# Patient Record
Sex: FEMALE | Race: BLACK OR AFRICAN AMERICAN | NOT HISPANIC OR LATINO | Employment: FULL TIME | URBAN - METROPOLITAN AREA
[De-identification: names, ages, dates, MRNs, and addresses within clinical notes are randomized per-mention and may not be internally consistent; named-entity substitution may affect disease eponyms.]

---

## 2017-04-06 ENCOUNTER — OFFICE VISIT (OUTPATIENT)
Dept: OBSTETRICS AND GYNECOLOGY | Facility: CLINIC | Age: 32
End: 2017-04-06
Payer: COMMERCIAL

## 2017-04-06 ENCOUNTER — TELEPHONE (OUTPATIENT)
Dept: OBSTETRICS AND GYNECOLOGY | Facility: CLINIC | Age: 32
End: 2017-04-06

## 2017-04-06 ENCOUNTER — HOSPITAL ENCOUNTER (OUTPATIENT)
Dept: RADIOLOGY | Facility: OTHER | Age: 32
Discharge: HOME | End: 2017-04-06
Attending: NURSE PRACTITIONER
Payer: COMMERCIAL

## 2017-04-06 VITALS
SYSTOLIC BLOOD PRESSURE: 114 MMHG | WEIGHT: 169.56 LBS | BODY MASS INDEX: 32.01 KG/M2 | HEIGHT: 61 IN | DIASTOLIC BLOOD PRESSURE: 82 MMHG

## 2017-04-06 DIAGNOSIS — R10.2 PELVIC PAIN IN FEMALE: Primary | ICD-10-CM

## 2017-04-06 DIAGNOSIS — R10.2 PELVIC PAIN IN FEMALE: ICD-10-CM

## 2017-04-06 LAB
CANDIDA RRNA VAG QL PROBE: NEGATIVE
G VAGINALIS RRNA GENITAL QL PROBE: NEGATIVE
T VAGINALIS RRNA GENITAL QL PROBE: NEGATIVE

## 2017-04-06 PROCEDURE — 87591 N.GONORRHOEAE DNA AMP PROB: CPT

## 2017-04-06 PROCEDURE — 76856 US EXAM PELVIC COMPLETE: CPT | Mod: 26,,, | Performed by: RADIOLOGY

## 2017-04-06 PROCEDURE — 76856 US EXAM PELVIC COMPLETE: CPT | Mod: TC

## 2017-04-06 PROCEDURE — 1160F RVW MEDS BY RX/DR IN RCRD: CPT | Mod: S$GLB,,, | Performed by: NURSE PRACTITIONER

## 2017-04-06 PROCEDURE — 76830 TRANSVAGINAL US NON-OB: CPT | Mod: 26,,, | Performed by: RADIOLOGY

## 2017-04-06 PROCEDURE — 87480 CANDIDA DNA DIR PROBE: CPT

## 2017-04-06 PROCEDURE — 99999 PR PBB SHADOW E&M-EST. PATIENT-LVL III: CPT | Mod: PBBFAC,,, | Performed by: NURSE PRACTITIONER

## 2017-04-06 PROCEDURE — 99213 OFFICE O/P EST LOW 20 MIN: CPT | Mod: S$GLB,,, | Performed by: NURSE PRACTITIONER

## 2017-04-06 NOTE — TELEPHONE ENCOUNTER
Returned pt call regarding experiencing right sided pelvic pain. No answer.  Left VM message to return call to clinic.

## 2017-04-06 NOTE — TELEPHONE ENCOUNTER
Returned pt call regarding experiencing pelvic pain.  Offer pt an appt in the urgent care clinic.  Informed pt of appt date and time.  Pt verbalized understanding.

## 2017-04-06 NOTE — PROGRESS NOTES
CC: Pelvic pain    Pt is 34 y.o. Here in urgent GYN care for evaluation of pelvic pain. The pain is described as cramping and stabbing, and is 5/10 in intensity. Pain was worse last night and could not go to sleep. Pain is located in the RLQ area and occasionally radiates down both legs. Onset was sudden occurring 2 days ago. Symptoms have been gradually worsening since. Aggravating factors: activity and a full bladder. Alleviating factors: sitting up and heating pad. Advil gave moderate relief. Associated symptoms: none. The patient denies constipation, fever, headache, nausea, sweats and vomiting. Risk factors for pelvic/abdominal pain include myomectomy. Pt stopped OCPs in December 2016-pt got . Pt is about to start her cycle. LMP 3/10/17. Pt reports h/o ovarian cysts and has had one rupture before.     Pt sees Dr. Mcmullen for her OB/GYN care.     ROS:  GENERAL: Feeling well overall. Denies fever or chills.   SKIN: Denies rash or lesions.   HEAD: Denies head injury or headache.   NODES: Denies enlarged lymph nodes.   CHEST: Denies chest pain or shortness of breath.   CARDIOVASCULAR: Denies palpitations or left sided chest pain.   ABDOMEN: No abdominal pain, constipation, diarrhea, nausea, vomiting or rectal bleeding.   URINARY: No dysuria, hematuria, or burning on urination.  REPRODUCTIVE: See HPI.   BREASTS: Denies pain, lumps, or nipple discharge.   HEMATOLOGIC: No easy bruisability or excessive bleeding.   MUSCULOSKELETAL: Denies joint pain or swelling.   NEUROLOGIC: Denies syncope or weakness.   PSYCHIATRIC: Denies depression, anxiety or mood swings.    PE:   APPEARANCE: Well nourished, well developed, White female in no acute distress.  VULVA: No lesions. Normal external female genitalia.  URETHRAL MEATUS: Normal size and location, no lesions, no prolapse.  URETHRA: No masses, tenderness, or prolapse.  VAGINA: Moist. No lesions or lacerations noted. No abnormal discharge present. No odor present.    CERVIX: No lesions or discharge. No cervical motion tenderness.   UTERUS: Normal size, regular shape, mobile, non-tender.  ADNEXA: No tenderness. No fullness or masses palpated in the adnexal regions.   ANUS PERINEUM: Normal.      Diagnosis:  1. Pelvic pain in female      Plan   GCCT and affirm pending  Pelvic u/s ordered  Discussed possible ovarian cyst or mittelschmertz pain  Follow-up pending results

## 2017-04-06 NOTE — TELEPHONE ENCOUNTER
----- Message from Hemalatha Chan sent at 4/6/2017 11:48 AM CDT -----  Contact: self  Pt returning a missed call, pt can be reached at [phone].

## 2017-04-06 NOTE — TELEPHONE ENCOUNTER
----- Message from Yasemin Oropeza sent at 4/6/2017  9:43 AM CDT -----  Contact: pt  x_  1st Request  _  2nd Request  _  3rd Request      Who:pt    Why: pain since last Tuesday in right ovary (pelvic area)    What Number to Call Back: [phone]    When to Expect a call back: (Before the end of the day)   -- if call after 3:00 call back will be tomorrow.

## 2017-04-07 ENCOUNTER — TELEPHONE (OUTPATIENT)
Dept: OBSTETRICS AND GYNECOLOGY | Facility: CLINIC | Age: 32
End: 2017-04-07

## 2017-04-07 LAB
C TRACH DNA SPEC QL NAA+PROBE: NOT DETECTED
N GONORRHOEA DNA SPEC QL NAA+PROBE: NOT DETECTED

## 2017-04-07 RX ORDER — ALBUTEROL SULFATE 90 UG/1
2 AEROSOL, METERED RESPIRATORY (INHALATION) EVERY 6 HOURS PRN
Qty: 18 G | Refills: 3 | Status: SHIPPED | OUTPATIENT
Start: 2017-04-07 | End: 2019-02-05 | Stop reason: SDUPTHER

## 2017-04-07 NOTE — TELEPHONE ENCOUNTER
----- Message from Hemalatha Chan sent at 4/7/2017 10:01 AM CDT -----  Contact: self  Pt needing a call back regarding her appt to Urgent Care on yesterday, she can be reached at [phone].

## 2017-04-07 NOTE — TELEPHONE ENCOUNTER
Returned pt call regarding her urgent care appt.  Pt stated that she seen her vaginosis culture came back negative but she did not receive her U/S results.  Informed pt that [employer] emailed her the results through [employer].  Read results to pt and inform her that the U/S showed 3 large fibroids.  Scheduled consult with Dr Mcmullen.  Informed pt to of appt date and time.  Pt verbalized understanding.  Letter sent out.

## 2017-04-12 ENCOUNTER — TELEPHONE (OUTPATIENT)
Dept: INTERNAL MEDICINE | Facility: CLINIC | Age: 32
End: 2017-04-12

## 2017-04-12 ENCOUNTER — OFFICE VISIT (OUTPATIENT)
Dept: INTERNAL MEDICINE | Facility: CLINIC | Age: 32
End: 2017-04-12
Payer: COMMERCIAL

## 2017-04-12 VITALS
HEIGHT: 62 IN | RESPIRATION RATE: 16 BRPM | BODY MASS INDEX: 31.68 KG/M2 | TEMPERATURE: 98 F | HEART RATE: 76 BPM | SYSTOLIC BLOOD PRESSURE: 102 MMHG | DIASTOLIC BLOOD PRESSURE: 62 MMHG | WEIGHT: 172.19 LBS

## 2017-04-12 DIAGNOSIS — K46.9 HERNIA: Primary | ICD-10-CM

## 2017-04-12 DIAGNOSIS — Z00.00 ROUTINE GENERAL MEDICAL EXAMINATION AT A HEALTH CARE FACILITY: Primary | ICD-10-CM

## 2017-04-12 PROCEDURE — 99999 PR PBB SHADOW E&M-EST. PATIENT-LVL III: CPT | Mod: PBBFAC,,, | Performed by: INTERNAL MEDICINE

## 2017-04-12 PROCEDURE — 1160F RVW MEDS BY RX/DR IN RCRD: CPT | Mod: S$GLB,,, | Performed by: INTERNAL MEDICINE

## 2017-04-12 PROCEDURE — 99213 OFFICE O/P EST LOW 20 MIN: CPT | Mod: S$GLB,,, | Performed by: INTERNAL MEDICINE

## 2017-04-12 NOTE — MR AVS SNAPSHOT
[facility]   [facility]  [place] [address]  Phone: [phone]  Fax: [phone]                  Melita Shell   2017 2:20 PM   Office Visit    Description:  Female : 1985   Provider:  Claudia Barajas MD   Department:  [facility]           Reason for Visit     Follow-up           Diagnoses this Visit        Comments    Hernia    -  Primary            To Do List           Future Appointments        Provider Department Dept Phone    2017 4:15 PM Milagros Mcmullen MD [facility] [address] [phone]    2017 2:15 PM Milagros Mcmullen MD [facility] [address] [phone]      Goals (5 Years of Data)     None      Follow-Up and Disposition     Return in about 3 months (around 2017).      [facility]     [facility] -  Assistance  Unless otherwise directed by your provider, please contact [facility], our nurse care line that is available for  assistance.     Registered nurses in the [facility] Center provide: appointment scheduling, clinical advisement, health education, and other advisory services.  Call: [phone] (toll free)               Medications           Message regarding Medications     Verify the changes and/or additions to your medication regime listed below are the same as discussed with your clinician today.  If any of these changes or additions are incorrect, please notify your healthcare provider.             Verify that the below list of medications is an accurate representation of the medications you are currently taking.  If none reported, the list may be blank. If incorrect, please contact your healthcare provider. Carry this list with you in case of emergency.           Current Medications     albuterol 90 mcg/actuation inhaler Inhale 2 puffs into the lungs every 6 (six) hours as needed for Wheezing.    CYANOCOBALAMIN, VITAMIN B-12,  "(LIQUID B 12 ORAL) Take by mouth.    DOCOSAHEXANOIC ACID/EPA (FISH OIL ORAL) Take by mouth.    FOLIC ACID ORAL Take by mouth.    mometasone (ASMANEX TWISTHALER) 220 mcg (60 doses) AePB Inhale 2 puffs into the lungs 2 (two) times daily. Controller    MULTIVITS,CA,MINERALS/IRON/FA (ONE-A-DAY WOMENS FORMULA ORAL) Take by mouth.    potassium 99 mg Tab Take by mouth.           Clinical Reference Information           Your Vitals Were     BP Pulse Temp Resp Height Weight    102/62 (BP Location: Left arm, Patient Position: Sitting, BP Method: Manual) 76 98 °F (36.7 °C) (Oral) 16 5' 2" (1.575 m) 78.1 kg (172 lb 2.9 oz)    Last Period BMI             04/06/2017 31.49 kg/m2         Blood Pressure          Most Recent Value    BP  102/62      Allergies as of 4/12/2017     Pcn [Penicillins]      Immunizations Administered on Date of Encounter - 4/12/2017     None      Instructions      What Is a Hernia?    A hernia is when an organ or tissue pushes through a weak area in the belly (abdominal) wall. This weak area may be present at birth. Or it may be caused by abdominal strain over time. If not treated, a hernia can get worse with time and physical stress.  When a bulge forms  When there is a weak area in the abdominal wall, an organ or tissue can push outward. This often causes a bulge that you can see under your skin. The bulge may get bigger when you stand up. It may go away when you lie down. You may also feel some pressure or mild pain when lifting, coughing, urinating, or doing other activities.  Types of hernias  The type of hernia you have depends on its location. Most hernias form in the groin at or near the internal ring. This is the entrance to a canal between the abdomen and groin. Hernias can also occur in the abdomen, thigh, or genitals.  · An incisional hernia occurs at the site of a previous surgical incision.  · An umbilical hernia occurs at the navel.  · An indirect inguinal hernia occurs in the groin at the " internal ring.  · A direct inguinal hernia occurs in the groin near the internal ring.  · A femoral hernia occurs just below the groin.  · An epigastric hernia occurs in the upper abdomen at the midline.  Diagnosis  In most cases, your healthcare provider can diagnose a hernia by doing a physical exam.  In some cases it might not be clear why you have a swelling in the belly wall. Then your provider may order an imaging test such as an ultrasound. This can help with the diagnosis.  Surgery  A hernia will not heal on its own. Surgery is needed to fix the weak spot in the abdominal wall. If not treated, a hernia can get larger. It can also cause serious health problems. The good news is that hernia surgery can be done quickly and safely. In some cases, you can go home the same day as your surgery.   When to call your provider  Call your healthcare provider right away if the swelling around your hernia becomes larger, firmer, or more painful. These may be signs that your intestines are stuck in the abdominal wall. This is an emergency. The hernia must be repaired right away to avoid serious problems.  Date Last Reviewed: 7/1/2016 © 2000-2016 [employer]. [address] [place] [address]. All rights reserved. This information is not intended as a substitute for professional medical care. Always follow your healthcare professional's instructions.             Language Assistance Services     ATTENTION: Language assistance services are available, free of charge. Please call [phone].      ATENCIÓN: Si habla español, tiene a sutherland disposición servicios gratuitos de asistencia lingüística. Llame al [phone].     [facility] Ý: N?u b?n nói Ti?ng Vi?t, có các d?ch v? h? tr? ngôn ng? mi?n phí dành cho b?n. G?i s? [phone].         [facility] complies with applicable Federal civil rights laws and does not discriminate on the basis of race, color, national origin, age, disability,  or sex.

## 2017-04-12 NOTE — PATIENT INSTRUCTIONS
What Is a Hernia?    A hernia is when an organ or tissue pushes through a weak area in the belly (abdominal) wall. This weak area may be present at birth. Or it may be caused by abdominal strain over time. If not treated, a hernia can get worse with time and physical stress.  When a bulge forms  When there is a weak area in the abdominal wall, an organ or tissue can push outward. This often causes a bulge that you can see under your skin. The bulge may get bigger when you stand up. It may go away when you lie down. You may also feel some pressure or mild pain when lifting, coughing, urinating, or doing other activities.  Types of hernias  The type of hernia you have depends on its location. Most hernias form in the groin at or near the internal ring. This is the entrance to a canal between the abdomen and groin. Hernias can also occur in the abdomen, thigh, or genitals.  · An incisional hernia occurs at the site of a previous surgical incision.  · An umbilical hernia occurs at the navel.  · An indirect inguinal hernia occurs in the groin at the internal ring.  · A direct inguinal hernia occurs in the groin near the internal ring.  · A femoral hernia occurs just below the groin.  · An epigastric hernia occurs in the upper abdomen at the midline.  Diagnosis  In most cases, your healthcare provider can diagnose a hernia by doing a physical exam.  In some cases it might not be clear why you have a swelling in the belly wall. Then your provider may order an imaging test such as an ultrasound. This can help with the diagnosis.  Surgery  A hernia will not heal on its own. Surgery is needed to fix the weak spot in the abdominal wall. If not treated, a hernia can get larger. It can also cause serious health problems. The good news is that hernia surgery can be done quickly and safely. In some cases, you can go home the same day as your surgery.   When to call your provider  Call your healthcare provider right away if the  swelling around your hernia becomes larger, firmer, or more painful. These may be signs that your intestines are stuck in the abdominal wall. This is an emergency. The hernia must be repaired right away to avoid serious problems.  Date Last Reviewed: 7/1/2016  © 9768-8646 [employer]. [address] [place] [address]. All rights reserved. This information is not intended as a substitute for professional medical care. Always follow your healthcare professional's instructions.
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
## 2017-05-05 ENCOUNTER — OFFICE VISIT (OUTPATIENT)
Dept: OBSTETRICS AND GYNECOLOGY | Facility: CLINIC | Age: 32
End: 2017-05-05
Attending: OBSTETRICS & GYNECOLOGY
Payer: COMMERCIAL

## 2017-05-05 VITALS
BODY MASS INDEX: 31.85 KG/M2 | SYSTOLIC BLOOD PRESSURE: 124 MMHG | DIASTOLIC BLOOD PRESSURE: 76 MMHG | WEIGHT: 174.19 LBS

## 2017-05-05 DIAGNOSIS — D25.1 FIBROIDS, INTRAMURAL: Primary | ICD-10-CM

## 2017-05-05 PROCEDURE — 99999 PR PBB SHADOW E&M-EST. PATIENT-LVL II: CPT | Mod: PBBFAC,,, | Performed by: OBSTETRICS & GYNECOLOGY

## 2017-05-05 PROCEDURE — 99213 OFFICE O/P EST LOW 20 MIN: CPT | Mod: S$GLB,,, | Performed by: OBSTETRICS & GYNECOLOGY

## 2017-05-05 PROCEDURE — 1160F RVW MEDS BY RX/DR IN RCRD: CPT | Mod: S$GLB,,, | Performed by: OBSTETRICS & GYNECOLOGY

## 2017-05-05 NOTE — PROGRESS NOTES
Chief Complaint: Pelvic pain    Melita Shell is a 32 y.o. female  presents for a follow-up of ultrasound.  She was experiencing pelvic pain similar to what she felt before myomectomy.  She reports bleeding is stable and not too heavy.  She is currently interested in conceiving and has been trying since December.      Ultrasound shows:    Comparison: Limited Pelvic ultrasound 13 and MRI pelvis 11    Findings: Transabdominal and transvaginal pelvic ultrasound performed.      The uterus is enlarged, lobulated and heterogeneous measuring 13.2 cm in length and 10 x 11.6 cm in transverse dimensions.  The endometrium was not well visualized due to shadowing and therefore not evaluated. There are several heterogeneous hypoechoic masses noted throughout the uterus, with the 3 largest appearing myometrial and measuring 6.5 x 7.5 x 7.2 cm anterior left upper segment, 5 x 6.2 x 6.1 cm at the right fundus, and 4.5 x 5.2 x 4.6 cm at the posterior and right fundus.  Multiple large fibroids were previously noted on prior MRI pelvis in .   The right ovary measures 6 x 3 x 4.8 cm with intact arterial and venous flow, containing several normal size and also dominant simple appearing follicles.  The left ovary was not definitively seen. No left adnexal mass identified. No significant amount of free fluid identified.    /76  Wt 79 kg (174 lb 2.6 oz)  LMP 2017  BMI 31.85 kg/m2    ROS:  GENERAL: No fever, chills, fatigability or weight loss.  VULVAR: No pain, no lesions and no itching.  VAGINAL: No relaxation, no itching, no discharge, no abnormal bleeding and no lesions.  ABDOMEN: No abdominal pain. Denies nausea. Denies vomiting. No diarrhea. No constipation  BREAST: Denies pain. No lumps. No discharge.  URINARY: No incontinence, no nocturia, no frequency and no dysuria.  CARDIOVASCULAR: No chest pain. No shortness of breath. No leg cramps.  NEUROLOGICAL: No headaches. No vision  changes.    Physical exam:      PELVIC: Normal external genitalia without lesions.  Normal hair distribution.  Adequate perineal body, normal urethral meatus.  Vagina moist and well rugated without lesions or discharge.  Cervix pink, without lesions, discharge or tenderness.  No significant cystocele or rectocele.  Bimanual exam shows uterus to be approximately 12 week size, irregular, mobile and nontender.  Adnexa without masses or tenderness.      1. Fibroids, intramural         Discussed ultrasound findings.  As patient is essentially asymptomatic, I do not recommend removal at this time.  Discussed possible complications of fibroids during pregnancy.  Patient agrees with management.  If she has not conceived by December, will further discuss management.    RTC in 6 months

## 2017-05-28 ENCOUNTER — PATIENT MESSAGE (OUTPATIENT)
Dept: OBSTETRICS AND GYNECOLOGY | Facility: CLINIC | Age: 32
End: 2017-05-28

## 2017-06-12 ENCOUNTER — OFFICE VISIT (OUTPATIENT)
Dept: INTERNAL MEDICINE | Facility: CLINIC | Age: 32
End: 2017-06-12
Payer: COMMERCIAL

## 2017-06-12 ENCOUNTER — LAB VISIT (OUTPATIENT)
Dept: LAB | Facility: HOSPITAL | Age: 32
End: 2017-06-12
Attending: INTERNAL MEDICINE
Payer: COMMERCIAL

## 2017-06-12 VITALS
HEART RATE: 76 BPM | HEIGHT: 62 IN | BODY MASS INDEX: 33.07 KG/M2 | SYSTOLIC BLOOD PRESSURE: 112 MMHG | DIASTOLIC BLOOD PRESSURE: 80 MMHG | TEMPERATURE: 98 F | WEIGHT: 179.69 LBS

## 2017-06-12 DIAGNOSIS — Z00.00 ROUTINE GENERAL MEDICAL EXAMINATION AT A HEALTH CARE FACILITY: ICD-10-CM

## 2017-06-12 DIAGNOSIS — Z00.00 ROUTINE MEDICAL EXAM: Primary | ICD-10-CM

## 2017-06-12 LAB
25(OH)D3+25(OH)D2 SERPL-MCNC: 9 NG/ML
ALBUMIN SERPL BCP-MCNC: 3.3 G/DL
ALP SERPL-CCNC: 59 U/L
ALT SERPL W/O P-5'-P-CCNC: 29 U/L
ANION GAP SERPL CALC-SCNC: 6 MMOL/L
AST SERPL-CCNC: 23 U/L
BASOPHILS # BLD AUTO: 0.01 K/UL
BASOPHILS NFR BLD: 0.2 %
BILIRUB SERPL-MCNC: 0.5 MG/DL
BUN SERPL-MCNC: 12 MG/DL
CALCIUM SERPL-MCNC: 8.8 MG/DL
CHLORIDE SERPL-SCNC: 104 MMOL/L
CHOLEST/HDLC SERPL: 3.1 {RATIO}
CO2 SERPL-SCNC: 26 MMOL/L
CREAT SERPL-MCNC: 0.8 MG/DL
DIFFERENTIAL METHOD: ABNORMAL
EOSINOPHIL # BLD AUTO: 0.1 K/UL
EOSINOPHIL NFR BLD: 1.8 %
ERYTHROCYTE [DISTWIDTH] IN BLOOD BY AUTOMATED COUNT: 13.3 %
EST. GFR  (AFRICAN AMERICAN): >60 ML/MIN/1.73 M^2
EST. GFR  (NON AFRICAN AMERICAN): >60 ML/MIN/1.73 M^2
GLUCOSE SERPL-MCNC: 90 MG/DL
HCT VFR BLD AUTO: 36.5 %
HDL/CHOLESTEROL RATIO: 31.8 %
HDLC SERPL-MCNC: 132 MG/DL
HDLC SERPL-MCNC: 42 MG/DL
HGB BLD-MCNC: 11.9 G/DL
LDLC SERPL CALC-MCNC: 79.4 MG/DL
LYMPHOCYTES # BLD AUTO: 1.8 K/UL
LYMPHOCYTES NFR BLD: 29 %
MCH RBC QN AUTO: 28.3 PG
MCHC RBC AUTO-ENTMCNC: 32.6 %
MCV RBC AUTO: 87 FL
MONOCYTES # BLD AUTO: 0.4 K/UL
MONOCYTES NFR BLD: 7.3 %
NEUTROPHILS # BLD AUTO: 3.7 K/UL
NEUTROPHILS NFR BLD: 61.2 %
NONHDLC SERPL-MCNC: 90 MG/DL
PLATELET # BLD AUTO: 355 K/UL
PMV BLD AUTO: 10.4 FL
POTASSIUM SERPL-SCNC: 4.4 MMOL/L
PROT SERPL-MCNC: 7.4 G/DL
RBC # BLD AUTO: 4.2 M/UL
SODIUM SERPL-SCNC: 136 MMOL/L
TRIGL SERPL-MCNC: 53 MG/DL
TSH SERPL DL<=0.005 MIU/L-ACNC: 2.47 UIU/ML
WBC # BLD AUTO: 6.03 K/UL

## 2017-06-12 PROCEDURE — 36415 COLL VENOUS BLD VENIPUNCTURE: CPT | Mod: PO

## 2017-06-12 PROCEDURE — 84443 ASSAY THYROID STIM HORMONE: CPT

## 2017-06-12 PROCEDURE — 80061 LIPID PANEL: CPT

## 2017-06-12 PROCEDURE — 99999 PR PBB SHADOW E&M-EST. PATIENT-LVL III: CPT | Mod: PBBFAC,,, | Performed by: INTERNAL MEDICINE

## 2017-06-12 PROCEDURE — 85025 COMPLETE CBC W/AUTO DIFF WBC: CPT

## 2017-06-12 PROCEDURE — 99395 PREV VISIT EST AGE 18-39: CPT | Mod: S$GLB,,, | Performed by: INTERNAL MEDICINE

## 2017-06-12 PROCEDURE — 80053 COMPREHEN METABOLIC PANEL: CPT

## 2017-06-12 PROCEDURE — 82306 VITAMIN D 25 HYDROXY: CPT
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
## 2017-06-13 ENCOUNTER — TELEPHONE (OUTPATIENT)
Dept: INTERNAL MEDICINE | Facility: CLINIC | Age: 32
End: 2017-06-13

## 2017-06-13 ENCOUNTER — PATIENT MESSAGE (OUTPATIENT)
Dept: INTERNAL MEDICINE | Facility: CLINIC | Age: 32
End: 2017-06-13

## 2017-06-13 DIAGNOSIS — D64.9 ANEMIA, UNSPECIFIED TYPE: Primary | ICD-10-CM

## 2017-06-13 RX ORDER — ERGOCALCIFEROL 1.25 MG/1
50000 CAPSULE ORAL
Qty: 4 CAPSULE | Refills: 3 | Status: SHIPPED | OUTPATIENT
Start: 2017-06-13 | End: 2019-02-05 | Stop reason: SDUPTHER

## 2017-06-13 NOTE — TELEPHONE ENCOUNTER
Labs are significant for mild anemia and low vitamin D.  Please schedule iron studies.  Prescription for vitamin D has been sent to her pharmacy electronically.

## 2017-06-13 NOTE — TELEPHONE ENCOUNTER
Sent rivka bhattig with  comments and lab appt booked for Friday.  i said to call phone staff if need different/date or time or location.

## 2017-06-16 ENCOUNTER — LAB VISIT (OUTPATIENT)
Dept: LAB | Facility: HOSPITAL | Age: 32
End: 2017-06-16
Attending: INTERNAL MEDICINE
Payer: COMMERCIAL

## 2017-06-16 DIAGNOSIS — D64.9 ANEMIA, UNSPECIFIED TYPE: ICD-10-CM

## 2017-06-16 LAB
FERRITIN SERPL-MCNC: 54 NG/ML
IRON SERPL-MCNC: 65 UG/DL
SATURATED IRON: 18 %
TOTAL IRON BINDING CAPACITY: 371 UG/DL
TRANSFERRIN SERPL-MCNC: 251 MG/DL

## 2017-06-16 PROCEDURE — 36415 COLL VENOUS BLD VENIPUNCTURE: CPT | Mod: PO

## 2017-06-16 PROCEDURE — 82728 ASSAY OF FERRITIN: CPT

## 2017-06-16 PROCEDURE — 83540 ASSAY OF IRON: CPT

## 2017-07-07 ENCOUNTER — OFFICE VISIT (OUTPATIENT)
Dept: OBSTETRICS AND GYNECOLOGY | Facility: CLINIC | Age: 32
End: 2017-07-07
Attending: OBSTETRICS & GYNECOLOGY
Payer: COMMERCIAL

## 2017-07-07 VITALS
SYSTOLIC BLOOD PRESSURE: 110 MMHG | HEIGHT: 62 IN | DIASTOLIC BLOOD PRESSURE: 66 MMHG | WEIGHT: 178.38 LBS | BODY MASS INDEX: 32.82 KG/M2

## 2017-07-07 DIAGNOSIS — Z01.419 VISIT FOR GYNECOLOGIC EXAMINATION: Primary | ICD-10-CM

## 2017-07-07 PROCEDURE — 99999 PR PBB SHADOW E&M-EST. PATIENT-LVL II: CPT | Mod: PBBFAC,,, | Performed by: OBSTETRICS & GYNECOLOGY

## 2017-07-07 PROCEDURE — 99395 PREV VISIT EST AGE 18-39: CPT | Mod: S$GLB,,, | Performed by: OBSTETRICS & GYNECOLOGY
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
## 2017-11-30 ENCOUNTER — TELEPHONE (OUTPATIENT)
Dept: OBSTETRICS AND GYNECOLOGY | Facility: CLINIC | Age: 32
End: 2017-11-30

## 2017-11-30 NOTE — TELEPHONE ENCOUNTER
Contacted the pt in attempt to r/s her appointment on 11/30. No answer, left VM message for the pt to call the clinic back.

## 2017-12-01 ENCOUNTER — TELEPHONE (OUTPATIENT)
Dept: OBSTETRICS AND GYNECOLOGY | Facility: CLINIC | Age: 32
End: 2017-12-01

## 2017-12-14 ENCOUNTER — TELEPHONE (OUTPATIENT)
Dept: OBSTETRICS AND GYNECOLOGY | Facility: CLINIC | Age: 32
End: 2017-12-14

## 2017-12-14 NOTE — TELEPHONE ENCOUNTER
----- Message from Trisha Liz sent at 12/14/2017  9:35 AM CST -----  Contact: self  x_  1st Request  _  2nd Request  _  3rd Request    Who: pt    Why: pt states she need to speak with a nurse in regards to an appt that was cancelled but never rescheduled.. Pt would like to be seen before January.. Please advise...    What Number to Call Back: [phone]    When to Expect a call back: (Before the end of the day)   -- if call after 3:00 call back will be tomorrow.

## 2017-12-14 NOTE — TELEPHONE ENCOUNTER
Returned the pt's call to the clinic. Pt informed me that she needed to be r/s from the appointment that was canceled when Dr. Mcmullen was out sick. Pt offered an appointment on 1/4/18 at 10:30 for her fibroids ff/u. Pt agreed to the appointment and given the appointment location, date, and time. Pt verbalized understanding. Letter sent out.

## 2018-01-02 ENCOUNTER — TELEPHONE (OUTPATIENT)
Dept: INTERNAL MEDICINE | Facility: CLINIC | Age: 33
End: 2018-01-02

## 2018-01-02 NOTE — TELEPHONE ENCOUNTER
----- Message from Aries Knapp sent at 1/2/2018 11:48 AM CST -----  Contact: self [phone]  Patient would like to come in for immunization shots for graduate school    Please advise

## 2018-01-04 ENCOUNTER — OFFICE VISIT (OUTPATIENT)
Dept: OBSTETRICS AND GYNECOLOGY | Facility: CLINIC | Age: 33
End: 2018-01-04
Attending: OBSTETRICS & GYNECOLOGY
Payer: COMMERCIAL

## 2018-01-04 VITALS
DIASTOLIC BLOOD PRESSURE: 78 MMHG | SYSTOLIC BLOOD PRESSURE: 104 MMHG | HEIGHT: 62 IN | WEIGHT: 174.81 LBS | BODY MASS INDEX: 32.17 KG/M2

## 2018-01-04 DIAGNOSIS — N92.6 IRREGULAR MENSES: Primary | ICD-10-CM

## 2018-01-04 PROCEDURE — 99999 PR PBB SHADOW E&M-EST. PATIENT-LVL II: CPT | Mod: PBBFAC,,, | Performed by: OBSTETRICS & GYNECOLOGY

## 2018-01-04 PROCEDURE — 99213 OFFICE O/P EST LOW 20 MIN: CPT | Mod: S$GLB,,, | Performed by: OBSTETRICS & GYNECOLOGY

## 2018-01-04 NOTE — PROGRESS NOTES
"Chief complaint: Fibroids    Melita Shell is a 32 y.o. female  presents for follow-up of fibroids.      She and her  have been trying to conceive for over a year.  She has a history of fibroids and reports some pelvic pain.  She reports bleeding is moderate.  LMP 17.  She has not been performing OPK's.  Her  does not have any children.      Last ultrasound shows:    Findings: Transabdominal and transvaginal pelvic ultrasound performed.  The uterus is enlarged, lobulated and heterogeneous measuring 13.2 cm in length and 10 x 11.6 cm in transverse dimensions.  The endometrium was not well visualized due to shadowing and therefore not evaluated. There are several heterogeneous hypoechoic masses noted throughout the uterus, with the 3 largest appearing myometrial and measuring 6.5 x 7.5 x 7.2 cm anterior left upper segment, 5 x 6.2 x 6.1 cm at the right fundus, and 4.5 x 5.2 x 4.6 cm at the posterior and right fundus.  Multiple large fibroids were previously noted on prior MRI pelvis in .   The right ovary measures 6 x 3 x 4.8 cm with intact arterial and venous flow, containing several normal size and also dominant simple appearing follicles.  The left ovary was not definitively seen. No left adnexal mass identified. No significant amount of free fluid identified.    /78 (BP Location: Left arm, Patient Position: Sitting, BP Method: Small (Manual))   Ht 5' 2" (1.575 m)   Wt 79.3 kg (174 lb 13.2 oz)   LMP 2017 (Exact Date)   BMI 31.98 kg/m²     ROS:  GENERAL: No fever, chills, fatigability or weight loss.  VULVAR: No pain, no lesions and no itching.  VAGINAL: No relaxation, no itching, no discharge, no abnormal bleeding and no lesions.  ABDOMEN: Reports abdominal pain associated with menses. Denies nausea. Denies vomiting. No diarrhea. No constipation  BREAST: Denies pain. No lumps. No discharge.  URINARY: No incontinence, no nocturia, no frequency and no " dysuria.  CARDIOVASCULAR: No chest pain. No shortness of breath. No leg cramps.  NEUROLOGICAL: No headaches. No vision changes.    Physical exam:    Deferred    Discussed the diagnosis of infertility and evaluation.  Information for semen analysis given.  Patient to call on CD#1.  FSH, LH, TSH, prolactin, estradiol to be drawn on CD # 3, progesterone on CD #21.  HSG on CD #5-9.  Recommended OPK's to help for timing of intercourse.     Discussed fibroids and that she would most likely need to have them removed for improved pregnancy outcomes but may not be contributing to infertility.  Will begin evaluation for infertility.  If all WNL, would recommend a myomectomy.    Will email/call with results.

## 2018-01-08 ENCOUNTER — TELEPHONE (OUTPATIENT)
Dept: INTERNAL MEDICINE | Facility: CLINIC | Age: 33
End: 2018-01-08

## 2018-01-08 NOTE — TELEPHONE ENCOUNTER
Called and spoke with the patient and she states she wants a sooner appt as she is in pain UC scheduled and also patient states she has a form for school. Termed the call

## 2018-01-08 NOTE — TELEPHONE ENCOUNTER
----- Message from Lorrie Milton sent at 1/3/2018 10:56 AM CST -----  Contact: Pt [phone]  Patient would like to get medical advice.  Symptoms (please be specific):  umbilical hernia   How long has patient had these symptoms: 1/2/18    Pharmacy name and phone #:  [facility] [id number], [place] - [address] AT [facility] [phone] (Phone)  [phone] (Fax)  Any drug allergies:    Comments:

## 2018-01-10 ENCOUNTER — LAB VISIT (OUTPATIENT)
Dept: LAB | Facility: HOSPITAL | Age: 33
End: 2018-01-10
Attending: INTERNAL MEDICINE
Payer: COMMERCIAL

## 2018-01-10 ENCOUNTER — OFFICE VISIT (OUTPATIENT)
Dept: INTERNAL MEDICINE | Facility: CLINIC | Age: 33
End: 2018-01-10
Payer: COMMERCIAL

## 2018-01-10 ENCOUNTER — TELEPHONE (OUTPATIENT)
Dept: INTERNAL MEDICINE | Facility: CLINIC | Age: 33
End: 2018-01-10

## 2018-01-10 ENCOUNTER — PATIENT MESSAGE (OUTPATIENT)
Dept: INTERNAL MEDICINE | Facility: CLINIC | Age: 33
End: 2018-01-10

## 2018-01-10 VITALS
DIASTOLIC BLOOD PRESSURE: 80 MMHG | WEIGHT: 174.81 LBS | TEMPERATURE: 99 F | SYSTOLIC BLOOD PRESSURE: 104 MMHG | HEART RATE: 84 BPM | HEIGHT: 62 IN | BODY MASS INDEX: 32.17 KG/M2

## 2018-01-10 DIAGNOSIS — R10.9 ABDOMINAL PAIN, UNSPECIFIED ABDOMINAL LOCATION: Primary | ICD-10-CM

## 2018-01-10 DIAGNOSIS — K45.8 OTHER SPECIFIED ABDOMINAL HERNIA WITHOUT OBSTRUCTION OR GANGRENE: ICD-10-CM

## 2018-01-10 DIAGNOSIS — Z01.84 IMMUNITY STATUS TESTING: ICD-10-CM

## 2018-01-10 DIAGNOSIS — Z23 NEED FOR DIPHTHERIA-TETANUS-PERTUSSIS (TDAP) VACCINE, ADULT/ADOLESCENT: ICD-10-CM

## 2018-01-10 PROCEDURE — 86762 RUBELLA ANTIBODY: CPT

## 2018-01-10 PROCEDURE — 90715 TDAP VACCINE 7 YRS/> IM: CPT | Mod: S$GLB,,, | Performed by: INTERNAL MEDICINE

## 2018-01-10 PROCEDURE — 36415 COLL VENOUS BLD VENIPUNCTURE: CPT | Mod: PO

## 2018-01-10 PROCEDURE — 86765 RUBEOLA ANTIBODY: CPT

## 2018-01-10 PROCEDURE — 99999 PR PBB SHADOW E&M-EST. PATIENT-LVL III: CPT | Mod: PBBFAC,,, | Performed by: INTERNAL MEDICINE

## 2018-01-10 PROCEDURE — 86735 MUMPS ANTIBODY: CPT

## 2018-01-10 PROCEDURE — 99213 OFFICE O/P EST LOW 20 MIN: CPT | Mod: 25,S$GLB,, | Performed by: INTERNAL MEDICINE

## 2018-01-10 PROCEDURE — 90471 IMMUNIZATION ADMIN: CPT | Mod: S$GLB,,, | Performed by: INTERNAL MEDICINE
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
## 2018-01-11 LAB
MUMPS IGG INTERPRETATION: POSITIVE
MUMPS IGG SCREEN: 2.58 ISR
RUBV IGG SER-ACNC: 21.1 IU/ML
RUBV IGG SER-IMP: REACTIVE

## 2018-01-15 ENCOUNTER — PATIENT MESSAGE (OUTPATIENT)
Dept: OBSTETRICS AND GYNECOLOGY | Facility: CLINIC | Age: 33
End: 2018-01-15

## 2018-01-16 ENCOUNTER — TELEPHONE (OUTPATIENT)
Dept: OBSTETRICS AND GYNECOLOGY | Facility: CLINIC | Age: 33
End: 2018-01-16

## 2018-01-16 ENCOUNTER — TELEPHONE (OUTPATIENT)
Dept: INTERNAL MEDICINE | Facility: CLINIC | Age: 33
End: 2018-01-16
Payer: COMMERCIAL

## 2018-01-16 DIAGNOSIS — Z23 NEED FOR MEASLES-MUMPS-RUBELLA (MMR) VACCINE: Primary | ICD-10-CM

## 2018-01-16 PROCEDURE — 81025 URINE PREGNANCY TEST: CPT | Mod: S$GLB,,, | Performed by: INTERNAL MEDICINE

## 2018-01-16 NOTE — TELEPHONE ENCOUNTER
Returned the pt's call to the clinic. Pt scheduled for labs on 1/17 since her cycle started on Monday 15, 2018. Pt agreed to schedule her lab draw at [facility] for 11:45AM. Pt informed of the appointment location, date, and time. Pt verbalized understanding. Letter reminder sent out.

## 2018-01-16 NOTE — TELEPHONE ENCOUNTER
----- Message from Whit Slater sent at 1/16/2018  8:22 AM CST -----  Contact: pt  x_ 1st Request  _ 2nd Request  _ 3rd Request    Who: pt    Why: was instructed to call once she start her 1st day of her cycle. Today it started and the patient is now needing instructions    What Number to Call Back: [phone]    When to Expect a call back: (Before the end of the day)  -- if call after 3:00 call back will be tomorrow.

## 2018-01-17 ENCOUNTER — PATIENT MESSAGE (OUTPATIENT)
Dept: INTERNAL MEDICINE | Facility: CLINIC | Age: 33
End: 2018-01-17

## 2018-01-17 LAB
RUBEOLA IGG ANTIBODY: 1.07 ISR
RUBEOLA INTERPRETATION: ABNORMAL

## 2018-01-17 NOTE — TELEPHONE ENCOUNTER
Please inform patient that measles titer is equivocal.  Need to schedule an MMR with UPT prior to getting the immunization.

## 2018-01-18 ENCOUNTER — LAB VISIT (OUTPATIENT)
Dept: LAB | Facility: OTHER | Age: 33
End: 2018-01-18
Attending: OBSTETRICS & GYNECOLOGY
Payer: COMMERCIAL

## 2018-01-18 DIAGNOSIS — N92.6 IRREGULAR MENSES: ICD-10-CM

## 2018-01-18 LAB — TSH SERPL DL<=0.005 MIU/L-ACNC: 2.28 UIU/ML

## 2018-01-18 PROCEDURE — 84146 ASSAY OF PROLACTIN: CPT

## 2018-01-18 PROCEDURE — 36415 COLL VENOUS BLD VENIPUNCTURE: CPT

## 2018-01-18 PROCEDURE — 84443 ASSAY THYROID STIM HORMONE: CPT

## 2018-01-18 PROCEDURE — 83002 ASSAY OF GONADOTROPIN (LH): CPT

## 2018-01-18 PROCEDURE — 82670 ASSAY OF TOTAL ESTRADIOL: CPT

## 2018-01-18 PROCEDURE — 83001 ASSAY OF GONADOTROPIN (FSH): CPT

## 2018-01-19 LAB
ESTRADIOL SERPL-MCNC: 34 PG/ML
FSH SERPL-ACNC: 8.1 MIU/ML
LH SERPL-ACNC: 3.4 MIU/ML
PROLACTIN SERPL IA-MCNC: 17.4 NG/ML

## 2018-01-21 ENCOUNTER — PATIENT MESSAGE (OUTPATIENT)
Dept: OBSTETRICS AND GYNECOLOGY | Facility: CLINIC | Age: 33
End: 2018-01-21

## 2018-01-23 ENCOUNTER — TELEPHONE (OUTPATIENT)
Dept: INTERNAL MEDICINE | Facility: CLINIC | Age: 33
End: 2018-01-23

## 2018-01-23 ENCOUNTER — OFFICE VISIT (OUTPATIENT)
Dept: SURGERY | Facility: CLINIC | Age: 33
End: 2018-01-23
Payer: COMMERCIAL

## 2018-01-23 ENCOUNTER — CLINICAL SUPPORT (OUTPATIENT)
Dept: INTERNAL MEDICINE | Facility: CLINIC | Age: 33
End: 2018-01-23
Payer: COMMERCIAL

## 2018-01-23 VITALS
TEMPERATURE: 99 F | DIASTOLIC BLOOD PRESSURE: 70 MMHG | HEART RATE: 61 BPM | SYSTOLIC BLOOD PRESSURE: 104 MMHG | HEIGHT: 62 IN | WEIGHT: 172.81 LBS | BODY MASS INDEX: 31.8 KG/M2

## 2018-01-23 DIAGNOSIS — K42.9 UMBILICAL HERNIA WITHOUT OBSTRUCTION AND WITHOUT GANGRENE: Primary | ICD-10-CM

## 2018-01-23 LAB
B-HCG UR QL: NEGATIVE
CTP QC/QA: YES

## 2018-01-23 PROCEDURE — 99204 OFFICE O/P NEW MOD 45 MIN: CPT | Mod: S$GLB,,, | Performed by: SURGERY

## 2018-01-23 PROCEDURE — 99999 PR PBB SHADOW E&M-EST. PATIENT-LVL III: CPT | Mod: PBBFAC,,, | Performed by: SURGERY

## 2018-01-23 PROCEDURE — 99999 PR PBB SHADOW E&M-EST. PATIENT-LVL I: CPT | Mod: PBBFAC,,,

## 2018-01-23 PROCEDURE — 90471 IMMUNIZATION ADMIN: CPT | Mod: S$GLB,,, | Performed by: FAMILY MEDICINE

## 2018-01-23 PROCEDURE — 90707 MMR VACCINE SC: CPT | Mod: S$GLB,,, | Performed by: FAMILY MEDICINE
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
## 2018-01-23 NOTE — LETTER
January 23, 2018      Claudia Barajas MD  [address]  [place] [address]           [facility]  [address]  [address] [facility] [address]  Phone: [phone]          Patient: Melita Shell   MR Number: [id number]   YOB: 1985   Date of Visit: 1/23/2018       Dear Dr. Claudia Barajas:    Thank you for referring Melita Shell to me for evaluation. Attached you will find relevant portions of my assessment and plan of care.    If you have questions, please do not hesitate to call me. I look forward to following Melita Shell along with you.    Sincerely,    Kim Valiente MD    Enclosure  CC:  No Recipients    If you would like to receive this communication electronically, please contact [email or web address] or [phone] to request more information on [employer] Link access.    For providers and/or their staff who would like to refer a patient to [facility], please contact us through our one-stop-shop provider referral line, Hardeep Alfaro, at [phone].    If you feel you have received this communication in error or would no longer like to receive these types of communications, please e-mail [email or web address]

## 2018-01-23 NOTE — PROGRESS NOTES
Pt's pregnancy test was negative.  She received an MMR vaccine to the L arm subQ as ordered by Dr. Barajas.  Tolerated well with no complaints of pain or discomfort. VIS information provided and she is agreeable to wait 15 minutes following the vaccine.

## 2018-02-15 ENCOUNTER — PATIENT MESSAGE (OUTPATIENT)
Dept: OBSTETRICS AND GYNECOLOGY | Facility: CLINIC | Age: 33
End: 2018-02-15

## 2018-02-21 ENCOUNTER — PATIENT MESSAGE (OUTPATIENT)
Dept: OBSTETRICS AND GYNECOLOGY | Facility: CLINIC | Age: 33
End: 2018-02-21

## 2018-02-21 ENCOUNTER — TELEPHONE (OUTPATIENT)
Dept: OBSTETRICS AND GYNECOLOGY | Facility: CLINIC | Age: 33
End: 2018-02-21

## 2018-02-21 NOTE — TELEPHONE ENCOUNTER
I spoke with patient regarding the semen analysis results and recommendations.  I have also emailed the recommendations through [facility].

## 2018-02-27 ENCOUNTER — PATIENT MESSAGE (OUTPATIENT)
Dept: OBSTETRICS AND GYNECOLOGY | Facility: CLINIC | Age: 33
End: 2018-02-27

## 2018-02-27 DIAGNOSIS — N94.6 DYSMENORRHEA: Primary | ICD-10-CM

## 2018-02-28 RX ORDER — NAPROXEN SODIUM 275 MG/1
275 TABLET ORAL EVERY 8 HOURS PRN
Qty: 30 TABLET | Refills: 0 | Status: SHIPPED | OUTPATIENT
Start: 2018-02-28 | End: 2018-05-14

## 2018-03-01 ENCOUNTER — PATIENT MESSAGE (OUTPATIENT)
Dept: OBSTETRICS AND GYNECOLOGY | Facility: CLINIC | Age: 33
End: 2018-03-01

## 2018-03-04 ENCOUNTER — HOSPITAL ENCOUNTER (EMERGENCY)
Facility: HOSPITAL | Age: 33
Discharge: HOME | End: 2018-03-04
Attending: EMERGENCY MEDICINE
Payer: COMMERCIAL

## 2018-03-04 VITALS
WEIGHT: 168 LBS | OXYGEN SATURATION: 98 % | BODY MASS INDEX: 30.91 KG/M2 | HEART RATE: 79 BPM | SYSTOLIC BLOOD PRESSURE: 121 MMHG | TEMPERATURE: 99 F | HEIGHT: 62 IN | DIASTOLIC BLOOD PRESSURE: 70 MMHG | RESPIRATION RATE: 16 BRPM

## 2018-03-04 DIAGNOSIS — N76.0 BV (BACTERIAL VAGINOSIS): Primary | ICD-10-CM

## 2018-03-04 DIAGNOSIS — B96.89 BV (BACTERIAL VAGINOSIS): Primary | ICD-10-CM

## 2018-03-04 LAB
BACTERIA #/AREA URNS AUTO: ABNORMAL /HPF
BACTERIA GENITAL QL WET PREP: ABNORMAL
BILIRUB UR QL STRIP: NEGATIVE
CLARITY UR REFRACT.AUTO: ABNORMAL
CLUE CELLS VAG QL WET PREP: ABNORMAL
COLOR UR AUTO: YELLOW
FILAMENT FUNGI VAG WET PREP-#/AREA: ABNORMAL
GLUCOSE UR QL STRIP: NEGATIVE
HGB UR QL STRIP: NEGATIVE
HYALINE CASTS UR QL AUTO: 4 /LPF
KETONES UR QL STRIP: NEGATIVE
LEUKOCYTE ESTERASE UR QL STRIP: ABNORMAL
MICROSCOPIC COMMENT: ABNORMAL
NITRITE UR QL STRIP: NEGATIVE
PH UR STRIP: 6 [PH] (ref 5–8)
PROT UR QL STRIP: NEGATIVE
RBC #/AREA URNS AUTO: 4 /HPF (ref 0–4)
SP GR UR STRIP: 1.01 (ref 1–1.03)
SPECIMEN SOURCE: ABNORMAL
SQUAMOUS #/AREA URNS AUTO: 6 /HPF
T VAGINALIS GENITAL QL WET PREP: ABNORMAL
URN SPEC COLLECT METH UR: ABNORMAL
UROBILINOGEN UR STRIP-ACNC: NEGATIVE EU/DL
WBC #/AREA URNS AUTO: 7 /HPF (ref 0–5)
WBC #/AREA VAG WET PREP: ABNORMAL
YEAST GENITAL QL WET PREP: ABNORMAL

## 2018-03-04 PROCEDURE — 99284 EMERGENCY DEPT VISIT MOD MDM: CPT | Mod: ,,, | Performed by: EMERGENCY MEDICINE

## 2018-03-04 PROCEDURE — 99283 EMERGENCY DEPT VISIT LOW MDM: CPT | Mod: 25

## 2018-03-04 PROCEDURE — 81001 URINALYSIS AUTO W/SCOPE: CPT

## 2018-03-04 PROCEDURE — 63600175 PHARM REV CODE 636 W HCPCS: Performed by: EMERGENCY MEDICINE

## 2018-03-04 PROCEDURE — 96372 THER/PROPH/DIAG INJ SC/IM: CPT

## 2018-03-04 PROCEDURE — 25000003 PHARM REV CODE 250: Performed by: EMERGENCY MEDICINE

## 2018-03-04 PROCEDURE — 87210 SMEAR WET MOUNT SALINE/INK: CPT

## 2018-03-04 RX ORDER — METRONIDAZOLE 500 MG/1
500 TABLET ORAL EVERY 12 HOURS
Qty: 20 TABLET | Refills: 0 | Status: SHIPPED | OUTPATIENT
Start: 2018-03-04 | End: 2018-03-14

## 2018-03-04 RX ORDER — AZITHROMYCIN 250 MG/1
1000 TABLET, FILM COATED ORAL
Status: COMPLETED | OUTPATIENT
Start: 2018-03-04 | End: 2018-03-04

## 2018-03-04 RX ORDER — CEFTRIAXONE 1 G/1
1 INJECTION, POWDER, FOR SOLUTION INTRAMUSCULAR; INTRAVENOUS
Status: COMPLETED | OUTPATIENT
Start: 2018-03-04 | End: 2018-03-04

## 2018-03-04 RX ORDER — DOXYCYCLINE 100 MG/1
100 CAPSULE ORAL 2 TIMES DAILY
Qty: 20 CAPSULE | Refills: 0 | Status: SHIPPED | OUTPATIENT
Start: 2018-03-04 | End: 2018-03-14

## 2018-03-04 RX ORDER — ONDANSETRON 8 MG/1
8 TABLET, ORALLY DISINTEGRATING ORAL
Status: COMPLETED | OUTPATIENT
Start: 2018-03-04 | End: 2018-03-04

## 2018-03-04 RX ADMIN — CEFTRIAXONE SODIUM 1 G: 1 INJECTION, POWDER, FOR SOLUTION INTRAMUSCULAR; INTRAVENOUS at 04:03

## 2018-03-04 RX ADMIN — ONDANSETRON 8 MG: 8 TABLET, ORALLY DISINTEGRATING ORAL at 04:03

## 2018-03-04 RX ADMIN — AZITHROMYCIN 1000 MG: 250 TABLET, FILM COATED ORAL at 04:03
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
## 2018-03-09 ENCOUNTER — PATIENT MESSAGE (OUTPATIENT)
Dept: SURGERY | Facility: CLINIC | Age: 33
End: 2018-03-09

## 2018-03-11 DIAGNOSIS — D25.1 INTRAMURAL AND SUBSEROUS LEIOMYOMA OF UTERUS: Primary | ICD-10-CM

## 2018-03-11 DIAGNOSIS — D25.1 FIBROIDS, INTRAMURAL: ICD-10-CM

## 2018-03-11 DIAGNOSIS — D25.2 INTRAMURAL AND SUBSEROUS LEIOMYOMA OF UTERUS: Primary | ICD-10-CM

## 2018-03-11 RX ORDER — LIDOCAINE HYDROCHLORIDE 10 MG/ML
1 INJECTION, SOLUTION EPIDURAL; INFILTRATION; INTRACAUDAL; PERINEURAL ONCE
Status: DISCONTINUED | OUTPATIENT
Start: 2018-03-11 | End: 2018-07-19

## 2018-03-11 RX ORDER — SODIUM CHLORIDE 9 MG/ML
INJECTION, SOLUTION INTRAVENOUS CONTINUOUS
Status: CANCELLED | OUTPATIENT
Start: 2018-03-11

## 2018-03-19 ENCOUNTER — PATIENT MESSAGE (OUTPATIENT)
Dept: SURGERY | Facility: CLINIC | Age: 33
End: 2018-03-19

## 2018-04-11 ENCOUNTER — PATIENT MESSAGE (OUTPATIENT)
Dept: OBSTETRICS AND GYNECOLOGY | Facility: CLINIC | Age: 33
End: 2018-04-11

## 2018-04-23 ENCOUNTER — PATIENT MESSAGE (OUTPATIENT)
Dept: SURGERY | Facility: OTHER | Age: 33
End: 2018-04-23

## 2018-05-02 ENCOUNTER — PATIENT MESSAGE (OUTPATIENT)
Dept: OBSTETRICS AND GYNECOLOGY | Facility: CLINIC | Age: 33
End: 2018-05-02

## 2018-05-06 ENCOUNTER — PATIENT MESSAGE (OUTPATIENT)
Dept: OBSTETRICS AND GYNECOLOGY | Facility: CLINIC | Age: 33
End: 2018-05-06

## 2018-05-14 ENCOUNTER — PATIENT MESSAGE (OUTPATIENT)
Dept: OBSTETRICS AND GYNECOLOGY | Facility: CLINIC | Age: 33
End: 2018-05-14

## 2018-05-14 ENCOUNTER — OFFICE VISIT (OUTPATIENT)
Dept: OBSTETRICS AND GYNECOLOGY | Facility: CLINIC | Age: 33
End: 2018-05-14
Attending: OBSTETRICS & GYNECOLOGY
Payer: COMMERCIAL

## 2018-05-14 ENCOUNTER — ANESTHESIA EVENT (OUTPATIENT)
Dept: SURGERY | Facility: OTHER | Age: 33
DRG: 742 | End: 2018-05-14
Payer: COMMERCIAL

## 2018-05-14 VITALS
WEIGHT: 163.38 LBS | DIASTOLIC BLOOD PRESSURE: 60 MMHG | BODY MASS INDEX: 30.07 KG/M2 | SYSTOLIC BLOOD PRESSURE: 110 MMHG | HEIGHT: 62 IN

## 2018-05-14 DIAGNOSIS — D25.2 INTRAMURAL, SUBMUCOUS, AND SUBSEROUS LEIOMYOMA OF UTERUS: ICD-10-CM

## 2018-05-14 DIAGNOSIS — Z01.818 PREOPERATIVE EXAM FOR GYNECOLOGIC SURGERY: Primary | ICD-10-CM

## 2018-05-14 DIAGNOSIS — D25.1 INTRAMURAL, SUBMUCOUS, AND SUBSEROUS LEIOMYOMA OF UTERUS: ICD-10-CM

## 2018-05-14 DIAGNOSIS — D25.0 INTRAMURAL, SUBMUCOUS, AND SUBSEROUS LEIOMYOMA OF UTERUS: ICD-10-CM

## 2018-05-14 PROCEDURE — 99499 UNLISTED E&M SERVICE: CPT | Mod: S$GLB,,, | Performed by: OBSTETRICS & GYNECOLOGY

## 2018-05-14 PROCEDURE — 99999 PR PBB SHADOW E&M-EST. PATIENT-LVL II: CPT | Mod: PBBFAC,,, | Performed by: OBSTETRICS & GYNECOLOGY
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
## 2018-05-17 ENCOUNTER — PATIENT MESSAGE (OUTPATIENT)
Dept: SURGERY | Facility: OTHER | Age: 33
End: 2018-05-17

## 2018-05-17 ENCOUNTER — HOSPITAL ENCOUNTER (OUTPATIENT)
Dept: PREADMISSION TESTING | Facility: OTHER | Age: 33
Discharge: HOME | End: 2018-05-17
Attending: OBSTETRICS & GYNECOLOGY
Payer: COMMERCIAL

## 2018-05-17 VITALS
HEART RATE: 77 BPM | TEMPERATURE: 99 F | DIASTOLIC BLOOD PRESSURE: 62 MMHG | RESPIRATION RATE: 16 BRPM | WEIGHT: 159 LBS | OXYGEN SATURATION: 99 % | HEIGHT: 62 IN | SYSTOLIC BLOOD PRESSURE: 107 MMHG | BODY MASS INDEX: 29.26 KG/M2

## 2018-05-17 LAB
ABO + RH BLD: NORMAL
BASOPHILS # BLD AUTO: 0.02 K/UL
BASOPHILS NFR BLD: 0.3 %
BLD GP AB SCN CELLS X3 SERPL QL: NORMAL
DIFFERENTIAL METHOD: ABNORMAL
EOSINOPHIL # BLD AUTO: 0.1 K/UL
EOSINOPHIL NFR BLD: 2.2 %
ERYTHROCYTE [DISTWIDTH] IN BLOOD BY AUTOMATED COUNT: 14 %
HCT VFR BLD AUTO: 36.3 %
HGB BLD-MCNC: 11.9 G/DL
LYMPHOCYTES # BLD AUTO: 1.7 K/UL
LYMPHOCYTES NFR BLD: 29.4 %
MCH RBC QN AUTO: 28.5 PG
MCHC RBC AUTO-ENTMCNC: 32.8 G/DL
MCV RBC AUTO: 87 FL
MONOCYTES # BLD AUTO: 0.4 K/UL
MONOCYTES NFR BLD: 6.7 %
NEUTROPHILS # BLD AUTO: 3.5 K/UL
NEUTROPHILS NFR BLD: 61.2 %
PLATELET # BLD AUTO: 337 K/UL
PMV BLD AUTO: 9.6 FL
RBC # BLD AUTO: 4.18 M/UL
RH BLD: NORMAL
WBC # BLD AUTO: 5.78 K/UL

## 2018-05-17 PROCEDURE — 36415 COLL VENOUS BLD VENIPUNCTURE: CPT

## 2018-05-17 PROCEDURE — 86901 BLOOD TYPING SEROLOGIC RH(D): CPT | Mod: 91

## 2018-05-17 PROCEDURE — 85025 COMPLETE CBC W/AUTO DIFF WBC: CPT

## 2018-05-17 PROCEDURE — 86901 BLOOD TYPING SEROLOGIC RH(D): CPT

## 2018-05-17 RX ORDER — SODIUM CHLORIDE, SODIUM LACTATE, POTASSIUM CHLORIDE, CALCIUM CHLORIDE 600; 310; 30; 20 MG/100ML; MG/100ML; MG/100ML; MG/100ML
INJECTION, SOLUTION INTRAVENOUS CONTINUOUS
Status: CANCELLED | OUTPATIENT
Start: 2018-05-17

## 2018-05-17 RX ORDER — ALBUTEROL SULFATE 0.83 MG/ML
2.5 SOLUTION RESPIRATORY (INHALATION)
Status: CANCELLED | OUTPATIENT
Start: 2018-05-17 | End: 2018-05-17

## 2018-05-17 RX ORDER — MINERAL OIL
180 ENEMA (ML) RECTAL DAILY
COMMUNITY
End: 2018-07-19

## 2018-05-17 NOTE — ANESTHESIA PREPROCEDURE EVALUATION
05/17/2018  Melita Shell is a 33 y.o., female.    Anesthesia Evaluation    I have reviewed the Patient Summary Reports.    I have reviewed the Nursing Notes.   I have reviewed the Medications.     Review of Systems  Anesthesia Hx:  No problems with previous Anesthesia  Denies Family Hx of Anesthesia complications.   Denies Personal Hx of Anesthesia complications.   Social:  Non-Smoker    Hematology/Oncology:  Hematology Normal   Oncology Normal     EENT/Dental:EENT/Dental Normal   Cardiovascular:  Cardiovascular Normal Exercise tolerance: good     Pulmonary:   Asthma mild No ER visits   Renal/:  Renal/ Normal     Musculoskeletal:  Musculoskeletal Normal    Neurological:  Neurology Normal    Endocrine:  Endocrine Normal    Dermatological:  Skin Normal    Psych:  Psychiatric Normal           Physical Exam  General:  Well nourished    Airway/Jaw/Neck:  Airway Findings: Mouth Opening: Normal Tongue: Normal  General Airway Assessment: Adult  Mallampati: I  TM Distance: Normal, at least 6 cm  Jaw/Neck Findings:  Neck ROM: Normal ROM      Dental:  Dental Findings: upper partial dentures             Anesthesia Plan  Type of Anesthesia, risks & benefits discussed:  Anesthesia Type:  general  Patient's Preference:   Intra-op Monitoring Plan: standard ASA monitors  Intra-op Monitoring Plan Comments:   Post Op Pain Control Plan: multimodal analgesia  Post Op Pain Control Plan Comments:   Induction:   IV  Beta Blocker:         Informed Consent: Patient understands risks and agrees with Anesthesia plan.  Questions answered. Anesthesia consent signed with patient.  ASA Score: 1     Day of Surgery Review of History & Physical:    H&P update referred to the surgeon.         Ready For Surgery From Anesthesia Perspective.

## 2018-05-17 NOTE — DISCHARGE INSTRUCTIONS
PRE-ADMIT TESTING -  [phone]    [address]  [place] [facility]          Your surgery has been scheduled at [facility]. We are pleased to have the opportunity to serve you. For Further Information please call [phone].    On the day of surgery please report to the Information Desk on the 1st floor.    · CONTACT YOUR PHYSICIAN'S OFFICE THE DAY PRIOR TO YOUR SURGERY TO OBTAIN YOUR ARRIVAL TIME.     · The evening before surgery do not eat anything after 9 p.m. ( this includes hard candy, chewing gum and mints).  You may only have GATORADE, POWERADE AND WATER  from 9 p.m. until you leave your home.   DO NOT DRINK ANY LIQUIDS ON THE WAY TO THE HOSPITAL.      SPECIAL MEDICATION INSTRUCTIONS: TAKE medications checked off by the Anesthesiologist on your Medication List.    Angiogram Patients: Take medications as instructed by your physician, including aspirin.     Surgery Patients:    If you take ASPIRIN - Your PHYSICIAN/SURGEON will need to inform you IF/OR when you need to stop taking aspirin prior to your surgery.     Do Not take any medications containing IBUPROFEN.  Do Not Wear any make-up or dark nail polish   (especially eye make-up) to surgery. If you come to surgery with makeup on you will be required to remove the makeup or nail polish.    Do not shave your surgical area at least 5 days prior to your surgery. The surgical prep will be performed at the hospital according to Infection Control regulations.    Leave all valuables at home.   Do Not wear any jewelry or watches, including any metal in body piercings.  Contact Lens must be removed before surgery. Either do not wear the contact lens or bring a case and solution for storage.  Please bring a container for eyeglasses or dentures as required.  Bring any paperwork your physician has provided, such as consent forms,  history and physicals, doctor's orders, etc.   Bring comfortable clothes that are loose fitting to wear upon  discharge. Take into consideration the type of surgery being performed.  Maintain your diet as advised per your physician the day prior to surgery.      Adequate rest the night before surgery is advised.   Park in the Parking lot behind the hospital or in the [place] [facility] across the street from the parking lot. Parking is complimentary.  If you will be discharged the same day as your procedure, please arrange for a responsible adult to drive you home or to accompany you if traveling by taxi.   YOU WILL NOT BE PERMITTED TO DRIVE OR TO LEAVE THE HOSPITAL ALONE AFTER SURGERY.   It is strongly recommended that you arrange for someone to remain with you for the first 24 hrs following your surgery.       Thank you for your cooperation.  The Staff of [facility].        Bathing Instructions                                                                 Please shower the evening before and morning of your procedure with    ANTIBACTERIAL SOAP. ( DIAL, etc )  Concentrate on the surgical area   for at least 3 minutes and rinse completely. Dry off as usual.   Do not use any deodorant, powder, body lotions, perfume, after shave or    cologne.

## 2018-05-23 ENCOUNTER — TELEPHONE (OUTPATIENT)
Dept: SURGERY | Facility: CLINIC | Age: 33
End: 2018-05-23

## 2018-05-23 NOTE — TELEPHONE ENCOUNTER
05/23/18                             1032  Contacted patient regarding upcoming appointment. Patient informed that Dr. Valiente will consent the morning of surgery. Patient verbalized understanding.

## 2018-05-23 NOTE — TELEPHONE ENCOUNTER
05/22/2018           828  Pt called in regarding her appt this afternoon. She has a meeting at work for 1330, and will not be able to make it. Inquired about potentially coming in on Monday. Informed pt that we could schedule an appt for Monday, however, due to schedule changes, I could not guarantee that her appt would be permanent. Also informed pt that I would speak w/ Dr. Valiente to see how she wanted to handle getting the pt consented for surgery and someone would contact her. Pt verbalized understanding.

## 2018-05-23 NOTE — TELEPHONE ENCOUNTER
05/23/2018          1506  Contacted pt to inform her that per Dr. Valiente, she will not be able to consent her the morning of surgery. Inquired about her coming in on 05/28/2018 @ 5815. Pt confirmed that she would be able to make it, and verbalized understanding.

## 2018-05-28 ENCOUNTER — OFFICE VISIT (OUTPATIENT)
Dept: SURGERY | Facility: CLINIC | Age: 33
DRG: 742 | End: 2018-05-28
Payer: COMMERCIAL

## 2018-05-28 ENCOUNTER — TELEPHONE (OUTPATIENT)
Dept: SURGERY | Facility: CLINIC | Age: 33
End: 2018-05-28

## 2018-05-28 VITALS
HEART RATE: 80 BPM | HEIGHT: 62 IN | DIASTOLIC BLOOD PRESSURE: 73 MMHG | SYSTOLIC BLOOD PRESSURE: 108 MMHG | WEIGHT: 161.69 LBS | TEMPERATURE: 98 F | BODY MASS INDEX: 29.75 KG/M2

## 2018-05-28 DIAGNOSIS — K42.9 UMBILICAL HERNIA WITHOUT OBSTRUCTION AND WITHOUT GANGRENE: Primary | ICD-10-CM

## 2018-05-28 PROCEDURE — 99999 PR PBB SHADOW E&M-EST. PATIENT-LVL III: CPT | Mod: PBBFAC,,, | Performed by: SURGERY

## 2018-05-28 PROCEDURE — 3008F BODY MASS INDEX DOCD: CPT | Mod: CPTII,S$GLB,, | Performed by: SURGERY

## 2018-05-28 PROCEDURE — 99214 OFFICE O/P EST MOD 30 MIN: CPT | Mod: S$GLB,,, | Performed by: SURGERY
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
## 2018-05-28 NOTE — TELEPHONE ENCOUNTER
----- Message from Toma Oliveira sent at 5/28/2018  8:09 AM CDT -----  Contact: [phone] or  [phone]/ Adelaide with [facility]    Adelaide its requesting to speak with the nurse in regarding pt's procedure tomorrow . Please advise       05/28/18                           0831  Contacted Adelaide in P[employer]mit regarding patient. Adelaide was requesting patient's consent, and was informed that patient would be coming in this afternoon to be consented at 1:40pm. Consent will be scanned into media. Adelaide verbalized understanding.

## 2018-05-29 ENCOUNTER — TELEPHONE (OUTPATIENT)
Dept: OBSTETRICS AND GYNECOLOGY | Facility: CLINIC | Age: 33
End: 2018-05-29

## 2018-05-29 ENCOUNTER — HOSPITAL ENCOUNTER (INPATIENT)
Facility: OTHER | Age: 33
LOS: 1 days | Discharge: HOME | DRG: 742 | End: 2018-05-31
Attending: OBSTETRICS & GYNECOLOGY | Admitting: OBSTETRICS & GYNECOLOGY
Payer: COMMERCIAL

## 2018-05-29 ENCOUNTER — ANESTHESIA (OUTPATIENT)
Dept: SURGERY | Facility: OTHER | Age: 33
DRG: 742 | End: 2018-05-29
Payer: COMMERCIAL

## 2018-05-29 DIAGNOSIS — D25.2 INTRAMURAL AND SUBSEROUS LEIOMYOMA OF UTERUS: ICD-10-CM

## 2018-05-29 DIAGNOSIS — D25.1 FIBROIDS, INTRAMURAL: ICD-10-CM

## 2018-05-29 DIAGNOSIS — Z98.890 S/P MYOMECTOMY: Primary | ICD-10-CM

## 2018-05-29 DIAGNOSIS — D25.1 INTRAMURAL AND SUBSEROUS LEIOMYOMA OF UTERUS: ICD-10-CM

## 2018-05-29 PROBLEM — D62 ACUTE BLOOD LOSS ANEMIA: Status: ACTIVE | Noted: 2018-05-29

## 2018-05-29 LAB
B-HCG UR QL: NEGATIVE
BASOPHILS # BLD AUTO: 0 K/UL
BASOPHILS # BLD AUTO: 0.01 K/UL
BASOPHILS NFR BLD: 0 %
BASOPHILS NFR BLD: 0.1 %
BLD PROD TYP BPU: NORMAL
BLOOD UNIT EXPIRATION DATE: NORMAL
BLOOD UNIT TYPE CODE: 1700
BLOOD UNIT TYPE: NORMAL
CODING SYSTEM: NORMAL
CTP QC/QA: YES
DIFFERENTIAL METHOD: ABNORMAL
DIFFERENTIAL METHOD: ABNORMAL
DISPENSE STATUS: NORMAL
EOSINOPHIL # BLD AUTO: 0 K/UL
EOSINOPHIL # BLD AUTO: 0 K/UL
EOSINOPHIL NFR BLD: 0 %
EOSINOPHIL NFR BLD: 0.2 %
ERYTHROCYTE [DISTWIDTH] IN BLOOD BY AUTOMATED COUNT: 13.9 %
ERYTHROCYTE [DISTWIDTH] IN BLOOD BY AUTOMATED COUNT: 14 %
HCT VFR BLD AUTO: 22.8 %
HCT VFR BLD AUTO: 24.1 %
HGB BLD-MCNC: 7.6 G/DL
HGB BLD-MCNC: 8.1 G/DL
LYMPHOCYTES # BLD AUTO: 0.7 K/UL
LYMPHOCYTES # BLD AUTO: 0.9 K/UL
LYMPHOCYTES NFR BLD: 6.8 %
LYMPHOCYTES NFR BLD: 7.3 %
MCH RBC QN AUTO: 28.4 PG
MCH RBC QN AUTO: 29.2 PG
MCHC RBC AUTO-ENTMCNC: 33.3 G/DL
MCHC RBC AUTO-ENTMCNC: 33.6 G/DL
MCV RBC AUTO: 85 FL
MCV RBC AUTO: 87 FL
MONOCYTES # BLD AUTO: 0.2 K/UL
MONOCYTES # BLD AUTO: 0.5 K/UL
MONOCYTES NFR BLD: 1.5 %
MONOCYTES NFR BLD: 5 %
NEUTROPHILS # BLD AUTO: 10.9 K/UL
NEUTROPHILS # BLD AUTO: 9.1 K/UL
NEUTROPHILS NFR BLD: 88 %
NEUTROPHILS NFR BLD: 90.7 %
PLATELET # BLD AUTO: 231 K/UL
PLATELET # BLD AUTO: 235 K/UL
PMV BLD AUTO: 9.2 FL
PMV BLD AUTO: 9.3 FL
POCT GLUCOSE: 94 MG/DL (ref 70–110)
RBC # BLD AUTO: 2.68 M/UL
RBC # BLD AUTO: 2.77 M/UL
TRANS ERYTHROCYTES VOL PATIENT: NORMAL ML
WBC # BLD AUTO: 10.33 K/UL
WBC # BLD AUTO: 12.05 K/UL

## 2018-05-29 PROCEDURE — 81025 URINE PREGNANCY TEST: CPT | Performed by: ANESTHESIOLOGY

## 2018-05-29 PROCEDURE — 82962 GLUCOSE BLOOD TEST: CPT | Performed by: OBSTETRICS & GYNECOLOGY

## 2018-05-29 PROCEDURE — 36415 COLL VENOUS BLD VENIPUNCTURE: CPT

## 2018-05-29 PROCEDURE — P9045 ALBUMIN (HUMAN), 5%, 250 ML: HCPCS | Mod: JG | Performed by: NURSE ANESTHETIST, CERTIFIED REGISTERED

## 2018-05-29 PROCEDURE — 88305 TISSUE EXAM BY PATHOLOGIST: CPT | Mod: 26,,, | Performed by: PATHOLOGY

## 2018-05-29 PROCEDURE — 58350 REOPEN FALLOPIAN TUBE: CPT | Mod: 59,,, | Performed by: OBSTETRICS & GYNECOLOGY

## 2018-05-29 PROCEDURE — 88305 TISSUE EXAM BY PATHOLOGIST: CPT | Performed by: PATHOLOGY

## 2018-05-29 PROCEDURE — 94640 AIRWAY INHALATION TREATMENT: CPT

## 2018-05-29 PROCEDURE — S0020 INJECTION, BUPIVICAINE HYDRO: HCPCS | Performed by: OBSTETRICS & GYNECOLOGY

## 2018-05-29 PROCEDURE — 37000008 HC ANESTHESIA 1ST 15 MINUTES: Performed by: OBSTETRICS & GYNECOLOGY

## 2018-05-29 PROCEDURE — 25000003 PHARM REV CODE 250: Performed by: NURSE ANESTHETIST, CERTIFIED REGISTERED

## 2018-05-29 PROCEDURE — 63600175 PHARM REV CODE 636 W HCPCS: Performed by: ANESTHESIOLOGY

## 2018-05-29 PROCEDURE — 71000033 HC RECOVERY, INTIAL HOUR: Performed by: OBSTETRICS & GYNECOLOGY

## 2018-05-29 PROCEDURE — 0WQF0ZZ REPAIR ABDOMINAL WALL, OPEN APPROACH: ICD-10-PCS | Performed by: SURGERY

## 2018-05-29 PROCEDURE — 71000039 HC RECOVERY, EACH ADD'L HOUR: Performed by: OBSTETRICS & GYNECOLOGY

## 2018-05-29 PROCEDURE — P9021 RED BLOOD CELLS UNIT: HCPCS

## 2018-05-29 PROCEDURE — 36000706: Performed by: OBSTETRICS & GYNECOLOGY

## 2018-05-29 PROCEDURE — 63600175 PHARM REV CODE 636 W HCPCS: Performed by: OBSTETRICS & GYNECOLOGY

## 2018-05-29 PROCEDURE — 63600175 PHARM REV CODE 636 W HCPCS: Performed by: NURSE ANESTHETIST, CERTIFIED REGISTERED

## 2018-05-29 PROCEDURE — 94799 UNLISTED PULMONARY SVC/PX: CPT

## 2018-05-29 PROCEDURE — 25000003 PHARM REV CODE 250: Performed by: OBSTETRICS & GYNECOLOGY

## 2018-05-29 PROCEDURE — 25000242 PHARM REV CODE 250 ALT 637 W/ HCPCS: Performed by: ANESTHESIOLOGY

## 2018-05-29 PROCEDURE — 0UB90ZZ EXCISION OF UTERUS, OPEN APPROACH: ICD-10-PCS | Performed by: OBSTETRICS & GYNECOLOGY

## 2018-05-29 PROCEDURE — 58146 MYOMECTOMY ABDOM COMPLEX: CPT | Mod: ,,, | Performed by: OBSTETRICS & GYNECOLOGY

## 2018-05-29 PROCEDURE — 27201423 OPTIME MED/SURG SUP & DEVICES STERILE SUPPLY: Performed by: OBSTETRICS & GYNECOLOGY

## 2018-05-29 PROCEDURE — 94761 N-INVAS EAR/PLS OXIMETRY MLT: CPT

## 2018-05-29 PROCEDURE — 85025 COMPLETE CBC W/AUTO DIFF WBC: CPT

## 2018-05-29 PROCEDURE — 99900035 HC TECH TIME PER 15 MIN (STAT)

## 2018-05-29 PROCEDURE — 86920 COMPATIBILITY TEST SPIN: CPT

## 2018-05-29 PROCEDURE — 3E0P7KZ INTRODUCTION OF OTHER DIAGNOSTIC SUBSTANCE INTO FEMALE REPRODUCTIVE, VIA NATURAL OR ARTIFICIAL OPENING: ICD-10-PCS | Performed by: OBSTETRICS & GYNECOLOGY

## 2018-05-29 PROCEDURE — C1765 ADHESION BARRIER: HCPCS | Performed by: OBSTETRICS & GYNECOLOGY

## 2018-05-29 PROCEDURE — 25000003 PHARM REV CODE 250: Performed by: ANESTHESIOLOGY

## 2018-05-29 PROCEDURE — 37000009 HC ANESTHESIA EA ADD 15 MINS: Performed by: OBSTETRICS & GYNECOLOGY

## 2018-05-29 PROCEDURE — 36000707: Performed by: OBSTETRICS & GYNECOLOGY

## 2018-05-29 PROCEDURE — 63600175 PHARM REV CODE 636 W HCPCS: Mod: JG | Performed by: OBSTETRICS & GYNECOLOGY

## 2018-05-29 DEVICE — BARRIER SEPRAFILM ADHESION: Type: IMPLANTABLE DEVICE | Site: ABDOMEN | Status: FUNCTIONAL

## 2018-05-29 RX ORDER — CEFAZOLIN SODIUM 1 G/3ML
2 INJECTION, POWDER, FOR SOLUTION INTRAMUSCULAR; INTRAVENOUS
Status: COMPLETED | OUTPATIENT
Start: 2018-05-29 | End: 2018-05-29

## 2018-05-29 RX ORDER — AMOXICILLIN 250 MG
1 CAPSULE ORAL 2 TIMES DAILY
Status: DISCONTINUED | OUTPATIENT
Start: 2018-05-29 | End: 2018-05-31 | Stop reason: HOSPADM

## 2018-05-29 RX ORDER — OXYCODONE AND ACETAMINOPHEN 10; 325 MG/1; MG/1
1 TABLET ORAL EVERY 4 HOURS PRN
Status: DISCONTINUED | OUTPATIENT
Start: 2018-05-29 | End: 2018-05-31 | Stop reason: HOSPADM

## 2018-05-29 RX ORDER — ALBUTEROL SULFATE 0.83 MG/ML
2.5 SOLUTION RESPIRATORY (INHALATION)
Status: COMPLETED | OUTPATIENT
Start: 2018-05-29 | End: 2018-05-29

## 2018-05-29 RX ORDER — MUPIROCIN 20 MG/G
1 OINTMENT TOPICAL 2 TIMES DAILY
Status: DISCONTINUED | OUTPATIENT
Start: 2018-05-29 | End: 2018-05-31 | Stop reason: HOSPADM

## 2018-05-29 RX ORDER — DEXAMETHASONE SODIUM PHOSPHATE 4 MG/ML
INJECTION, SOLUTION INTRA-ARTICULAR; INTRALESIONAL; INTRAMUSCULAR; INTRAVENOUS; SOFT TISSUE
Status: DISCONTINUED | OUTPATIENT
Start: 2018-05-29 | End: 2018-05-29

## 2018-05-29 RX ORDER — LIDOCAINE HCL/PF 100 MG/5ML
SYRINGE (ML) INTRAVENOUS
Status: DISCONTINUED | OUTPATIENT
Start: 2018-05-29 | End: 2018-05-29

## 2018-05-29 RX ORDER — ONDANSETRON HYDROCHLORIDE 2 MG/ML
INJECTION, SOLUTION INTRAMUSCULAR; INTRAVENOUS
Status: DISCONTINUED | OUTPATIENT
Start: 2018-05-29 | End: 2018-05-29

## 2018-05-29 RX ORDER — OXYCODONE HYDROCHLORIDE 5 MG/1
5 TABLET ORAL
Status: DISCONTINUED | OUTPATIENT
Start: 2018-05-29 | End: 2018-05-29 | Stop reason: HOSPADM

## 2018-05-29 RX ORDER — CETIRIZINE HYDROCHLORIDE 10 MG/1
10 TABLET ORAL DAILY
Status: DISCONTINUED | OUTPATIENT
Start: 2018-05-29 | End: 2018-05-31 | Stop reason: HOSPADM

## 2018-05-29 RX ORDER — HYDROMORPHONE HYDROCHLORIDE 1 MG/ML
1 INJECTION, SOLUTION INTRAMUSCULAR; INTRAVENOUS; SUBCUTANEOUS EVERY 4 HOURS PRN
Status: DISCONTINUED | OUTPATIENT
Start: 2018-05-29 | End: 2018-05-31 | Stop reason: HOSPADM

## 2018-05-29 RX ORDER — OXYCODONE AND ACETAMINOPHEN 5; 325 MG/1; MG/1
1 TABLET ORAL EVERY 4 HOURS PRN
Status: DISCONTINUED | OUTPATIENT
Start: 2018-05-29 | End: 2018-05-31 | Stop reason: HOSPADM

## 2018-05-29 RX ORDER — HYDROCODONE BITARTRATE AND ACETAMINOPHEN 500; 5 MG/1; MG/1
TABLET ORAL
Status: DISCONTINUED | OUTPATIENT
Start: 2018-05-29 | End: 2018-05-29

## 2018-05-29 RX ORDER — NEOSTIGMINE METHYLSULFATE 1 MG/ML
INJECTION, SOLUTION INTRAVENOUS
Status: DISCONTINUED | OUTPATIENT
Start: 2018-05-29 | End: 2018-05-29

## 2018-05-29 RX ORDER — ROCURONIUM BROMIDE 10 MG/ML
INJECTION, SOLUTION INTRAVENOUS
Status: DISCONTINUED | OUTPATIENT
Start: 2018-05-29 | End: 2018-05-29

## 2018-05-29 RX ORDER — BUPIVACAINE HYDROCHLORIDE 5 MG/ML
INJECTION, SOLUTION EPIDURAL; INTRACAUDAL
Status: DISCONTINUED | OUTPATIENT
Start: 2018-05-29 | End: 2018-05-29 | Stop reason: HOSPADM

## 2018-05-29 RX ORDER — ALBUMIN HUMAN 50 G/1000ML
SOLUTION INTRAVENOUS CONTINUOUS PRN
Status: DISCONTINUED | OUTPATIENT
Start: 2018-05-29 | End: 2018-05-29

## 2018-05-29 RX ORDER — SODIUM CHLORIDE 9 MG/ML
INJECTION, SOLUTION INTRAVENOUS CONTINUOUS
Status: DISCONTINUED | OUTPATIENT
Start: 2018-05-29 | End: 2018-05-29

## 2018-05-29 RX ORDER — ALBUTEROL SULFATE 90 UG/1
2 AEROSOL, METERED RESPIRATORY (INHALATION) EVERY 6 HOURS PRN
Status: DISCONTINUED | OUTPATIENT
Start: 2018-05-29 | End: 2018-05-31 | Stop reason: HOSPADM

## 2018-05-29 RX ORDER — VASOPRESSIN 20 [USP'U]/ML
INJECTION, SOLUTION INTRAMUSCULAR; SUBCUTANEOUS
Status: DISCONTINUED | OUTPATIENT
Start: 2018-05-29 | End: 2018-05-29 | Stop reason: HOSPADM

## 2018-05-29 RX ORDER — GLYCOPYRROLATE 0.2 MG/ML
INJECTION INTRAMUSCULAR; INTRAVENOUS
Status: DISCONTINUED | OUTPATIENT
Start: 2018-05-29 | End: 2018-05-29

## 2018-05-29 RX ORDER — KETOROLAC TROMETHAMINE 30 MG/ML
30 INJECTION, SOLUTION INTRAMUSCULAR; INTRAVENOUS EVERY 6 HOURS
Status: COMPLETED | OUTPATIENT
Start: 2018-05-29 | End: 2018-05-30

## 2018-05-29 RX ORDER — SODIUM CHLORIDE, SODIUM LACTATE, POTASSIUM CHLORIDE, CALCIUM CHLORIDE 600; 310; 30; 20 MG/100ML; MG/100ML; MG/100ML; MG/100ML
INJECTION, SOLUTION INTRAVENOUS CONTINUOUS
Status: DISCONTINUED | OUTPATIENT
Start: 2018-05-29 | End: 2018-05-30

## 2018-05-29 RX ORDER — SODIUM CHLORIDE, SODIUM LACTATE, POTASSIUM CHLORIDE, CALCIUM CHLORIDE 600; 310; 30; 20 MG/100ML; MG/100ML; MG/100ML; MG/100ML
INJECTION, SOLUTION INTRAVENOUS CONTINUOUS
Status: DISCONTINUED | OUTPATIENT
Start: 2018-05-29 | End: 2018-05-29

## 2018-05-29 RX ORDER — MIDAZOLAM HYDROCHLORIDE 1 MG/ML
INJECTION INTRAMUSCULAR; INTRAVENOUS
Status: DISCONTINUED | OUTPATIENT
Start: 2018-05-29 | End: 2018-05-29

## 2018-05-29 RX ORDER — FENTANYL CITRATE 50 UG/ML
25 INJECTION, SOLUTION INTRAMUSCULAR; INTRAVENOUS EVERY 5 MIN PRN
Status: COMPLETED | OUTPATIENT
Start: 2018-05-29 | End: 2018-05-29

## 2018-05-29 RX ORDER — ONDANSETRON 8 MG/1
8 TABLET, ORALLY DISINTEGRATING ORAL EVERY 8 HOURS PRN
Status: DISCONTINUED | OUTPATIENT
Start: 2018-05-29 | End: 2018-05-31 | Stop reason: HOSPADM

## 2018-05-29 RX ORDER — MEPERIDINE HYDROCHLORIDE 50 MG/ML
12.5 INJECTION INTRAMUSCULAR; INTRAVENOUS; SUBCUTANEOUS ONCE AS NEEDED
Status: DISCONTINUED | OUTPATIENT
Start: 2018-05-29 | End: 2018-05-29 | Stop reason: HOSPADM

## 2018-05-29 RX ORDER — FENTANYL CITRATE 50 UG/ML
INJECTION, SOLUTION INTRAMUSCULAR; INTRAVENOUS
Status: DISCONTINUED | OUTPATIENT
Start: 2018-05-29 | End: 2018-05-29

## 2018-05-29 RX ORDER — ACETAMINOPHEN 10 MG/ML
INJECTION, SOLUTION INTRAVENOUS
Status: DISCONTINUED | OUTPATIENT
Start: 2018-05-29 | End: 2018-05-29

## 2018-05-29 RX ORDER — SODIUM CHLORIDE 0.9 % (FLUSH) 0.9 %
3 SYRINGE (ML) INJECTION
Status: DISCONTINUED | OUTPATIENT
Start: 2018-05-29 | End: 2018-05-29

## 2018-05-29 RX ORDER — METHYLENE BLUE 10 MG/ML
INJECTION INTRAVENOUS
Status: DISCONTINUED | OUTPATIENT
Start: 2018-05-29 | End: 2018-05-29 | Stop reason: HOSPADM

## 2018-05-29 RX ORDER — KETOROLAC TROMETHAMINE 30 MG/ML
30 INJECTION, SOLUTION INTRAMUSCULAR; INTRAVENOUS EVERY 6 HOURS
Status: DISCONTINUED | OUTPATIENT
Start: 2018-05-29 | End: 2018-05-29

## 2018-05-29 RX ORDER — HYDROMORPHONE HYDROCHLORIDE 2 MG/ML
0.4 INJECTION, SOLUTION INTRAMUSCULAR; INTRAVENOUS; SUBCUTANEOUS EVERY 5 MIN PRN
Status: DISCONTINUED | OUTPATIENT
Start: 2018-05-29 | End: 2018-05-29 | Stop reason: HOSPADM

## 2018-05-29 RX ORDER — DIPHENHYDRAMINE HCL 25 MG
25 CAPSULE ORAL EVERY 4 HOURS PRN
Status: DISCONTINUED | OUTPATIENT
Start: 2018-05-29 | End: 2018-05-31 | Stop reason: HOSPADM

## 2018-05-29 RX ORDER — ONDANSETRON 2 MG/ML
4 INJECTION INTRAMUSCULAR; INTRAVENOUS DAILY PRN
Status: DISCONTINUED | OUTPATIENT
Start: 2018-05-29 | End: 2018-05-29 | Stop reason: HOSPADM

## 2018-05-29 RX ORDER — PROPOFOL 10 MG/ML
VIAL (ML) INTRAVENOUS
Status: DISCONTINUED | OUTPATIENT
Start: 2018-05-29 | End: 2018-05-29

## 2018-05-29 RX ORDER — KETOROLAC TROMETHAMINE 30 MG/ML
INJECTION, SOLUTION INTRAMUSCULAR; INTRAVENOUS
Status: DISCONTINUED | OUTPATIENT
Start: 2018-05-29 | End: 2018-05-29

## 2018-05-29 RX ORDER — IBUPROFEN 600 MG/1
600 TABLET ORAL EVERY 6 HOURS
Status: DISCONTINUED | OUTPATIENT
Start: 2018-05-30 | End: 2018-05-31 | Stop reason: HOSPADM

## 2018-05-29 RX ADMIN — CEFAZOLIN 2 G: 330 INJECTION, POWDER, FOR SOLUTION INTRAMUSCULAR; INTRAVENOUS at 07:05

## 2018-05-29 RX ADMIN — ROCURONIUM BROMIDE 20 MG: 10 INJECTION INTRAVENOUS at 08:05

## 2018-05-29 RX ADMIN — STANDARDIZED SENNA CONCENTRATE AND DOCUSATE SODIUM 1 TABLET: 8.6; 5 TABLET, FILM COATED ORAL at 09:05

## 2018-05-29 RX ADMIN — FENTANYL CITRATE 50 MCG: 50 INJECTION, SOLUTION INTRAMUSCULAR; INTRAVENOUS at 10:05

## 2018-05-29 RX ADMIN — CEFAZOLIN 2 G: 330 INJECTION, POWDER, FOR SOLUTION INTRAMUSCULAR; INTRAVENOUS at 09:05

## 2018-05-29 RX ADMIN — ALBUTEROL SULFATE 2.5 MG: 2.5 SOLUTION RESPIRATORY (INHALATION) at 05:05

## 2018-05-29 RX ADMIN — OXYCODONE HYDROCHLORIDE AND ACETAMINOPHEN 1 TABLET: 10; 325 TABLET ORAL at 03:05

## 2018-05-29 RX ADMIN — CETIRIZINE HYDROCHLORIDE 10 MG: 10 TABLET, FILM COATED ORAL at 04:05

## 2018-05-29 RX ADMIN — FENTANYL CITRATE 25 MCG: 50 INJECTION, SOLUTION INTRAMUSCULAR; INTRAVENOUS at 11:05

## 2018-05-29 RX ADMIN — GLYCOPYRROLATE 0.6 MG: 0.2 INJECTION, SOLUTION INTRAMUSCULAR; INTRAVENOUS at 11:05

## 2018-05-29 RX ADMIN — SODIUM CHLORIDE, SODIUM LACTATE, POTASSIUM CHLORIDE, AND CALCIUM CHLORIDE: .6; .31; .03; .02 INJECTION, SOLUTION INTRAVENOUS at 11:05

## 2018-05-29 RX ADMIN — ROCURONIUM BROMIDE 50 MG: 10 INJECTION INTRAVENOUS at 07:05

## 2018-05-29 RX ADMIN — KETOROLAC TROMETHAMINE 30 MG: 30 INJECTION, SOLUTION INTRAMUSCULAR at 05:05

## 2018-05-29 RX ADMIN — SODIUM CHLORIDE, SODIUM LACTATE, POTASSIUM CHLORIDE, AND CALCIUM CHLORIDE: 600; 310; 30; 20 INJECTION, SOLUTION INTRAVENOUS at 08:05

## 2018-05-29 RX ADMIN — NEOSTIGMINE METHYLSULFATE 5 MG: 1 INJECTION INTRAVENOUS at 11:05

## 2018-05-29 RX ADMIN — ONDANSETRON 4 MG: 2 INJECTION, SOLUTION INTRAMUSCULAR; INTRAVENOUS at 07:05

## 2018-05-29 RX ADMIN — ALBUMIN (HUMAN): 2.5 SOLUTION INTRAVENOUS at 07:05

## 2018-05-29 RX ADMIN — OXYCODONE HYDROCHLORIDE 5 MG: 5 TABLET ORAL at 12:05

## 2018-05-29 RX ADMIN — FENTANYL CITRATE 50 MCG: 50 INJECTION, SOLUTION INTRAMUSCULAR; INTRAVENOUS at 07:05

## 2018-05-29 RX ADMIN — ALBUMIN (HUMAN): 2.5 SOLUTION INTRAVENOUS at 08:05

## 2018-05-29 RX ADMIN — OXYCODONE HYDROCHLORIDE AND ACETAMINOPHEN 1 TABLET: 10; 325 TABLET ORAL at 09:05

## 2018-05-29 RX ADMIN — KETOROLAC TROMETHAMINE 30 MG: 30 INJECTION, SOLUTION INTRAMUSCULAR; INTRAVENOUS at 10:05

## 2018-05-29 RX ADMIN — DEXAMETHASONE SODIUM PHOSPHATE 8 MG: 4 INJECTION, SOLUTION INTRAMUSCULAR; INTRAVENOUS at 07:05

## 2018-05-29 RX ADMIN — FENTANYL CITRATE 25 MCG: 50 INJECTION, SOLUTION INTRAMUSCULAR; INTRAVENOUS at 12:05

## 2018-05-29 RX ADMIN — ALBUMIN (HUMAN): 2.5 SOLUTION INTRAVENOUS at 09:05

## 2018-05-29 RX ADMIN — PROPOFOL 150 MG: 10 INJECTION, EMULSION INTRAVENOUS at 07:05

## 2018-05-29 RX ADMIN — HYDROMORPHONE HYDROCHLORIDE 0.4 MG: 2 INJECTION INTRAMUSCULAR; INTRAVENOUS; SUBCUTANEOUS at 12:05

## 2018-05-29 RX ADMIN — MUPIROCIN 1 G: 20 OINTMENT TOPICAL at 09:05

## 2018-05-29 RX ADMIN — ACETAMINOPHEN 1000 MG: 10 INJECTION, SOLUTION INTRAVENOUS at 07:05

## 2018-05-29 RX ADMIN — SODIUM CHLORIDE, SODIUM LACTATE, POTASSIUM CHLORIDE, AND CALCIUM CHLORIDE: 600; 310; 30; 20 INJECTION, SOLUTION INTRAVENOUS at 06:05

## 2018-05-29 RX ADMIN — SODIUM CHLORIDE, SODIUM LACTATE, POTASSIUM CHLORIDE, AND CALCIUM CHLORIDE: .6; .31; .03; .02 INJECTION, SOLUTION INTRAVENOUS at 03:05

## 2018-05-29 RX ADMIN — HYDROMORPHONE HYDROCHLORIDE 1 MG: 1 INJECTION, SOLUTION INTRAMUSCULAR; INTRAVENOUS; SUBCUTANEOUS at 04:05

## 2018-05-29 RX ADMIN — ONDANSETRON 8 MG: 8 TABLET, ORALLY DISINTEGRATING ORAL at 03:05

## 2018-05-29 RX ADMIN — LIDOCAINE HYDROCHLORIDE 100 MG: 20 INJECTION, SOLUTION INTRAVENOUS at 07:05

## 2018-05-29 RX ADMIN — MIDAZOLAM HYDROCHLORIDE 2 MG: 1 INJECTION, SOLUTION INTRAMUSCULAR; INTRAVENOUS at 06:05

## 2018-05-29 RX ADMIN — CARBOXYMETHYLCELLULOSE SODIUM 2 DROP: 2.5 SOLUTION/ DROPS OPHTHALMIC at 07:05

## 2018-05-29 RX ADMIN — FENTANYL CITRATE 100 MCG: 50 INJECTION, SOLUTION INTRAMUSCULAR; INTRAVENOUS at 07:05

## 2018-05-29 RX ADMIN — KETOROLAC TROMETHAMINE 30 MG: 30 INJECTION, SOLUTION INTRAMUSCULAR at 11:05

## 2018-05-29 RX ADMIN — ROCURONIUM BROMIDE 10 MG: 10 INJECTION INTRAVENOUS at 09:05

## 2018-05-29 NOTE — DISCHARGE INSTRUCTIONS
Ok to remove dressing on 5/31, leave underlying strips in place until follow up. If they fall off it is okay. Ok to shower 5/31, use mild soap on incision, pat incision dry. No soaking bath or swimming until after follow up. Take your pain medication as needed. Take over the counter stool softener while taking pain medication to prevent constipation. If no bowel movement in 2 days take miralax twice a day. Ok for light activity (light housework, walking, stair climbing) no strenuous exercise until after follow up. No lifting greater than 20 pounds or 1 gallon of milk until after follow up. Please call my office if fever > 101.5, pain uncontrolled with oral medications, persistent nausea/vomiting/or diarrhea, redness or drainage from the incisions, or any other worrisome concerns.

## 2018-05-29 NOTE — PLAN OF CARE
Problem: Patient Care Overview  Goal: Plan of Care Review  Outcome: Ongoing (interventions implemented as appropriate)  Patient on room air saturations 98% with clear BS,prn DPI not needed.Post-op incentive spirometry instructed with good effort.

## 2018-05-29 NOTE — HPI
Suleman is a 33 y.o. female  presents for scheduled open myomectomy, chromopertubation. She has a history of fibroids and reports some pelvic pain.  She reports bleeding is moderate.

## 2018-05-29 NOTE — SUBJECTIVE & OBJECTIVE
Interval History: Patient is doing immediately post-op/ Pain is moderately controlled, just received pain meds. Tolerating CLD diet without N/V. Romeo in place. Has note yet ambulated. .Denies fever, chills, CP, SOB.      Scheduled Meds:   cetirizine  10 mg Oral Daily    [START ON 5/30/2018] ibuprofen  600 mg Oral Q6H    ketorolac  30 mg Intravenous Q6H    mupirocin  1 g Nasal BID    senna-docusate 8.6-50 mg  1 tablet Oral BID     Continuous Infusions:   lactated ringers       PRN Meds:albuterol, diphenhydrAMINE, HYDROmorphone, ondansetron, oxyCODONE-acetaminophen, oxyCODONE-acetaminophen, promethazine (PHENERGAN) IVPB    Review of patient's allergies indicates:   Allergen Reactions    Pcn [penicillins] Nausea Only       Objective:     Vital Signs (Most Recent):  Temp: 96.5 °F (35.8 °C) (05/29/18 1423)  Pulse: 72 (05/29/18 1423)  Resp: 18 (05/29/18 1255)  BP: 121/64 (05/29/18 1423)  SpO2: 98 % (05/29/18 1423) Vital Signs (24h Range):  Temp:  [96.5 °F (35.8 °C)-98.3 °F (36.8 °C)] 96.5 °F (35.8 °C)  Pulse:  [61-87] 72  Resp:  [16-18] 18  SpO2:  [94 %-100 %] 98 %  BP: (102-136)/(58-88) 121/64     Weight: 72.1 kg (159 lb)  Body mass index is 29.08 kg/m².  Patient's last menstrual period was 05/28/2018 (exact date).    I&O (Last 24H):    Intake/Output Summary (Last 24 hours) at 05/29/18 1506  Last data filed at 05/29/18 1254   Gross per 24 hour   Intake             3500 ml   Output             2440 ml   Net             1060 ml       Physical Exam:   Constitutional: She is oriented to person, place, and time. She appears well-developed and well-nourished.    HENT:   Head: Normocephalic and atraumatic.     Neck: Normal range of motion.    Cardiovascular: Normal rate, regular rhythm and normal heart sounds.     Pulmonary/Chest: Effort normal and breath sounds normal.        Abdominal: Soft. Bowel sounds are normal. She exhibits abdominal incision (pfannenstiel, c/d/i, bandage in place; umbilical incision c/d/i  bandage in place). She exhibits no distension. There is no tenderness.             Musculoskeletal: Normal range of motion and moves all extremeties.       Neurological: She is alert and oriented to person, place, and time.    Skin: Skin is warm and dry.    Psychiatric: She has a normal mood and affect.       Laboratory:    Recent Labs  Lab 05/29/18  0920   WBC 12.05   HGB 7.6*   HCT 22.8*   MCV 85

## 2018-05-29 NOTE — PROGRESS NOTES
[facility]  Obstetrics & Gynecology  Progress Note    Patient Name: Melita Shell  MRN: [id number]  Admission Date: 2018  Primary Care Provider: Claudia Barajas MD  Principal Problem: S/P myomectomy    Subjective:     HPI:  Suleman is a 33 y.o. female  presents for scheduled open myomectomy, chromopertubation. She has a history of fibroids and reports some pelvic pain.  She reports bleeding is moderate.     Interval History: Patient is doing immediately post-op/ Pain is moderately controlled, just received pain meds. Tolerating CLD diet without N/V. Romeo in place. Has note yet ambulated. .Denies fever, chills, CP, SOB.      Scheduled Meds:   cetirizine  10 mg Oral Daily    [START ON 2018] ibuprofen  600 mg Oral Q6H    ketorolac  30 mg Intravenous Q6H    mupirocin  1 g Nasal BID    senna-docusate 8.6-50 mg  1 tablet Oral BID     Continuous Infusions:   lactated ringers       PRN Meds:albuterol, diphenhydrAMINE, HYDROmorphone, ondansetron, oxyCODONE-acetaminophen, oxyCODONE-acetaminophen, promethazine (PHENERGAN) IVPB    Review of patient's allergies indicates:   Allergen Reactions    Pcn [penicillins] Nausea Only       Objective:     Vital Signs (Most Recent):  Temp: 96.5 °F (35.8 °C) (18 1423)  Pulse: 72 (18 1423)  Resp: 18 (18 1255)  BP: 121/64 (18 1423)  SpO2: 98 % (18 1423) Vital Signs (24h Range):  Temp:  [96.5 °F (35.8 °C)-98.3 °F (36.8 °C)] 96.5 °F (35.8 °C)  Pulse:  [61-87] 72  Resp:  [16-18] 18  SpO2:  [94 %-100 %] 98 %  BP: (102-136)/(58-88) 121/64     Weight: 72.1 kg (159 lb)  Body mass index is 29.08 kg/m².  Patient's last menstrual period was 2018 (exact date).    I&O (Last 24H):    Intake/Output Summary (Last 24 hours) at 18 1506  Last data filed at 18 1254   Gross per 24 hour   Intake             3500 ml   Output             2440 ml   Net             1060 ml       Physical Exam:   Constitutional: She is  oriented to person, place, and time. She appears well-developed and well-nourished.    HENT:   Head: Normocephalic and atraumatic.     Neck: Normal range of motion.    Cardiovascular: Normal rate, regular rhythm and normal heart sounds.     Pulmonary/Chest: Effort normal and breath sounds normal.        Abdominal: Soft. Bowel sounds are normal. She exhibits abdominal incision (pfannenstiel, c/d/i, bandage in place; umbilical incision c/d/i bandage in place). She exhibits no distension. There is no tenderness.             Musculoskeletal: Normal range of motion and moves all extremeties.       Neurological: She is alert and oriented to person, place, and time.    Skin: Skin is warm and dry.    Psychiatric: She has a normal mood and affect.       Laboratory:    Recent Labs  Lab 05/29/18  0920   WBC 12.05   HGB 7.6*   HCT 22.8*   MCV 85             Assessment/Plan:     * S/P abdominal myomectomy/umbilical hernia repair/chromopertubation 5/29/2018    POD#0  - Continue routine post-op advances  - Pain: Continue IV toradol/po percocet/dilaudid, pain well controlled  - Romeo in place; will remove POD#1  - Encourage ambulation  - Encourage IS  - Continue IVF until tolerating more po  - Clear liquid diet, will advance this PM as tolerated  - Antiemetics prn nausea/vomiting.  - Simethicone prn for gas pain  - DVT ppx: AMIE/SCD        Acute blood loss anemia    - EBL 1500cc intraop  - 1u pRBC given intraop  - 11/36 (pre-op) > 7.6/22.8 (intraop) > 1u pRBC > CBC pending this PM  - VSS, Good UOP            Nel Ibrahim MD  Obstetrics & Gynecology  [facility]

## 2018-05-29 NOTE — ANESTHESIA POSTPROCEDURE EVALUATION
"Anesthesia Post Evaluation    Patient: Melita Shell    Procedure(s) Performed: Procedure(s) (LRB):  MYOMECTOMY OPEN (N/A)  REPAIR-HERNIA-UMBILICAL (5 YRS +) (N/A)  CHROMOTUBATION, OVIDUCT (N/A)    Final Anesthesia Type: general  Patient location during evaluation: PACU  Patient participation: Yes- Able to Participate  Level of consciousness: awake and alert  Post-procedure vital signs: reviewed and stable  Pain management: adequate  Airway patency: patent  PONV status at discharge: No PONV  Anesthetic complications: no      Cardiovascular status: blood pressure returned to baseline  Respiratory status: unassisted and spontaneous ventilation  Hydration status: euvolemic  Follow-up not needed.        Visit Vitals  /64 (BP Location: Right arm, Patient Position: Lying)   Pulse 72   Temp 35.8 °C (96.5 °F) (Oral)   Resp 18   Ht 5' 2" (1.575 m)   Wt 72.1 kg (159 lb)   LMP 05/28/2018 (Exact Date)   SpO2 98%   BMI 29.08 kg/m²       Pain/Holli Score: Pain Assessment Performed: Yes (5/29/2018 11:24 AM)  Presence of Pain: complains of pain/discomfort (5/29/2018 11:41 AM)  Pain Rating Prior to Med Admin: 5 (5/29/2018 12:54 PM)  Pain Rating Post Med Admin: 4 (5/29/2018 12:35 PM)  Holli Score: 10 (5/29/2018 12:55 PM)      "

## 2018-05-29 NOTE — OP NOTE
DATE OF PROCEDURE: 05/29/2018     PREOPERATIVE DIAGNOSIS:   Umbilical hernia.     PREOPERATIVE DIAGNOSIS:  Umbilical hernia.     PROCEDURE: Repair of umbilical hernia      SURGEON: Kim Valiente M.D.     ANESTHESIA: General endotracheal.     PREP: Chlorhexidine.     ESTIMATED BLOOD LOSS: Minimal.     SPECIMEN: None.     INDICATION: The patient is a 33 year-old female who presents to the clinic with   symptomatic  umbilical hernia. She was getting a simultaneous myomectomy with Dr. Contreras. The risks of the surgery was discussed with the patient including bleeding, infection, pain, scarring, wound  complications, injury or local structures, recurrence and potential need for   further surgery. The patient demonstrated understanding of these risks and a   consent form was obtained.     PROCEDURE IN DETAIL: The patient was identified in the Preoperative Unit and   taken back to the Operating Room, laid supine on the operating room table. IV   antibiotics were administered prior to the administration of anesthesia.   Anesthesia was induced without complication. The patient was then prepped and   draped in a standard sterile fashion. A timeout procedure was performed in   accordance with hospital protocol. The skin was anesthetized with local   anesthesia. A 3-cm incision was made in the lexis-umbilical location with a   15-blade scalpel. Tissue was dissected until the hernia sac was identified. It  was circumferentially dissected down to the level of the fascia and cleared.   The hernia was then reduced and the fascial edges were cleared. The final   fascial defect measured 1 cm.  The midline was reapproximated with 0 Ethibond suture in an figure of 8 fashion x 4.  The umbilicus was then tacked   down with a 4-0 Vicryl suture in an interrupted fashion. The dermis was   reapproximated using 3-0 Vicryl suture in an interrupted fashion. The skin was   reapproximated using 5-0 Monocryl suture in a running fashion. Dry  sterile   dressings were applied. The patient was awakened from anesthesia without   complications and returned to the Postoperative Recovery Unit in stable   condition. At the end of the case, sponge, instrument and needle counts were   correct on 2 occasions and I was present and scrubbed throughout the entirety of  the case.     COMPLICATIONS: None.    Condition: Stable

## 2018-05-29 NOTE — BRIEF OP NOTE
[facility]  Brief Operative Note    SUMMARY     Surgery Date: 5/29/2018     Surgeon(s) and Role:  Panel 1:     * Milagros Mcmullen MD - Primary     * Nel Ibrahim MD - Resident - Assisting    Panel 2:     * Kim Valiente MD - Primary    Pre-op Diagnosis:  Intramural and subserous leiomyoma of uterus [D25.1, D25.2]    Post-op Diagnosis:  Post-Op Diagnosis Codes:     * Intramural and subserous leiomyoma of uterus [D25.1, D25.2]    Procedure(s) (LRB):  MYOMECTOMY OPEN (N/A)  REPAIR-HERNIA-UMBILICAL (5 YRS +) (N/A)  CHROMOTUBATION, OVIDUCT (N/A)    Anesthesia: General    Description of Procedure:   1. Abdominal myomectomy through pfannenstiel skin incision  2. 17 large fibroids removed  3. 1u pRBC given intraop - EBL 1500mL  4. Chromopertubation performed, bilateral tubal blockage  5. Large 5cm fibroid on left ovary, unable to be removed     Estimated Blood Loss: 1500 mL         Specimens:   Specimen (12h ago through future)    Start     Ordered    05/29/18 1053  Specimen to Pathology - Surgery  Once     Comments:  1. Uterine fibroids      05/29/18 1052        Nel Ibrahim M.D.  PGY-4 OB/GYN

## 2018-05-29 NOTE — HOSPITAL COURSE
05/29/2018 - s/p Abdominal myomectomy/chromopertubation/umbilical hernia repair per [facility], complicated by EBL 1500cc, s/p 1u pRBC intraoperatively. Doing well immediately post-op. Post H/H 8/24 05/30/2018 - POD#1. Doing well post-op, mild nausea and some pain. Plan for 1 more night in hospital.  05/31/2018 - POD#2. No acute events, tolerating regular diet, pain controlled. Discharge home today.

## 2018-05-29 NOTE — OP NOTE
DATE: (date: 05/29/2018)    OPERATIVE REPORT    PREOPERATIVE DIAGNOSIS  1. Fibroids  2. Menorrhagia  3. Chronic blood loss anemia  4. History of myomectomy    POSTOPERATIVE DIAGNOSIS  1. Fibroids  2. Menorrhagia  3. Chronic blood loss anemia  4. History of myomectomy    PROCEDURE:  1. Abdominal myomectomy via Pfannenstiel skin incision  2. Chromopertubation  3. Umbilical hernia repair (performed by Dr. Valiente)    SURGEON: Dr. Milagros Mcmullen    ASSISTANT: Dr. Nel Seymour    ANESTHESIA: General    COMPLICATIONS: None    EBL: 1500 cc    IV FLUIDS: 1800 cc LR, 1000 cc Albumin, 1 Unit PRBC's    URINE OUTPUT: 800 cc    FINDINGS: Enlarged uterus with multiple fibroids (subserosal, intramural) extending to the liver and stomach.  Large subserosal fibroid adhesed to the left ovary and tube.  Tubal dye perfusion attempted but dye extravasated through uterine incisions.  Adhesions of the right tube and ovary to the right portion of the uterus.  Patient is not a candidate for a vaginal delivery    PROCEDURE: Patient was taking to the operating room where general anesthesia was administered and found to be adequate.  She was prepped and draped in the dorsal lithotomy position.  A sterile speculum was placed in the vagina.  The anterior lip of the cervix was grasped with a single tooth tenaculum.  The uterus was sounded to approximately 10cm.  A JAMIL was placed inside the endometrial cavity.  All other instruments were removed from the vagina.  A conte catheter was inserted into the bladder.    Gloves were changed.  A Pfannenstiel skin incision was made with the scalpel and carried down to the underlying layer of fascia with the scalpel.  The incision was extended laterally with Cha scissors.  The superior aspect of the incision was grasped with the [facility] clamps, tented up and the underlying muscles were dissected off bluntly.  Attention was turned to the inferior aspect of the incision.  The underlying muscles were  dissected off bluntly.  The rectus muscles were  in the midline.  The peritoneum was entered in digitally.  The incision was extended with finger fracture.  All laparotomy sponges were removed from the surgical field.     Uterus was palpated and found to be very irregular and enlarged extending to the liver and stomach superiorly.  The uterus was unable to be exteriorized due to the large size.  All fibroids were removed in the following fashion: 20 Units of Pitressin diluted in 100cc of Normal saline was injected into the serosa overlying the fibroid.  An incision was made with a Bovie.  This incision was carried down to the fibroid with the Bovie.  The fibroid was grasped with a Leyhey clamp.  Using the Harmonic Focus, the fibroid was enucleated from the underlying myometrium.  Hemostasis was maintained utilizing the focus.  Once the fibroid was completely enucleated, it was handed to the waiting surgical nurse.  The deepest layer of myometrium was reapproximated with 2-0 PDS in a running, locked fashion.  The next layer of myometrium was reapproximated with 2-0 Vicryl in a running, locked fashion.  The serosa was reapproximated with 3-0 Monocryl in a baseball stitch fashion.  After several large fibroids had been removed, the uterus was able to be exteriorized.  A red rubber catheter was placed around the lower uterine segment to achieve better hemostasis.  Additional fibroids were removed as described above.  A left subserosal fibroid was noted and found to be densely adherent to the ovary.  It was difficult to distinguish the fibroid from the ovary.  Large vessels were noted as well as the IP which were involved in the conglomeration.  Decision was made to leave this fibroid in-situ. There was an additional anterior fibroid near the bladder.  Decision was made to leave this fibroid in-situ due to risk of injury to the bladder.      The uterus was copiously irrigated.  Excellent hemostasis was noted.   Chromopertubation was performed.  However, there was extravasation of dye anteriorly where fibroids had been removed.  The pelvis was again irrigated and suctioned.  The JAMIL was removed.  A sheet of seprafilm was placed inside the abdominal cavity.  The peritoneum was reapproximated with 2-0 Vicryl in a running fashion.  The muscle was reapproximated with 2-0 Vicryl.  The fascia was reapproximated with 1-0 PDS in a running fashion.  The skin was closed with 4-0 Monocryl in a subcuticular fashion.  Sponge, lap, and needle counts were correct x 2.  The conte was draining clear urine.    Dr. Valiente was present to perform the umbilical hernia repair portion of the procedure.  That will be dictated under a separate note.       Milagros Mcmullen MD, FACOG  Obstetrics and Gynecology

## 2018-05-29 NOTE — INTERVAL H&P NOTE
The patient has been examined and the H&P has been reviewed:    I concur with the findings and no changes have occurred since H&P was written.    Anesthesia/Surgery risks, benefits and alternative options discussed and understood by patient/family.          Active Hospital Problems    Diagnosis  POA    Fibroids, intramural [D25.1]  Yes      Resolved Hospital Problems    Diagnosis Date Resolved POA   No resolved problems to display.     Nel Ibrahim M.D.  PGY-4 OB/GYN

## 2018-05-29 NOTE — TELEPHONE ENCOUNTER
----- Message from Roberta Campa MA sent at 5/14/2018 10:06 AM CDT -----  Post-op appointment 6 weeks from 5/29

## 2018-05-29 NOTE — TELEPHONE ENCOUNTER
Contacted the pt to schedule post op appointment. Spoke with the pt's  James. James agreed to an appointment on 7/19 at 10:15AM for the pt. James verbalized understanding. Letter sent out.

## 2018-05-29 NOTE — TRANSFER OF CARE
"Anesthesia Transfer of Care Note    Patient: Melita Shell    Procedure(s) Performed: Procedure(s) (LRB):  MYOMECTOMY OPEN (N/A)  REPAIR-HERNIA-UMBILICAL (5 YRS +) (N/A)  CHROMOTUBATION, OVIDUCT (N/A)    Patient location: PACU    Anesthesia Type: general    Transport from OR: Transported from OR on 2-3 L/min O2 by NC with adequate spontaneous ventilation    Post pain: adequate analgesia    Post assessment: no apparent anesthetic complications    Post vital signs: stable    Level of consciousness: awake and alert    Nausea/Vomiting: no nausea/vomiting    Complications: none    Transfer of care protocol was followed      Last vitals:   Visit Vitals  /88   Pulse 75   Temp 36.7 °C (98 °F)   Resp 18   Ht 5' 2" (1.575 m)   Wt 72.1 kg (159 lb)   LMP 05/28/2018 (Exact Date)   SpO2 100%   BMI 29.08 kg/m²     "

## 2018-05-29 NOTE — ASSESSMENT & PLAN NOTE
POD#0  - Continue routine post-op advances  - Pain: Continue IV toradol/po percocet/dilaudid, pain well controlled  - Romeo in place; will remove POD#1  - Encourage ambulation  - Encourage IS  - Continue IVF until tolerating more po  - Clear liquid diet, will advance this PM as tolerated  - Antiemetics prn nausea/vomiting.  - Simethicone prn for gas pain  - DVT ppx: AMIE/SCD

## 2018-05-29 NOTE — ASSESSMENT & PLAN NOTE
- EBL 1500cc intraop  - 1u pRBC given intraop  - 11/36 (pre-op) > 7.6/22.8 (intraop) > 1u pRBC > CBC pending this PM  - VSS, Good UOP

## 2018-05-30 PROBLEM — D62 ACUTE BLOOD LOSS ANEMIA: Status: ACTIVE | Noted: 2018-05-30

## 2018-05-30 PROCEDURE — 25000003 PHARM REV CODE 250: Performed by: OBSTETRICS & GYNECOLOGY

## 2018-05-30 PROCEDURE — 63600175 PHARM REV CODE 636 W HCPCS: Performed by: OBSTETRICS & GYNECOLOGY

## 2018-05-30 PROCEDURE — 99024 POSTOP FOLLOW-UP VISIT: CPT | Mod: ,,, | Performed by: OBSTETRICS & GYNECOLOGY

## 2018-05-30 PROCEDURE — 25000003 PHARM REV CODE 250: Performed by: PHYSICIAN ASSISTANT

## 2018-05-30 PROCEDURE — 11000001 HC ACUTE MED/SURG PRIVATE ROOM

## 2018-05-30 PROCEDURE — 94799 UNLISTED PULMONARY SVC/PX: CPT

## 2018-05-30 RX ORDER — IBUPROFEN 600 MG/1
600 TABLET ORAL EVERY 6 HOURS
Qty: 30 TABLET | Refills: 0 | Status: SHIPPED | OUTPATIENT
Start: 2018-05-30 | End: 2018-06-11 | Stop reason: SDUPTHER

## 2018-05-30 RX ORDER — OXYCODONE AND ACETAMINOPHEN 5; 325 MG/1; MG/1
1 TABLET ORAL EVERY 4 HOURS PRN
Qty: 20 TABLET | Refills: 0 | Status: SHIPPED | OUTPATIENT
Start: 2018-05-30 | End: 2018-07-19

## 2018-05-30 RX ORDER — SIMETHICONE 80 MG
1 TABLET,CHEWABLE ORAL 3 TIMES DAILY PRN
Status: DISCONTINUED | OUTPATIENT
Start: 2018-05-30 | End: 2018-05-31 | Stop reason: HOSPADM

## 2018-05-30 RX ADMIN — STANDARDIZED SENNA CONCENTRATE AND DOCUSATE SODIUM 1 TABLET: 8.6; 5 TABLET, FILM COATED ORAL at 08:05

## 2018-05-30 RX ADMIN — HYDROMORPHONE HYDROCHLORIDE 1 MG: 1 INJECTION, SOLUTION INTRAMUSCULAR; INTRAVENOUS; SUBCUTANEOUS at 02:05

## 2018-05-30 RX ADMIN — HYDROMORPHONE HYDROCHLORIDE 1 MG: 1 INJECTION, SOLUTION INTRAMUSCULAR; INTRAVENOUS; SUBCUTANEOUS at 08:05

## 2018-05-30 RX ADMIN — IBUPROFEN 600 MG: 600 TABLET ORAL at 05:05

## 2018-05-30 RX ADMIN — MUPIROCIN 1 G: 20 OINTMENT TOPICAL at 08:05

## 2018-05-30 RX ADMIN — KETOROLAC TROMETHAMINE 30 MG: 30 INJECTION, SOLUTION INTRAMUSCULAR at 05:05

## 2018-05-30 RX ADMIN — SODIUM CHLORIDE, SODIUM LACTATE, POTASSIUM CHLORIDE, AND CALCIUM CHLORIDE: .6; .31; .03; .02 INJECTION, SOLUTION INTRAVENOUS at 11:05

## 2018-05-30 RX ADMIN — IBUPROFEN 600 MG: 600 TABLET ORAL at 12:05

## 2018-05-30 RX ADMIN — OXYCODONE HYDROCHLORIDE AND ACETAMINOPHEN 1 TABLET: 10; 325 TABLET ORAL at 05:05

## 2018-05-30 RX ADMIN — KETOROLAC TROMETHAMINE 30 MG: 30 INJECTION, SOLUTION INTRAMUSCULAR at 11:05

## 2018-05-30 RX ADMIN — CETIRIZINE HYDROCHLORIDE 10 MG: 10 TABLET, FILM COATED ORAL at 08:05

## 2018-05-30 RX ADMIN — STANDARDIZED SENNA CONCENTRATE AND DOCUSATE SODIUM 1 TABLET: 8.6; 5 TABLET, FILM COATED ORAL at 09:05

## 2018-05-30 RX ADMIN — SIMETHICONE CHEW TAB 80 MG 80 MG: 80 TABLET ORAL at 08:05

## 2018-05-30 RX ADMIN — OXYCODONE HYDROCHLORIDE AND ACETAMINOPHEN 1 TABLET: 10; 325 TABLET ORAL at 03:05

## 2018-05-30 NOTE — PLAN OF CARE
PCP: Claudia Barajas in [place], confirmed in [facility]    Pharmacy:  [facility] [address], confirmed in [facility]    Pt lives independently at home with .    Family will provide ride home at time of discharge.      No DME in use at home.      Pt denies substance abuse or mental health history.      No anticipated DC needs from CM perspective.       05/30/18 1043   Discharge Assessment   Assessment Type Discharge Planning Assessment   Confirmed/corrected address and phone number on facesheet? Yes   Assessment information obtained from? Patient   Communicated expected length of stay with patient/caregiver yes   Prior to hospitilization cognitive status: Alert/Oriented   Prior to hospitalization functional status: Independent   Current cognitive status: Alert/Oriented   Current Functional Status: Independent   Lives With spouse   Able to Return to Prior Arrangements yes   Is patient able to care for self after discharge? Yes   Who are your caregiver(s) and their phone number(s)? Spouse:  James [phone]   Patient's perception of discharge disposition home or selfcare   Readmission Within The Last 30 Days no previous admission in last 30 days   Patient currently being followed by outpatient case management? No   Patient currently receives any other outside agency services? No   Equipment Currently Used at Home none   Do you have any problems affording any of your prescribed medications? No   Is the patient taking medications as prescribed? yes   Does the patient have transportation home? Yes   Transportation Available family or friend will provide   Does the patient receive services at the [facility]? No   Discharge Plan A Home   Patient/Family In Agreement With Plan yes

## 2018-05-30 NOTE — PROGRESS NOTES
[facility]  Obstetrics & Gynecology  Progress Note    Patient Name: Melita Shell  MRN: [id number]  Admission Date: 2018  Primary Care Provider: Claudia Barajas MD  Principal Problem: S/P myomectomy    Subjective:     HPI:  Suleman is a 33 y.o. female  presents for scheduled open myomectomy, chromopertubation. She has a history of fibroids and reports some pelvic pain.  She reports bleeding is moderate.     Interval History: Patient is doing well this morning. No acute events overnight. Pain is well controlled with IV/po pain meds. Tolerating liquid diet without N/V. Romeo just removed this AM, has not yet voided. Ambulating without difficulty. Minimal vaginal bleeding. Denies fever, chills, CP, SOB.      Scheduled Meds:   cetirizine  10 mg Oral Daily    ibuprofen  600 mg Oral Q6H    ketorolac  30 mg Intravenous Q6H    mupirocin  1 g Nasal BID    senna-docusate 8.6-50 mg  1 tablet Oral BID     Continuous Infusions:   lactated ringers 125 mL/hr at 18 2311     PRN Meds:albuterol, diphenhydrAMINE, HYDROmorphone, ondansetron, oxyCODONE-acetaminophen, oxyCODONE-acetaminophen, promethazine (PHENERGAN) IVPB    Review of patient's allergies indicates:   Allergen Reactions    Pcn [penicillins] Nausea Only       Objective:     Vital Signs (Most Recent):  Temp: 99.2 °F (37.3 °C) (18 0446)  Pulse: 85 (18 0446)  Resp: 18 (18 2328)  BP: (!) 104/50 (18 0446)  SpO2: 99 % (18 0446) Vital Signs (24h Range):  Temp:  [96.5 °F (35.8 °C)-99.2 °F (37.3 °C)] 99.2 °F (37.3 °C)  Pulse:  [61-88] 85  Resp:  [16-20] 18  SpO2:  [94 %-100 %] 99 %  BP: ()/(50-66) 104/50     Weight: 73 kg (160 lb 15 oz)  Body mass index is 29.44 kg/m².  Patient's last menstrual period was 2018 (exact date).    I&O (Last 24H):    Intake/Output Summary (Last 24 hours) at 18 0648  Last data filed at 18 0641   Gross per 24 hour   Intake             5000 ml   Output              3690 ml   Net             1310 ml       Physical Exam:   Constitutional: She is oriented to person, place, and time. She appears well-developed and well-nourished.    HENT:   Head: Normocephalic and atraumatic.     Neck: Normal range of motion.    Cardiovascular: Normal rate, regular rhythm and normal heart sounds.     Pulmonary/Chest: Effort normal and breath sounds normal.        Abdominal: Soft. Bowel sounds are normal. She exhibits abdominal incision (pfannenstiel, c/d/i, bandage in place; umbilical incision c/d/i bandage in place). She exhibits no distension. There is no tenderness.             Musculoskeletal: Normal range of motion and moves all extremeties.       Neurological: She is alert and oriented to person, place, and time.    Skin: Skin is warm and dry.    Psychiatric: She has a normal mood and affect.       Laboratory:    Recent Labs  Lab 05/29/18  0920 05/29/18  1610   WBC 12.05 10.33   HGB 7.6* 8.1*   HCT 22.8* 24.1*   MCV 85 87    231          Assessment/Plan:     * S/P abdominal myomectomy/umbilical hernia repair/chromopertubation 5/29/2018    POD#1  - Continue routine post-op advances  - Pain: Continue IV [id number] (then po ibuprofen)/po percocet/dilaudid, pain well controlled  - Romeo removed this AM, PVT   - Encourage ambulation  - Encourage IS  - Continue IVF until tolerating more po  - Regular diet  - Antiemetics prn nausea/vomiting.  - Simethicone prn for gas pain  - DVT ppx: AMIE/SCD        Acute blood loss anemia    - EBL 1500cc intraop  - 1u pRBC given intraop  - 11/36 (pre-op) > 7.6/22.8 (intraop) > 1u pRBC > 8.1/24.1  - VSS, Good UOP        Umbilical hernia without obstruction and without gangrene    - repaired per general surgery            Nel Ibrahim MD  Obstetrics & Gynecology  [facility]

## 2018-05-30 NOTE — ASSESSMENT & PLAN NOTE
- EBL 1500cc intraop  - 1u pRBC given intraop  - 11/36 (pre-op) > 7.6/22.8 (intraop) > 1u pRBC > 8.1/24.1  - VSS, Good UOP

## 2018-05-30 NOTE — PLAN OF CARE
Problem: Patient Care Overview  Goal: Plan of Care Review  Outcome: Ongoing (interventions implemented as appropriate)  Patient in no apparent distress. Sat's  99 % on room air. IS done. PRN MDI not needed . Will continue to monitor.

## 2018-05-30 NOTE — MEDICAL/APP STUDENT
[facility]  Obstetrics & Gynecology  Progress Note    Patient Name: Melita Shell  MRN: [id number]  Admission Date: 2018  Primary Care Provider: Claudia Barajas MD  Principal Problem: S/P myomectomy   [id number]    Subjective:     33 y.o. female  is at post-operative day (POD) #1 status post open myomectomy, chromopertubation, and umbilical hernia repair, indicated by a history of intramural and subserous leiomyoma (fibroids) and umbilical hernia. She also had an opened myomectomy in  and history of R ovarian cyst  and asthma.     The patient is doing well. No major issues/adverse events overnights.  Pain was controlled with PO vs IV meds   Tolerating regular diet   Voiding vs Has not voided as conte catheter in place  Stooling (BM)  Has Ambulated to bedside chair w/o difficulty     (-) Headache  (-) Chest pain  (-) SOB  (-) Nausea/Vomiting  (-) AMS    Scheduled Meds:   cetirizine  10 mg Oral Daily    ibuprofen  600 mg Oral Q6H    ketorolac  30 mg Intravenous Q6H    mupirocin  1 g Nasal BID    senna-docusate 8.6-50 mg  1 tablet Oral BID     Continuous Infusions:   lactated ringers 125 mL/hr at 18 2311     PRN Meds:albuterol, diphenhydrAMINE, HYDROmorphone, ondansetron, oxyCODONE-acetaminophen, oxyCODONE-acetaminophen, promethazine (PHENERGAN) IVPB    Review of patient's allergies indicates:   Allergen Reactions    Pcn [penicillins] Nausea Only       Objective:     Vital Signs (Most Recent):  Temp: 99.2 °F (37.3 °C) (18 0446)  Pulse: 85 (18 0446)  Resp: 18 (188)  BP: (!) 104/50 (18 0446)  SpO2: 99 % (18 0446) Vital Signs (24h Range):  Temp:  [96.5 °F (35.8 °C)-99.2 °F (37.3 °C)] 99.2 °F (37.3 °C)  Pulse:  [61-88] 85  Resp:  [16-20] 18  SpO2:  [94 %-100 %] 99 %  BP: ()/(50-66) 104/50     Weight: 73 kg (160 lb 15 oz)  Body mass index is 29.44 kg/m².  Patient's last menstrual period was 2018 (exact date).    I&O (Last  24H):    Intake/Output Summary (Last 24 hours) at 05/30/18 0546  Last data filed at 05/30/18 0500   Gross per 24 hour   Intake             3500 ml   Output             3690 ml   Net             -190 ml     Urine = 2190  Oliguria/Poor Urine Output = <30mL/h or < (0.5 x weight kg)/h      Physical Exam  Physical Exam  Constitutional:                She is oriented to person, place, and time.                She appeared well-developed and well-nourished.                No distress.  HEENT               Head: Normocephalic and atraumatic. Neck: supple, normal range of motion.               Eyes               Ear               Nose               Throat  CVS:                Radial pulses are regular, strong, and symmetrical               Normal heart sounds. No murmurs  PULMONARY/CHEST:                Normal efforts and breath sounds. No stridor. No wheezes. No crackles.                No respiratory distress.  ABDOMEN:                Abdominal incisions (pfannenstiel) are clean, dry, intact, bandage in place               Soft.               No distension.               No tenderness vs appropriately tenderness               No rebound. No guarding               Normal bowel sounds  GENITOURINARY               Romeo catheter in place draining clean yellow urine               Packing removed. Minimal blood vs completely red but no clot.               No active bleeding.               No discharge               No tenderness vs appropriately tender  EXTREMITIES (UE and LE)               No cyanosis               No edema               No tenderness               Normal sensation               Normal movements               LE: Sequential Compression Device (SCD) in place               LE: Posterior tibial / Dorsalis pedis pulses are strong and symmetrical  SKIN: Warm and dry  PSY:                Normal mood and affect               Normal behavior    Laboratory:  5/29 4-8pm  H&H  8.1/24.1  RBC: 2.77  WBC: 10.33    Pathology specimen: Inprogress    Assessment/Plan:     Active Diagnoses:    Diagnosis Date Noted POA    PRINCIPAL PROBLEM:  S/P abdominal myomectomy/umbilical hernia repair/chromopertubation 2018 [Z98.890] 2018 Not Applicable    Fibroids, intramural [D25.1] 2018 Yes    Acute blood loss anemia [D62] 2018 Unknown    Umbilical hernia without obstruction and without gangrene [K42.9] 10/08/2013 Yes      Problems Resolved During this Admission:    Diagnosis Date Noted Date Resolved POA     33 y.o. female , at (POD) #1, is doing well.     POD#0  - Continue routine post-op advances  - Pain: Continue IV toradol/po percocet/dilaudid, pain well controlled  - Romeo in place; will remove POD#1  - Encourage ambulation  - Encourage IS  - Continue IVF until tolerating more po  - Clear liquid diet, will advance this PM as tolerated  - Antiemetics prn nausea/vomiting.  - Simethicone prn for gas pain  - DVT ppx: AMIE/SCD     Acute blood loss anemia  - EBL 1500cc intraop  - 1u pRBC given intraop  - /36 (pre-op) > 7.6/22.8 (intraop) > 1u pRBC > CBC pending this PM     Continue home med  -albuterol  -mometasone  -fexofenadine  -pre-philipp Vit, Vit D, folate, B12     Discharge  As patient meets post-op milestone, anticipate d/c home today/POD pending void trial         ***    Bean Bush  Obstetrics & Gynecology  [facility]

## 2018-05-30 NOTE — DISCHARGE SUMMARY
[facility]  Obstetrics & Gynecology  Discharge Summary    Patient Name: Meilta Shell  MRN: [id number]  Admission Date: 2018  Hospital Length of Stay: 0 days  Discharge Date and Time:  2018 12:45 PM  Attending Physician: Milagros Mcmullen MD   Discharging Provider: Nel Ibrahim MD  Primary Care Provider: Claudia Barajas MD    HPI:  Suleman is a 33 y.o. female  presents for scheduled open myomectomy, chromopertubation. She has a history of fibroids and reports some pelvic pain.  She reports bleeding is moderate.     Hospital Course:  2018 - s/p Abdominal myomectomy/chromopertubation/umbilical hernia repair [facility], complicated by EBL 1500cc, s/p 1u pRBC intraoperatively. Doing well immediately post-op. Post H/H 2018 - POD#1. Meeting all post-op milestones. Ready for DC home.    Procedure(s) (LRB):  MYOMECTOMY OPEN (N/A)  REPAIR-HERNIA-UMBILICAL (5 YRS +) (N/A)  CHROMOTUBATION, OVIDUCT (N/A)         Significant Diagnostic Studies: Labs:   CBC   Recent Labs  Lab 18  0920 18  1610   WBC 12.05 10.33   HGB 7.6* 8.1*   HCT 22.8* 24.1*    231       Pending Diagnostic Studies:     None        Final Active Diagnoses:    Diagnosis Date Noted POA    PRINCIPAL PROBLEM:  S/P abdominal myomectomy/umbilical hernia repair/chromopertubation 2018 [Z98.890] 2018 Not Applicable    Fibroids, intramural [D25.1] 2018 Yes    Acute blood loss anemia [D62] 2018 Unknown    Umbilical hernia without obstruction and without gangrene [K42.9] 10/08/2013 Yes      Problems Resolved During this Admission:    Diagnosis Date Noted Date Resolved POA        Discharged Condition: good    Disposition: Home or Self Care    Follow Up:  Follow-up Information     Milagros Mcmullen MD In 4 weeks.    Specialties:  Obstetrics, Obstetrics and Gynecology  Why:  Post-op Check  Contact information:  [address]  [address]  [phone]                 Patient Instructions:     Diet Adult Regular     Activity as tolerated     Notify your health care provider if you experience any of the following:  temperature >100.4     Notify your health care provider if you experience any of the following:  persistent nausea and vomiting or diarrhea     Notify your health care provider if you experience any of the following:  redness, tenderness, or signs of infection (pain, swelling, redness, odor or green/yellow discharge around incision site)     Notify your health care provider if you experience any of the following:  persistent dizziness, light-headedness, or visual disturbances     Notify your health care provider if you experience any of the following:  increased confusion or weakness     Remove dressing in 72 hours       Medications:  Reconciled Home Medications:      Medication List      START taking these medications    ibuprofen 600 MG tablet  Commonly known as:  ADVIL,MOTRIN  Take 1 tablet (600 mg total) by mouth every 6 (six) hours.     oxyCODONE-acetaminophen 5-325 mg per tablet  Commonly known as:  PERCOCET  Take 1 tablet by mouth every 4 (four) hours as needed.        CONTINUE taking these medications    albuterol 90 mcg/actuation inhaler  Inhale 2 puffs into the lungs every 6 (six) hours as needed for Wheezing.     ergocalciferol 50,000 unit Cap  Commonly known as:  ERGOCALCIFEROL  Take 1 capsule (50,000 Units total) by mouth every 7 days.     fexofenadine 180 MG tablet  Commonly known as:  ALLEGRA  Take 180 mg by mouth once daily.     LIQUID B 12 ORAL  Take by mouth.     mometasone 220 mcg (60 doses) Aepb  Commonly known as:  ASMANEX TWISTHALER  Inhale 2 puffs into the lungs 2 (two) times daily. Controller     prenat.vits,rachel,min-iron-folic Tab  Take by mouth.     UNABLE TO FIND  medication name:Fertalaid supplement            Nel Ibrahim MD  Obstetrics & Gynecology  [facility]

## 2018-05-30 NOTE — PLAN OF CARE
Patient resting in no apparent distress. Romeo removed per order. Pt due to void. No passing of  flatus reported. No bowel movement reported. IVF infusing. Abdominal dressings c/d/i. Pt ambulated in room with assistance x 1. Family at bedside. Pt instructed to use call light for assistance. Will continue to monitor.

## 2018-05-30 NOTE — SUBJECTIVE & OBJECTIVE
Interval History: Patient is doing well this morning. No acute events overnight. Pain is well controlled with IV/po pain meds. Tolerating liquid diet without N/V. Romeo just removed this AM, has not yet voided. Ambulating without difficulty. Minimal vaginal bleeding. Denies fever, chills, CP, SOB.      Scheduled Meds:   cetirizine  10 mg Oral Daily    ibuprofen  600 mg Oral Q6H    ketorolac  30 mg Intravenous Q6H    mupirocin  1 g Nasal BID    senna-docusate 8.6-50 mg  1 tablet Oral BID     Continuous Infusions:   lactated ringers 125 mL/hr at 05/29/18 2311     PRN Meds:albuterol, diphenhydrAMINE, HYDROmorphone, ondansetron, oxyCODONE-acetaminophen, oxyCODONE-acetaminophen, promethazine (PHENERGAN) IVPB    Review of patient's allergies indicates:   Allergen Reactions    Pcn [penicillins] Nausea Only       Objective:     Vital Signs (Most Recent):  Temp: 99.2 °F (37.3 °C) (05/30/18 0446)  Pulse: 85 (05/30/18 0446)  Resp: 18 (05/29/18 2328)  BP: (!) 104/50 (05/30/18 0446)  SpO2: 99 % (05/30/18 0446) Vital Signs (24h Range):  Temp:  [96.5 °F (35.8 °C)-99.2 °F (37.3 °C)] 99.2 °F (37.3 °C)  Pulse:  [61-88] 85  Resp:  [16-20] 18  SpO2:  [94 %-100 %] 99 %  BP: ()/(50-66) 104/50     Weight: 73 kg (160 lb 15 oz)  Body mass index is 29.44 kg/m².  Patient's last menstrual period was 05/28/2018 (exact date).    I&O (Last 24H):    Intake/Output Summary (Last 24 hours) at 05/30/18 0648  Last data filed at 05/30/18 0641   Gross per 24 hour   Intake             5000 ml   Output             3690 ml   Net             1310 ml       Physical Exam:   Constitutional: She is oriented to person, place, and time. She appears well-developed and well-nourished.    HENT:   Head: Normocephalic and atraumatic.     Neck: Normal range of motion.    Cardiovascular: Normal rate, regular rhythm and normal heart sounds.     Pulmonary/Chest: Effort normal and breath sounds normal.        Abdominal: Soft. Bowel sounds are normal. She  exhibits abdominal incision (pfannenstiel, c/d/i, bandage in place; umbilical incision c/d/i bandage in place). She exhibits no distension. There is no tenderness.             Musculoskeletal: Normal range of motion and moves all extremeties.       Neurological: She is alert and oriented to person, place, and time.    Skin: Skin is warm and dry.    Psychiatric: She has a normal mood and affect.       Laboratory:    Recent Labs  Lab 05/29/18  0920 05/29/18  1610   WBC 12.05 10.33   HGB 7.6* 8.1*   HCT 22.8* 24.1*   MCV 85 87    231

## 2018-05-30 NOTE — ASSESSMENT & PLAN NOTE
POD#1  - Continue routine post-op advances  - Pain: Continue IV [id number] (then po ibuprofen)/po percocet/dilaudid, pain well controlled  - Romeo removed this AM, PVT   - Encourage ambulation  - Encourage IS  - Continue IVF until tolerating more po  - Regular diet  - Antiemetics prn nausea/vomiting.  - Simethicone prn for gas pain  - DVT ppx: AMIE/SCD

## 2018-05-31 ENCOUNTER — PATIENT MESSAGE (OUTPATIENT)
Dept: OBSTETRICS AND GYNECOLOGY | Facility: CLINIC | Age: 33
End: 2018-05-31

## 2018-05-31 VITALS
TEMPERATURE: 99 F | BODY MASS INDEX: 29.62 KG/M2 | HEART RATE: 96 BPM | OXYGEN SATURATION: 98 % | SYSTOLIC BLOOD PRESSURE: 105 MMHG | WEIGHT: 160.94 LBS | RESPIRATION RATE: 18 BRPM | DIASTOLIC BLOOD PRESSURE: 59 MMHG | HEIGHT: 62 IN

## 2018-05-31 PROCEDURE — 99900035 HC TECH TIME PER 15 MIN (STAT)

## 2018-05-31 PROCEDURE — 99024 POSTOP FOLLOW-UP VISIT: CPT | Mod: ,,, | Performed by: OBSTETRICS & GYNECOLOGY

## 2018-05-31 PROCEDURE — 63600175 PHARM REV CODE 636 W HCPCS: Performed by: OBSTETRICS & GYNECOLOGY

## 2018-05-31 PROCEDURE — 94799 UNLISTED PULMONARY SVC/PX: CPT

## 2018-05-31 PROCEDURE — 94761 N-INVAS EAR/PLS OXIMETRY MLT: CPT

## 2018-05-31 PROCEDURE — 25000003 PHARM REV CODE 250: Performed by: OBSTETRICS & GYNECOLOGY

## 2018-05-31 PROCEDURE — 25000003 PHARM REV CODE 250: Performed by: PHYSICIAN ASSISTANT

## 2018-05-31 RX ADMIN — CETIRIZINE HYDROCHLORIDE 10 MG: 10 TABLET, FILM COATED ORAL at 09:05

## 2018-05-31 RX ADMIN — STANDARDIZED SENNA CONCENTRATE AND DOCUSATE SODIUM 1 TABLET: 8.6; 5 TABLET, FILM COATED ORAL at 09:05

## 2018-05-31 RX ADMIN — IBUPROFEN 600 MG: 600 TABLET ORAL at 01:05

## 2018-05-31 RX ADMIN — MUPIROCIN 1 G: 20 OINTMENT TOPICAL at 09:05

## 2018-05-31 RX ADMIN — OXYCODONE HYDROCHLORIDE AND ACETAMINOPHEN 1 TABLET: 5; 325 TABLET ORAL at 01:05

## 2018-05-31 RX ADMIN — HYDROMORPHONE HYDROCHLORIDE 1 MG: 1 INJECTION, SOLUTION INTRAMUSCULAR; INTRAVENOUS; SUBCUTANEOUS at 12:05

## 2018-05-31 RX ADMIN — IBUPROFEN 600 MG: 600 TABLET ORAL at 12:05

## 2018-05-31 RX ADMIN — OXYCODONE HYDROCHLORIDE AND ACETAMINOPHEN 1 TABLET: 5; 325 TABLET ORAL at 09:05

## 2018-05-31 RX ADMIN — OXYCODONE HYDROCHLORIDE AND ACETAMINOPHEN 1 TABLET: 5; 325 TABLET ORAL at 05:05

## 2018-05-31 RX ADMIN — IBUPROFEN 600 MG: 600 TABLET ORAL at 05:05

## 2018-05-31 RX ADMIN — SIMETHICONE CHEW TAB 80 MG 80 MG: 80 TABLET ORAL at 05:05

## 2018-05-31 NOTE — NURSING
Dressings to abdomen removed.  Steri strips in place up umbilical incision; transverse lower abdominal incision well approximated with no steri strips.  Verified with Ob/gyn resident that pt was ok to discharge without dressing or steristrips to transverse dressing.  Pt provided with abdominal pads to cover incision for comfort if needed.  Discharge instructions reviewed with patient and family at bedside.  Peripheral IV access removed before discharge.

## 2018-05-31 NOTE — DISCHARGE SUMMARY
[facility]  Obstetrics & Gynecology  Discharge Summary    Patient Name: Melita Shell  MRN: [id number]  Admission Date: 2018  Hospital Length of Stay: 1 days  Discharge Date and Time: No discharge date for patient encounter.  Attending Physician: Milagros Mcmullen MD   Discharging Provider: Nel Ibrahim MD  Primary Care Provider: Claudia Barajas MD    HPI:  Suleman is a 33 y.o. female  presents for scheduled open myomectomy, chromopertubation. She has a history of fibroids and reports some pelvic pain.  She reports bleeding is moderate.     Hospital Course:  2018 - s/p Abdominal myomectomy/chromopertubation/umbilical hernia repair [facility], complicated by EBL 1500cc, s/p 1u pRBC intraoperatively. Doing well immediately post-op. Post H/H 2018 - POD#1. Doing well post-op, mild nausea and some pain. Plan for 1 more night in hospital.  2018 - POD#2. No acute events, tolerating regular diet, pain controlled. Discharge home today.    Procedure(s) (LRB):  MYOMECTOMY OPEN (N/A)  REPAIR-HERNIA-UMBILICAL (5 YRS +) (N/A)  CHROMOTUBATION, OVIDUCT (N/A)         Significant Diagnostic Studies: Labs:   CBC   Recent Labs  Lab 18  1610   WBC 10.33   HGB 8.1*   HCT 24.1*          Pending Diagnostic Studies:     None        Final Active Diagnoses:    Diagnosis Date Noted POA    PRINCIPAL PROBLEM:  S/P abdominal myomectomy/umbilical hernia repair/chromopertubation 2018 [Z98.890] 2018 Not Applicable    Acute blood loss anemia [D62] 2018 Yes    Fibroids, intramural [D25.1] 2018 Yes    Umbilical hernia without obstruction and without gangrene [K42.9] 10/08/2013 Yes      Problems Resolved During this Admission:    Diagnosis Date Noted Date Resolved POA        Discharged Condition: good    Disposition: Home or Self Care    Follow Up:  Follow-up Information     Milagros Mcmullen MD In 4 weeks.    Specialties:   Obstetrics, Obstetrics and Gynecology  Why:  Post-op Check  Contact information:  [address] [address]  [phone]                 Patient Instructions:     Diet Adult Regular     Activity as tolerated     Notify your health care provider if you experience any of the following:  temperature >100.4     Notify your health care provider if you experience any of the following:  persistent nausea and vomiting or diarrhea     Notify your health care provider if you experience any of the following:  redness, tenderness, or signs of infection (pain, swelling, redness, odor or green/yellow discharge around incision site)     Notify your health care provider if you experience any of the following:  persistent dizziness, light-headedness, or visual disturbances     Notify your health care provider if you experience any of the following:  increased confusion or weakness     Remove dressing in 72 hours       Medications:  Reconciled Home Medications:      Medication List      START taking these medications    ibuprofen 600 MG tablet  Commonly known as:  ADVIL,MOTRIN  Take 1 tablet (600 mg total) by mouth every 6 (six) hours.     oxyCODONE-acetaminophen 5-325 mg per tablet  Commonly known as:  PERCOCET  Take 1 tablet by mouth every 4 (four) hours as needed.        CONTINUE taking these medications    albuterol 90 mcg/actuation inhaler  Inhale 2 puffs into the lungs every 6 (six) hours as needed for Wheezing.     ergocalciferol 50,000 unit Cap  Commonly known as:  ERGOCALCIFEROL  Take 1 capsule (50,000 Units total) by mouth every 7 days.     fexofenadine 180 MG tablet  Commonly known as:  ALLEGRA  Take 180 mg by mouth once daily.     LIQUID B 12 ORAL  Take by mouth.     mometasone 220 mcg (60 doses) Aepb  Commonly known as:  ASMANEX TWISTHALER  Inhale 2 puffs into the lungs 2 (two) times daily. Controller     prenat.vits,rachel,min-iron-folic Tab  Take by mouth.     UNABLE TO FIND  medication  name:Fertalaid supplement            Nel Ibrahim MD  Obstetrics & Gynecology  [facility]

## 2018-05-31 NOTE — PROGRESS NOTES
[facility]  Obstetrics & Gynecology  Progress Note    Patient Name: Melita Shell  MRN: [id number]  Admission Date: 2018  Primary Care Provider: Claudia Barajas MD  Principal Problem: S/P myomectomy    Subjective:     HPI:  Suleman is a 33 y.o. female  presents for scheduled open myomectomy, chromopertubation. She has a history of fibroids and reports some pelvic pain.  She reports bleeding is moderate.     Interval History: Patient is doing well this morning. No acute events overnight. C/o right sided chest pain last night after using IS and when taking deep breaths, states this has since resolved. Pain is well controlled with po pain meds, dilaudid IV x 2 overnight. Tolerating regular diet without N/V. Urinating and ambulating without difficulty. Minimal vaginal bleeding. Denies fever, chills, CP, SOB.      Scheduled Meds:   cetirizine  10 mg Oral Daily    ibuprofen  600 mg Oral Q6H    mupirocin  1 g Nasal BID    senna-docusate 8.6-50 mg  1 tablet Oral BID     Continuous Infusions:    PRN Meds:albuterol, diphenhydrAMINE, HYDROmorphone, ondansetron, oxyCODONE-acetaminophen, oxyCODONE-acetaminophen, promethazine (PHENERGAN) IVPB, simethicone    Review of patient's allergies indicates:   Allergen Reactions    Pcn [penicillins] Nausea Only       Objective:     Vital Signs (Most Recent):  Temp: 98.7 °F (37.1 °C) (18 0420)  Pulse: (!) 116 (18 0420)  Resp: 16 (18 2331)  BP: (!) 109/57 (18 0420)  SpO2: 97 % (18 0420) Vital Signs (24h Range):  Temp:  [98.7 °F (37.1 °C)-100 °F (37.8 °C)] 98.7 °F (37.1 °C)  Pulse:  [] 116  Resp:  [16-18] 16  SpO2:  [96 %-100 %] 97 %  BP: (108-114)/(53-59) 109/57     Weight: 73 kg (160 lb 15 oz)  Body mass index is 29.44 kg/m².  Patient's last menstrual period was 2018 (exact date).    I&O (Last 24H):    Intake/Output Summary (Last 24 hours) at 18 0742  Last data filed at 18 0100   Gross per 24  hour   Intake                0 ml   Output             2900 ml   Net            -2900 ml       Physical Exam:   Constitutional: She is oriented to person, place, and time. She appears well-developed and well-nourished.    HENT:   Head: Normocephalic and atraumatic.     Neck: Normal range of motion.    Cardiovascular: Normal rate, regular rhythm and normal heart sounds.     Pulmonary/Chest: Effort normal and breath sounds normal.        Abdominal: Soft. Bowel sounds are normal. She exhibits abdominal incision (pfannenstiel, c/d/i, bandage in place; umbilical incision c/d/i bandage in place). She exhibits no distension. There is no tenderness.             Musculoskeletal: Normal range of motion and moves all extremeties.       Neurological: She is alert and oriented to person, place, and time.    Skin: Skin is warm and dry.    Psychiatric: She has a normal mood and affect.       Laboratory:    Recent Labs  Lab 05/29/18  0920 05/29/18  1610   WBC 12.05 10.33   HGB 7.6* 8.1*   HCT 22.8* 24.1*   MCV 85 87    231          Assessment/Plan:     * S/P abdominal myomectomy/umbilical hernia repair/chromopertubation 5/29/2018    POD#2  - Continue routine post-op advances  - Pain: s/p IV [id number], continue po/po percocet/dilaudid, pain well controlled  - Urinating without difficulty  - Encourage ambulation  - Encourage IS  - Regular diet  - Antiemetics prn nausea/vomiting.  - Simethicone prn for gas pain  - DVT ppx: AMIE/SCD        Acute blood loss anemia    - EBL 1500cc intraop  - 1u pRBC given intraop  - 11/36 (pre-op) > 7.6/22.8 (intraop) > 1u pRBC > 8.1/24.1  - VSS, Good UOP        Umbilical hernia without obstruction and without gangrene    - repaired per general surgery            Nel Ibrahim MD  Obstetrics & Gynecology  [facility]

## 2018-05-31 NOTE — MEDICAL/APP STUDENT
[facility]  Obstetrics & Gynecology  Progress Note    Patient Name: Melita Shell  MRN: [id number]  Admission Date: 2018  Primary Care Provider: Claudia Barajas MD  Principal Problem: S/P myomectomy    Subjective:     33 y.o. female  is at post-operative day (POD) #2 status post open myomectomy, chromopertubation, and umbilical hernia repair, indicated by a history of intramural and subserous leiomyoma (fibroids) and umbilical hernia. She also had an opened myomectomy in  and history of R ovarian cyst  and asthma.     The patient is doing well. No major issues/adverse events overnights.  Pain was controlled with PO vs IV meds   Tolerating regular diet   Voiding vs Has not voided as conte catheter in place  Stooling (BM)  Has Ambulated to bedside chair w/o difficulty     (-) Headache  (-) Chest pain  (-) SOB  (-) Nausea/Vomiting  (-) AMS    Scheduled Meds:   cetirizine  10 mg Oral Daily    ibuprofen  600 mg Oral Q6H    mupirocin  1 g Nasal BID    senna-docusate 8.6-50 mg  1 tablet Oral BID     Continuous Infusions:  PRN Meds:albuterol, diphenhydrAMINE, HYDROmorphone, ondansetron, oxyCODONE-acetaminophen, oxyCODONE-acetaminophen, promethazine (PHENERGAN) IVPB, simethicone    Review of patient's allergies indicates:   Allergen Reactions    Pcn [penicillins] Nausea Only       Objective:     Vital Signs (Most Recent):  Temp: 98.7 °F (37.1 °C) (18 0420)  Pulse: (!) 116 (18 0420)  Resp: 16 (18 2331)  BP: (!) 109/57 (18 0420)  SpO2: 97 % (18 0420) Vital Signs (24h Range):  Temp:  [98.7 °F (37.1 °C)-100 °F (37.8 °C)] 98.7 °F (37.1 °C)  Pulse:  [] 116  Resp:  [16-18] 16  SpO2:  [96 %-100 %] 97 %  BP: ()/(50-59) 109/57     Weight: 73 kg (160 lb 15 oz)  Body mass index is 29.44 kg/m².  Patient's last menstrual period was 2018 (exact date).    I&O (Last 24H):    Intake/Output Summary (Last 24 hours) at 18 0507  Last data  filed at 18 0100   Gross per 24 hour   Intake             1500 ml   Output             2900 ml   Net            -1400 ml     Urine = 2190  Oliguria/Poor Urine Output = <30mL/h or < (0.5 x weight kg)/h       Physical Exam  Constitutional:                She is oriented to person, place, and time.                She appeared well-developed and well-nourished.                No distress.  HEENT               Head: Normocephalic and atraumatic. Neck: supple, normal range of motion.               Eyes               Ear               Nose               Throat  CVS:                Radial pulses are regular, strong, and symmetrical               Normal heart sounds. No murmurs  PULMONARY/CHEST:                Normal efforts and breath sounds. No stridor. No wheezes. No crackles.                No respiratory distress.  ABDOMEN:                Abdominal incisions (pfannenstiel) are clean, dry, intact, bandage in place               Soft.               No distension.               No tenderness vs appropriately tenderness               No rebound. No guarding               Normal bowel sounds  GENITOURINARY               Romeo catheter in place draining clean yellow urine               Packing removed. Minimal blood vs completely red but no clot.               No active bleeding.               No discharge               No tenderness vs appropriately tender  EXTREMITIES (UE and LE)               No cyanosis               No edema               No tenderness               Normal sensation               Normal movements               LE: Sequential Compression Device (SCD) in place               LE: Posterior tibial / Dorsalis pedis pulses are strong and symmetrical  SKIN: Warm and dry  PSY:                Normal mood and affect               Normal behavior    Laboratory:  HH:   WBC: 10.33  Pt: 23.1        Assessment/Plan:     33 y.o. female , at (POD) #1, is doing  well.     POD#0  - Continue routine post-op advances  - Pain: Continue IV toradol/po percocet/dilaudid, pain well controlled  - Romeo in place; will remove POD#1  - Encourage ambulation  - Encourage IS  - Continue IVF until tolerating more po  - Clear liquid diet, will advance this PM as tolerated  - Antiemetics prn nausea/vomiting.  - Simethicone prn for gas pain  - DVT ppx: AMIE/SCD     Acute blood loss anemia  - EBL 1500cc intraop  - 1u pRBC given intraop  -  (pre-op) > 7.6/22.8 (intraop) > 1u pRBC > CBC pending this PM     Continue home med  -albuterol  -mometasone  -fexofenadine  -pre-philipp Vit, Vit D, folate, B12     Discharge  As patient meets post-op milestone, anticipate d/c home today/POD pending void trial           ***    Bean Castillo Eleazar  Obstetrics & Gynecology  [facility]

## 2018-05-31 NOTE — ASSESSMENT & PLAN NOTE
POD#2  - Continue routine post-op advances  - Pain: s/p IV [id number], continue po/po percocet/dilaudid, pain well controlled  - Urinating without difficulty  - Encourage ambulation  - Encourage IS  - Regular diet  - Antiemetics prn nausea/vomiting.  - Simethicone prn for gas pain  - DVT ppx: AMIE/SCD

## 2018-05-31 NOTE — PLAN OF CARE
Pt medically cleared for discharge.    Family to provide ride home.    No further needs from CM perspective.       05/31/18 1430   Final Note   Assessment Type Final Discharge Note   Discharge Disposition Home   What phone number can be called within the next 1-3 days to see how you are doing after discharge? [phone]   Hospital Follow Up  Appt(s) scheduled? Yes   Discharge plans and expectations educations in teach back method with documentation complete? Yes   Right Care Referral Info   Post Acute Recommendation No Care

## 2018-05-31 NOTE — NURSING
"Notified and spoke with Dr. JOSE Ji about patient c/o upper Right chest pain that is worse when she deep breathes and performs on her IS  Pt also passed large quarter size blood clot    Spoke with MD, patient, and family  verbalized "this is to be expected, even a week or two out"    Gas x was prev given  Pt not passing gas and no bowel movement noted  Bowel sounds absent    Instructed to ambulate and increase PO fluid intake     No further orders at this time, however a team member will be by to check on her some time tonight  Cont to monitor   "

## 2018-05-31 NOTE — SUBJECTIVE & OBJECTIVE
Interval History: Patient is doing well this morning. No acute events overnight. C/o right sided chest pain last night after using IS and when taking deep breaths, states this has since resolved. Pain is well controlled with po pain meds, dilaudid IV x 2 overnight. Tolerating regular diet without N/V. Urinating and ambulating without difficulty. Minimal vaginal bleeding. Denies fever, chills, CP, SOB.      Scheduled Meds:   cetirizine  10 mg Oral Daily    ibuprofen  600 mg Oral Q6H    mupirocin  1 g Nasal BID    senna-docusate 8.6-50 mg  1 tablet Oral BID     Continuous Infusions:    PRN Meds:albuterol, diphenhydrAMINE, HYDROmorphone, ondansetron, oxyCODONE-acetaminophen, oxyCODONE-acetaminophen, promethazine (PHENERGAN) IVPB, simethicone    Review of patient's allergies indicates:   Allergen Reactions    Pcn [penicillins] Nausea Only       Objective:     Vital Signs (Most Recent):  Temp: 98.7 °F (37.1 °C) (05/31/18 0420)  Pulse: (!) 116 (05/31/18 0420)  Resp: 16 (05/30/18 2331)  BP: (!) 109/57 (05/31/18 0420)  SpO2: 97 % (05/31/18 0420) Vital Signs (24h Range):  Temp:  [98.7 °F (37.1 °C)-100 °F (37.8 °C)] 98.7 °F (37.1 °C)  Pulse:  [] 116  Resp:  [16-18] 16  SpO2:  [96 %-100 %] 97 %  BP: (108-114)/(53-59) 109/57     Weight: 73 kg (160 lb 15 oz)  Body mass index is 29.44 kg/m².  Patient's last menstrual period was 05/28/2018 (exact date).    I&O (Last 24H):    Intake/Output Summary (Last 24 hours) at 05/31/18 0742  Last data filed at 05/31/18 0100   Gross per 24 hour   Intake                0 ml   Output             2900 ml   Net            -2900 ml       Physical Exam:   Constitutional: She is oriented to person, place, and time. She appears well-developed and well-nourished.    HENT:   Head: Normocephalic and atraumatic.     Neck: Normal range of motion.    Cardiovascular: Normal rate, regular rhythm and normal heart sounds.     Pulmonary/Chest: Effort normal and breath sounds normal.        Abdominal:  Soft. Bowel sounds are normal. She exhibits abdominal incision (pfannenstiel, c/d/i, bandage in place; umbilical incision c/d/i bandage in place). She exhibits no distension. There is no tenderness.             Musculoskeletal: Normal range of motion and moves all extremeties.       Neurological: She is alert and oriented to person, place, and time.    Skin: Skin is warm and dry.    Psychiatric: She has a normal mood and affect.       Laboratory:    Recent Labs  Lab 05/29/18  0920 05/29/18  1610   WBC 12.05 10.33   HGB 7.6* 8.1*   HCT 22.8* 24.1*   MCV 85 87    231

## 2018-05-31 NOTE — PLAN OF CARE
Problem: Patient Care Overview  Goal: Plan of Care Review  Outcome: Ongoing (interventions implemented as appropriate)  Patient remains on room air saturations 99%,prn DPI not needed at this time.IS done with good effort will continue to monitor.

## 2018-06-01 ENCOUNTER — TELEPHONE (OUTPATIENT)
Dept: OBSTETRICS AND GYNECOLOGY | Facility: CLINIC | Age: 33
End: 2018-06-01

## 2018-06-01 NOTE — TELEPHONE ENCOUNTER
----- Message from Whit Slater sent at 6/1/2018  3:46 PM CDT -----  Contact: pt            Name of Who is Calling: pt      What is the request in detail: pt states she is needing to speak with staff to schedule a appointment for 06/06/18 to have her stiches removed.      Can the clinic reply by [facility]: no      What [facility]: [phone]

## 2018-06-01 NOTE — TELEPHONE ENCOUNTER
Contacted the pt to schedule her ollow up. Pt agreed to an appointment on 6/6 at 11AM. Pt verbalized understanding.  Letter sent out.

## 2018-06-02 ENCOUNTER — NURSE TRIAGE (OUTPATIENT)
Dept: ADMINISTRATIVE | Facility: CLINIC | Age: 33
End: 2018-06-02

## 2018-06-02 ENCOUNTER — HOSPITAL ENCOUNTER (OUTPATIENT)
Facility: OTHER | Age: 33
Discharge: HOME | End: 2018-06-03
Attending: EMERGENCY MEDICINE | Admitting: OBSTETRICS & GYNECOLOGY
Payer: COMMERCIAL

## 2018-06-02 DIAGNOSIS — D64.9 SYMPTOMATIC ANEMIA: Primary | ICD-10-CM

## 2018-06-02 PROBLEM — R11.0 NAUSEA: Status: ACTIVE | Noted: 2018-06-02

## 2018-06-02 LAB
ABO + RH BLD: NORMAL
ALBUMIN SERPL BCP-MCNC: 3 G/DL
ALP SERPL-CCNC: 120 U/L
ALT SERPL W/O P-5'-P-CCNC: 34 U/L
ANION GAP SERPL CALC-SCNC: 11 MMOL/L
ANISOCYTOSIS BLD QL SMEAR: SLIGHT
AST SERPL-CCNC: 40 U/L
B-HCG UR QL: NEGATIVE
BASOPHILS # BLD AUTO: ABNORMAL K/UL
BASOPHILS NFR BLD: 0 %
BILIRUB SERPL-MCNC: 1.3 MG/DL
BILIRUB UR QL STRIP: NEGATIVE
BLD GP AB SCN CELLS X3 SERPL QL: NORMAL
BUN SERPL-MCNC: 5 MG/DL
CALCIUM SERPL-MCNC: 9.1 MG/DL
CHLORIDE SERPL-SCNC: 106 MMOL/L
CLARITY UR: CLEAR
CO2 SERPL-SCNC: 22 MMOL/L
COLOR UR: YELLOW
CREAT SERPL-MCNC: 0.7 MG/DL
CTP QC/QA: YES
DIFFERENTIAL METHOD: ABNORMAL
EOSINOPHIL # BLD AUTO: ABNORMAL K/UL
EOSINOPHIL NFR BLD: 2 %
ERYTHROCYTE [DISTWIDTH] IN BLOOD BY AUTOMATED COUNT: 14.2 %
EST. GFR  (AFRICAN AMERICAN): >60 ML/MIN/1.73 M^2
EST. GFR  (NON AFRICAN AMERICAN): >60 ML/MIN/1.73 M^2
GLUCOSE SERPL-MCNC: 98 MG/DL
GLUCOSE UR QL STRIP: NEGATIVE
HCT VFR BLD AUTO: 21.6 %
HGB BLD-MCNC: 7 G/DL
HGB UR QL STRIP: ABNORMAL
HYPOCHROMIA BLD QL SMEAR: ABNORMAL
KETONES UR QL STRIP: NEGATIVE
LACTATE SERPL-SCNC: 1.2 MMOL/L
LEUKOCYTE ESTERASE UR QL STRIP: NEGATIVE
LYMPHOCYTES # BLD AUTO: ABNORMAL K/UL
LYMPHOCYTES NFR BLD: 14 %
MAGNESIUM SERPL-MCNC: 2.1 MG/DL
MCH RBC QN AUTO: 28.3 PG
MCHC RBC AUTO-ENTMCNC: 32.4 G/DL
MCV RBC AUTO: 87 FL
MICROSCOPIC COMMENT: ABNORMAL
MONOCYTES # BLD AUTO: ABNORMAL K/UL
MONOCYTES NFR BLD: 3 %
NEUTROPHILS NFR BLD: 81 %
NITRITE UR QL STRIP: NEGATIVE
PH UR STRIP: 8 [PH] (ref 5–8)
PHOSPHATE SERPL-MCNC: 2.1 MG/DL
PLATELET # BLD AUTO: 360 K/UL
PLATELET BLD QL SMEAR: ABNORMAL
PMV BLD AUTO: 9.1 FL
POLYCHROMASIA BLD QL SMEAR: ABNORMAL
POTASSIUM SERPL-SCNC: 4 MMOL/L
PROT SERPL-MCNC: 6.8 G/DL
PROT UR QL STRIP: NEGATIVE
RBC # BLD AUTO: 2.47 M/UL
RBC #/AREA URNS HPF: 5 /HPF (ref 0–4)
SODIUM SERPL-SCNC: 139 MMOL/L
SP GR UR STRIP: <=1.005 (ref 1–1.03)
SQUAMOUS #/AREA URNS HPF: 4 /HPF
URN SPEC COLLECT METH UR: ABNORMAL
UROBILINOGEN UR STRIP-ACNC: NEGATIVE EU/DL
WBC # BLD AUTO: 6.12 K/UL

## 2018-06-02 PROCEDURE — 85027 COMPLETE CBC AUTOMATED: CPT

## 2018-06-02 PROCEDURE — 83605 ASSAY OF LACTIC ACID: CPT

## 2018-06-02 PROCEDURE — 96361 HYDRATE IV INFUSION ADD-ON: CPT

## 2018-06-02 PROCEDURE — 25000003 PHARM REV CODE 250: Performed by: PHYSICIAN ASSISTANT

## 2018-06-02 PROCEDURE — 99219 PR INITIAL OBSERVATION CARE,LEVL II: CPT | Mod: ,,, | Performed by: OBSTETRICS & GYNECOLOGY

## 2018-06-02 PROCEDURE — 81025 URINE PREGNANCY TEST: CPT | Performed by: EMERGENCY MEDICINE

## 2018-06-02 PROCEDURE — G0378 HOSPITAL OBSERVATION PER HR: HCPCS

## 2018-06-02 PROCEDURE — 99285 EMERGENCY DEPT VISIT HI MDM: CPT | Mod: 25

## 2018-06-02 PROCEDURE — 96374 THER/PROPH/DIAG INJ IV PUSH: CPT

## 2018-06-02 PROCEDURE — 84100 ASSAY OF PHOSPHORUS: CPT

## 2018-06-02 PROCEDURE — 83735 ASSAY OF MAGNESIUM: CPT

## 2018-06-02 PROCEDURE — 85007 BL SMEAR W/DIFF WBC COUNT: CPT

## 2018-06-02 PROCEDURE — 81000 URINALYSIS NONAUTO W/SCOPE: CPT

## 2018-06-02 PROCEDURE — 86901 BLOOD TYPING SEROLOGIC RH(D): CPT

## 2018-06-02 PROCEDURE — 80053 COMPREHEN METABOLIC PANEL: CPT

## 2018-06-02 PROCEDURE — 86920 COMPATIBILITY TEST SPIN: CPT

## 2018-06-02 RX ORDER — PANTOPRAZOLE SODIUM 40 MG/10ML
40 INJECTION, POWDER, LYOPHILIZED, FOR SOLUTION INTRAVENOUS 2 TIMES DAILY
Status: DISCONTINUED | OUTPATIENT
Start: 2018-06-03 | End: 2018-06-02 | Stop reason: RX

## 2018-06-02 RX ORDER — SODIUM CHLORIDE 0.9 % (FLUSH) 0.9 %
3 SYRINGE (ML) INJECTION
Status: DISCONTINUED | OUTPATIENT
Start: 2018-06-03 | End: 2018-06-03 | Stop reason: HOSPADM

## 2018-06-02 RX ORDER — AMOXICILLIN 250 MG
1 CAPSULE ORAL 2 TIMES DAILY
Status: DISCONTINUED | OUTPATIENT
Start: 2018-06-03 | End: 2018-06-03 | Stop reason: HOSPADM

## 2018-06-02 RX ORDER — HYDROCODONE BITARTRATE AND ACETAMINOPHEN 500; 5 MG/1; MG/1
TABLET ORAL
Status: DISCONTINUED | OUTPATIENT
Start: 2018-06-03 | End: 2018-06-03 | Stop reason: HOSPADM

## 2018-06-02 RX ORDER — DIPHENHYDRAMINE HCL 25 MG
25 CAPSULE ORAL EVERY 6 HOURS PRN
Status: DISCONTINUED | OUTPATIENT
Start: 2018-06-03 | End: 2018-06-03 | Stop reason: HOSPADM

## 2018-06-02 RX ORDER — DIPHENHYDRAMINE HCL 25 MG
25 CAPSULE ORAL EVERY 6 HOURS PRN
Status: DISCONTINUED | OUTPATIENT
Start: 2018-06-03 | End: 2018-06-02

## 2018-06-02 RX ORDER — ACETAMINOPHEN 325 MG/1
650 TABLET ORAL ONCE
Status: COMPLETED | OUTPATIENT
Start: 2018-06-03 | End: 2018-06-03

## 2018-06-02 RX ORDER — SIMETHICONE 80 MG
1 TABLET,CHEWABLE ORAL 3 TIMES DAILY PRN
Status: DISCONTINUED | OUTPATIENT
Start: 2018-06-03 | End: 2018-06-03 | Stop reason: HOSPADM

## 2018-06-02 RX ORDER — ONDANSETRON 8 MG/1
8 TABLET, ORALLY DISINTEGRATING ORAL EVERY 8 HOURS PRN
Status: DISCONTINUED | OUTPATIENT
Start: 2018-06-03 | End: 2018-06-03 | Stop reason: HOSPADM

## 2018-06-02 RX ORDER — HYDROCODONE BITARTRATE AND ACETAMINOPHEN 10; 325 MG/1; MG/1
1 TABLET ORAL EVERY 6 HOURS PRN
Status: DISCONTINUED | OUTPATIENT
Start: 2018-06-03 | End: 2018-06-03 | Stop reason: HOSPADM

## 2018-06-02 RX ORDER — IBUPROFEN 600 MG/1
600 TABLET ORAL EVERY 6 HOURS PRN
Status: DISCONTINUED | OUTPATIENT
Start: 2018-06-03 | End: 2018-06-03 | Stop reason: HOSPADM

## 2018-06-02 RX ORDER — SODIUM CHLORIDE 9 MG/ML
1000 INJECTION, SOLUTION INTRAVENOUS
Status: COMPLETED | OUTPATIENT
Start: 2018-06-02 | End: 2018-06-02

## 2018-06-02 RX ORDER — SODIUM CHLORIDE AND POTASSIUM CHLORIDE 150; 900 MG/100ML; MG/100ML
INJECTION, SOLUTION INTRAVENOUS CONTINUOUS
Status: DISCONTINUED | OUTPATIENT
Start: 2018-06-03 | End: 2018-06-03 | Stop reason: HOSPADM

## 2018-06-02 RX ORDER — HYDROCODONE BITARTRATE AND ACETAMINOPHEN 5; 325 MG/1; MG/1
1 TABLET ORAL EVERY 6 HOURS PRN
Status: DISCONTINUED | OUTPATIENT
Start: 2018-06-03 | End: 2018-06-03 | Stop reason: HOSPADM

## 2018-06-02 RX ADMIN — SODIUM CHLORIDE 1000 ML: 0.9 INJECTION, SOLUTION INTRAVENOUS at 09:06

## 2018-06-02 NOTE — TELEPHONE ENCOUNTER
"    Reason for Disposition   Caller has URGENT question and triager unable to answer question    Answer Assessment - Initial Assessment Questions  1. SYMPTOM: "What's the main symptom you're concerned about?" (e.g., pain, fever, vomiting)      [place], vomiting.   No BM-  Used a fleet.  Last night  2. ONSET: "When did ________  start?"   today   3. SURGERY: "What surgery was performed?"      myometectomy and hernia repair  4. DATE of SURGERY: "When was surgery performed?"   tues may 29th  5. ANESTHESIA: " What type of anesthesia did you have?" (e.g., general, spinal, epidural, local)      genernal  6. PAIN: "Is there any pain?" If so, ask: "How bad is it?"  (Scale 1-10; or mild, moderate, severe)     Not a lot  7. FEVER: "Do you have a fever?" If so, ask: "What is your temperature, how was it measured, and when did it start?"    no  8. VOMITING: "Is there any vomiting?" If yes, ask: "How many times?"  once  9. BLEEDING: "Is there any bleeding?" If so, ask: "How much?" and "Where?"  No bleeding  10. OTHER SYMPTOMS: "Do you have any other symptoms?" (e.g., drainage from wound, painful urination, constipation)  Constipation-  Taking miralax    Protocols used: ST POST-OP SYMPTOMS AND [id number]     wants to now if anything to else for pain that will not cause constipation.     something nausea-  Still feeling a little nauseated not.  Weak from vomiting and nausea.  =  Has appt with Dr. Mcmullen on wed.  weaker today-  Holding on to wall to walk.    called on call provider. DR. Ellis.  She will call pt directly.    Called pt back and she was feeling worse.  States she was weak, cold  and feeling dizzy.  Advised she should go to the Ed for evaluation.  Mom with her and took temp- normal.  Mom states she looks weak and was crying because she felt weak.  Again encouraged to go to  ED for evaluation.  Advised oncall  would call, but she did not have to wait and should go to Ed if continue to feel bad.   "

## 2018-06-03 VITALS
DIASTOLIC BLOOD PRESSURE: 56 MMHG | WEIGHT: 162 LBS | BODY MASS INDEX: 29.81 KG/M2 | RESPIRATION RATE: 18 BRPM | TEMPERATURE: 98 F | OXYGEN SATURATION: 99 % | SYSTOLIC BLOOD PRESSURE: 98 MMHG | HEIGHT: 62 IN | HEART RATE: 71 BPM

## 2018-06-03 PROBLEM — R11.0 NAUSEA: Status: RESOLVED | Noted: 2018-06-03 | Resolved: 2018-06-03

## 2018-06-03 PROBLEM — D64.9 SYMPTOMATIC ANEMIA: Status: RESOLVED | Noted: 2018-06-02 | Resolved: 2018-06-03

## 2018-06-03 PROBLEM — R11.0 NAUSEA: Status: RESOLVED | Noted: 2018-06-02 | Resolved: 2018-06-03

## 2018-06-03 LAB
BASOPHILS # BLD AUTO: 0.01 K/UL
BASOPHILS # BLD AUTO: 0.01 K/UL
BASOPHILS NFR BLD: 0.2 %
BASOPHILS NFR BLD: 0.2 %
BLD PROD TYP BPU: NORMAL
BLOOD UNIT EXPIRATION DATE: NORMAL
BLOOD UNIT TYPE CODE: 1700
BLOOD UNIT TYPE: NORMAL
CODING SYSTEM: NORMAL
DIFFERENTIAL METHOD: ABNORMAL
DIFFERENTIAL METHOD: ABNORMAL
DISPENSE STATUS: NORMAL
EOSINOPHIL # BLD AUTO: 0.2 K/UL
EOSINOPHIL # BLD AUTO: 0.2 K/UL
EOSINOPHIL NFR BLD: 2.5 %
EOSINOPHIL NFR BLD: 2.5 %
ERYTHROCYTE [DISTWIDTH] IN BLOOD BY AUTOMATED COUNT: 14.3 %
ERYTHROCYTE [DISTWIDTH] IN BLOOD BY AUTOMATED COUNT: 14.8 %
HCT VFR BLD AUTO: 22 %
HCT VFR BLD AUTO: 23.5 %
HCT VFR BLD AUTO: 24.8 %
HGB BLD-MCNC: 7.4 G/DL
HGB BLD-MCNC: 7.9 G/DL
HGB BLD-MCNC: 8.3 G/DL
LYMPHOCYTES # BLD AUTO: 1 K/UL
LYMPHOCYTES # BLD AUTO: 1.3 K/UL
LYMPHOCYTES NFR BLD: 17 %
LYMPHOCYTES NFR BLD: 20.7 %
MCH RBC QN AUTO: 28.9 PG
MCH RBC QN AUTO: 29 PG
MCHC RBC AUTO-ENTMCNC: 33.6 G/DL
MCHC RBC AUTO-ENTMCNC: 33.6 G/DL
MCV RBC AUTO: 86 FL
MCV RBC AUTO: 86 FL
MONOCYTES # BLD AUTO: 0.4 K/UL
MONOCYTES # BLD AUTO: 0.4 K/UL
MONOCYTES NFR BLD: 6.2 %
MONOCYTES NFR BLD: 6.7 %
NEUTROPHILS # BLD AUTO: 4.4 K/UL
NEUTROPHILS # BLD AUTO: 4.4 K/UL
NEUTROPHILS NFR BLD: 69.6 %
NEUTROPHILS NFR BLD: 73.3 %
PLATELET # BLD AUTO: 303 K/UL
PLATELET # BLD AUTO: 310 K/UL
PMV BLD AUTO: 9.2 FL
PMV BLD AUTO: 9.3 FL
RBC # BLD AUTO: 2.55 M/UL
RBC # BLD AUTO: 2.73 M/UL
TRANS ERYTHROCYTES VOL PATIENT: NORMAL ML
WBC # BLD AUTO: 5.94 K/UL
WBC # BLD AUTO: 6.38 K/UL

## 2018-06-03 PROCEDURE — 85018 HEMOGLOBIN: CPT

## 2018-06-03 PROCEDURE — 63600175 PHARM REV CODE 636 W HCPCS: Performed by: OBSTETRICS & GYNECOLOGY

## 2018-06-03 PROCEDURE — 36415 COLL VENOUS BLD VENIPUNCTURE: CPT

## 2018-06-03 PROCEDURE — 25000003 PHARM REV CODE 250: Performed by: OBSTETRICS & GYNECOLOGY

## 2018-06-03 PROCEDURE — G0378 HOSPITAL OBSERVATION PER HR: HCPCS

## 2018-06-03 PROCEDURE — C9113 INJ PANTOPRAZOLE SODIUM, VIA: HCPCS | Performed by: OBSTETRICS & GYNECOLOGY

## 2018-06-03 PROCEDURE — 36430 TRANSFUSION BLD/BLD COMPNT: CPT

## 2018-06-03 PROCEDURE — P9021 RED BLOOD CELLS UNIT: HCPCS

## 2018-06-03 PROCEDURE — 99217 PR OBSERVATION CARE DISCHARGE: CPT | Mod: ,,, | Performed by: OBSTETRICS & GYNECOLOGY

## 2018-06-03 PROCEDURE — 85014 HEMATOCRIT: CPT

## 2018-06-03 PROCEDURE — 85025 COMPLETE CBC W/AUTO DIFF WBC: CPT

## 2018-06-03 RX ORDER — IRON POLYSACCHARIDE COMPLEX 150 MG
150 CAPSULE ORAL DAILY
Refills: 0 | COMMUNITY
Start: 2018-06-03 | End: 2019-02-05

## 2018-06-03 RX ORDER — IRON POLYSACCHARIDE COMPLEX 150 MG
150 CAPSULE ORAL DAILY
Refills: 0 | COMMUNITY
Start: 2018-06-03 | End: 2019-07-29

## 2018-06-03 RX ADMIN — HYDROCODONE BITARTRATE AND ACETAMINOPHEN 1 TABLET: 5; 325 TABLET ORAL at 09:06

## 2018-06-03 RX ADMIN — ONDANSETRON 8 MG: 8 TABLET, ORALLY DISINTEGRATING ORAL at 09:06

## 2018-06-03 RX ADMIN — SIMETHICONE CHEW TAB 80 MG 80 MG: 80 TABLET ORAL at 09:06

## 2018-06-03 RX ADMIN — IBUPROFEN 600 MG: 600 TABLET ORAL at 09:06

## 2018-06-03 RX ADMIN — SODIUM CHLORIDE AND POTASSIUM CHLORIDE: .9; .15 SOLUTION INTRAVENOUS at 01:06

## 2018-06-03 RX ADMIN — STANDARDIZED SENNA CONCENTRATE AND DOCUSATE SODIUM 1 TABLET: 8.6; 5 TABLET, FILM COATED ORAL at 12:06

## 2018-06-03 RX ADMIN — PANTOPRAZOLE SODIUM 40 MG: 40 INJECTION, POWDER, FOR SOLUTION INTRAVENOUS at 12:06

## 2018-06-03 RX ADMIN — POTASSIUM PHOSPHATE, MONOBASIC AND POTASSIUM PHOSPHATE, DIBASIC 15 MMOL: 224; 236 INJECTION, SOLUTION, CONCENTRATE INTRAVENOUS at 05:06

## 2018-06-03 RX ADMIN — ACETAMINOPHEN 650 MG: 325 TABLET ORAL at 12:06

## 2018-06-03 RX ADMIN — SIMETHICONE CHEW TAB 80 MG 80 MG: 80 TABLET ORAL at 06:06

## 2018-06-03 RX ADMIN — HYDROCODONE BITARTRATE AND ACETAMINOPHEN 1 TABLET: 10; 325 TABLET ORAL at 06:06

## 2018-06-03 RX ADMIN — STANDARDIZED SENNA CONCENTRATE AND DOCUSATE SODIUM 1 TABLET: 8.6; 5 TABLET, FILM COATED ORAL at 09:06

## 2018-06-03 RX ADMIN — HYDROCODONE BITARTRATE AND ACETAMINOPHEN 1 TABLET: 5; 325 TABLET ORAL at 02:06

## 2018-06-03 RX ADMIN — PANTOPRAZOLE SODIUM 40 MG: 40 INJECTION, POWDER, FOR SOLUTION INTRAVENOUS at 09:06

## 2018-06-03 NOTE — NURSING
Verbal order from Dr. Briggs to repeat H & H at 1515; call with result, then likely ok to d/c.    H/H = 8.3/24.8.  Paged GYN.     1600 Spoke to GYN resident; ok to d/c pt.  Waiting for discharge orders.    Discharge orders received at 1550.  IV removed.    Discharge instructions reviewed with pt by Kecia FRANCIS.      7828 Pt ready for transport.
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
## 2018-06-03 NOTE — NURSING
Pt mood labile, resting with eyes closed, then complains of pain 8/10 when awakened, alternates between tearful outbursts, lashing out at mother, and sitting up in bed, chatting happily. Voiding adequately and spontaneously, tolerating clear liquids well, advanced to regular diet, encouraged a light lunch and PO fluids, discussed pain management with mother and pt, encouraged ibuprofen, and to use opiod pain medication sparingly to avoid nausea and constipation, encouraged ambulation, pt ambulated to bathroom and in room.    Bed in low, locked position, call light within reach, able to make needs known; no needs at this time.

## 2018-06-03 NOTE — ED TRIAGE NOTES
Pt presents to the ED with c/o fatigue since being discharged Thursday following a myomectomy and hernia repair on Tuesday. Pt states she has been feeling fatigued with nausea and vomiting. Denies trouble with urination but states she has been having trouble with bowel movements. Pt's  administered a fleet enema with little to no relief. Pt has also been taking miralax. Pt has been taking percocet and ibuprofen around the clock since discharge. Pain is rated 4/10.

## 2018-06-03 NOTE — SUBJECTIVE & OBJECTIVE
Scheduled Meds:  Continuous Infusions:   [START ON 6/3/2018] 0/9% NACL & POTASSIUM CHLORIDE 20 MEQ/L       PRN Meds:[START ON 6/3/2018] sodium chloride, [START ON 6/3/2018] ondansetron, [START ON 6/3/2018] promethazine (PHENERGAN) IVPB, [START ON 6/3/2018] sodium chloride 0.9%    Review of patient's allergies indicates:   Allergen Reactions    Pcn [penicillins] Nausea Only       Objective:     Vital Signs (Most Recent):  Temp: 99.4 °F (37.4 °C) (06/02/18 2116)  Pulse: 76 (06/02/18 2333)  Resp: 20 (06/02/18 2333)  BP: 129/63 (06/02/18 2326)  SpO2: 100 % (06/02/18 2333) Vital Signs (24h Range):  Temp:  [99.4 °F (37.4 °C)-99.6 °F (37.6 °C)] 99.4 °F (37.4 °C)  Pulse:  [74-84] 76  Resp:  [15-20] 20  SpO2:  [100 %] 100 %  BP: (116-129)/(62-73) 129/63     Weight: 73.5 kg (162 lb)  Body mass index is 29.63 kg/m².  Patient's last menstrual period was 05/28/2018 (exact date).    I&O (Last 24H):  No intake or output data in the 24 hours ending 06/02/18 2340    Physical Exam:   Constitutional: She is oriented to person, place, and time. She appears well-developed and well-nourished.       Cardiovascular: Normal rate.     Pulmonary/Chest: Effort normal. No respiratory distress.        Abdominal: Soft. Bowel sounds are normal. She exhibits abdominal incision (clean dry and intact. no erythema of incisions. ). She exhibits no distension. There is tenderness (appropriate psoto p). There is no rebound and no guarding.                 Neurological: She is alert and oriented to person, place, and time.     Psychiatric: She has a normal mood and affect. Her behavior is normal. Judgment and thought content normal.       Laboratory:  CBC:   Recent Labs  Lab 06/02/18 2112   WBC 6.12   RBC 2.47*   HGB 7.0*   HCT 21.6*   *   MCV 87   MCH 28.3   MCHC 32.4     CMP:   Recent Labs  Lab 06/02/18  2112   GLU 98   CALCIUM 9.1   ALBUMIN 3.0*   PROT 6.8      K 4.0   CO2 22*      BUN 5*   CREATININE 0.7   ALKPHOS 120   ALT  34   AST 40   BILITOT 1.3*       Diagnostic Results:  none   Bladder non-tender and non-distended. Urine clear yellow.

## 2018-06-03 NOTE — PLAN OF CARE
Discharge planner met with patient at the bedside, accompanied by several family members.  Patient is a 30-day re-admission, last dc on 5/30/18 with no dc needs. Patient denies having any dc needs at time. Denies needing assistance with medications.  Patient's spouse to provide transportation at the time of discharge. Patient confirmed demographics is correct in epic.  Case management to continue to follow.    6/3/18 1301   Discharge Assessment   Assessment Type Discharge Planning Assessment   Confirmed/corrected address and phone number on face sheet? Yes   Assessment information obtained from? Patient    Communicated expected length of stay with patient/caregiver Yes, Observation level of care   Type of Healthcare Directive Received None    If Healthcare Directive is received, is it scanned into Epic? no (comment)   Prior to hospitalization cognitive status: Alert/Oriented   Current cognitive status: Alert/Oriented    Current Functional Status: Independent    Arrived From home or self-care   Lives With Spouse    Able to Return to Prior Arrangements Yes    Is patient able to care for self after discharge? Yes    Discharge Plan A Home with self care   Discharge Plan B Home with family    Patient/Family In Agreement With Plan Yes

## 2018-06-03 NOTE — NURSING
Pt alert, oriented, admitted to unit and oriented to unit, one unit of blood transfused no signs or symptoms of reaction noted, pt complaining of generalized abdominal pain, medicated once with prn oxycodone per request, pt denies nausea at this time, reports fatigue and Sob alleviated following blood administration, pt voiding in bedside commode and bathroom, abdominal incision clean dry intact family at bedside.

## 2018-06-03 NOTE — ED NOTES
Completed hourly rounding on pt. Pt lying in bed, AAOx4, GCS 15, calm, cooperative, responding appropriately to questions, respirations even and unlabored with equal bilateral chest expansion, NAD noted. Addressed pt needs. Pt given extra warm blanket for comfort. Pt disconnected from monitor and ambulated to bathroom with standby assist accompanied by family member.

## 2018-06-03 NOTE — DISCHARGE INSTRUCTIONS
Iron Supplements  Most people think of iron as a metal that is used to make pots, frying pans, and soup kettles. This same metal (or mineral) also plays a vital role in the body. Iron helps the blood cells carry oxygen. When you dont get enough iron, you may feel tired and lack energy. Over time, without enough iron, your body makes fewer red blood cells. This can cause a condition called iron-deficiency anemia. Since your body doesnt make iron, you must get it from foods or supplements.     Food sources of iron  Iron is found in a few types of foods. Good sources include:  · Red meat, poultry, fish, eggs  · Dried fruits (especially raisins, prunes, figs)  · Legumes such as dried beans and lentils  · Breads and cereals with iron added  · Blackstrap molasses  · Spinach  · Foods cooked in cast-iron pans. This is especially true of acidic foods, such as tomatoes and veronica.   Why use a supplement?  Women often need additional iron because they lose menstrual blood during their period. But even grown men and boys may need a supplement at some point. You may need an iron supplement if any of the following is true for you:  · I rarely eat foods that are high in iron (such as red meat, poultry, dried beans, and foods with iron added).  · I am a vegetarian and I rarely eat legumes (dried beans, peas, lentils).  · I am a woman who has heavy menstrual periods.  · I am pregnant or breastfeeding.  · I have been diagnosed with iron-deficiency anemia.  If you take iron  Here are some tips to help you get the most from an iron supplement:  · Take it with vitamin C for better absorption.  · Dont take an iron supplement with milk. The calcium in milk limits iron absorption.  · Iron supplements can cause constipation. To prevent this, drink plenty of water, eat high-fiber foods, and exercise often. Also try an iron supplement with an added stool softener.  · Eat a healthy diet to get all the nutrients your body  needs.  Recommended iron intake  The amount of iron each person needs is different, and varies by age. Women who are pregnant or breastfeeding need extra iron. But taking more than the suggested amount is not always healthy. Your health care provider can help you choose the right amount of iron for you.   Date Last Reviewed: 6/2/2015  © 2974-7636 [employer]. [address] [place] [address], [place] [address]. All rights reserved. This information is not intended as a substitute for professional medical care. Always follow your healthcare professional's instructions.

## 2018-06-03 NOTE — HOSPITAL COURSE
06/02/2018 - Admitted for observation. Post operative symptomatic Anemia. Hgb 7 Hct 21. VSS. Plan for transfusion 1uPRBC and reassess CBC and symptoms.   No signs of infection on admission. Afebrile. No leukocytosis. Lactic Acid WNL. Exam benign.   Patient also w/ one episode of nausea; however was isolated incident. Abdomen is soft and w/ +BS. Plan for NPO overnight, pending no nausea in AM will advance diet.

## 2018-06-03 NOTE — DISCHARGE SUMMARY
[facility]  Obstetrics & Gynecology  Discharge Summary    Patient Name: Melita Shell  MRN: [id number]  Admission Date: 6/2/2018  Hospital Length of Stay: 0 days  Discharge Date and Time: 06/03/2018  Attending Physician: Jerry Arango MD   Discharging Provider: Gordo Ramirez MD  Primary Care Provider: Claudia Barajas MD    HPI:  Melita Shell is a 33 y.o. female who is now POD#5 s/p Open Myomectomy and umbilical hernia repair. Myomectomy complicated by EBL of 1500ml. Transfused 1u PRBC intraop. Patient was in house until POD#2 when she was able to tolerate PO, pain was controlled, Hemoglobin was 8 (from 11 pre op). She reports since going home has had progressive fatigue. Experiences lightheadedness and SOB with walking and mother reports she has been eating lots of ice. She has been tolerating PO until tonight when she had one isolated episode of emesis after feeling like her food was stuck. Denies on going nausea. Denies fever or chills. She is passing gas per rectum. She had not had a BM until yesterday when she performed a fleets enema and was able to pass some stool.     Hospital Course:  06/02/2018 - Admitted for observation. Post operative symptomatic Anemia. Hgb 7 Hct 21. VSS. Plan for transfusion 1uPRBC and reassess CBC and symptoms.   No signs of infection on admission. Afebrile. No leukocytosis. Lactic Acid WNL. Exam benign.   Patient also w/ one episode of nausea; however was isolated incident. Abdomen is soft and w/ +BS. Plan for NPO overnight, pending no nausea in AM will advance diet.     * No surgery found *     Consults         Status Ordering Provider     Inpatient consult to Obstetrics / Gynecology  Once     Provider:  Jerry Arango MD    Completed RENA CRANDALL          Significant Diagnostic Studies: Labs: All labs within the past 24 hours have been reviewed       Recent Labs  Lab 06/02/18  2112 06/03/18  0522 06/03/18  0918 06/03/18  1510   WBC  6.12 6.38 5.94  --    HGB 7.0* 7.4* 7.9* 8.3*   HCT 21.6* 22.0* 23.5* 24.8*   MCV 87 86 86  --    * 303 310  --         H/h 7/21 -> 8.3/25    Pending Diagnostic Studies:     None        Final Active Diagnoses:    Diagnosis Date Noted POA      Problems Resolved During this Admission:    Diagnosis Date Noted Date Resolved POA    PRINCIPAL PROBLEM:  Symptomatic anemia [D64.9] 06/02/2018 06/03/2018 Yes    Nausea [R11.0] 06/02/2018 06/03/2018 Unknown        Discharged Condition: good    Disposition:     Follow Up:  Follow-up Information     Milagros Mcmullen MD In 3 weeks.    Specialties:  Obstetrics, Obstetrics and Gynecology  Why:  Post Op Visit  Contact information:  [address] [address]  [phone]                 Patient Instructions:   No discharge procedures on file.  Medications:  Reconciled Home Medications:      Medication List      START taking these medications    iron polysaccharides 150 mg iron Cap  Commonly known as:  NIFEREX  Take 1 capsule (150 mg total) by mouth once daily.        CONTINUE taking these medications    albuterol 90 mcg/actuation inhaler  Inhale 2 puffs into the lungs every 6 (six) hours as needed for Wheezing.     ergocalciferol 50,000 unit Cap  Commonly known as:  ERGOCALCIFEROL  Take 1 capsule (50,000 Units total) by mouth every 7 days.     fexofenadine 180 MG tablet  Commonly known as:  ALLEGRA  Take 180 mg by mouth once daily.     ibuprofen 600 MG tablet  Commonly known as:  ADVIL,MOTRIN  Take 1 tablet (600 mg total) by mouth every 6 (six) hours.     LIQUID B 12 ORAL  Take by mouth.     mometasone 220 mcg (60 doses) Aepb  Commonly known as:  ASMANEX TWISTHALER  Inhale 2 puffs into the lungs 2 (two) times daily. Controller     oxyCODONE-acetaminophen 5-325 mg per tablet  Commonly known as:  PERCOCET  Take 1 tablet by mouth every 4 (four) hours as needed.     prenat.vits,rachel,min-iron-folic Tab  Take by mouth.     UNABLE TO FIND  medication  name:Francie Ramirez MD  Obstetrics & Gynecology  [facility]    Doing well, no questions,no problems.Hct 24.8,HGB 8.3  I have reviewed the resident's note, evaluated the patient and agree with the diagnosis and management plan

## 2018-06-03 NOTE — ASSESSMENT & PLAN NOTE
-- Admit to observation  -- Transfuse 1 u PRBC  -- Recheck CBC in AM   -- No suspicion for ongoing intraabdominal bleeding -- abdomen is soft, vitals are stable. hgb is 7 (has been 7-8 since surgery).   -- Monitor closely

## 2018-06-03 NOTE — ASSESSMENT & PLAN NOTE
" -- Isolated incident this PM w/ one episode of vomiting after GERD and feeling that dinner "got stuck" , no on going n/v. Abdomen soft. BS+. Electrolytes WNL. No suspicion for ileus or SBO at this time. No imaging indicated.  -- Plan for protonix BID   -- NPO w/ IVFs overnight   -- If no nausea in AM, advance diet as tolerated          "

## 2018-06-03 NOTE — ASSESSMENT & PLAN NOTE
"-- Isolated incident this PM w/ one episode of vomiting after GERD and feeling that dinner "got stuck" , no on going n/v. Abdomen soft. BS+. Electrolytes WNL.   -- Plan for protonix BID   -- NPO w/ IVFs overnight   -- If no nausea in AM, advance diet as tolerated   "

## 2018-06-03 NOTE — H&P
[facility]  Obstetrics & Gynecology  History & Physical    Patient Name: Melita Shell  MRN: [id number]  Admission Date: 6/2/2018  Primary Care Provider: Claudia Barajas MD    Subjective:     Chief Complaint/Reason for Admission: Symptomatic Anemia     History of Present Illness:  Melita Shell is a 33 y.o. female who is now POD#5 s/p Open Myomectomy and umbilical hernia repair. Myomectomy complicated by EBL of 1500ml. Transfused 1u PRBC intraop. Patient was in house until POD#2 when she was able to tolerate PO, pain was controlled, Hemoglobin was 8 (from 11 pre op). She reports since going home has had progressive fatigue. Experiences lightheadedness and SOB with walking and mother reports she has been eating lots of ice. She has been tolerating PO until tonight when she had one isolated episode of emesis after feeling like her food was stuck. Denies on going nausea. Denies fever or chills. She is passing gas per rectum. She had not had a BM until yesterday when she performed a fleets enema and was able to pass some stool.         Scheduled Meds:  Continuous Infusions:   [START ON 6/3/2018] 0/9% NACL & POTASSIUM CHLORIDE 20 MEQ/L       PRN Meds:[START ON 6/3/2018] sodium chloride, [START ON 6/3/2018] ondansetron, [START ON 6/3/2018] promethazine (PHENERGAN) IVPB, [START ON 6/3/2018] sodium chloride 0.9%    Review of patient's allergies indicates:   Allergen Reactions    Pcn [penicillins] Nausea Only       Objective:     Vital Signs (Most Recent):  Temp: 99.4 °F (37.4 °C) (06/02/18 2116)  Pulse: 76 (06/02/18 2333)  Resp: 20 (06/02/18 2333)  BP: 129/63 (06/02/18 2326)  SpO2: 100 % (06/02/18 2333) Vital Signs (24h Range):  Temp:  [99.4 °F (37.4 °C)-99.6 °F (37.6 °C)] 99.4 °F (37.4 °C)  Pulse:  [74-84] 76  Resp:  [15-20] 20  SpO2:  [100 %] 100 %  BP: (116-129)/(62-73) 129/63     Weight: 73.5 kg (162 lb)  Body mass index is 29.63 kg/m².  Patient's last menstrual period was 05/28/2018  "(exact date).    I&O (Last 24H):  No intake or output data in the 24 hours ending 06/02/18 2340    Physical Exam:   Constitutional: She is oriented to person, place, and time. She appears well-developed and well-nourished.       Cardiovascular: Normal rate.     Pulmonary/Chest: Effort normal. No respiratory distress.        Abdominal: Soft. Bowel sounds are normal. She exhibits abdominal incision (clean dry and intact. no erythema of incisions. ). She exhibits no distension. There is tenderness (appropriate psoto p). There is no rebound and no guarding.                 Neurological: She is alert and oriented to person, place, and time.     Psychiatric: She has a normal mood and affect. Her behavior is normal. Judgment and thought content normal.       Laboratory:  CBC:   Recent Labs  Lab 06/02/18 2112   WBC 6.12   RBC 2.47*   HGB 7.0*   HCT 21.6*   *   MCV 87   MCH 28.3   MCHC 32.4     CMP:   Recent Labs  Lab 06/02/18 2112   GLU 98   CALCIUM 9.1   ALBUMIN 3.0*   PROT 6.8      K 4.0   CO2 22*      BUN 5*   CREATININE 0.7   ALKPHOS 120   ALT 34   AST 40   BILITOT 1.3*       Diagnostic Results:  none    Assessment/Plan:     Nausea    -- Isolated incident this PM w/ one episode of vomiting after GERD and feeling that dinner "got stuck" , no on going n/v. Abdomen soft. BS+. Electrolytes WNL. No suspicion for ileus or SBO at this time. No imaging indicated.  -- Plan for protonix BID   -- NPO w/ IVFs overnight   -- If no nausea in AM, advance diet as tolerated         Symptomatic anemia    -- Admit to observation  -- Transfuse 1 u PRBC  -- Recheck CBC in AM   -- No suspicion for ongoing intraabdominal bleeding -- abdomen is soft, vitals are stable. hgb is 7 (has been 7-8 since surgery).   -- Monitor closely            Jazmine Escobedo MD  Obstetrics & Gynecology  [facility]  "

## 2018-06-03 NOTE — HPI
Melita Shell is a 33 y.o. female who is now POD#5 s/p Open Myomectomy and umbilical hernia repair. Myomectomy complicated by EBL of 1500ml. Transfused 1u PRBC intraop. Patient was in house until POD#2 when she was able to tolerate PO, pain was controlled, Hemoglobin was 8 (from 11 pre op). She reports since going home has had progressive fatigue. Experiences lightheadedness and SOB with walking and mother reports she has been eating lots of ice. She has been tolerating PO until tonight when she had one isolated episode of emesis after feeling like her food was stuck. Denies on going nausea. Denies fever or chills. She is passing gas per rectum. She had not had a BM until yesterday when she performed a fleets enema and was able to pass some stool.

## 2018-06-04 ENCOUNTER — TELEPHONE (OUTPATIENT)
Dept: OBSTETRICS AND GYNECOLOGY | Facility: CLINIC | Age: 33
End: 2018-06-04

## 2018-06-04 NOTE — TELEPHONE ENCOUNTER
Hello, this is Roberta calling from the OBGYN clinic at [facility]. Following up from your visit to the ER. (No answer, left VM message for the pt regarding this information and to call the clinic back.)

## 2018-06-06 ENCOUNTER — OFFICE VISIT (OUTPATIENT)
Dept: OBSTETRICS AND GYNECOLOGY | Facility: CLINIC | Age: 33
End: 2018-06-06
Attending: OBSTETRICS & GYNECOLOGY
Payer: COMMERCIAL

## 2018-06-06 VITALS
DIASTOLIC BLOOD PRESSURE: 64 MMHG | WEIGHT: 154.56 LBS | SYSTOLIC BLOOD PRESSURE: 110 MMHG | HEIGHT: 62 IN | BODY MASS INDEX: 28.44 KG/M2

## 2018-06-06 DIAGNOSIS — Z09 POSTOP CHECK: Primary | ICD-10-CM

## 2018-06-06 PROCEDURE — 99024 POSTOP FOLLOW-UP VISIT: CPT | Mod: S$GLB,,, | Performed by: OBSTETRICS & GYNECOLOGY

## 2018-06-06 PROCEDURE — 99999 PR PBB SHADOW E&M-EST. PATIENT-LVL II: CPT | Mod: PBBFAC,,, | Performed by: OBSTETRICS & GYNECOLOGY

## 2018-06-06 NOTE — PROGRESS NOTES
"CC: Postoperative visit to remove bandage    Melita Shell is a 33 y.o. female  presents for a postoperative visit s/p abdominal myomectomy on Tuesday, May 30.  Her postoperative course was complicated by a readmission for blood transfusion secondary to symptomatic anemia.  She is doing well postoperative.  The bandage was removed while she was getting the transfusion.    /64 (BP Location: Left arm, Patient Position: Sitting, BP Method: Small (Manual))   Ht 5' 2" (1.575 m)   Wt 70.1 kg (154 lb 8.7 oz)   LMP 2018 (Exact Date)   BMI 28.27 kg/m²     ROS:  GENERAL: No fever, chills, fatigability or weight loss.  VULVAR: No pain, no lesions and no itching.  VAGINAL: No relaxation, no itching, no discharge, no abnormal bleeding and no lesions.  ABDOMEN: No abdominal pain. Denies nausea. Denies vomiting. No diarrhea. No constipation  BREAST: Denies pain. No lumps. No discharge.  URINARY: No incontinence, no nocturia, no frequency and no dysuria.  CARDIOVASCULAR: No chest pain. No shortness of breath. No leg cramps.  NEUROLOGICAL: No headaches. No vision changes.    Physical Exam    INCISION: Clean, dry, intact.  Well healed.      1. Postop check         Continue routine postop care.  RTC as scheduled.    "

## 2018-06-10 ENCOUNTER — PATIENT MESSAGE (OUTPATIENT)
Dept: OBSTETRICS AND GYNECOLOGY | Facility: CLINIC | Age: 33
End: 2018-06-10

## 2018-06-11 ENCOUNTER — TELEPHONE (OUTPATIENT)
Dept: OBSTETRICS AND GYNECOLOGY | Facility: CLINIC | Age: 33
End: 2018-06-11

## 2018-06-11 ENCOUNTER — OFFICE VISIT (OUTPATIENT)
Dept: SURGERY | Facility: CLINIC | Age: 33
End: 2018-06-11
Payer: COMMERCIAL

## 2018-06-11 VITALS
WEIGHT: 154.31 LBS | SYSTOLIC BLOOD PRESSURE: 92 MMHG | DIASTOLIC BLOOD PRESSURE: 59 MMHG | HEIGHT: 62 IN | TEMPERATURE: 98 F | HEART RATE: 65 BPM | BODY MASS INDEX: 28.39 KG/M2

## 2018-06-11 DIAGNOSIS — N94.6 DYSMENORRHEA: Primary | ICD-10-CM

## 2018-06-11 DIAGNOSIS — K42.9 UMBILICAL HERNIA WITHOUT OBSTRUCTION AND WITHOUT GANGRENE: Primary | ICD-10-CM

## 2018-06-11 PROCEDURE — 99024 POSTOP FOLLOW-UP VISIT: CPT | Mod: S$GLB,,, | Performed by: STUDENT IN AN ORGANIZED HEALTH CARE EDUCATION/TRAINING PROGRAM

## 2018-06-11 PROCEDURE — 99999 PR PBB SHADOW E&M-EST. PATIENT-LVL III: CPT | Mod: PBBFAC,,, | Performed by: STUDENT IN AN ORGANIZED HEALTH CARE EDUCATION/TRAINING PROGRAM

## 2018-06-11 RX ORDER — IBUPROFEN 600 MG/1
600 TABLET ORAL EVERY 6 HOURS
Qty: 30 TABLET | Refills: 2 | Status: SHIPPED | OUTPATIENT
Start: 2018-06-11 | End: 2018-06-13 | Stop reason: SDUPTHER

## 2018-06-11 NOTE — PROGRESS NOTES
32 yo female 2 wks s/p umbilical hernia repair during simultaneous myomectomy.     Doing well. Tolerating diet and (+) bowel function. Scheduled for follow- up with OB 6/16/188.    Vitals:    06/11/18 0808   BP: (!) 92/59   Pulse: 65   Temp: 98 °F (36.7 °C)     NAD  Non labored breathing  Umbilical incision healing well, no erythema, drainage, or tenderness, scar tissue ridge    - RTC as needed    Katie Arora MD  General Surgery, PGY-II  Pager: [phone]

## 2018-06-11 NOTE — TELEPHONE ENCOUNTER
Pt requesting a refill of her Ibuprofen 600mg. Pt stated that she is completely out. Can you refill for the her?

## 2018-06-13 DIAGNOSIS — N94.6 DYSMENORRHEA: ICD-10-CM

## 2018-06-13 RX ORDER — IBUPROFEN 600 MG/1
600 TABLET ORAL EVERY 6 HOURS
Qty: 30 TABLET | Refills: 0 | Status: CANCELLED | OUTPATIENT
Start: 2018-06-13

## 2018-06-13 RX ORDER — IBUPROFEN 600 MG/1
600 TABLET ORAL EVERY 6 HOURS
Qty: 30 TABLET | Refills: 2 | Status: SHIPPED | OUTPATIENT
Start: 2018-06-13 | End: 2018-07-19

## 2018-06-28 ENCOUNTER — PATIENT MESSAGE (OUTPATIENT)
Dept: OBSTETRICS AND GYNECOLOGY | Facility: CLINIC | Age: 33
End: 2018-06-28

## 2018-07-10 ENCOUNTER — PATIENT MESSAGE (OUTPATIENT)
Dept: OBSTETRICS AND GYNECOLOGY | Facility: CLINIC | Age: 33
End: 2018-07-10

## 2018-07-10 ENCOUNTER — PATIENT MESSAGE (OUTPATIENT)
Dept: SURGERY | Facility: CLINIC | Age: 33
End: 2018-07-10

## 2018-07-19 ENCOUNTER — PATIENT MESSAGE (OUTPATIENT)
Dept: OBSTETRICS AND GYNECOLOGY | Facility: CLINIC | Age: 33
End: 2018-07-19

## 2018-07-19 ENCOUNTER — OFFICE VISIT (OUTPATIENT)
Dept: OBSTETRICS AND GYNECOLOGY | Facility: CLINIC | Age: 33
End: 2018-07-19
Attending: OBSTETRICS & GYNECOLOGY
Payer: COMMERCIAL

## 2018-07-19 VITALS
BODY MASS INDEX: 29.45 KG/M2 | WEIGHT: 160.06 LBS | HEIGHT: 62 IN | SYSTOLIC BLOOD PRESSURE: 110 MMHG | DIASTOLIC BLOOD PRESSURE: 70 MMHG

## 2018-07-19 DIAGNOSIS — Z09 POSTOP CHECK: Primary | ICD-10-CM

## 2018-07-19 PROCEDURE — 99999 PR PBB SHADOW E&M-EST. PATIENT-LVL II: CPT | Mod: PBBFAC,,, | Performed by: OBSTETRICS & GYNECOLOGY

## 2018-07-19 PROCEDURE — 99024 POSTOP FOLLOW-UP VISIT: CPT | Mod: S$GLB,,, | Performed by: OBSTETRICS & GYNECOLOGY

## 2018-07-19 NOTE — PROGRESS NOTES
"CC: Postoperative visit to remove bandage     Melita Shell is a 33 y.o. female  presents for a postoperative visit s/p abdominal myomectomy on Tuesday, May 30.  Her postoperative course was complicated by a readmission for blood transfusion secondary to symptomatic anemia.  She is doing well postoperative.  The bandage was removed while she was getting the transfusion.     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    /70   Ht 5' 2" (1.575 m)   Wt 72.6 kg (160 lb 0.9 oz)   LMP 2018 (Exact Date)   BMI 29.27 kg/m²     ROS:  GENERAL: No fever, chills, fatigability or weight loss.  VULVAR: No pain, no lesions and no itching.  VAGINAL: No relaxation, no itching, no discharge, no abnormal bleeding and no lesions.  ABDOMEN: No abdominal pain. Denies nausea. Denies vomiting. No diarrhea. No constipation  BREAST: Denies pain. No lumps. No discharge.  URINARY: No incontinence, no nocturia, no frequency and no dysuria.  CARDIOVASCULAR: No chest pain. No shortness of breath. No leg cramps.  NEUROLOGICAL: No headaches. No vision changes.    Physical Exam    INCISION: Clean, dry, intact.  Well healed.    PELVIC: Normal external genitalia without lesions.  Normal hair distribution.  Adequate perineal body, normal urethral meatus.  Vagina moist and well rugated without lesions or discharge.  Cervix pink, without lesions, discharge or tenderness.  No significant cystocele or rectocele.  Bimanual exam shows uterus to be normal size, regular, mobile and nontender.  Adnexa without masses or tenderness.      1. Postop check         Patient can return to normal activities.  Return to clinic in 1 year for well woman exam.    Need to delay conception for 4-6 months  "

## 2018-10-11 ENCOUNTER — PATIENT MESSAGE (OUTPATIENT)
Dept: OBSTETRICS AND GYNECOLOGY | Facility: CLINIC | Age: 33
End: 2018-10-11

## 2018-11-05 ENCOUNTER — PATIENT MESSAGE (OUTPATIENT)
Dept: INTERNAL MEDICINE | Facility: CLINIC | Age: 33
End: 2018-11-05

## 2019-02-02 ENCOUNTER — OFFICE VISIT (OUTPATIENT)
Dept: URGENT CARE | Facility: CLINIC | Age: 34
End: 2019-02-02
Payer: COMMERCIAL

## 2019-02-02 VITALS
RESPIRATION RATE: 16 BRPM | BODY MASS INDEX: 30.36 KG/M2 | TEMPERATURE: 101 F | OXYGEN SATURATION: 98 % | HEART RATE: 98 BPM | DIASTOLIC BLOOD PRESSURE: 74 MMHG | SYSTOLIC BLOOD PRESSURE: 131 MMHG | HEIGHT: 62 IN | WEIGHT: 165 LBS

## 2019-02-02 DIAGNOSIS — R50.9 FEVER, UNSPECIFIED FEVER CAUSE: ICD-10-CM

## 2019-02-02 DIAGNOSIS — J10.1 INFLUENZA A: Primary | ICD-10-CM

## 2019-02-02 LAB
CTP QC/QA: YES
FLUAV AG NPH QL: POSITIVE
FLUBV AG NPH QL: NEGATIVE

## 2019-02-02 PROCEDURE — 87804 INFLUENZA ASSAY W/OPTIC: CPT | Mod: QW,S$GLB,, | Performed by: NURSE PRACTITIONER

## 2019-02-02 PROCEDURE — 3008F PR BODY MASS INDEX (BMI) DOCUMENTED: ICD-10-PCS | Mod: CPTII,S$GLB,, | Performed by: NURSE PRACTITIONER

## 2019-02-02 PROCEDURE — 99214 OFFICE O/P EST MOD 30 MIN: CPT | Mod: S$GLB,,, | Performed by: NURSE PRACTITIONER

## 2019-02-02 PROCEDURE — 99214 PR OFFICE/OUTPT VISIT, EST, LEVL IV, 30-39 MIN: ICD-10-PCS | Mod: S$GLB,,, | Performed by: NURSE PRACTITIONER

## 2019-02-02 PROCEDURE — 87804 POCT INFLUENZA A/B: ICD-10-PCS | Mod: 59,QW,S$GLB, | Performed by: NURSE PRACTITIONER

## 2019-02-02 PROCEDURE — 3008F BODY MASS INDEX DOCD: CPT | Mod: CPTII,S$GLB,, | Performed by: NURSE PRACTITIONER

## 2019-02-02 RX ORDER — ACETAMINOPHEN 500 MG
1000 TABLET ORAL
Status: COMPLETED | OUTPATIENT
Start: 2019-02-02 | End: 2019-02-02

## 2019-02-02 RX ORDER — PROMETHAZINE HYDROCHLORIDE AND DEXTROMETHORPHAN HYDROBROMIDE 6.25; 15 MG/5ML; MG/5ML
5 SYRUP ORAL NIGHTLY PRN
Qty: 180 ML | Refills: 0 | Status: SHIPPED | OUTPATIENT
Start: 2019-02-02 | End: 2019-02-12

## 2019-02-02 RX ORDER — OSELTAMIVIR PHOSPHATE 75 MG/1
75 CAPSULE ORAL 2 TIMES DAILY
Qty: 10 CAPSULE | Refills: 0 | Status: SHIPPED | OUTPATIENT
Start: 2019-02-02 | End: 2019-02-07

## 2019-02-02 RX ADMIN — Medication 1000 MG: at 11:02

## 2019-02-02 NOTE — PATIENT INSTRUCTIONS
Motrin or tylenol for fevers/body aches. He received Tylenol in clinic today.   Cough syrup may make you drowsy.  Rest and increase fluid intake.    If you were prescribed a narcotic medication, do not drive or operate heavy equipment or machinery while taking these medications.    You must understand that you've received an Urgent Care treatment only and that you may be released before all your medical problems are known or treated. You, the patient, will arrange for follow up care as instructed.  If your condition worsens we recommend that you receive another evaluation at the emergency room immediately or contact your primary medical clinics after hours call service to discuss your concerns.  Please return here or go to the Emergency Department for any concerns or worsening of condition.      Influenza (Adult)    Influenza is also called the flu. It is a viral illness that affects the air passages of your lungs. It is different from the common cold. The flu can easily be passed from one to person to another. It may be spread through the air by coughing and sneezing. Or it can be spread by touching the sick person and then touching your own eyes, nose, or mouth.  The flu starts 1 to 3 days after you are exposed to the flu virus. It may last for 1 to 2 weeks but many people feel tired or fatigued for many weeks afterward. You usually dont need to take antibiotics unless you have a complication. This might be an ear or sinus infection or pneumonia.  Symptoms of the flu may be mild or severe. They can include extreme tiredness (wanting to stay in bed all day), chills, fevers, muscle aches, soreness with eye movement, headache, and a dry, hacking cough.  Home care  Follow these guidelines when caring for yourself at home:  · Avoid being around cigarette smoke, whether yours or other peoples.  · Acetaminophen or ibuprofen will help ease your fever, muscle aches, and headache. Dont give aspirin to anyone younger than  18 who has the flu. Aspirin can harm the liver.  · Nausea and loss of appetite are common with the flu. Eat light meals. Drink 6 to 8 glasses of liquids every day. Good choices are water, sport drinks, soft drinks without caffeine, juices, tea, and soup. Extra fluids will also help loosen secretions in your nose and lungs.  · Over-the-counter cold medicines will not make the flu go away faster. But the medicines may help with coughing, sore throat, and congestion in your nose and sinuses. Dont use a decongestant if you have high blood pressure.  · Stay home until your fever has been gone for at least 24 hours without using medicine to reduce fever.  Follow-up care  Follow up with your healthcare provider, or as advised, if you are not getting better over the next week.  If you are age 65 or older, talk with your provider about getting a pneumococcal vaccine every 5 years. You should also get this vaccine if you have chronic asthma or COPD. All adults should get a flu vaccine every fall. Ask your provider about this.  When to seek medical advice  Call your healthcare provider right away if any of these occur:  · Cough with lots of colored mucus (sputum) or blood in your mucus  · Chest pain, shortness of breath, wheezing, or trouble breathing  · Severe headache, or face, neck, or ear pain  · New rash with fever  · Fever of 100.4°F (38°C) or higher, or as directed by your healthcare provider  · Confusion, behavior change, or seizure  · Severe weakness or dizziness  · You get a new fever or cough after getting better for a few days  Date Last Reviewed: 1/1/2017  © 4097-4423 [employer]. [address] [place] [address], [place] [address]. All rights reserved. This information is not intended as a substitute for professional medical care. Always follow your healthcare professional's instructions.

## 2019-02-02 NOTE — PROGRESS NOTES
"Subjective:       Patient ID: Melita Shell is a 33 y.o. female.    Vitals:  height is 5' 2" (1.575 m) and weight is 74.8 kg (165 lb). Her temperature is 101 °F (38.3 °C) (abnormal). Her blood pressure is 131/74 and her pulse is 98. Her respiration is 16 and oxygen saturation is 98%.     Chief Complaint: URI      She is accompanied by her spouse.  Symptoms started yesterday.   Has not taken anything for fever.      URI    This is a new problem. The current episode started yesterday. The problem has been gradually worsening. Maximum temperature: pt states she felt feverish. Associated symptoms include congestion, coughing, a sore throat and vomiting. Pertinent negatives include no ear pain, nausea, rash, sinus pain or wheezing. She has tried acetaminophen and decongestant for the symptoms. The treatment provided no relief.       Constitution: Positive for chills. Negative for sweating, fatigue and fever.   HENT: Positive for congestion and sore throat. Negative for ear pain, sinus pain, sinus pressure and voice change.    Neck: Negative for painful lymph nodes.   Eyes: Negative for eye redness.   Respiratory: Positive for cough. Negative for chest tightness, sputum production, bloody sputum, COPD, shortness of breath, stridor, wheezing and asthma.    Gastrointestinal: Positive for vomiting. Negative for nausea.   Musculoskeletal: Positive for muscle ache.   Skin: Negative for rash.   Allergic/Immunologic: Negative for seasonal allergies and asthma.   Hematologic/Lymphatic: Negative for swollen lymph nodes.       Objective:      Physical Exam   Constitutional: She is oriented to person, place, and time. She appears well-developed and well-nourished. She is cooperative.  Non-toxic appearance. She does not appear ill. No distress.   HENT:   Head: Normocephalic and atraumatic.   Right Ear: Hearing, tympanic membrane, external ear and ear canal normal.   Left Ear: Hearing, tympanic membrane, external ear and ear " canal normal.   Nose: Nose normal. No mucosal edema, rhinorrhea or nasal deformity. No epistaxis. Right sinus exhibits no maxillary sinus tenderness and no frontal sinus tenderness. Left sinus exhibits no maxillary sinus tenderness and no frontal sinus tenderness.   Mouth/Throat: Uvula is midline, oropharynx is clear and moist and mucous membranes are normal. No trismus in the jaw. Normal dentition. No uvula swelling. No posterior oropharyngeal erythema.   Eyes: Conjunctivae and lids are normal. No scleral icterus.   Sclera clear bilat   Neck: Trachea normal, full passive range of motion without pain and phonation normal. Neck supple.   Cardiovascular: Normal rate, regular rhythm, normal heart sounds, intact distal pulses and normal pulses.   Pulmonary/Chest: Effort normal and breath sounds normal. No respiratory distress.   Abdominal: Soft. Normal appearance and bowel sounds are normal. She exhibits no distension. There is no tenderness.   Musculoskeletal: Normal range of motion. She exhibits no edema or deformity.   Neurological: She is alert and oriented to person, place, and time. She exhibits normal muscle tone. Coordination normal.   Skin: Skin is warm, dry and intact. She is not diaphoretic. No pallor.   Psychiatric: She has a normal mood and affect. Her speech is normal and behavior is normal. Judgment and thought content normal. Cognition and memory are normal.   Nursing note and vitals reviewed.      Results for orders placed or performed in visit on 02/02/19   POCT Influenza A/B   Result Value Ref Range    Rapid Influenza A Ag Positive (A) Negative    Rapid Influenza B Ag Negative Negative     Acceptable Yes      Assessment:       1. Influenza A    2. Fever, unspecified fever cause        Plan:         Influenza A  -     oseltamivir (TAMIFLU) 75 MG capsule; Take 1 capsule (75 mg total) by mouth 2 (two) times daily. for 5 days  Dispense: 10 capsule; Refill: 0  -      promethazine-dextromethorphan (PROMETHAZINE-DM) 6.25-15 mg/5 mL Syrp; Take 5 mLs by mouth nightly as needed.  Dispense: 180 mL; Refill: 0    Fever, unspecified fever cause  -     POCT Influenza A/B  -     acetaminophen tablet 1,000 mg      Patient Instructions   Motrin or tylenol for fevers/body aches. He received Tylenol in clinic today.   Cough syrup may make you drowsy.  Rest and increase fluid intake.    If you were prescribed a narcotic medication, do not drive or operate heavy equipment or machinery while taking these medications.    You must understand that you've received an Urgent Care treatment only and that you may be released before all your medical problems are known or treated. You, the patient, will arrange for follow up care as instructed.  If your condition worsens we recommend that you receive another evaluation at the emergency room immediately or contact your primary medical clinics after hours call service to discuss your concerns.  Please return here or go to the Emergency Department for any concerns or worsening of condition.      Influenza (Adult)    Influenza is also called the flu. It is a viral illness that affects the air passages of your lungs. It is different from the common cold. The flu can easily be passed from one to person to another. It may be spread through the air by coughing and sneezing. Or it can be spread by touching the sick person and then touching your own eyes, nose, or mouth.  The flu starts 1 to 3 days after you are exposed to the flu virus. It may last for 1 to 2 weeks but many people feel tired or fatigued for many weeks afterward. You usually dont need to take antibiotics unless you have a complication. This might be an ear or sinus infection or pneumonia.  Symptoms of the flu may be mild or severe. They can include extreme tiredness (wanting to stay in bed all day), chills, fevers, muscle aches, soreness with eye movement, headache, and a dry, hacking cough.  Home  care  Follow these guidelines when caring for yourself at home:  · Avoid being around cigarette smoke, whether yours or other peoples.  · Acetaminophen or ibuprofen will help ease your fever, muscle aches, and headache. Dont give aspirin to anyone younger than 18 who has the flu. Aspirin can harm the liver.  · Nausea and loss of appetite are common with the flu. Eat light meals. Drink 6 to 8 glasses of liquids every day. Good choices are water, sport drinks, soft drinks without caffeine, juices, tea, and soup. Extra fluids will also help loosen secretions in your nose and lungs.  · Over-the-counter cold medicines will not make the flu go away faster. But the medicines may help with coughing, sore throat, and congestion in your nose and sinuses. Dont use a decongestant if you have high blood pressure.  · Stay home until your fever has been gone for at least 24 hours without using medicine to reduce fever.  Follow-up care  Follow up with your healthcare provider, or as advised, if you are not getting better over the next week.  If you are age 65 or older, talk with your provider about getting a pneumococcal vaccine every 5 years. You should also get this vaccine if you have chronic asthma or COPD. All adults should get a flu vaccine every fall. Ask your provider about this.  When to seek medical advice  Call your healthcare provider right away if any of these occur:  · Cough with lots of colored mucus (sputum) or blood in your mucus  · Chest pain, shortness of breath, wheezing, or trouble breathing  · Severe headache, or face, neck, or ear pain  · New rash with fever  · Fever of 100.4°F (38°C) or higher, or as directed by your healthcare provider  · Confusion, behavior change, or seizure  · Severe weakness or dizziness  · You get a new fever or cough after getting better for a few days  Date Last Reviewed: 1/1/2017  © 5421-1357 [employer]. [address] [place] [address], [place] [address]. All rights  reserved. This information is not intended as a substitute for professional medical care. Always follow your healthcare professional's instructions.

## 2019-02-05 ENCOUNTER — OFFICE VISIT (OUTPATIENT)
Dept: INTERNAL MEDICINE | Facility: CLINIC | Age: 34
End: 2019-02-05
Payer: COMMERCIAL

## 2019-02-05 VITALS
RESPIRATION RATE: 14 BRPM | TEMPERATURE: 99 F | WEIGHT: 167.31 LBS | HEIGHT: 62 IN | BODY MASS INDEX: 30.79 KG/M2 | HEART RATE: 60 BPM | SYSTOLIC BLOOD PRESSURE: 100 MMHG | DIASTOLIC BLOOD PRESSURE: 62 MMHG

## 2019-02-05 DIAGNOSIS — Z00.00 ROUTINE MEDICAL EXAM: Primary | ICD-10-CM

## 2019-02-05 PROCEDURE — 99999 PR PBB SHADOW E&M-EST. PATIENT-LVL III: CPT | Mod: PBBFAC,,, | Performed by: INTERNAL MEDICINE

## 2019-02-05 PROCEDURE — 99395 PR PREVENTIVE VISIT,EST,18-39: ICD-10-PCS | Mod: S$GLB,,, | Performed by: INTERNAL MEDICINE

## 2019-02-05 PROCEDURE — 99999 PR PBB SHADOW E&M-EST. PATIENT-LVL III: ICD-10-PCS | Mod: PBBFAC,,, | Performed by: INTERNAL MEDICINE

## 2019-02-05 PROCEDURE — 99395 PREV VISIT EST AGE 18-39: CPT | Mod: S$GLB,,, | Performed by: INTERNAL MEDICINE

## 2019-02-05 RX ORDER — ERGOCALCIFEROL 1.25 MG/1
50000 CAPSULE ORAL
Qty: 4 CAPSULE | Refills: 3 | Status: SHIPPED | OUTPATIENT
Start: 2019-02-05 | End: 2019-03-29

## 2019-02-05 RX ORDER — ALBUTEROL SULFATE 90 UG/1
2 AEROSOL, METERED RESPIRATORY (INHALATION) EVERY 6 HOURS PRN
Qty: 18 G | Refills: 3 | Status: SHIPPED | OUTPATIENT
Start: 2019-02-05 | End: 2021-04-26

## 2019-02-05 NOTE — LETTER
February 5, 2019      [facility]                                                                                                                                                       [address]  [place] [address]  Phone: [phone]  Fax: [phone]       Patient: Melita Shell   YOB: 1985  Date of Visit: 02/05/2019    To Whom It May Concern:    Joshua Shell  was at [facility] on 02/05/2019. She may return to work/school on 02/06/2019 with no restrictions. If you have any questions or concerns, or if I can be of further assistance, please do not hesitate to contact me.    Sincerely,    Claudia Barajas MD
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
## 2019-03-08 ENCOUNTER — LAB VISIT (OUTPATIENT)
Dept: LAB | Facility: HOSPITAL | Age: 34
End: 2019-03-08
Attending: INTERNAL MEDICINE
Payer: COMMERCIAL

## 2019-03-08 DIAGNOSIS — Z00.00 ROUTINE MEDICAL EXAM: ICD-10-CM

## 2019-03-08 LAB
25(OH)D3+25(OH)D2 SERPL-MCNC: 20 NG/ML
ALBUMIN SERPL BCP-MCNC: 3.4 G/DL
ALP SERPL-CCNC: 54 U/L
ALT SERPL W/O P-5'-P-CCNC: 20 U/L
ANION GAP SERPL CALC-SCNC: 6 MMOL/L
AST SERPL-CCNC: 19 U/L
BASOPHILS # BLD AUTO: 0.02 K/UL
BASOPHILS NFR BLD: 0.3 %
BILIRUB SERPL-MCNC: 0.7 MG/DL
BUN SERPL-MCNC: 10 MG/DL
CALCIUM SERPL-MCNC: 9.3 MG/DL
CHLORIDE SERPL-SCNC: 102 MMOL/L
CHOLEST SERPL-MCNC: 154 MG/DL
CHOLEST/HDLC SERPL: 3.6 {RATIO}
CO2 SERPL-SCNC: 28 MMOL/L
CREAT SERPL-MCNC: 0.8 MG/DL
DIFFERENTIAL METHOD: ABNORMAL
EOSINOPHIL # BLD AUTO: 0.1 K/UL
EOSINOPHIL NFR BLD: 1.9 %
ERYTHROCYTE [DISTWIDTH] IN BLOOD BY AUTOMATED COUNT: 12.4 %
EST. GFR  (AFRICAN AMERICAN): >60 ML/MIN/1.73 M^2
EST. GFR  (NON AFRICAN AMERICAN): >60 ML/MIN/1.73 M^2
ESTIMATED AVG GLUCOSE: 114 MG/DL
GLUCOSE SERPL-MCNC: 92 MG/DL
HBA1C MFR BLD HPLC: 5.6 %
HCT VFR BLD AUTO: 37.9 %
HDLC SERPL-MCNC: 43 MG/DL
HDLC SERPL: 27.9 %
HGB BLD-MCNC: 12.5 G/DL
IMM GRANULOCYTES # BLD AUTO: 0.02 K/UL
IMM GRANULOCYTES NFR BLD AUTO: 0.3 %
LDLC SERPL CALC-MCNC: 92.2 MG/DL
LYMPHOCYTES # BLD AUTO: 1.7 K/UL
LYMPHOCYTES NFR BLD: 26.8 %
MCH RBC QN AUTO: 29.7 PG
MCHC RBC AUTO-ENTMCNC: 33 G/DL
MCV RBC AUTO: 90 FL
MONOCYTES # BLD AUTO: 0.6 K/UL
MONOCYTES NFR BLD: 8.7 %
NEUTROPHILS # BLD AUTO: 3.9 K/UL
NEUTROPHILS NFR BLD: 62 %
NONHDLC SERPL-MCNC: 111 MG/DL
NRBC BLD-RTO: 0 /100 WBC
PLATELET # BLD AUTO: 359 K/UL
PMV BLD AUTO: 10.6 FL
POTASSIUM SERPL-SCNC: 4.4 MMOL/L
PROT SERPL-MCNC: 7.3 G/DL
RBC # BLD AUTO: 4.21 M/UL
SODIUM SERPL-SCNC: 136 MMOL/L
TRIGL SERPL-MCNC: 94 MG/DL
TSH SERPL DL<=0.005 MIU/L-ACNC: 3.23 UIU/ML
WBC # BLD AUTO: 6.35 K/UL

## 2019-03-08 PROCEDURE — 83036 HEMOGLOBIN GLYCOSYLATED A1C: CPT

## 2019-03-08 PROCEDURE — 85025 COMPLETE CBC W/AUTO DIFF WBC: CPT

## 2019-03-08 PROCEDURE — 36415 COLL VENOUS BLD VENIPUNCTURE: CPT | Mod: PO

## 2019-03-08 PROCEDURE — 84443 ASSAY THYROID STIM HORMONE: CPT

## 2019-03-08 PROCEDURE — 80061 LIPID PANEL: CPT

## 2019-03-08 PROCEDURE — 80053 COMPREHEN METABOLIC PANEL: CPT

## 2019-03-08 PROCEDURE — 82306 VITAMIN D 25 HYDROXY: CPT

## 2019-03-25 ENCOUNTER — PATIENT MESSAGE (OUTPATIENT)
Dept: SURGERY | Facility: CLINIC | Age: 34
End: 2019-03-25

## 2019-03-29 ENCOUNTER — OFFICE VISIT (OUTPATIENT)
Dept: SURGERY | Facility: CLINIC | Age: 34
End: 2019-03-29
Payer: COMMERCIAL

## 2019-03-29 VITALS
HEART RATE: 63 BPM | SYSTOLIC BLOOD PRESSURE: 124 MMHG | HEIGHT: 62 IN | WEIGHT: 173.94 LBS | TEMPERATURE: 99 F | DIASTOLIC BLOOD PRESSURE: 73 MMHG | BODY MASS INDEX: 32.01 KG/M2

## 2019-03-29 DIAGNOSIS — Z98.890 S/P MYOMECTOMY: Primary | ICD-10-CM

## 2019-03-29 PROCEDURE — 3008F PR BODY MASS INDEX (BMI) DOCUMENTED: ICD-10-PCS | Mod: CPTII,S$GLB,, | Performed by: STUDENT IN AN ORGANIZED HEALTH CARE EDUCATION/TRAINING PROGRAM

## 2019-03-29 PROCEDURE — 99213 PR OFFICE/OUTPT VISIT, EST, LEVL III, 20-29 MIN: ICD-10-PCS | Mod: S$GLB,,, | Performed by: STUDENT IN AN ORGANIZED HEALTH CARE EDUCATION/TRAINING PROGRAM

## 2019-03-29 PROCEDURE — 99999 PR PBB SHADOW E&M-EST. PATIENT-LVL III: CPT | Mod: PBBFAC,,, | Performed by: STUDENT IN AN ORGANIZED HEALTH CARE EDUCATION/TRAINING PROGRAM

## 2019-03-29 PROCEDURE — 3008F BODY MASS INDEX DOCD: CPT | Mod: CPTII,S$GLB,, | Performed by: STUDENT IN AN ORGANIZED HEALTH CARE EDUCATION/TRAINING PROGRAM

## 2019-03-29 PROCEDURE — 99213 OFFICE O/P EST LOW 20 MIN: CPT | Mod: S$GLB,,, | Performed by: STUDENT IN AN ORGANIZED HEALTH CARE EDUCATION/TRAINING PROGRAM

## 2019-03-29 PROCEDURE — 99999 PR PBB SHADOW E&M-EST. PATIENT-LVL III: ICD-10-PCS | Mod: PBBFAC,,, | Performed by: STUDENT IN AN ORGANIZED HEALTH CARE EDUCATION/TRAINING PROGRAM
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
## 2019-03-29 NOTE — LETTER
March 29, 2019      Kim Valiente MD  [address]  [facility] [address]           [facility]  [address]  [address] [facility] [address]  Phone: [phone]          Patient: Melita Shell   MR Number: [id number]   YOB: 1985   Date of Visit: 3/29/2019       Dear Dr. Kim Valiente:    Thank you for referring Melita Shell to me for evaluation. Attached you will find relevant portions of my assessment and plan of care.    If you have questions, please do not hesitate to call me. I look forward to following Melita Shell along with you.    Sincerely,    Ward Puente MD    Enclosure  CC:  No Recipients    If you would like to receive this communication electronically, please contact [email or web address] or [phone] to request more information on [employer] access.    For providers and/or their staff who would like to refer a patient to [facility], please contact us through our one-stop-shop provider referral line, [facility] , at [phone].    If you feel you have received this communication in error or would no longer like to receive these types of communications, please e-mail [email or web address]

## 2019-07-02 ENCOUNTER — HOSPITAL ENCOUNTER (EMERGENCY)
Facility: OTHER | Age: 34
Discharge: HOME | End: 2019-07-02
Attending: EMERGENCY MEDICINE
Payer: COMMERCIAL

## 2019-07-02 VITALS
OXYGEN SATURATION: 100 % | HEIGHT: 62 IN | WEIGHT: 174 LBS | TEMPERATURE: 98 F | BODY MASS INDEX: 32.02 KG/M2 | SYSTOLIC BLOOD PRESSURE: 120 MMHG | DIASTOLIC BLOOD PRESSURE: 73 MMHG | HEART RATE: 64 BPM | RESPIRATION RATE: 18 BRPM

## 2019-07-02 DIAGNOSIS — N94.6 DYSMENORRHEA: ICD-10-CM

## 2019-07-02 DIAGNOSIS — D25.9 UTERINE LEIOMYOMA, UNSPECIFIED LOCATION: Primary | ICD-10-CM

## 2019-07-02 DIAGNOSIS — R10.32 LEFT LOWER QUADRANT PAIN: ICD-10-CM

## 2019-07-02 LAB
ALBUMIN SERPL BCP-MCNC: 3.7 G/DL (ref 3.5–5.2)
ALP SERPL-CCNC: 55 U/L (ref 55–135)
ALT SERPL W/O P-5'-P-CCNC: 30 U/L (ref 10–44)
ANION GAP SERPL CALC-SCNC: 5 MMOL/L (ref 8–16)
AST SERPL-CCNC: 40 U/L (ref 10–40)
B-HCG UR QL: NEGATIVE
BACTERIA #/AREA URNS HPF: ABNORMAL /HPF
BASOPHILS # BLD AUTO: 0.01 K/UL (ref 0–0.2)
BASOPHILS NFR BLD: 0.3 % (ref 0–1.9)
BILIRUB SERPL-MCNC: 0.8 MG/DL (ref 0.1–1)
BILIRUB UR QL STRIP: NEGATIVE
BUN SERPL-MCNC: 10 MG/DL (ref 6–20)
CALCIUM SERPL-MCNC: 9 MG/DL (ref 8.7–10.5)
CHLORIDE SERPL-SCNC: 107 MMOL/L (ref 95–110)
CLARITY UR: ABNORMAL
CO2 SERPL-SCNC: 25 MMOL/L (ref 23–29)
COLOR UR: YELLOW
CREAT SERPL-MCNC: 0.8 MG/DL (ref 0.5–1.4)
CTP QC/QA: YES
DIFFERENTIAL METHOD: ABNORMAL
EOSINOPHIL # BLD AUTO: 0.1 K/UL (ref 0–0.5)
EOSINOPHIL NFR BLD: 2.4 % (ref 0–8)
ERYTHROCYTE [DISTWIDTH] IN BLOOD BY AUTOMATED COUNT: 12.8 % (ref 11.5–14.5)
EST. GFR  (AFRICAN AMERICAN): >60 ML/MIN/1.73 M^2
EST. GFR  (NON AFRICAN AMERICAN): >60 ML/MIN/1.73 M^2
GLUCOSE SERPL-MCNC: 91 MG/DL (ref 70–110)
GLUCOSE UR QL STRIP: NEGATIVE
HCT VFR BLD AUTO: 35.5 % (ref 37–48.5)
HGB BLD-MCNC: 12 G/DL (ref 12–16)
HGB UR QL STRIP: ABNORMAL
KETONES UR QL STRIP: NEGATIVE
LEUKOCYTE ESTERASE UR QL STRIP: ABNORMAL
LIPASE SERPL-CCNC: 32 U/L (ref 4–60)
LYMPHOCYTES # BLD AUTO: 1 K/UL (ref 1–4.8)
LYMPHOCYTES NFR BLD: 24.9 % (ref 18–48)
MCH RBC QN AUTO: 29.2 PG (ref 27–31)
MCHC RBC AUTO-ENTMCNC: 33.8 G/DL (ref 32–36)
MCV RBC AUTO: 86 FL (ref 82–98)
MICROSCOPIC COMMENT: ABNORMAL
MONOCYTES # BLD AUTO: 0.3 K/UL (ref 0.3–1)
MONOCYTES NFR BLD: 8.1 % (ref 4–15)
NEUTROPHILS # BLD AUTO: 2.5 K/UL (ref 1.8–7.7)
NEUTROPHILS NFR BLD: 64.3 % (ref 38–73)
NITRITE UR QL STRIP: NEGATIVE
PH UR STRIP: 7 [PH] (ref 5–8)
PLATELET # BLD AUTO: 357 K/UL (ref 150–350)
PMV BLD AUTO: 10.2 FL (ref 9.2–12.9)
POTASSIUM SERPL-SCNC: 4.7 MMOL/L (ref 3.5–5.1)
PROT SERPL-MCNC: 8.1 G/DL (ref 6–8.4)
PROT UR QL STRIP: NEGATIVE
RBC # BLD AUTO: 4.11 M/UL (ref 4–5.4)
RBC #/AREA URNS HPF: 25 /HPF (ref 0–4)
SODIUM SERPL-SCNC: 137 MMOL/L (ref 136–145)
SP GR UR STRIP: 1.01 (ref 1–1.03)
SQUAMOUS #/AREA URNS HPF: 7 /HPF
URN SPEC COLLECT METH UR: ABNORMAL
UROBILINOGEN UR STRIP-ACNC: NEGATIVE EU/DL
WBC # BLD AUTO: 3.82 K/UL (ref 3.9–12.7)
WBC #/AREA URNS HPF: 2 /HPF (ref 0–5)

## 2019-07-02 PROCEDURE — 96374 THER/PROPH/DIAG INJ IV PUSH: CPT | Mod: 59

## 2019-07-02 PROCEDURE — 25500020 PHARM REV CODE 255: Performed by: EMERGENCY MEDICINE

## 2019-07-02 PROCEDURE — 85025 COMPLETE CBC W/AUTO DIFF WBC: CPT

## 2019-07-02 PROCEDURE — 81000 URINALYSIS NONAUTO W/SCOPE: CPT

## 2019-07-02 PROCEDURE — 96361 HYDRATE IV INFUSION ADD-ON: CPT

## 2019-07-02 PROCEDURE — 63600175 PHARM REV CODE 636 W HCPCS: Performed by: PHYSICIAN ASSISTANT

## 2019-07-02 PROCEDURE — 81025 URINE PREGNANCY TEST: CPT | Performed by: EMERGENCY MEDICINE

## 2019-07-02 PROCEDURE — 83690 ASSAY OF LIPASE: CPT

## 2019-07-02 PROCEDURE — 25000003 PHARM REV CODE 250: Performed by: PHYSICIAN ASSISTANT

## 2019-07-02 PROCEDURE — 99284 EMERGENCY DEPT VISIT MOD MDM: CPT | Mod: 25

## 2019-07-02 PROCEDURE — 96375 TX/PRO/DX INJ NEW DRUG ADDON: CPT

## 2019-07-02 PROCEDURE — 80053 COMPREHEN METABOLIC PANEL: CPT

## 2019-07-02 RX ORDER — ONDANSETRON 2 MG/ML
8 INJECTION INTRAMUSCULAR; INTRAVENOUS
Status: COMPLETED | OUTPATIENT
Start: 2019-07-02 | End: 2019-07-02

## 2019-07-02 RX ORDER — HYDROCODONE BITARTRATE AND ACETAMINOPHEN 5; 325 MG/1; MG/1
1 TABLET ORAL EVERY 6 HOURS PRN
Qty: 12 TABLET | Refills: 0 | Status: SHIPPED | OUTPATIENT
Start: 2019-07-02 | End: 2019-07-12

## 2019-07-02 RX ORDER — KETOROLAC TROMETHAMINE 30 MG/ML
15 INJECTION, SOLUTION INTRAMUSCULAR; INTRAVENOUS
Status: COMPLETED | OUTPATIENT
Start: 2019-07-02 | End: 2019-07-02

## 2019-07-02 RX ORDER — MORPHINE SULFATE 10 MG/ML
4 INJECTION INTRAMUSCULAR; INTRAVENOUS; SUBCUTANEOUS
Status: COMPLETED | OUTPATIENT
Start: 2019-07-02 | End: 2019-07-02

## 2019-07-02 RX ORDER — NAPROXEN 500 MG/1
500 TABLET ORAL 2 TIMES DAILY PRN
Qty: 20 TABLET | Refills: 0 | Status: SHIPPED | OUTPATIENT
Start: 2019-07-02 | End: 2019-08-28

## 2019-07-02 RX ADMIN — SODIUM CHLORIDE 1000 ML: 0.9 INJECTION, SOLUTION INTRAVENOUS at 11:07

## 2019-07-02 RX ADMIN — IOHEXOL 75 ML: 350 INJECTION, SOLUTION INTRAVENOUS at 12:07

## 2019-07-02 RX ADMIN — KETOROLAC TROMETHAMINE 15 MG: 30 INJECTION, SOLUTION INTRAMUSCULAR; INTRAVENOUS at 11:07

## 2019-07-02 RX ADMIN — MORPHINE SULFATE 4 MG: 10 INJECTION INTRAVENOUS at 12:07

## 2019-07-02 RX ADMIN — ONDANSETRON 8 MG: 2 INJECTION INTRAMUSCULAR; INTRAVENOUS at 11:07

## 2019-07-02 NOTE — ED TRIAGE NOTES
Reports abd pain starting last night. Tender to palpation to LLQ. Reports some nausea. Denies emesis. Last BM yesterday. PMH of hernia repair and myomectomy. AAO X 3, answers questions appropriately, appears in no acute distress. Placed on continuous pulse ox, BP cycling q 30.
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
## 2019-07-03 ENCOUNTER — PATIENT MESSAGE (OUTPATIENT)
Dept: OBSTETRICS AND GYNECOLOGY | Facility: CLINIC | Age: 34
End: 2019-07-03

## 2019-07-29 ENCOUNTER — OFFICE VISIT (OUTPATIENT)
Dept: OBSTETRICS AND GYNECOLOGY | Facility: CLINIC | Age: 34
End: 2019-07-29
Attending: OBSTETRICS & GYNECOLOGY
Payer: COMMERCIAL

## 2019-07-29 VITALS
WEIGHT: 179.44 LBS | HEIGHT: 62 IN | DIASTOLIC BLOOD PRESSURE: 74 MMHG | BODY MASS INDEX: 33.02 KG/M2 | SYSTOLIC BLOOD PRESSURE: 108 MMHG

## 2019-07-29 DIAGNOSIS — D25.1 INTRAMURAL, SUBMUCOUS, AND SUBSEROUS LEIOMYOMA OF UTERUS: Primary | ICD-10-CM

## 2019-07-29 DIAGNOSIS — D25.0 INTRAMURAL, SUBMUCOUS, AND SUBSEROUS LEIOMYOMA OF UTERUS: Primary | ICD-10-CM

## 2019-07-29 DIAGNOSIS — D25.2 INTRAMURAL, SUBMUCOUS, AND SUBSEROUS LEIOMYOMA OF UTERUS: Primary | ICD-10-CM

## 2019-07-29 PROBLEM — D62 ACUTE BLOOD LOSS ANEMIA: Status: RESOLVED | Noted: 2018-05-30 | Resolved: 2019-07-29

## 2019-07-29 PROCEDURE — 99213 PR OFFICE/OUTPT VISIT, EST, LEVL III, 20-29 MIN: ICD-10-PCS | Mod: S$GLB,,, | Performed by: OBSTETRICS & GYNECOLOGY

## 2019-07-29 PROCEDURE — 99999 PR PBB SHADOW E&M-EST. PATIENT-LVL III: ICD-10-PCS | Mod: PBBFAC,,, | Performed by: OBSTETRICS & GYNECOLOGY

## 2019-07-29 PROCEDURE — 3008F BODY MASS INDEX DOCD: CPT | Mod: CPTII,S$GLB,, | Performed by: OBSTETRICS & GYNECOLOGY

## 2019-07-29 PROCEDURE — 3008F PR BODY MASS INDEX (BMI) DOCUMENTED: ICD-10-PCS | Mod: CPTII,S$GLB,, | Performed by: OBSTETRICS & GYNECOLOGY

## 2019-07-29 PROCEDURE — 99213 OFFICE O/P EST LOW 20 MIN: CPT | Mod: S$GLB,,, | Performed by: OBSTETRICS & GYNECOLOGY

## 2019-07-29 PROCEDURE — 99999 PR PBB SHADOW E&M-EST. PATIENT-LVL III: CPT | Mod: PBBFAC,,, | Performed by: OBSTETRICS & GYNECOLOGY
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
## 2019-08-05 ENCOUNTER — PATIENT MESSAGE (OUTPATIENT)
Dept: OBSTETRICS AND GYNECOLOGY | Facility: CLINIC | Age: 34
End: 2019-08-05

## 2019-08-05 ENCOUNTER — TELEPHONE (OUTPATIENT)
Dept: OBSTETRICS AND GYNECOLOGY | Facility: CLINIC | Age: 34
End: 2019-08-05

## 2019-08-05 DIAGNOSIS — Z32.00 PREGNANCY EXAMINATION OR TEST, PREGNANCY UNCONFIRMED: Primary | ICD-10-CM

## 2019-08-05 NOTE — TELEPHONE ENCOUNTER
Pt wants to  Know her next step  She took 2 pregnancy test and she is pregnant and wants to be seen by Dr Mcmullen for her pregnancy. Please advise pt.

## 2019-08-06 ENCOUNTER — PATIENT MESSAGE (OUTPATIENT)
Dept: OBSTETRICS AND GYNECOLOGY | Facility: CLINIC | Age: 34
End: 2019-08-06

## 2019-08-07 ENCOUNTER — TELEPHONE (OUTPATIENT)
Dept: OBSTETRICS AND GYNECOLOGY | Facility: CLINIC | Age: 34
End: 2019-08-07

## 2019-08-07 DIAGNOSIS — Z34.90 PREGNANCY, UNSPECIFIED GESTATIONAL AGE: Primary | ICD-10-CM

## 2019-08-07 NOTE — TELEPHONE ENCOUNTER
Yes, I will see her.  If she does not know her LMP, please schedule hcg and ultrasound.  Orders placed

## 2019-08-11 ENCOUNTER — PATIENT MESSAGE (OUTPATIENT)
Dept: OBSTETRICS AND GYNECOLOGY | Facility: CLINIC | Age: 34
End: 2019-08-11

## 2019-08-12 ENCOUNTER — PATIENT MESSAGE (OUTPATIENT)
Dept: OBSTETRICS AND GYNECOLOGY | Facility: CLINIC | Age: 34
End: 2019-08-12

## 2019-08-13 ENCOUNTER — OFFICE VISIT (OUTPATIENT)
Dept: OBSTETRICS AND GYNECOLOGY | Facility: CLINIC | Age: 34
End: 2019-08-13
Payer: COMMERCIAL

## 2019-08-13 ENCOUNTER — TELEPHONE (OUTPATIENT)
Dept: OBSTETRICS AND GYNECOLOGY | Facility: CLINIC | Age: 34
End: 2019-08-13

## 2019-08-13 ENCOUNTER — HOSPITAL ENCOUNTER (OUTPATIENT)
Dept: RADIOLOGY | Facility: OTHER | Age: 34
Discharge: HOME | End: 2019-08-13
Attending: NURSE PRACTITIONER
Payer: COMMERCIAL

## 2019-08-13 VITALS
BODY MASS INDEX: 31.85 KG/M2 | HEIGHT: 62 IN | WEIGHT: 173.06 LBS | SYSTOLIC BLOOD PRESSURE: 108 MMHG | DIASTOLIC BLOOD PRESSURE: 64 MMHG

## 2019-08-13 DIAGNOSIS — O46.90 VAGINAL BLEEDING IN PREGNANCY: Primary | ICD-10-CM

## 2019-08-13 DIAGNOSIS — O46.90 VAGINAL BLEEDING IN PREGNANCY: ICD-10-CM

## 2019-08-13 DIAGNOSIS — O20.9 BLEEDING IN EARLY PREGNANCY: Primary | ICD-10-CM

## 2019-08-13 LAB
B-HCG UR QL: POSITIVE
CTP QC/QA: YES

## 2019-08-13 PROCEDURE — 81025 POCT URINE PREGNANCY: ICD-10-PCS | Mod: S$GLB,,, | Performed by: NURSE PRACTITIONER

## 2019-08-13 PROCEDURE — 99999 PR PBB SHADOW E&M-EST. PATIENT-LVL III: ICD-10-PCS | Mod: PBBFAC,,, | Performed by: NURSE PRACTITIONER

## 2019-08-13 PROCEDURE — 76817 US OB LESS THAN 14 WKS WITH TRANSVAGINAL (XPD): ICD-10-PCS | Mod: 26,,, | Performed by: RADIOLOGY

## 2019-08-13 PROCEDURE — 76801 OB US < 14 WKS SINGLE FETUS: CPT | Mod: TC

## 2019-08-13 PROCEDURE — 99999 PR PBB SHADOW E&M-EST. PATIENT-LVL III: CPT | Mod: PBBFAC,,, | Performed by: NURSE PRACTITIONER

## 2019-08-13 PROCEDURE — 3008F PR BODY MASS INDEX (BMI) DOCUMENTED: ICD-10-PCS | Mod: CPTII,S$GLB,, | Performed by: NURSE PRACTITIONER

## 2019-08-13 PROCEDURE — 99213 OFFICE O/P EST LOW 20 MIN: CPT | Mod: S$GLB,,, | Performed by: NURSE PRACTITIONER

## 2019-08-13 PROCEDURE — 99213 PR OFFICE/OUTPT VISIT, EST, LEVL III, 20-29 MIN: ICD-10-PCS | Mod: S$GLB,,, | Performed by: NURSE PRACTITIONER

## 2019-08-13 PROCEDURE — 76801 US OB LESS THAN 14 WKS WITH TRANSVAGINAL (XPD): ICD-10-PCS | Mod: 26,,, | Performed by: RADIOLOGY

## 2019-08-13 PROCEDURE — 81025 URINE PREGNANCY TEST: CPT | Mod: S$GLB,,, | Performed by: NURSE PRACTITIONER

## 2019-08-13 PROCEDURE — 76801 OB US < 14 WKS SINGLE FETUS: CPT | Mod: 26,,, | Performed by: RADIOLOGY

## 2019-08-13 PROCEDURE — 3008F BODY MASS INDEX DOCD: CPT | Mod: CPTII,S$GLB,, | Performed by: NURSE PRACTITIONER

## 2019-08-13 PROCEDURE — 76817 TRANSVAGINAL US OBSTETRIC: CPT | Mod: 26,,, | Performed by: RADIOLOGY

## 2019-08-13 RX ORDER — FOLIC ACID 20 MG
CAPSULE ORAL
COMMUNITY
Start: 2019-08-08 | End: 2021-04-26

## 2019-08-13 NOTE — PROGRESS NOTES
CC: Vaginal Bleeding in Early Pregnancy    HPI: Pt is a 34 y.o.  female who presents for complaining of vaginal bleeding and cramping that began 4 days ago. Positive UPT at home, LMP 6/29/19, 6w3d EGA. She initially noted vaginal spotting on 8/9/19 that has progressed to heavy bleeding with clots/tissue and associated cramping.  Repots she is still experiencing some cramping and moderate vaginal bleeding today.      ROS:  GENERAL: Feeling well overall. Denies fever or chills.   SKIN: Denies rash or lesions.   HEAD: Denies head injury or headache.   NODES: Denies enlarged lymph nodes.   CHEST: Denies chest pain or shortness of breath.   CARDIOVASCULAR: Denies palpitations or left sided chest pain.   ABDOMEN: No abdominal pain, constipation, diarrhea, nausea, vomiting or rectal bleeding.   URINARY: No dysuria, hematuria, or burning on urination.  REPRODUCTIVE: See HPI.   BREASTS: Denies pain, lumps, or nipple discharge.   HEMATOLOGIC: No easy bruisability or excessive bleeding.   MUSCULOSKELETAL: Denies joint pain or swelling.   NEUROLOGIC: Denies syncope or weakness.   PSYCHIATRIC: Denies depression, anxiety or mood swings.    PE:   APPEARANCE: Well nourished, well developed, Black or  female in no acute distress.  VULVA: No lesions. Normal external female genitalia.  URETHRAL MEATUS: Normal size and location, no lesions, no prolapse.  URETHRA: No masses, tenderness, or prolapse.  VAGINA: Moist. No lesions or lacerations noted. No abnormal discharge present. No odor present. Moderate amount of dark, red blood.  CERVIX: No lesions or discharge. No cervical motion tenderness. Cervical os is closed.  UTERUS: Normal size, regular shape, mobile, non-tender.  ADNEXA: No tenderness. No fullness or masses palpated in the adnexal regions.   ANUS PERINEUM: Normal.      Diagnosis:  1. Bleeding in Early Pregnancy       Plan:     Orders Placed This Encounter    hCG, quantitative      STAT OB US and HCG today,  will await results for follow up. Instructed to maintain pelvic rest.  Discussed to call and RTC if  Has a Fever above 100.4°F or chills, bright red vaginal bleeding that soaks more than 1 pad per hour, or a foul smelling discharge        ADELAIDE Witt

## 2019-08-13 NOTE — TELEPHONE ENCOUNTER
Contact was made with patient, lab work and US results were reviewed. Informed of repeat HCG order placed. Given ER precautions and instructed to maintain pelvic rest, verbalized understanding.

## 2019-08-15 ENCOUNTER — PATIENT MESSAGE (OUTPATIENT)
Dept: OBSTETRICS AND GYNECOLOGY | Facility: CLINIC | Age: 34
End: 2019-08-15

## 2019-08-15 ENCOUNTER — LAB VISIT (OUTPATIENT)
Dept: LAB | Facility: HOSPITAL | Age: 34
End: 2019-08-15
Attending: NURSE PRACTITIONER
Payer: COMMERCIAL

## 2019-08-15 ENCOUNTER — TELEPHONE (OUTPATIENT)
Dept: OBSTETRICS AND GYNECOLOGY | Facility: CLINIC | Age: 34
End: 2019-08-15

## 2019-08-15 DIAGNOSIS — O20.9 BLEEDING IN EARLY PREGNANCY: ICD-10-CM

## 2019-08-15 DIAGNOSIS — O20.9 BLEEDING IN EARLY PREGNANCY: Primary | ICD-10-CM

## 2019-08-15 LAB — HCG INTACT+B SERPL-ACNC: 1745 MIU/ML

## 2019-08-15 PROCEDURE — 36415 COLL VENOUS BLD VENIPUNCTURE: CPT

## 2019-08-15 PROCEDURE — 84702 CHORIONIC GONADOTROPIN TEST: CPT

## 2019-08-15 NOTE — TELEPHONE ENCOUNTER
Patient was contacted and notified of decreasing HCG level.Pelvic rest, ED precautions reviewed. Work note sent. Will repeat HCG level in 2-3 days.

## 2019-08-19 ENCOUNTER — CLINICAL SUPPORT (OUTPATIENT)
Dept: OBSTETRICS AND GYNECOLOGY | Facility: CLINIC | Age: 34
End: 2019-08-19
Payer: COMMERCIAL

## 2019-08-19 ENCOUNTER — TELEPHONE (OUTPATIENT)
Dept: OBSTETRICS AND GYNECOLOGY | Facility: CLINIC | Age: 34
End: 2019-08-19

## 2019-08-19 ENCOUNTER — PATIENT MESSAGE (OUTPATIENT)
Dept: OBSTETRICS AND GYNECOLOGY | Facility: CLINIC | Age: 34
End: 2019-08-19

## 2019-08-19 ENCOUNTER — LAB VISIT (OUTPATIENT)
Dept: LAB | Facility: OTHER | Age: 34
End: 2019-08-19
Attending: NURSE PRACTITIONER
Payer: COMMERCIAL

## 2019-08-19 DIAGNOSIS — Z67.91 RH NEGATIVE STATE IN ANTEPARTUM PERIOD: Primary | ICD-10-CM

## 2019-08-19 DIAGNOSIS — Z67.91 RH NEGATIVE STATUS DURING PREGNANCY IN FIRST TRIMESTER: Primary | ICD-10-CM

## 2019-08-19 DIAGNOSIS — O26.891 RH NEGATIVE STATUS DURING PREGNANCY IN FIRST TRIMESTER: Primary | ICD-10-CM

## 2019-08-19 DIAGNOSIS — O20.9 VAGINAL BLEEDING AFFECTING EARLY PREGNANCY: Primary | ICD-10-CM

## 2019-08-19 DIAGNOSIS — O26.899 RH NEGATIVE STATE IN ANTEPARTUM PERIOD: Primary | ICD-10-CM

## 2019-08-19 DIAGNOSIS — O20.9 BLEEDING IN EARLY PREGNANCY: ICD-10-CM

## 2019-08-19 LAB — HCG INTACT+B SERPL-ACNC: 1996 MIU/ML

## 2019-08-19 PROCEDURE — 96372 RHO (D) IMMUNE GLOBULIN: ICD-10-PCS | Mod: S$GLB,,, | Performed by: NURSE PRACTITIONER

## 2019-08-19 PROCEDURE — 96372 THER/PROPH/DIAG INJ SC/IM: CPT | Mod: S$GLB,,, | Performed by: NURSE PRACTITIONER

## 2019-08-19 PROCEDURE — 99999 PR PBB SHADOW E&M-EST. PATIENT-LVL I: ICD-10-PCS | Mod: PBBFAC,,,

## 2019-08-19 PROCEDURE — 99999 PR PBB SHADOW E&M-EST. PATIENT-LVL I: CPT | Mod: PBBFAC,,,

## 2019-08-19 PROCEDURE — 84702 CHORIONIC GONADOTROPIN TEST: CPT

## 2019-08-19 PROCEDURE — 36415 COLL VENOUS BLD VENIPUNCTURE: CPT

## 2019-08-19 NOTE — TELEPHONE ENCOUNTER
Patient contacted. Rh negative status and need for Rhogam injection with miscarriage explained. Patient reports all questions answered. Awaiting result of HCG testing today.

## 2019-08-19 NOTE — TELEPHONE ENCOUNTER
Patient contacted regarding HCG results. Levels have increased mildly, Dr. Mcmullen notified of the result. Repeat HCG ordered to be performed on Wednesday 8/21/19. Pelvic rest and ED precautions reviewed.

## 2019-08-21 ENCOUNTER — LAB VISIT (OUTPATIENT)
Dept: LAB | Facility: OTHER | Age: 34
End: 2019-08-21
Payer: COMMERCIAL

## 2019-08-21 ENCOUNTER — TELEPHONE (OUTPATIENT)
Dept: OBSTETRICS AND GYNECOLOGY | Facility: CLINIC | Age: 34
End: 2019-08-21

## 2019-08-21 DIAGNOSIS — O20.9 VAGINAL BLEEDING AFFECTING EARLY PREGNANCY: ICD-10-CM

## 2019-08-21 DIAGNOSIS — O20.9 VAGINAL BLEEDING AFFECTING EARLY PREGNANCY: Primary | ICD-10-CM

## 2019-08-21 LAB — HCG INTACT+B SERPL-ACNC: 1671 MIU/ML

## 2019-08-21 PROCEDURE — 84702 CHORIONIC GONADOTROPIN TEST: CPT

## 2019-08-21 PROCEDURE — 36415 COLL VENOUS BLD VENIPUNCTURE: CPT

## 2019-08-23 ENCOUNTER — LAB VISIT (OUTPATIENT)
Dept: LAB | Facility: OTHER | Age: 34
End: 2019-08-23
Payer: COMMERCIAL

## 2019-08-23 DIAGNOSIS — O20.9 VAGINAL BLEEDING AFFECTING EARLY PREGNANCY: ICD-10-CM

## 2019-08-23 LAB — HCG INTACT+B SERPL-ACNC: 1452 MIU/ML

## 2019-08-23 PROCEDURE — 84702 CHORIONIC GONADOTROPIN TEST: CPT

## 2019-08-23 PROCEDURE — 36415 COLL VENOUS BLD VENIPUNCTURE: CPT

## 2019-08-26 ENCOUNTER — LAB VISIT (OUTPATIENT)
Dept: LAB | Facility: OTHER | Age: 34
End: 2019-08-26
Payer: COMMERCIAL

## 2019-08-26 DIAGNOSIS — O20.9 VAGINAL BLEEDING AFFECTING EARLY PREGNANCY: ICD-10-CM

## 2019-08-26 DIAGNOSIS — O20.9 VAGINAL BLEEDING AFFECTING EARLY PREGNANCY: Primary | ICD-10-CM

## 2019-08-26 LAB — HCG INTACT+B SERPL-ACNC: 1156 MIU/ML

## 2019-08-26 PROCEDURE — 84702 CHORIONIC GONADOTROPIN TEST: CPT

## 2019-08-26 PROCEDURE — 36415 COLL VENOUS BLD VENIPUNCTURE: CPT

## 2019-08-27 ENCOUNTER — TELEPHONE (OUTPATIENT)
Dept: OBSTETRICS AND GYNECOLOGY | Facility: CLINIC | Age: 34
End: 2019-08-27

## 2019-08-27 NOTE — TELEPHONE ENCOUNTER
Contacted the patient to schedule f/u on test results with Dr. Mcmullen. Patient agreed to an appointment on 8/28 at 9:15AM. Patient verbalized understanding.

## 2019-08-27 NOTE — TELEPHONE ENCOUNTER
----- Message from Milagros Mcmullen MD sent at 8/27/2019 10:41 AM CDT -----  Regarding: RE: Beta Levels  Roberta,  Please have patient see me tomorrow to discuss treatment options and how to proceed.    Dr. Mcmullen  ----- Message -----  From: Abby Kennedy NP  Sent: 8/26/2019   3:51 PM  To: Milagros Mcmullen MD  Subject: Beta Levels                                      Dr. Mcmullen,    Ms. Shell's repeat bHCG levels display decline but no significant drop in levels. I would like you input as how to proceed from this point further. The patient reports she is asymptomatic at this time. She has follow up schedules with Nano Dunbar NP on Thursday.    Abby Kennedy NP

## 2019-08-28 ENCOUNTER — OFFICE VISIT (OUTPATIENT)
Dept: OBSTETRICS AND GYNECOLOGY | Facility: CLINIC | Age: 34
End: 2019-08-28
Attending: OBSTETRICS & GYNECOLOGY
Payer: COMMERCIAL

## 2019-08-28 VITALS
WEIGHT: 174.19 LBS | SYSTOLIC BLOOD PRESSURE: 114 MMHG | DIASTOLIC BLOOD PRESSURE: 80 MMHG | BODY MASS INDEX: 32.05 KG/M2 | HEIGHT: 62 IN

## 2019-08-28 DIAGNOSIS — O03.9 SPONTANEOUS ABORTION: Primary | ICD-10-CM

## 2019-08-28 PROCEDURE — 3008F PR BODY MASS INDEX (BMI) DOCUMENTED: ICD-10-PCS | Mod: CPTII,S$GLB,, | Performed by: OBSTETRICS & GYNECOLOGY

## 2019-08-28 PROCEDURE — 99999 PR PBB SHADOW E&M-EST. PATIENT-LVL III: CPT | Mod: PBBFAC,,, | Performed by: OBSTETRICS & GYNECOLOGY

## 2019-08-28 PROCEDURE — 3008F BODY MASS INDEX DOCD: CPT | Mod: CPTII,S$GLB,, | Performed by: OBSTETRICS & GYNECOLOGY

## 2019-08-28 PROCEDURE — 99213 OFFICE O/P EST LOW 20 MIN: CPT | Mod: S$GLB,,, | Performed by: OBSTETRICS & GYNECOLOGY

## 2019-08-28 PROCEDURE — 99213 PR OFFICE/OUTPT VISIT, EST, LEVL III, 20-29 MIN: ICD-10-PCS | Mod: S$GLB,,, | Performed by: OBSTETRICS & GYNECOLOGY

## 2019-08-28 PROCEDURE — 99999 PR PBB SHADOW E&M-EST. PATIENT-LVL III: ICD-10-PCS | Mod: PBBFAC,,, | Performed by: OBSTETRICS & GYNECOLOGY

## 2019-08-28 NOTE — PROGRESS NOTES
"Chief Complaint: Abnormal pregnancy    Melita Shell is a 34 y.o. female  presents to discuss hcg levels.  Patient had a positive UPT at home.  She experienced bleeding and clots.  At that time, initial hcg was 2303.  Ultrasound failed to show an intrauterine pregnancy, only a gestational sac, no fetal pole.  Levels have trended as such.    8/13 - 2303  8/15 - 1745  8/19 - 1996  8/21 - 1671  8/23 - 1452  8/26 - 1156    Last ultrasound was  and showed:    FINDINGS:  The uterus measures 13.8 x 9.3 x 8.9 cm and contains multiple fibroids, largest partially exophytic near the fundus measuring 9.2 x 9.3 x 11.3 cm.  Small, anechoic intrauterine structure measuring 1.2 x 0.5 x 0.5 cm.  No definite fetal pole or yolk sac identified.  Bilateral ovaries are not clearly visualized.  Mild amount of free fluid, nonspecific and possibly physiologic.      Impression       Anechoic intrauterine structure without fetal pole identified.  This meets criteria for pregnancy of unknown location.  Normal early intrauterine pregnancy (less than 5 weeks) may not be visualized sonographically.  Recommend continued correlation with serial beta HCG.  If an ectopic is a clinical consideration, it cannot be excluded by this examination.    Bilateral ovaries not well visualized.    Intrauterine fibroids.      /80 (BP Location: Right arm, Patient Position: Sitting, BP Method: Small (Manual))   Ht 5' 2" (1.575 m)   Wt 79 kg (174 lb 2.6 oz)   LMP 2019   BMI 31.85 kg/m²     ROS:  GENERAL: No fever, chills, fatigability or weight loss.  VULVAR: No pain, no lesions and no itching.  VAGINAL: No relaxation, no itching, no discharge, no abnormal bleeding and no lesions.  ABDOMEN: No abdominal pain. Denies nausea. Denies vomiting. No diarrhea. No constipation  BREAST: Denies pain. No lumps. No discharge.  URINARY: No incontinence, no nocturia, no frequency and no dysuria.  CARDIOVASCULAR: No chest pain. No shortness of " breath. No leg cramps.  NEUROLOGICAL: No headaches. No vision changes.    Physical exam:    Deferred    1. Spontaneous   hCG, quantitative       Discussed falling hcg levels are indicative of a miscarriage.  Discussed treatment options - expectant, medical, surgical.  R/B of each discussed.  She desires expectant management.  Will repeat hcg weekly until hormone level is less than 5.  Recommended contraception during that time.      RTC PRN

## 2019-09-02 PROBLEM — D25.2 INTRAMURAL, SUBMUCOUS, AND SUBSEROUS LEIOMYOMA OF UTERUS: Status: RESOLVED | Noted: 2018-05-14 | Resolved: 2019-09-02

## 2019-09-02 PROBLEM — Z98.890 S/P MYOMECTOMY: Status: RESOLVED | Noted: 2018-05-29 | Resolved: 2019-09-02

## 2019-09-02 PROBLEM — D25.1 INTRAMURAL, SUBMUCOUS, AND SUBSEROUS LEIOMYOMA OF UTERUS: Status: RESOLVED | Noted: 2018-05-14 | Resolved: 2019-09-02

## 2019-09-02 PROBLEM — D25.0 INTRAMURAL, SUBMUCOUS, AND SUBSEROUS LEIOMYOMA OF UTERUS: Status: RESOLVED | Noted: 2018-05-14 | Resolved: 2019-09-02

## 2019-09-03 ENCOUNTER — LAB VISIT (OUTPATIENT)
Dept: LAB | Facility: OTHER | Age: 34
End: 2019-09-03
Attending: OBSTETRICS & GYNECOLOGY
Payer: COMMERCIAL

## 2019-09-03 DIAGNOSIS — O03.9 SPONTANEOUS ABORTION: ICD-10-CM

## 2019-09-03 LAB — HCG INTACT+B SERPL-ACNC: 492 MIU/ML

## 2019-09-03 PROCEDURE — 84702 CHORIONIC GONADOTROPIN TEST: CPT

## 2019-09-03 PROCEDURE — 36415 COLL VENOUS BLD VENIPUNCTURE: CPT

## 2019-09-10 ENCOUNTER — PATIENT MESSAGE (OUTPATIENT)
Dept: OBSTETRICS AND GYNECOLOGY | Facility: CLINIC | Age: 34
End: 2019-09-10

## 2019-09-10 ENCOUNTER — LAB VISIT (OUTPATIENT)
Dept: LAB | Facility: OTHER | Age: 34
End: 2019-09-10
Attending: OBSTETRICS & GYNECOLOGY
Payer: COMMERCIAL

## 2019-09-10 DIAGNOSIS — Z32.00 PREGNANCY EXAMINATION OR TEST, PREGNANCY UNCONFIRMED: ICD-10-CM

## 2019-09-10 LAB — HCG INTACT+B SERPL-ACNC: 213 MIU/ML

## 2019-09-10 PROCEDURE — 84702 CHORIONIC GONADOTROPIN TEST: CPT

## 2019-09-10 PROCEDURE — 36415 COLL VENOUS BLD VENIPUNCTURE: CPT

## 2019-09-11 ENCOUNTER — PATIENT MESSAGE (OUTPATIENT)
Dept: OBSTETRICS AND GYNECOLOGY | Facility: CLINIC | Age: 34
End: 2019-09-11

## 2019-09-24 ENCOUNTER — LAB VISIT (OUTPATIENT)
Dept: LAB | Facility: OTHER | Age: 34
End: 2019-09-24
Attending: OBSTETRICS & GYNECOLOGY
Payer: COMMERCIAL

## 2019-09-24 DIAGNOSIS — O03.9 SPONTANEOUS ABORTION: ICD-10-CM

## 2019-09-24 LAB — HCG INTACT+B SERPL-ACNC: 9.4 MIU/ML

## 2019-09-24 PROCEDURE — 84702 CHORIONIC GONADOTROPIN TEST: CPT

## 2019-09-24 PROCEDURE — 36415 COLL VENOUS BLD VENIPUNCTURE: CPT

## 2019-09-26 ENCOUNTER — PATIENT MESSAGE (OUTPATIENT)
Dept: OBSTETRICS AND GYNECOLOGY | Facility: CLINIC | Age: 34
End: 2019-09-26

## 2019-09-27 ENCOUNTER — TELEPHONE (OUTPATIENT)
Dept: OBSTETRICS AND GYNECOLOGY | Facility: CLINIC | Age: 34
End: 2019-09-27

## 2019-09-27 NOTE — TELEPHONE ENCOUNTER
----- Message from Savannah Hall sent at 9/27/2019 11:18 AM CDT -----  Contact: Patient   Patient called requesting a call back. The patient can be reached at  [phone].

## 2019-09-27 NOTE — TELEPHONE ENCOUNTER
Returned call to the patient. Patient agreed to lab appointment on 10/8 at 7:30AM. Patient verbalized understanding.

## 2019-09-27 NOTE — TELEPHONE ENCOUNTER
Contacted the patient to schedule repeat hcg level in two weeks. No answer, left voicemail message for the patient to call the clinic back.

## 2019-10-08 ENCOUNTER — CLINICAL SUPPORT (OUTPATIENT)
Dept: INTERNAL MEDICINE | Facility: CLINIC | Age: 34
End: 2019-10-08
Payer: COMMERCIAL

## 2019-10-08 ENCOUNTER — LAB VISIT (OUTPATIENT)
Dept: LAB | Facility: OTHER | Age: 34
End: 2019-10-08
Attending: OBSTETRICS & GYNECOLOGY
Payer: COMMERCIAL

## 2019-10-08 DIAGNOSIS — O03.9 SPONTANEOUS ABORTION: ICD-10-CM

## 2019-10-08 LAB — HCG INTACT+B SERPL-ACNC: <1.2 MIU/ML

## 2019-10-08 PROCEDURE — 90686 IIV4 VACC NO PRSV 0.5 ML IM: CPT | Mod: S$GLB,,, | Performed by: INTERNAL MEDICINE

## 2019-10-08 PROCEDURE — 90471 FLU VACCINE (QUAD) GREATER THAN OR EQUAL TO 3YO PRESERVATIVE FREE IM: ICD-10-PCS | Mod: S$GLB,,, | Performed by: INTERNAL MEDICINE

## 2019-10-08 PROCEDURE — 90686 FLU VACCINE (QUAD) GREATER THAN OR EQUAL TO 3YO PRESERVATIVE FREE IM: ICD-10-PCS | Mod: S$GLB,,, | Performed by: INTERNAL MEDICINE

## 2019-10-08 PROCEDURE — 90471 IMMUNIZATION ADMIN: CPT | Mod: S$GLB,,, | Performed by: INTERNAL MEDICINE

## 2019-10-08 PROCEDURE — 84702 CHORIONIC GONADOTROPIN TEST: CPT

## 2019-10-08 PROCEDURE — 36415 COLL VENOUS BLD VENIPUNCTURE: CPT

## 2020-03-05 ENCOUNTER — OFFICE VISIT (OUTPATIENT)
Dept: OBSTETRICS AND GYNECOLOGY | Facility: CLINIC | Age: 35
End: 2020-03-05
Payer: COMMERCIAL

## 2020-03-05 VITALS
DIASTOLIC BLOOD PRESSURE: 70 MMHG | SYSTOLIC BLOOD PRESSURE: 120 MMHG | BODY MASS INDEX: 33.83 KG/M2 | WEIGHT: 184.94 LBS

## 2020-03-05 DIAGNOSIS — Z11.51 SCREENING FOR HPV (HUMAN PAPILLOMAVIRUS): ICD-10-CM

## 2020-03-05 DIAGNOSIS — Z12.4 ENCOUNTER FOR PAPANICOLAOU SMEAR FOR CERVICAL CANCER SCREENING: ICD-10-CM

## 2020-03-05 DIAGNOSIS — Z01.419 WOMEN'S ANNUAL ROUTINE GYNECOLOGICAL EXAMINATION: Primary | ICD-10-CM

## 2020-03-05 PROCEDURE — 99395 PREV VISIT EST AGE 18-39: CPT | Mod: S$GLB,,, | Performed by: NURSE PRACTITIONER

## 2020-03-05 PROCEDURE — 99999 PR PBB SHADOW E&M-EST. PATIENT-LVL III: ICD-10-PCS | Mod: PBBFAC,,, | Performed by: NURSE PRACTITIONER

## 2020-03-05 PROCEDURE — 99999 PR PBB SHADOW E&M-EST. PATIENT-LVL III: CPT | Mod: PBBFAC,,, | Performed by: NURSE PRACTITIONER

## 2020-03-05 PROCEDURE — 88142 CYTOPATH C/V THIN LAYER: CPT

## 2020-03-05 PROCEDURE — 87624 HPV HI-RISK TYP POOLED RSLT: CPT

## 2020-03-05 PROCEDURE — 99395 PR PREVENTIVE VISIT,EST,18-39: ICD-10-PCS | Mod: S$GLB,,, | Performed by: NURSE PRACTITIONER

## 2020-03-13 LAB
HPV HR 12 DNA SPEC QL NAA+PROBE: NEGATIVE
HPV16 AG SPEC QL: NEGATIVE
HPV18 DNA SPEC QL NAA+PROBE: NEGATIVE

## 2020-03-29 ENCOUNTER — HOSPITAL ENCOUNTER (EMERGENCY)
Facility: HOSPITAL | Age: 35
Discharge: HOME | End: 2020-03-29
Attending: EMERGENCY MEDICINE
Payer: COMMERCIAL

## 2020-03-29 VITALS
OXYGEN SATURATION: 98 % | TEMPERATURE: 100 F | HEART RATE: 97 BPM | RESPIRATION RATE: 16 BRPM | HEIGHT: 62 IN | BODY MASS INDEX: 33.13 KG/M2 | DIASTOLIC BLOOD PRESSURE: 58 MMHG | SYSTOLIC BLOOD PRESSURE: 122 MMHG | WEIGHT: 180 LBS

## 2020-03-29 DIAGNOSIS — R10.2 PELVIC PAIN: Primary | ICD-10-CM

## 2020-03-29 DIAGNOSIS — B34.9 VIRAL SYNDROME: ICD-10-CM

## 2020-03-29 LAB
ALBUMIN SERPL BCP-MCNC: 3.4 G/DL (ref 3.5–5.2)
ALP SERPL-CCNC: 66 U/L (ref 55–135)
ALT SERPL W/O P-5'-P-CCNC: 21 U/L (ref 10–44)
ANION GAP SERPL CALC-SCNC: 9 MMOL/L (ref 8–16)
AST SERPL-CCNC: 18 U/L (ref 10–40)
B-HCG UR QL: NEGATIVE
BASOPHILS # BLD AUTO: 0.02 K/UL (ref 0–0.2)
BASOPHILS NFR BLD: 0.3 % (ref 0–1.9)
BILIRUB SERPL-MCNC: 0.6 MG/DL (ref 0.1–1)
BILIRUB UR QL STRIP: NEGATIVE
BUN SERPL-MCNC: 9 MG/DL (ref 6–20)
CALCIUM SERPL-MCNC: 9.3 MG/DL (ref 8.7–10.5)
CHLORIDE SERPL-SCNC: 102 MMOL/L (ref 95–110)
CLARITY UR: CLEAR
CO2 SERPL-SCNC: 24 MMOL/L (ref 23–29)
COLOR UR: YELLOW
CREAT SERPL-MCNC: 0.9 MG/DL (ref 0.5–1.4)
CTP QC/QA: YES
DIFFERENTIAL METHOD: ABNORMAL
EOSINOPHIL # BLD AUTO: 0.1 K/UL (ref 0–0.5)
EOSINOPHIL NFR BLD: 0.7 % (ref 0–8)
ERYTHROCYTE [DISTWIDTH] IN BLOOD BY AUTOMATED COUNT: 11.9 % (ref 11.5–14.5)
EST. GFR  (AFRICAN AMERICAN): >60 ML/MIN/1.73 M^2
EST. GFR  (NON AFRICAN AMERICAN): >60 ML/MIN/1.73 M^2
GLUCOSE SERPL-MCNC: 85 MG/DL (ref 70–110)
GLUCOSE UR QL STRIP: NEGATIVE
HCT VFR BLD AUTO: 37 % (ref 37–48.5)
HGB BLD-MCNC: 12.2 G/DL (ref 12–16)
HGB UR QL STRIP: ABNORMAL
IMM GRANULOCYTES # BLD AUTO: 0.02 K/UL (ref 0–0.04)
IMM GRANULOCYTES NFR BLD AUTO: 0.3 % (ref 0–0.5)
INFLUENZA A, MOLECULAR: NEGATIVE
INFLUENZA B, MOLECULAR: NEGATIVE
KETONES UR QL STRIP: NEGATIVE
LEUKOCYTE ESTERASE UR QL STRIP: NEGATIVE
LYMPHOCYTES # BLD AUTO: 1 K/UL (ref 1–4.8)
LYMPHOCYTES NFR BLD: 13.6 % (ref 18–48)
MCH RBC QN AUTO: 29.6 PG (ref 27–31)
MCHC RBC AUTO-ENTMCNC: 33 G/DL (ref 32–36)
MCV RBC AUTO: 90 FL (ref 82–98)
MONOCYTES # BLD AUTO: 0.5 K/UL (ref 0.3–1)
MONOCYTES NFR BLD: 7.3 % (ref 4–15)
NEUTROPHILS # BLD AUTO: 5.6 K/UL (ref 1.8–7.7)
NEUTROPHILS NFR BLD: 77.8 % (ref 38–73)
NITRITE UR QL STRIP: NEGATIVE
NRBC BLD-RTO: 0 /100 WBC
PH UR STRIP: 8 [PH] (ref 5–8)
PLATELET # BLD AUTO: 373 K/UL (ref 150–350)
PMV BLD AUTO: 10 FL (ref 9.2–12.9)
POTASSIUM SERPL-SCNC: 3.9 MMOL/L (ref 3.5–5.1)
PROT SERPL-MCNC: 8.2 G/DL (ref 6–8.4)
PROT UR QL STRIP: NEGATIVE
RBC # BLD AUTO: 4.12 M/UL (ref 4–5.4)
SODIUM SERPL-SCNC: 135 MMOL/L (ref 136–145)
SP GR UR STRIP: 1.01 (ref 1–1.03)
SPECIMEN SOURCE: NORMAL
URN SPEC COLLECT METH UR: ABNORMAL
UROBILINOGEN UR STRIP-ACNC: NEGATIVE EU/DL
WBC # BLD AUTO: 7.14 K/UL (ref 3.9–12.7)

## 2020-03-29 PROCEDURE — 87591 N.GONORRHOEAE DNA AMP PROB: CPT

## 2020-03-29 PROCEDURE — 36415 COLL VENOUS BLD VENIPUNCTURE: CPT

## 2020-03-29 PROCEDURE — 85025 COMPLETE CBC W/AUTO DIFF WBC: CPT

## 2020-03-29 PROCEDURE — 81025 URINE PREGNANCY TEST: CPT | Performed by: EMERGENCY MEDICINE

## 2020-03-29 PROCEDURE — 87502 INFLUENZA DNA AMP PROBE: CPT

## 2020-03-29 PROCEDURE — 96374 THER/PROPH/DIAG INJ IV PUSH: CPT

## 2020-03-29 PROCEDURE — 81003 URINALYSIS AUTO W/O SCOPE: CPT

## 2020-03-29 PROCEDURE — 63600175 PHARM REV CODE 636 W HCPCS: Performed by: EMERGENCY MEDICINE

## 2020-03-29 PROCEDURE — 99284 EMERGENCY DEPT VISIT MOD MDM: CPT | Mod: 25

## 2020-03-29 PROCEDURE — 80053 COMPREHEN METABOLIC PANEL: CPT

## 2020-03-29 RX ORDER — METOCLOPRAMIDE HYDROCHLORIDE 5 MG/ML
10 INJECTION INTRAMUSCULAR; INTRAVENOUS
Status: COMPLETED | OUTPATIENT
Start: 2020-03-29 | End: 2020-03-29

## 2020-03-29 RX ADMIN — METOCLOPRAMIDE 10 MG: 5 INJECTION, SOLUTION INTRAMUSCULAR; INTRAVENOUS at 03:03
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
## 2020-03-29 NOTE — ED NOTES
Upon discharge, patient is AAOx4, no cardiac or respiratory complications. Follow up care and  Medications have been reviewed with patient and has been instructed to return to the ER if needed. Patient verbalized understanding and ambulated to the lobby without difficulty. SHARON RETANA.

## 2020-03-30 ENCOUNTER — PES CALL (OUTPATIENT)
Dept: ADMINISTRATIVE | Facility: CLINIC | Age: 35
End: 2020-03-30

## 2020-03-30 LAB
C TRACH DNA SPEC QL NAA+PROBE: NOT DETECTED
N GONORRHOEA DNA SPEC QL NAA+PROBE: NOT DETECTED

## 2020-03-31 LAB
FINAL PATHOLOGIC DIAGNOSIS: NORMAL
Lab: NORMAL

## 2020-04-01 ENCOUNTER — CLINICAL SUPPORT (OUTPATIENT)
Dept: INTERNAL MEDICINE | Facility: CLINIC | Age: 35
End: 2020-04-01
Payer: COMMERCIAL

## 2020-04-01 ENCOUNTER — OFFICE VISIT (OUTPATIENT)
Dept: INTERNAL MEDICINE | Facility: CLINIC | Age: 35
End: 2020-04-01
Payer: COMMERCIAL

## 2020-04-01 ENCOUNTER — PATIENT MESSAGE (OUTPATIENT)
Dept: INTERNAL MEDICINE | Facility: CLINIC | Age: 35
End: 2020-04-01

## 2020-04-01 DIAGNOSIS — R50.9 FEVER, UNSPECIFIED FEVER CAUSE: Primary | ICD-10-CM

## 2020-04-01 DIAGNOSIS — R50.9 FEVER, UNSPECIFIED FEVER CAUSE: ICD-10-CM

## 2020-04-01 PROCEDURE — U0002 COVID-19 LAB TEST NON-CDC: HCPCS

## 2020-04-01 PROCEDURE — 99213 PR OFFICE/OUTPT VISIT, EST, LEVL III, 20-29 MIN: ICD-10-PCS | Mod: 95,,, | Performed by: INTERNAL MEDICINE

## 2020-04-01 PROCEDURE — 99999 PR PBB SHADOW E&M-EST. PATIENT-LVL I: CPT | Mod: PBBFAC,,,

## 2020-04-01 PROCEDURE — 99999 PR PBB SHADOW E&M-EST. PATIENT-LVL I: ICD-10-PCS | Mod: PBBFAC,,,

## 2020-04-01 PROCEDURE — 99213 OFFICE O/P EST LOW 20 MIN: CPT | Mod: 95,,, | Performed by: INTERNAL MEDICINE
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
## 2020-04-02 LAB — SARS-COV-2 RNA RESP QL NAA+PROBE: NOT DETECTED

## 2020-05-11 ENCOUNTER — TELEPHONE (OUTPATIENT)
Dept: INTERNAL MEDICINE | Facility: CLINIC | Age: 35
End: 2020-05-11

## 2020-05-11 ENCOUNTER — PATIENT MESSAGE (OUTPATIENT)
Dept: INTERNAL MEDICINE | Facility: CLINIC | Age: 35
End: 2020-05-11

## 2020-05-13 ENCOUNTER — OFFICE VISIT (OUTPATIENT)
Dept: URGENT CARE | Facility: CLINIC | Age: 35
End: 2020-05-13
Payer: COMMERCIAL

## 2020-05-13 VITALS
SYSTOLIC BLOOD PRESSURE: 112 MMHG | HEIGHT: 62 IN | WEIGHT: 183 LBS | HEART RATE: 83 BPM | RESPIRATION RATE: 19 BRPM | TEMPERATURE: 97 F | OXYGEN SATURATION: 100 % | DIASTOLIC BLOOD PRESSURE: 77 MMHG | BODY MASS INDEX: 33.68 KG/M2

## 2020-05-13 DIAGNOSIS — R10.31 PAIN, ABDOMINAL, RLQ: Primary | ICD-10-CM

## 2020-05-13 DIAGNOSIS — R10.30 LOWER ABDOMINAL PAIN: ICD-10-CM

## 2020-05-13 DIAGNOSIS — K43.9 VENTRAL HERNIA WITHOUT OBSTRUCTION OR GANGRENE: ICD-10-CM

## 2020-05-13 LAB
B-HCG UR QL: NEGATIVE
BILIRUB UR QL STRIP: NEGATIVE
CTP QC/QA: YES
GLUCOSE UR QL STRIP: NEGATIVE
KETONES UR QL STRIP: NEGATIVE
LEUKOCYTE ESTERASE UR QL STRIP: NEGATIVE
PH, POC UA: 8.5
POC BLOOD, URINE: NEGATIVE
POC NITRATES, URINE: NEGATIVE
PROT UR QL STRIP: NEGATIVE
SP GR UR STRIP: 1 (ref 1–1.03)
UROBILINOGEN UR STRIP-ACNC: NORMAL (ref 0.1–1.1)

## 2020-05-13 PROCEDURE — 74022 PR XRAY, ABD SERIES, COMPLETE, W/ 2+ VIEWS ABD, 1 VIEW CHEST: ICD-10-PCS | Mod: S$GLB,,, | Performed by: NURSE PRACTITIONER

## 2020-05-13 PROCEDURE — 74022 RADEX COMPL AQT ABD SERIES: CPT | Mod: S$GLB,,, | Performed by: NURSE PRACTITIONER

## 2020-05-13 PROCEDURE — 81003 URINALYSIS AUTO W/O SCOPE: CPT | Mod: QW,S$GLB,, | Performed by: NURSE PRACTITIONER

## 2020-05-13 PROCEDURE — 99214 OFFICE O/P EST MOD 30 MIN: CPT | Mod: 25,S$GLB,, | Performed by: NURSE PRACTITIONER

## 2020-05-13 PROCEDURE — 81025 POCT URINE PREGNANCY: ICD-10-PCS | Mod: S$GLB,,, | Performed by: NURSE PRACTITIONER

## 2020-05-13 PROCEDURE — 99214 PR OFFICE/OUTPT VISIT, EST, LEVL IV, 30-39 MIN: ICD-10-PCS | Mod: 25,S$GLB,, | Performed by: NURSE PRACTITIONER

## 2020-05-13 PROCEDURE — 81025 URINE PREGNANCY TEST: CPT | Mod: S$GLB,,, | Performed by: NURSE PRACTITIONER

## 2020-05-13 PROCEDURE — 81003 POCT URINALYSIS, DIPSTICK, AUTOMATED, W/O SCOPE: ICD-10-PCS | Mod: QW,S$GLB,, | Performed by: NURSE PRACTITIONER

## 2020-05-13 RX ORDER — DICLOFENAC SODIUM 50 MG/1
50 TABLET, DELAYED RELEASE ORAL 3 TIMES DAILY PRN
Qty: 30 TABLET | Refills: 0 | Status: SHIPPED | OUTPATIENT
Start: 2020-05-13 | End: 2021-01-29

## 2020-05-14 NOTE — PATIENT INSTRUCTIONS
Hernia (Adult)    A hernia can happen when there is a weakness or defect in the wall of the abdomen or groin. Intestines or nearby tissues may move from their usual location and push through the weakness in the wall. This can cause a hernia (bulge) you may see or feel.  Causes and Risk Factors   A hernia may be present at birth. Or it may be caused by the wear and tear of daily living. Certain factors can make a hernia more likely. These can include:  · Heavy lifting  · Straining, whether from lifting, movement, or constipation  · Chronic cough  · Injury to the abdominal wall  · Excess weight  · Pregnancy  · Prior surgery  · Older age  · Family history of hernia  Symptoms  Symptoms of a hernia may come on suddenly. Or they may appear slowly over time. Some common symptoms include:  · Bulge in the groin area, around the navel, or in the scrotum (the bulge may get bigger when you stand and go away when you lie down)  · Pain or pressure around the bulge  · Pain during activities such as lifting, coughing, or sneezing  · A feeling of weakness or pressure in the groin  · Pain or swelling in the scrotum  Types of hernias  There are different types of hernia. The type you have depends on its location:  · Inguinal: This type is in the groin or scrotum. It is more common in men.  · Femoral: This type is in the groin, upper thigh (where the leg bends), or labia. It is more common in women.  · Ventral: This type is in the abdominal wall.  · Umbilical: This type occurs around the navel (belly button).  · Incisional: This type occurs at the site of a previous surgery.  The condition of the hernia can help determine how urgently it needs to be treated.  · Reducible: It goes back in by itself, or it can be pushed back in.  · Irreducible: It cant be pushed back in.  · Incarcerated/Strangulated: The intestine is trapped (incarcerated). If this happens, you wont be able to push the bulge back in. If the incarcerated hernia isnt  treated, it may become strangulated. This means the area loses blood supply and the tissue may die. This requires emergency surgery! Treatment is needed right away!  In most cases, a hernia will not heal on its own. Surgery is usually needed to repair the defect in the abdominal wall or groin. Youll be told more about surgery, if needed.  If your symptoms are not severe, treatment may sometimes be delayed. In such cases, regular follow-up visits with the provider will be needed. Youll be asked to keep track of your symptoms and to watch for signs of more serious problems. You may also be given guidelines similar to the home care instructions below.  Home Care  To help keep a hernia from getting worse, you may be advised to:  · Avoid heavy lifting and straining as directed.  · Take steps to prevent constipation, such as eating more fiber and drinking more water. This may help reduce straining that can occur when having a bowel movement. Reducing straining may help keep your symptoms from getting worse.  · Maintain a healthy weight or lose excess weight. This can help reduce strain on abdominal muscles and tissues.  · Stop smoking. This can help prevent coughing that may also strain abdominal muscles and tissues.  Follow-up care  Follow up with your healthcare provider, or as directed. If imaging tests were done, they will be reviewed a doctor. You will be told the results and any new findings that may affect your care.  When to seek medical advice  Call your healthcare provider right away if any of these occur:  · Hernia hardens, swells, or grows larger  · Hernia can no longer be pushed back in  · Pain moves to the lower right abdomen (just below the waistline), or spreads to the back  Call 911  Call 911 right away if any of these occur:  · Nausea and vomiting  · Severe pain, redness, or tenderness in the area near the hernia  · Pain worsens quickly and doesnt get better  · Inability to have a bowel movement or  pass gas  · Fever of 100.4°F (38°C) or higher  · Trouble breathing  · Fainting  · Rapid heart rate  · Vomiting blood  · Large amounts of blood in stool  Date Last Reviewed: 6/9/2015  © 9092-3873 [employer]. [address] [place] [address], [place] [address]. All rights reserved. This information is not intended as a substitute for professional medical care. Always follow your healthcare professional's instructions.        Uterine Fibroids    Fibroids are lumps (growths) of muscle tissue. They can form in the wall of uterus (womb). Fibroids are very common. They are almost always benign (noncancerous). It is not known what causes fibroids. But they may run in families. Also, changes in female hormones may cause them to grow over time. After menopause, fibroids may stop growing, shrink, or go away.  Fibroids may or may not cause symptoms. This depends on many factors, such as the size, number, and locations of the fibroids. If symptoms do occur, they can include:  · Heavy bleeding (in severe cases, this may lead to a problem called anemia) or painful periods  · Feeling of fullness, pressure, or pain in the lower belly (abdomen) or pelvic region  · Lower back pain  · Frequent urination  · Constipation  · Pain during sex  · Problems getting pregnant  If fibroids are suspected, an evaluation will be done to help understand the extent of the problem. This can include a health history, exam, and tests. Based on the results, treatment can then be planned in needed.  Until more details are known about your problem, you may be given guidelines similar to the home care instructions below.      Home care  · To help control pain, over-the-counter pain medicine may be advised. Take these only as directed by your provider.  · If you have heavy or painful periods, keep a log of your menstrual cycle. Show the log to your provider. The information may help him or her determine if your symptoms are due to fibroids or another  cause.  · Make certain lifestyle changes. This may include losing excess weight, being more active, or eating less red meat. These changes may help lower the risk of fibroids in some people. They may also help improve overall health.  Follow-up care  Follow up with your healthcare provider as advised. If testing was done, youll be told the results when they are ready. Treatment options for fibroids may include medicines or procedures to help shrink or keep fibroids from growing. In severe cases, surgery may be needed to remove fibroids or to remove the uterus. If you have fibroids but no symptoms, you may not need treatment at all. However, your provider may advise regular follow-up to check fibroid size and growth.  When to seek medical advice  Call your healthcare provider right away if any of these occur:  · Heavy bleeding or painful periods that continue or dont get better with treatment  · Signs of anemia such as extreme fatigue, pale skin, shortness of breath with little exertion, or rapid heartbeat  · Weakness, dizziness, or fainting  · Severe pain in the pelvic or belly region  · Swollen or enlarged belly  Date Last Reviewed: 6/22/2015  © 1798-6146 [employer]. [address] [place] [address]. All rights reserved. This information is not intended as a substitute for professional medical care. Always follow your healthcare professional's instructions.        Ovarian Cysts    Ovarian cysts are sacs filled with fluid or tissue that form on or inside the ovaries. The ovaries are two small organs located on each side of a womans uterus (womb). They are part of the female reproductive system.  Ovarian cysts are common in women, especially during childbearing years. There are different types of cysts. Most are harmless (benign) and go away on their own. They often cause no symptoms. If symptoms do occur, they can include mild pain or pressure in the lower belly (abdomen).  Cysts that are  large or break (rupture) may cause more severe pain and symptoms. In these cases, hospital care or treatment such as surgery may be needed. More extensive treatment may also be needed if a cyst causes an ovary to twist (called torsion) or if a cyst is suspected to be cancerous. Keep in mind that most cysts are not cancerous, however.  General care  · To help relieve pain, your healthcare provider may recommend using over-the-counter pain medicine. If needed, stronger pain medicine may be prescribed.  · Depending on the type of cyst you have, your healthcare provider may advise taking birth control pills. These help shrink cysts in certain cases. They may also help prevent new cysts from forming. Be sure to take these medicines as directed if they are prescribed.  · Your healthcare provider may advise you to watch your symptoms over time to see if they go away or worsen. Regular ultrasound tests may also be advised. These can help check if a cyst goes away or grows in size.  Follow-up care  Follow up with your healthcare provider, or as advised.  When to seek medical advice  Call your healthcare provider right away if any of these occur:  · Pain worsens or fails to get better with home treatment  · Fever of 100.4°F (38°C) or higher (or other fever amount directed by your healthcare provider)  · Nausea and vomiting  · Weakness, dizziness, or fainting  · Abnormal vaginal bleeding  Date Last Reviewed: 6/11/2015  © 0350-0376 [employer]. [address] [place] [address], [place] [address]. All rights reserved. This information is not intended as a substitute for professional medical care. Always follow your healthcare professional's instructions.

## 2020-05-14 NOTE — PROGRESS NOTES
"Subjective:       Patient ID: Melita Shell is a 35 y.o. female.    Vitals:  height is 5' 2" (1.575 m) and weight is 83 kg (183 lb). Her temperature is 97.4 °F (36.3 °C). Her blood pressure is 112/77 and her pulse is 83. Her respiration is 19 and oxygen saturation is 100%.     Chief Complaint: Abdominal Pain    Presents with RLQ/right suprapubic pain for a few days. Noticed pain more when her bladder is full, as she empties her bladder, she will get a sharp pain to the right suprapubic area that lasts seconds and resolve. States she has had this pain in the past, but does not know what it was from, did not have it evaluated. Hx of fibroids, unsure if she has ever had ovarian cysts  Reviewed hx, has hx ovarian cyst in 2013    Also has a mass to her abdomen x 2 weeks, very mildly tender. Denies back pain, denies lower extremity weakness/parasthesia/paralysis. Denies nausea or vomiting or deep pain in abdomen surrounding site of mass.     Abdominal Pain   The current episode started yesterday. The pain is located in the RLQ. The quality of the pain is sharp, aching and cramping. Pertinent negatives include no constipation, diarrhea, dysuria, fever, nausea or vomiting. Associated symptoms comments: Pt explains pain as when bladder is full its a aching pain after using restroom send sharp pain and then goes away . The pain is aggravated by urination and bowel movement. Relieved by: tylenol. She has tried nothing for the symptoms. There is no history of abdominal surgery.       Constitution: Negative for appetite change, chills, sweating and fever.   HENT: Negative for trouble swallowing.    Cardiovascular: Negative for chest pain.   Respiratory: Negative for shortness of breath.    Gastrointestinal: Positive for abdominal pain. Negative for abdominal trauma, abdominal bloating, history of abdominal surgery, nausea, vomiting, constipation, diarrhea, dark colored stools and heartburn.   Genitourinary: Positive for " pelvic pain. Negative for dysuria and missed menses.   Musculoskeletal: Negative for back pain.       Objective:      Physical Exam   Constitutional: She is oriented to person, place, and time. She appears well-developed and well-nourished.   HENT:   Head: Normocephalic and atraumatic.   Right Ear: External ear normal.   Left Ear: External ear normal.   Nose: Nose normal.   Mouth/Throat: Mucous membranes are normal.   Eyes: Conjunctivae and lids are normal.   Neck: Trachea normal and full passive range of motion without pain. Neck supple.   Cardiovascular: Normal rate, regular rhythm and normal heart sounds.   Pulmonary/Chest: Effort normal and breath sounds normal. No respiratory distress.   Abdominal: Soft. Normal appearance and bowel sounds are normal. She exhibits no distension, no abdominal bruit, no pulsatile midline mass and no mass. There is tenderness in the right lower quadrant and suprapubic area. A hernia is present.       Musculoskeletal: Normal range of motion. She exhibits no edema.   Neurological: She is alert and oriented to person, place, and time. She has normal strength.   Skin: Skin is warm, dry, intact, not diaphoretic and not pale.   Psychiatric: She has a normal mood and affect. Her speech is normal and behavior is normal. Judgment and thought content normal. Cognition and memory are normal.   Nursing note and vitals reviewed.        Assessment:       1. Lower abdominal pain        Plan:       During exam, noted mass superior to umbilicus, consistent with a ventral hernia. Unable to reduce. Pulsating, will obtain an xray as there is no other imaging study available in clinic to see if aorta is visible and enlarged    Xray reviewed by me, no acute process except some retained stool  Instructed to increase fluids, fiber, follow up with PCP as scheduled    Lower abdominal pain  -     POCT Urinalysis, Dipstick, Automated, W/O Scope  -     POCT urine pregnancy  -     XR ABDOMEN FLAT AND ERECT;  Future; Expected date: 05/13/2020

## 2020-05-21 ENCOUNTER — OFFICE VISIT (OUTPATIENT)
Dept: INTERNAL MEDICINE | Facility: CLINIC | Age: 35
End: 2020-05-21
Payer: COMMERCIAL

## 2020-05-21 VITALS
BODY MASS INDEX: 32.49 KG/M2 | TEMPERATURE: 98 F | SYSTOLIC BLOOD PRESSURE: 98 MMHG | DIASTOLIC BLOOD PRESSURE: 70 MMHG | RESPIRATION RATE: 18 BRPM | HEIGHT: 62 IN | WEIGHT: 176.56 LBS

## 2020-05-21 DIAGNOSIS — K43.9 VENTRAL HERNIA WITHOUT OBSTRUCTION OR GANGRENE: Primary | ICD-10-CM

## 2020-05-21 DIAGNOSIS — K59.00 CONSTIPATION, UNSPECIFIED CONSTIPATION TYPE: ICD-10-CM

## 2020-05-21 PROCEDURE — 99213 OFFICE O/P EST LOW 20 MIN: CPT | Mod: S$GLB,,, | Performed by: INTERNAL MEDICINE

## 2020-05-21 PROCEDURE — 3008F PR BODY MASS INDEX (BMI) DOCUMENTED: ICD-10-PCS | Mod: CPTII,S$GLB,, | Performed by: INTERNAL MEDICINE

## 2020-05-21 PROCEDURE — 3008F BODY MASS INDEX DOCD: CPT | Mod: CPTII,S$GLB,, | Performed by: INTERNAL MEDICINE

## 2020-05-21 PROCEDURE — 99213 PR OFFICE/OUTPT VISIT, EST, LEVL III, 20-29 MIN: ICD-10-PCS | Mod: S$GLB,,, | Performed by: INTERNAL MEDICINE

## 2020-05-21 PROCEDURE — 99999 PR PBB SHADOW E&M-EST. PATIENT-LVL III: ICD-10-PCS | Mod: PBBFAC,,, | Performed by: INTERNAL MEDICINE

## 2020-05-21 PROCEDURE — 99999 PR PBB SHADOW E&M-EST. PATIENT-LVL III: CPT | Mod: PBBFAC,,, | Performed by: INTERNAL MEDICINE

## 2020-05-21 NOTE — PATIENT INSTRUCTIONS
Constipation (Adult)  Constipation means that you have bowel movements that are less frequent than usual. Stools often become very hard and difficult to pass.  Constipation is very common. At some point in life it affects almost everyone. Since everyone's bowel habits are different, what is constipation to one person may not be to another. Your healthcare provider may do tests to diagnose constipation. It depends on what he or she finds when evaluating you.    Symptoms of constipation include:  · Abdominal pain  · Bloating  · Vomiting  · Painful bowel movements  · Itching, swelling, bleeding, or pain around the anus  Causes  Constipation can have many causes. These include:  · Diet low in fiber  · Too much dairy  · Not drinking enough liquids  · Lack of exercise or physical activity. This is especially true for older adults.  · Changes in lifestyle or daily routine, including pregnancy, aging, work, and travel  · Frequent use or misuse of laxatives  · Ignoring the urge to have a bowel movement or delaying it until later  · Medicines, such as certain prescription pain medicines, iron supplements, antacids, certain antidepressants, and calcium supplements  · Diseases like irritable bowel syndrome, bowel obstructions, stroke, diabetes, thyroid disease, Parkinson disease, hemorrhoids, and colon cancer  Complications  Potential complications of constipation can include:  · Hemorrhoids  · Rectal bleeding from hemorrhoids or anal fissures (skin tears)  · Hernias  · Dependency on laxatives  · Chronic constipation  · Fecal impaction  · Bowel obstruction or perforation  Home care  All treatment should be done after talking with your healthcare provider. This is especially true if you have another medical problems, are taking prescription medicines, or are an older adult. Treatment most often involves lifestyle changes. You may also need medicines. Your healthcare provider will tell you which will work best for you. Follow  the advice below to help avoid this problem in the future.  Lifestyle changes  These lifestyle changes can help prevent constipation:  · Diet. Eat a high-fiber diet, with fresh fruit and vegetables, and reduce dairy intake, meats, and processed foods  · Fluids. It's important to get enough fluids each day. Drink plenty of water when you eat more fiber. If you are on diet that limits the amount of fluid you can have, talk about this with your healthcare provider.  · Regular exercise. Check with your healthcare provider first.  Medications  Take any medicines as directed. Some laxatives are safe to use only every now and then. Others can be taken on a regular basis. Talk with your doctor or pharmacist if you have questions.  Prescription pain medicines can cause constipation. If you are taking this kind of medicine, ask your healthcare provider if you should also take a stool softener.  Medicines you may take to treat constipation include:  · Fiber supplements  · Stool softeners  · Laxatives  · Enemas  · Rectal suppositories  Follow-up care  Follow up with your healthcare provider if symptoms don't get better in the next few days. You may need to have more tests or see a specialist.  Call 911  Call 911 if any of these occur:  · Trouble breathing  · Stiff, rigid abdomen that is severely painful to touch  · Confusion  · Fainting or loss of consciousness  · Rapid heart rate  · Chest pain  When to seek medical advice  Call your healthcare provider right away if any of these occur:  · Fever over 100.4°F (38°C)  · Failure to resume normal bowel movements  · Pain in your abdomen or back gets worse  · Nausea or vomiting  · Swelling in your abdomen  · Blood in the stool  · Black, tarry stool  · Involuntary weight loss  · Weakness  Date Last Reviewed: 12/30/2015  © 6109-2921 "[employer]. [address] [place] [address], [place] [address]. All rights reserved. This information is not intended as a substitute for  professional medical care. Always follow your healthcare professional's instructions.
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
## 2020-09-16 ENCOUNTER — TELEPHONE (OUTPATIENT)
Dept: INTERNAL MEDICINE | Facility: CLINIC | Age: 35
End: 2020-09-16

## 2020-11-23 ENCOUNTER — PATIENT MESSAGE (OUTPATIENT)
Dept: OBSTETRICS AND GYNECOLOGY | Facility: CLINIC | Age: 35
End: 2020-11-23

## 2021-01-04 ENCOUNTER — PATIENT MESSAGE (OUTPATIENT)
Dept: OBSTETRICS AND GYNECOLOGY | Facility: CLINIC | Age: 36
End: 2021-01-04

## 2021-01-05 ENCOUNTER — TELEPHONE (OUTPATIENT)
Dept: OBSTETRICS AND GYNECOLOGY | Facility: CLINIC | Age: 36
End: 2021-01-05

## 2021-01-05 DIAGNOSIS — N91.2 AMENORRHEA: Primary | ICD-10-CM

## 2021-01-27 ENCOUNTER — PATIENT OUTREACH (OUTPATIENT)
Dept: ADMINISTRATIVE | Facility: OTHER | Age: 36
End: 2021-01-27

## 2021-01-29 ENCOUNTER — OFFICE VISIT (OUTPATIENT)
Dept: OBSTETRICS AND GYNECOLOGY | Facility: CLINIC | Age: 36
End: 2021-01-29
Payer: COMMERCIAL

## 2021-01-29 ENCOUNTER — PATIENT MESSAGE (OUTPATIENT)
Dept: OBSTETRICS AND GYNECOLOGY | Facility: CLINIC | Age: 36
End: 2021-01-29

## 2021-01-29 ENCOUNTER — LAB VISIT (OUTPATIENT)
Dept: LAB | Facility: OTHER | Age: 36
End: 2021-01-29
Payer: COMMERCIAL

## 2021-01-29 ENCOUNTER — PROCEDURE VISIT (OUTPATIENT)
Dept: OBSTETRICS AND GYNECOLOGY | Facility: CLINIC | Age: 36
End: 2021-01-29
Attending: OBSTETRICS & GYNECOLOGY
Payer: COMMERCIAL

## 2021-01-29 VITALS
BODY MASS INDEX: 31.45 KG/M2 | HEIGHT: 62 IN | WEIGHT: 170.88 LBS | DIASTOLIC BLOOD PRESSURE: 72 MMHG | SYSTOLIC BLOOD PRESSURE: 112 MMHG

## 2021-01-29 DIAGNOSIS — D25.1 FIBROIDS, INTRAMURAL: ICD-10-CM

## 2021-01-29 DIAGNOSIS — N91.2 AMENORRHEA: ICD-10-CM

## 2021-01-29 DIAGNOSIS — N91.5 OLIGOMENORRHEA, UNSPECIFIED TYPE: Primary | ICD-10-CM

## 2021-01-29 DIAGNOSIS — Z32.01 POSITIVE PREGNANCY TEST: ICD-10-CM

## 2021-01-29 DIAGNOSIS — O99.019 ANEMIA DURING PREGNANCY: Primary | ICD-10-CM

## 2021-01-29 DIAGNOSIS — N91.5 OLIGOMENORRHEA, UNSPECIFIED TYPE: ICD-10-CM

## 2021-01-29 PROBLEM — O26.899 RH NEGATIVE STATE IN ANTEPARTUM PERIOD: Status: ACTIVE | Noted: 2021-01-29

## 2021-01-29 PROBLEM — Z67.91 RH NEGATIVE STATE IN ANTEPARTUM PERIOD: Status: ACTIVE | Noted: 2021-01-29

## 2021-01-29 LAB
ABO + RH BLD: NORMAL
B-HCG UR QL: POSITIVE
BASOPHILS # BLD AUTO: 0.02 K/UL (ref 0–0.2)
BASOPHILS NFR BLD: 0.3 % (ref 0–1.9)
BLD GP AB SCN CELLS X3 SERPL QL: NORMAL
CTP QC/QA: YES
DIFFERENTIAL METHOD: ABNORMAL
EOSINOPHIL # BLD AUTO: 0.1 K/UL (ref 0–0.5)
EOSINOPHIL NFR BLD: 0.7 % (ref 0–8)
ERYTHROCYTE [DISTWIDTH] IN BLOOD BY AUTOMATED COUNT: 12.2 % (ref 11.5–14.5)
HCT VFR BLD AUTO: 33.1 % (ref 37–48.5)
HGB BLD-MCNC: 11.2 G/DL (ref 12–16)
IMM GRANULOCYTES # BLD AUTO: 0.03 K/UL (ref 0–0.04)
IMM GRANULOCYTES NFR BLD AUTO: 0.4 % (ref 0–0.5)
LYMPHOCYTES # BLD AUTO: 1.3 K/UL (ref 1–4.8)
LYMPHOCYTES NFR BLD: 18.3 % (ref 18–48)
MCH RBC QN AUTO: 29.3 PG (ref 27–31)
MCHC RBC AUTO-ENTMCNC: 33.8 G/DL (ref 32–36)
MCV RBC AUTO: 87 FL (ref 82–98)
MONOCYTES # BLD AUTO: 0.5 K/UL (ref 0.3–1)
MONOCYTES NFR BLD: 7.1 % (ref 4–15)
NEUTROPHILS # BLD AUTO: 5.3 K/UL (ref 1.8–7.7)
NEUTROPHILS NFR BLD: 73.2 % (ref 38–73)
NRBC BLD-RTO: 0 /100 WBC
PLATELET # BLD AUTO: 440 K/UL (ref 150–350)
PMV BLD AUTO: 10 FL (ref 9.2–12.9)
RBC # BLD AUTO: 3.82 M/UL (ref 4–5.4)
WBC # BLD AUTO: 7.2 K/UL (ref 3.9–12.7)

## 2021-01-29 PROCEDURE — 85025 COMPLETE CBC W/AUTO DIFF WBC: CPT

## 2021-01-29 PROCEDURE — 1126F PR PAIN SEVERITY QUANTIFIED, NO PAIN PRESENT: ICD-10-PCS | Mod: S$GLB,,, | Performed by: NURSE PRACTITIONER

## 2021-01-29 PROCEDURE — 86900 BLOOD TYPING SEROLOGIC ABO: CPT

## 2021-01-29 PROCEDURE — 99214 PR OFFICE/OUTPT VISIT, EST, LEVL IV, 30-39 MIN: ICD-10-PCS | Mod: S$GLB,,, | Performed by: NURSE PRACTITIONER

## 2021-01-29 PROCEDURE — 1126F AMNT PAIN NOTED NONE PRSNT: CPT | Mod: S$GLB,,, | Performed by: NURSE PRACTITIONER

## 2021-01-29 PROCEDURE — 76801 PR US, OB <14WKS, TRANSABD, SINGLE GESTATION: ICD-10-PCS | Mod: S$GLB,,, | Performed by: OBSTETRICS & GYNECOLOGY

## 2021-01-29 PROCEDURE — 99999 PR PBB SHADOW E&M-EST. PATIENT-LVL III: CPT | Mod: PBBFAC,,, | Performed by: NURSE PRACTITIONER

## 2021-01-29 PROCEDURE — 99999 PR PBB SHADOW E&M-EST. PATIENT-LVL III: ICD-10-PCS | Mod: PBBFAC,,, | Performed by: NURSE PRACTITIONER

## 2021-01-29 PROCEDURE — 87340 HEPATITIS B SURFACE AG IA: CPT

## 2021-01-29 PROCEDURE — 3008F BODY MASS INDEX DOCD: CPT | Mod: CPTII,S$GLB,, | Performed by: NURSE PRACTITIONER

## 2021-01-29 PROCEDURE — 99214 OFFICE O/P EST MOD 30 MIN: CPT | Mod: S$GLB,,, | Performed by: NURSE PRACTITIONER

## 2021-01-29 PROCEDURE — 86762 RUBELLA ANTIBODY: CPT

## 2021-01-29 PROCEDURE — 87086 URINE CULTURE/COLONY COUNT: CPT

## 2021-01-29 PROCEDURE — 86592 SYPHILIS TEST NON-TREP QUAL: CPT

## 2021-01-29 PROCEDURE — 86703 HIV-1/HIV-2 1 RESULT ANTBDY: CPT

## 2021-01-29 PROCEDURE — 76801 OB US < 14 WKS SINGLE FETUS: CPT | Mod: S$GLB,,, | Performed by: OBSTETRICS & GYNECOLOGY

## 2021-01-29 PROCEDURE — 3008F PR BODY MASS INDEX (BMI) DOCUMENTED: ICD-10-PCS | Mod: CPTII,S$GLB,, | Performed by: NURSE PRACTITIONER

## 2021-01-29 RX ORDER — IRON POLYSACCHARIDE COMPLEX 150 MG
150 CAPSULE ORAL DAILY
Qty: 30 CAPSULE | Refills: 6 | Status: SHIPPED | OUTPATIENT
Start: 2021-01-29 | End: 2021-04-26

## 2021-01-31 LAB — BACTERIA UR CULT: NO GROWTH

## 2021-02-01 LAB
HBV SURFACE AG SERPL QL IA: NEGATIVE
HIV 1+2 AB+HIV1 P24 AG SERPL QL IA: NEGATIVE
RUBV IGG SER-ACNC: 36.5 IU/ML
RUBV IGG SER-IMP: REACTIVE

## 2021-02-02 LAB — RPR SER QL: NORMAL

## 2021-03-01 ENCOUNTER — PATIENT MESSAGE (OUTPATIENT)
Dept: OBSTETRICS AND GYNECOLOGY | Facility: CLINIC | Age: 36
End: 2021-03-01

## 2021-03-16 ENCOUNTER — PATIENT OUTREACH (OUTPATIENT)
Dept: ADMINISTRATIVE | Facility: OTHER | Age: 36
End: 2021-03-16

## 2021-03-18 ENCOUNTER — INITIAL PRENATAL (OUTPATIENT)
Dept: OBSTETRICS AND GYNECOLOGY | Facility: CLINIC | Age: 36
End: 2021-03-18
Payer: COMMERCIAL

## 2021-03-18 VITALS
BODY MASS INDEX: 29.83 KG/M2 | DIASTOLIC BLOOD PRESSURE: 78 MMHG | SYSTOLIC BLOOD PRESSURE: 106 MMHG | WEIGHT: 163.13 LBS

## 2021-03-18 DIAGNOSIS — O34.29 PREGNANCY WITH HISTORY OF UTERINE MYOMECTOMY: ICD-10-CM

## 2021-03-18 DIAGNOSIS — Z34.02 PRIMIGRAVIDA IN SECOND TRIMESTER: Primary | ICD-10-CM

## 2021-03-18 PROCEDURE — 0502F PR SUBSEQUENT PRENATAL CARE: ICD-10-PCS | Mod: CPTII,S$GLB,, | Performed by: OBSTETRICS & GYNECOLOGY

## 2021-03-18 PROCEDURE — 99999 PR PBB SHADOW E&M-EST. PATIENT-LVL II: CPT | Mod: PBBFAC,,, | Performed by: OBSTETRICS & GYNECOLOGY

## 2021-03-18 PROCEDURE — 99999 PR PBB SHADOW E&M-EST. PATIENT-LVL II: ICD-10-PCS | Mod: PBBFAC,,, | Performed by: OBSTETRICS & GYNECOLOGY

## 2021-03-18 PROCEDURE — 0502F SUBSEQUENT PRENATAL CARE: CPT | Mod: CPTII,S$GLB,, | Performed by: OBSTETRICS & GYNECOLOGY

## 2021-03-20 ENCOUNTER — PATIENT MESSAGE (OUTPATIENT)
Dept: ADMINISTRATIVE | Facility: OTHER | Age: 36
End: 2021-03-20

## 2021-03-27 ENCOUNTER — PATIENT MESSAGE (OUTPATIENT)
Dept: OBSTETRICS AND GYNECOLOGY | Facility: CLINIC | Age: 36
End: 2021-03-27

## 2021-03-31 ENCOUNTER — TELEPHONE (OUTPATIENT)
Dept: OBSTETRICS AND GYNECOLOGY | Facility: CLINIC | Age: 36
End: 2021-03-31

## 2021-03-31 DIAGNOSIS — Z3A.20 20 WEEKS GESTATION OF PREGNANCY: Primary | ICD-10-CM

## 2021-04-05 ENCOUNTER — PATIENT MESSAGE (OUTPATIENT)
Dept: OBSTETRICS AND GYNECOLOGY | Facility: CLINIC | Age: 36
End: 2021-04-05

## 2021-04-05 ENCOUNTER — TELEPHONE (OUTPATIENT)
Dept: OBSTETRICS AND GYNECOLOGY | Facility: CLINIC | Age: 36
End: 2021-04-05

## 2021-04-06 ENCOUNTER — PATIENT MESSAGE (OUTPATIENT)
Dept: OBSTETRICS AND GYNECOLOGY | Facility: CLINIC | Age: 36
End: 2021-04-06

## 2021-04-06 ENCOUNTER — CLINICAL SUPPORT (OUTPATIENT)
Dept: OBSTETRICS AND GYNECOLOGY | Facility: CLINIC | Age: 36
End: 2021-04-06
Payer: COMMERCIAL

## 2021-04-06 DIAGNOSIS — Z01.30 BP CHECK: Primary | ICD-10-CM

## 2021-04-12 ENCOUNTER — ROUTINE PRENATAL (OUTPATIENT)
Dept: OBSTETRICS AND GYNECOLOGY | Facility: CLINIC | Age: 36
End: 2021-04-12
Payer: COMMERCIAL

## 2021-04-12 VITALS
BODY MASS INDEX: 29.96 KG/M2 | SYSTOLIC BLOOD PRESSURE: 122 MMHG | WEIGHT: 163.81 LBS | DIASTOLIC BLOOD PRESSURE: 78 MMHG

## 2021-04-12 DIAGNOSIS — Z34.00 SUPERVISION OF NORMAL FIRST PREGNANCY, ANTEPARTUM: Primary | ICD-10-CM

## 2021-04-12 PROCEDURE — 99999 PR PBB SHADOW E&M-EST. PATIENT-LVL III: ICD-10-PCS | Mod: PBBFAC,,, | Performed by: NURSE PRACTITIONER

## 2021-04-12 PROCEDURE — 0502F PR SUBSEQUENT PRENATAL CARE: ICD-10-PCS | Mod: CPTII,S$GLB,, | Performed by: NURSE PRACTITIONER

## 2021-04-12 PROCEDURE — 0502F SUBSEQUENT PRENATAL CARE: CPT | Mod: CPTII,S$GLB,, | Performed by: NURSE PRACTITIONER

## 2021-04-12 PROCEDURE — 99999 PR PBB SHADOW E&M-EST. PATIENT-LVL III: CPT | Mod: PBBFAC,,, | Performed by: NURSE PRACTITIONER

## 2021-04-15 ENCOUNTER — CLINICAL SUPPORT (OUTPATIENT)
Dept: OBSTETRICS AND GYNECOLOGY | Facility: CLINIC | Age: 36
End: 2021-04-15
Payer: COMMERCIAL

## 2021-04-15 ENCOUNTER — ROUTINE PRENATAL (OUTPATIENT)
Dept: OBSTETRICS AND GYNECOLOGY | Facility: CLINIC | Age: 36
End: 2021-04-15
Payer: COMMERCIAL

## 2021-04-15 VITALS
DIASTOLIC BLOOD PRESSURE: 70 MMHG | SYSTOLIC BLOOD PRESSURE: 120 MMHG | BODY MASS INDEX: 30.16 KG/M2 | WEIGHT: 164.88 LBS

## 2021-04-15 DIAGNOSIS — Z3A.19 19 WEEKS GESTATION OF PREGNANCY: ICD-10-CM

## 2021-04-15 DIAGNOSIS — O34.12 UTERINE FIBROIDS AFFECTING PREGNANCY IN SECOND TRIMESTER: ICD-10-CM

## 2021-04-15 DIAGNOSIS — Z3A.20 20 WEEKS GESTATION OF PREGNANCY: ICD-10-CM

## 2021-04-15 DIAGNOSIS — O34.29 PREGNANCY WITH HISTORY OF UTERINE MYOMECTOMY: ICD-10-CM

## 2021-04-15 DIAGNOSIS — D25.9 UTERINE FIBROIDS AFFECTING PREGNANCY IN SECOND TRIMESTER: ICD-10-CM

## 2021-04-15 DIAGNOSIS — Z67.91 RH NEGATIVE STATE IN ANTEPARTUM PERIOD: ICD-10-CM

## 2021-04-15 DIAGNOSIS — O26.899 RH NEGATIVE STATE IN ANTEPARTUM PERIOD: ICD-10-CM

## 2021-04-15 DIAGNOSIS — Z36.4 ANTENATAL SCREENING FOR FETAL GROWTH RETARDATION USING ULTRASONICS: ICD-10-CM

## 2021-04-15 DIAGNOSIS — Z34.00 SUPERVISION OF NORMAL FIRST PREGNANCY, ANTEPARTUM: Primary | ICD-10-CM

## 2021-04-15 DIAGNOSIS — Z36.3 ANTENATAL SCREENING FOR MALFORMATION USING ULTRASONICS: ICD-10-CM

## 2021-04-15 PROCEDURE — 76805 PR US, OB 14+WKS, TRANSABD, SINGLE GESTATION: ICD-10-PCS | Mod: S$GLB,,, | Performed by: OBSTETRICS & GYNECOLOGY

## 2021-04-15 PROCEDURE — 0502F SUBSEQUENT PRENATAL CARE: CPT | Mod: CPTII,S$GLB,, | Performed by: OBSTETRICS & GYNECOLOGY

## 2021-04-15 PROCEDURE — 99999 PR PBB SHADOW E&M-EST. PATIENT-LVL II: ICD-10-PCS | Mod: PBBFAC,,, | Performed by: OBSTETRICS & GYNECOLOGY

## 2021-04-15 PROCEDURE — 99999 PR PBB SHADOW E&M-EST. PATIENT-LVL II: CPT | Mod: PBBFAC,,, | Performed by: OBSTETRICS & GYNECOLOGY

## 2021-04-15 PROCEDURE — 0502F PR SUBSEQUENT PRENATAL CARE: ICD-10-PCS | Mod: CPTII,S$GLB,, | Performed by: OBSTETRICS & GYNECOLOGY

## 2021-04-15 PROCEDURE — 76805 OB US >/= 14 WKS SNGL FETUS: CPT | Mod: S$GLB,,, | Performed by: OBSTETRICS & GYNECOLOGY

## 2021-04-16 ENCOUNTER — PATIENT MESSAGE (OUTPATIENT)
Dept: OBSTETRICS AND GYNECOLOGY | Facility: HOSPITAL | Age: 36
End: 2021-04-16

## 2021-04-16 DIAGNOSIS — Z34.00 SUPERVISION OF NORMAL FIRST PREGNANCY, ANTEPARTUM: Primary | ICD-10-CM

## 2021-04-26 ENCOUNTER — PATIENT MESSAGE (OUTPATIENT)
Dept: OBSTETRICS AND GYNECOLOGY | Facility: CLINIC | Age: 36
End: 2021-04-26

## 2021-04-26 ENCOUNTER — TELEPHONE (OUTPATIENT)
Dept: OBSTETRICS AND GYNECOLOGY | Facility: CLINIC | Age: 36
End: 2021-04-26

## 2021-04-27 ENCOUNTER — PATIENT MESSAGE (OUTPATIENT)
Dept: OBSTETRICS AND GYNECOLOGY | Facility: CLINIC | Age: 36
End: 2021-04-27

## 2021-04-29 ENCOUNTER — HOSPITAL ENCOUNTER (EMERGENCY)
Facility: HOSPITAL | Age: 36
Discharge: HOME | End: 2021-04-29
Attending: EMERGENCY MEDICINE
Payer: COMMERCIAL

## 2021-04-29 ENCOUNTER — PATIENT MESSAGE (OUTPATIENT)
Dept: RESEARCH | Facility: HOSPITAL | Age: 36
End: 2021-04-29

## 2021-04-29 VITALS
WEIGHT: 165 LBS | HEIGHT: 62 IN | BODY MASS INDEX: 30.36 KG/M2 | DIASTOLIC BLOOD PRESSURE: 79 MMHG | OXYGEN SATURATION: 99 % | TEMPERATURE: 98 F | RESPIRATION RATE: 16 BRPM | HEART RATE: 86 BPM | SYSTOLIC BLOOD PRESSURE: 131 MMHG

## 2021-04-29 DIAGNOSIS — D25.9 UTERINE LEIOMYOMA, UNSPECIFIED LOCATION: Primary | ICD-10-CM

## 2021-04-29 DIAGNOSIS — R10.9 ABDOMINAL PAIN IN PREGNANCY: ICD-10-CM

## 2021-04-29 DIAGNOSIS — O26.899 ABDOMINAL PAIN IN PREGNANCY: ICD-10-CM

## 2021-04-29 LAB
BILIRUB UR QL STRIP: NEGATIVE
CLARITY UR: CLEAR
COLOR UR: YELLOW
GLUCOSE UR QL STRIP: NEGATIVE
HGB UR QL STRIP: NEGATIVE
KETONES UR QL STRIP: NEGATIVE
LEUKOCYTE ESTERASE UR QL STRIP: NEGATIVE
NITRITE UR QL STRIP: NEGATIVE
PH UR STRIP: 7 [PH] (ref 5–8)
PROT UR QL STRIP: NEGATIVE
SP GR UR STRIP: 1.01 (ref 1–1.03)
URN SPEC COLLECT METH UR: NORMAL
UROBILINOGEN UR STRIP-ACNC: NEGATIVE EU/DL

## 2021-04-29 PROCEDURE — 25000003 PHARM REV CODE 250: Performed by: EMERGENCY MEDICINE

## 2021-04-29 PROCEDURE — 99284 EMERGENCY DEPT VISIT MOD MDM: CPT | Mod: 25

## 2021-04-29 PROCEDURE — 81003 URINALYSIS AUTO W/O SCOPE: CPT | Performed by: EMERGENCY MEDICINE

## 2021-04-29 RX ORDER — KETOROLAC TROMETHAMINE 10 MG/1
10 TABLET, FILM COATED ORAL
Status: COMPLETED | OUTPATIENT
Start: 2021-04-29 | End: 2021-04-29

## 2021-04-29 RX ORDER — KETOROLAC TROMETHAMINE 10 MG/1
10 TABLET, FILM COATED ORAL EVERY 6 HOURS PRN
Qty: 12 TABLET | Refills: 0 | Status: ON HOLD | OUTPATIENT
Start: 2021-04-29 | End: 2021-06-29 | Stop reason: HOSPADM

## 2021-04-29 RX ADMIN — KETOROLAC TROMETHAMINE 10 MG: 10 TABLET, FILM COATED ORAL at 07:04

## 2021-05-06 ENCOUNTER — TELEPHONE (OUTPATIENT)
Dept: OBSTETRICS AND GYNECOLOGY | Facility: CLINIC | Age: 36
End: 2021-05-06

## 2021-05-06 DIAGNOSIS — Z34.00 SUPERVISION OF NORMAL FIRST PREGNANCY, ANTEPARTUM: Primary | ICD-10-CM

## 2021-05-13 ENCOUNTER — PROCEDURE VISIT (OUTPATIENT)
Dept: OBSTETRICS AND GYNECOLOGY | Facility: CLINIC | Age: 36
End: 2021-05-13
Payer: COMMERCIAL

## 2021-05-13 ENCOUNTER — TELEPHONE (OUTPATIENT)
Dept: OBSTETRICS AND GYNECOLOGY | Facility: CLINIC | Age: 36
End: 2021-05-13

## 2021-05-13 ENCOUNTER — ROUTINE PRENATAL (OUTPATIENT)
Dept: OBSTETRICS AND GYNECOLOGY | Facility: CLINIC | Age: 36
End: 2021-05-13
Payer: COMMERCIAL

## 2021-05-13 ENCOUNTER — PATIENT MESSAGE (OUTPATIENT)
Dept: OBSTETRICS AND GYNECOLOGY | Facility: CLINIC | Age: 36
End: 2021-05-13

## 2021-05-13 VITALS
SYSTOLIC BLOOD PRESSURE: 130 MMHG | BODY MASS INDEX: 29.88 KG/M2 | WEIGHT: 163.38 LBS | DIASTOLIC BLOOD PRESSURE: 78 MMHG

## 2021-05-13 DIAGNOSIS — Z67.91 RH NEGATIVE STATE IN ANTEPARTUM PERIOD: ICD-10-CM

## 2021-05-13 DIAGNOSIS — Z36.4 ANTENATAL SCREENING FOR FETAL GROWTH RETARDATION USING ULTRASONICS: ICD-10-CM

## 2021-05-13 DIAGNOSIS — O09.512 AMA (ADVANCED MATERNAL AGE) PRIMIGRAVIDA 35+, SECOND TRIMESTER: Primary | ICD-10-CM

## 2021-05-13 DIAGNOSIS — D25.9 UTERINE FIBROIDS AFFECTING PREGNANCY IN SECOND TRIMESTER: ICD-10-CM

## 2021-05-13 DIAGNOSIS — O34.12 UTERINE FIBROIDS AFFECTING PREGNANCY IN SECOND TRIMESTER: ICD-10-CM

## 2021-05-13 DIAGNOSIS — Z34.00 SUPERVISION OF NORMAL FIRST PREGNANCY, ANTEPARTUM: ICD-10-CM

## 2021-05-13 DIAGNOSIS — O34.29 PREGNANCY WITH HISTORY OF UTERINE MYOMECTOMY: ICD-10-CM

## 2021-05-13 DIAGNOSIS — O26.899 RH NEGATIVE STATE IN ANTEPARTUM PERIOD: ICD-10-CM

## 2021-05-13 DIAGNOSIS — O09.512 AMA (ADVANCED MATERNAL AGE) PRIMIGRAVIDA 35+, SECOND TRIMESTER: ICD-10-CM

## 2021-05-13 DIAGNOSIS — O09.522 MULTIGRAVIDA OF ADVANCED MATERNAL AGE IN SECOND TRIMESTER: ICD-10-CM

## 2021-05-13 DIAGNOSIS — Z3A.23 23 WEEKS GESTATION OF PREGNANCY: ICD-10-CM

## 2021-05-13 DIAGNOSIS — Z34.00 SUPERVISION OF NORMAL FIRST PREGNANCY, ANTEPARTUM: Primary | ICD-10-CM

## 2021-05-13 PROCEDURE — 0502F PR SUBSEQUENT PRENATAL CARE: ICD-10-PCS | Mod: CPTII,S$GLB,, | Performed by: OBSTETRICS & GYNECOLOGY

## 2021-05-13 PROCEDURE — 99999 PR PBB SHADOW E&M-EST. PATIENT-LVL II: ICD-10-PCS | Mod: PBBFAC,,, | Performed by: OBSTETRICS & GYNECOLOGY

## 2021-05-13 PROCEDURE — 76816 PR  US,PREGNANT UTERUS,F/U,TRANSABD APP: ICD-10-PCS | Mod: S$GLB,,, | Performed by: OBSTETRICS & GYNECOLOGY

## 2021-05-13 PROCEDURE — 99999 PR PBB SHADOW E&M-EST. PATIENT-LVL II: CPT | Mod: PBBFAC,,, | Performed by: OBSTETRICS & GYNECOLOGY

## 2021-05-13 PROCEDURE — 76816 OB US FOLLOW-UP PER FETUS: CPT | Mod: S$GLB,,, | Performed by: OBSTETRICS & GYNECOLOGY

## 2021-05-13 PROCEDURE — 0502F SUBSEQUENT PRENATAL CARE: CPT | Mod: CPTII,S$GLB,, | Performed by: OBSTETRICS & GYNECOLOGY

## 2021-05-13 RX ORDER — ASPIRIN 81 MG/1
81 TABLET ORAL DAILY
Qty: 150 TABLET | Refills: 2 | Status: ON HOLD | OUTPATIENT
Start: 2021-05-13 | End: 2021-08-20 | Stop reason: HOSPADM

## 2021-05-18 ENCOUNTER — PATIENT MESSAGE (OUTPATIENT)
Dept: OBSTETRICS AND GYNECOLOGY | Facility: CLINIC | Age: 36
End: 2021-05-18

## 2021-05-21 ENCOUNTER — TELEPHONE (OUTPATIENT)
Dept: OBSTETRICS AND GYNECOLOGY | Facility: CLINIC | Age: 36
End: 2021-05-21

## 2021-05-21 ENCOUNTER — PATIENT MESSAGE (OUTPATIENT)
Dept: OBSTETRICS AND GYNECOLOGY | Facility: CLINIC | Age: 36
End: 2021-05-21

## 2021-05-26 ENCOUNTER — PATIENT MESSAGE (OUTPATIENT)
Dept: OBSTETRICS AND GYNECOLOGY | Facility: CLINIC | Age: 36
End: 2021-05-26

## 2021-05-26 ENCOUNTER — PATIENT MESSAGE (OUTPATIENT)
Dept: ADMINISTRATIVE | Facility: OTHER | Age: 36
End: 2021-05-26

## 2021-06-08 ENCOUNTER — PROCEDURE VISIT (OUTPATIENT)
Dept: MATERNAL FETAL MEDICINE | Facility: CLINIC | Age: 36
End: 2021-06-08
Attending: OBSTETRICS & GYNECOLOGY
Payer: COMMERCIAL

## 2021-06-08 ENCOUNTER — PATIENT MESSAGE (OUTPATIENT)
Dept: OBSTETRICS AND GYNECOLOGY | Facility: CLINIC | Age: 36
End: 2021-06-08

## 2021-06-08 DIAGNOSIS — D25.1 FIBROIDS, INTRAMURAL: ICD-10-CM

## 2021-06-08 DIAGNOSIS — Z36.89 ENCOUNTER FOR ULTRASOUND TO ASSESS FETAL GROWTH: ICD-10-CM

## 2021-06-08 DIAGNOSIS — D25.9 UTERINE FIBROIDS AFFECTING PREGNANCY IN SECOND TRIMESTER: ICD-10-CM

## 2021-06-08 DIAGNOSIS — O34.12 UTERINE FIBROIDS AFFECTING PREGNANCY IN SECOND TRIMESTER: ICD-10-CM

## 2021-06-08 DIAGNOSIS — O09.512 AMA (ADVANCED MATERNAL AGE) PRIMIGRAVIDA 35+, SECOND TRIMESTER: ICD-10-CM

## 2021-06-08 DIAGNOSIS — O09.512 AMA (ADVANCED MATERNAL AGE) PRIMIGRAVIDA 35+, SECOND TRIMESTER: Primary | ICD-10-CM

## 2021-06-08 PROCEDURE — 76816 OB US FOLLOW-UP PER FETUS: CPT | Mod: S$GLB,,, | Performed by: OBSTETRICS & GYNECOLOGY

## 2021-06-08 PROCEDURE — 76816 PR  US,PREGNANT UTERUS,F/U,TRANSABD APP: ICD-10-PCS | Mod: S$GLB,,, | Performed by: OBSTETRICS & GYNECOLOGY

## 2021-06-16 ENCOUNTER — TELEPHONE (OUTPATIENT)
Dept: OBSTETRICS AND GYNECOLOGY | Facility: CLINIC | Age: 36
End: 2021-06-16

## 2021-06-17 ENCOUNTER — LAB VISIT (OUTPATIENT)
Dept: LAB | Facility: HOSPITAL | Age: 36
End: 2021-06-17
Attending: OBSTETRICS & GYNECOLOGY
Payer: COMMERCIAL

## 2021-06-17 ENCOUNTER — ROUTINE PRENATAL (OUTPATIENT)
Dept: OBSTETRICS AND GYNECOLOGY | Facility: CLINIC | Age: 36
End: 2021-06-17
Payer: COMMERCIAL

## 2021-06-17 ENCOUNTER — CLINICAL SUPPORT (OUTPATIENT)
Dept: OBSTETRICS AND GYNECOLOGY | Facility: CLINIC | Age: 36
End: 2021-06-17
Payer: COMMERCIAL

## 2021-06-17 VITALS — BODY MASS INDEX: 28.63 KG/M2 | SYSTOLIC BLOOD PRESSURE: 130 MMHG | DIASTOLIC BLOOD PRESSURE: 84 MMHG | WEIGHT: 156.5 LBS

## 2021-06-17 DIAGNOSIS — O09.512 AMA (ADVANCED MATERNAL AGE) PRIMIGRAVIDA 35+, SECOND TRIMESTER: ICD-10-CM

## 2021-06-17 DIAGNOSIS — Z34.00 SUPERVISION OF NORMAL FIRST PREGNANCY, ANTEPARTUM: Primary | ICD-10-CM

## 2021-06-17 DIAGNOSIS — Z34.00 SUPERVISION OF NORMAL FIRST PREGNANCY, ANTEPARTUM: ICD-10-CM

## 2021-06-17 DIAGNOSIS — O26.899 RH NEGATIVE STATE IN ANTEPARTUM PERIOD: ICD-10-CM

## 2021-06-17 DIAGNOSIS — O34.29 PREGNANCY WITH HISTORY OF UTERINE MYOMECTOMY: ICD-10-CM

## 2021-06-17 DIAGNOSIS — Z67.91 RH NEGATIVE STATE IN ANTEPARTUM PERIOD: ICD-10-CM

## 2021-06-17 DIAGNOSIS — D25.9 UTERINE FIBROIDS AFFECTING PREGNANCY IN SECOND TRIMESTER: ICD-10-CM

## 2021-06-17 DIAGNOSIS — O34.12 UTERINE FIBROIDS AFFECTING PREGNANCY IN SECOND TRIMESTER: ICD-10-CM

## 2021-06-17 LAB
BASOPHILS # BLD AUTO: 0.02 K/UL (ref 0–0.2)
BASOPHILS NFR BLD: 0.2 % (ref 0–1.9)
DIFFERENTIAL METHOD: ABNORMAL
EOSINOPHIL # BLD AUTO: 0.1 K/UL (ref 0–0.5)
EOSINOPHIL NFR BLD: 0.6 % (ref 0–8)
ERYTHROCYTE [DISTWIDTH] IN BLOOD BY AUTOMATED COUNT: 14.4 % (ref 11.5–14.5)
GLUCOSE SERPL-MCNC: 108 MG/DL (ref 70–140)
HCT VFR BLD AUTO: 32.5 % (ref 37–48.5)
HGB BLD-MCNC: 10.3 G/DL (ref 12–16)
IMM GRANULOCYTES # BLD AUTO: 0.05 K/UL (ref 0–0.04)
IMM GRANULOCYTES NFR BLD AUTO: 0.6 % (ref 0–0.5)
LYMPHOCYTES # BLD AUTO: 0.8 K/UL (ref 1–4.8)
LYMPHOCYTES NFR BLD: 8.7 % (ref 18–48)
MCH RBC QN AUTO: 25.4 PG (ref 27–31)
MCHC RBC AUTO-ENTMCNC: 31.7 G/DL (ref 32–36)
MCV RBC AUTO: 80 FL (ref 82–98)
MONOCYTES # BLD AUTO: 0.5 K/UL (ref 0.3–1)
MONOCYTES NFR BLD: 5.9 % (ref 4–15)
NEUTROPHILS # BLD AUTO: 7.3 K/UL (ref 1.8–7.7)
NEUTROPHILS NFR BLD: 84 % (ref 38–73)
NRBC BLD-RTO: 0 /100 WBC
PLATELET # BLD AUTO: 454 K/UL (ref 150–450)
PMV BLD AUTO: 9.8 FL (ref 9.2–12.9)
RBC # BLD AUTO: 4.06 M/UL (ref 4–5.4)
WBC # BLD AUTO: 8.63 K/UL (ref 3.9–12.7)

## 2021-06-17 PROCEDURE — 90471 TDAP VACCINE GREATER THAN OR EQUAL TO 7YO IM: ICD-10-PCS | Mod: 59,S$GLB,, | Performed by: OBSTETRICS & GYNECOLOGY

## 2021-06-17 PROCEDURE — 0502F SUBSEQUENT PRENATAL CARE: CPT | Mod: CPTII,S$GLB,, | Performed by: OBSTETRICS & GYNECOLOGY

## 2021-06-17 PROCEDURE — 90715 TDAP VACCINE GREATER THAN OR EQUAL TO 7YO IM: ICD-10-PCS | Mod: S$GLB,,, | Performed by: OBSTETRICS & GYNECOLOGY

## 2021-06-17 PROCEDURE — 99999 PR PBB SHADOW E&M-EST. PATIENT-LVL II: CPT | Mod: PBBFAC,,, | Performed by: OBSTETRICS & GYNECOLOGY

## 2021-06-17 PROCEDURE — 90471 IMMUNIZATION ADMIN: CPT | Mod: 59,S$GLB,, | Performed by: OBSTETRICS & GYNECOLOGY

## 2021-06-17 PROCEDURE — 99999 PR PBB SHADOW E&M-EST. PATIENT-LVL II: ICD-10-PCS | Mod: PBBFAC,,, | Performed by: OBSTETRICS & GYNECOLOGY

## 2021-06-17 PROCEDURE — 85025 COMPLETE CBC W/AUTO DIFF WBC: CPT | Performed by: OBSTETRICS & GYNECOLOGY

## 2021-06-17 PROCEDURE — 0502F PR SUBSEQUENT PRENATAL CARE: ICD-10-PCS | Mod: CPTII,S$GLB,, | Performed by: OBSTETRICS & GYNECOLOGY

## 2021-06-17 PROCEDURE — 36415 COLL VENOUS BLD VENIPUNCTURE: CPT | Performed by: OBSTETRICS & GYNECOLOGY

## 2021-06-17 PROCEDURE — 96372 RHO (D) IMMUNE GLOBULIN: ICD-10-PCS | Mod: S$GLB,,, | Performed by: OBSTETRICS & GYNECOLOGY

## 2021-06-17 PROCEDURE — 90715 TDAP VACCINE 7 YRS/> IM: CPT | Mod: S$GLB,,, | Performed by: OBSTETRICS & GYNECOLOGY

## 2021-06-17 PROCEDURE — 96372 THER/PROPH/DIAG INJ SC/IM: CPT | Mod: S$GLB,,, | Performed by: OBSTETRICS & GYNECOLOGY

## 2021-06-17 PROCEDURE — 82950 GLUCOSE TEST: CPT | Performed by: OBSTETRICS & GYNECOLOGY

## 2021-06-22 ENCOUNTER — PATIENT MESSAGE (OUTPATIENT)
Dept: OBSTETRICS AND GYNECOLOGY | Facility: CLINIC | Age: 36
End: 2021-06-22

## 2021-06-23 ENCOUNTER — TELEPHONE (OUTPATIENT)
Dept: OBSTETRICS AND GYNECOLOGY | Facility: CLINIC | Age: 36
End: 2021-06-23

## 2021-06-23 DIAGNOSIS — O99.013 ANEMIA DURING PREGNANCY IN THIRD TRIMESTER: Primary | ICD-10-CM

## 2021-06-23 RX ORDER — IRON POLYSACCHARIDE COMPLEX 150 MG
150 CAPSULE ORAL 2 TIMES DAILY
Qty: 60 CAPSULE | Refills: 6 | Status: ON HOLD | OUTPATIENT
Start: 2021-06-23 | End: 2021-07-20 | Stop reason: HOSPADM

## 2021-06-24 ENCOUNTER — ANESTHESIA (OUTPATIENT)
Dept: OBSTETRICS AND GYNECOLOGY | Facility: OTHER | Age: 36
End: 2021-06-24

## 2021-06-24 ENCOUNTER — HOSPITAL ENCOUNTER (OUTPATIENT)
Facility: HOSPITAL | Age: 36
Discharge: ANOTHER HOSPITAL | End: 2021-06-24
Attending: OBSTETRICS & GYNECOLOGY | Admitting: OBSTETRICS & GYNECOLOGY
Payer: COMMERCIAL

## 2021-06-24 ENCOUNTER — PATIENT MESSAGE (OUTPATIENT)
Dept: OBSTETRICS AND GYNECOLOGY | Facility: CLINIC | Age: 36
End: 2021-06-24

## 2021-06-24 ENCOUNTER — HOSPITAL ENCOUNTER (INPATIENT)
Facility: OTHER | Age: 36
LOS: 5 days | Discharge: HOME | DRG: 831 | End: 2021-06-29
Attending: OBSTETRICS & GYNECOLOGY | Admitting: OBSTETRICS & GYNECOLOGY
Payer: COMMERCIAL

## 2021-06-24 ENCOUNTER — ANESTHESIA EVENT (OUTPATIENT)
Dept: OBSTETRICS AND GYNECOLOGY | Facility: OTHER | Age: 36
End: 2021-06-24

## 2021-06-24 ENCOUNTER — TELEPHONE (OUTPATIENT)
Dept: OBSTETRICS AND GYNECOLOGY | Facility: CLINIC | Age: 36
End: 2021-06-24

## 2021-06-24 VITALS
DIASTOLIC BLOOD PRESSURE: 78 MMHG | BODY MASS INDEX: 29.08 KG/M2 | SYSTOLIC BLOOD PRESSURE: 141 MMHG | HEIGHT: 62 IN | RESPIRATION RATE: 16 BRPM | TEMPERATURE: 98 F | HEART RATE: 109 BPM | OXYGEN SATURATION: 99 % | WEIGHT: 158 LBS

## 2021-06-24 DIAGNOSIS — R10.9 ABDOMINAL PAIN: ICD-10-CM

## 2021-06-24 DIAGNOSIS — O47.00 THREATENED PRETERM LABOR: Primary | ICD-10-CM

## 2021-06-24 DIAGNOSIS — O09.512 AMA (ADVANCED MATERNAL AGE) PRIMIGRAVIDA 35+, SECOND TRIMESTER: ICD-10-CM

## 2021-06-24 DIAGNOSIS — O47.00 THREATENED PREMATURE LABOR, ANTEPARTUM: ICD-10-CM

## 2021-06-24 DIAGNOSIS — D25.1 FIBROIDS, INTRAMURAL: ICD-10-CM

## 2021-06-24 DIAGNOSIS — O99.013 ANEMIA DURING PREGNANCY IN THIRD TRIMESTER: ICD-10-CM

## 2021-06-24 PROBLEM — J45.20 MILD INTERMITTENT ASTHMA WITHOUT COMPLICATION: Status: ACTIVE | Noted: 2021-06-24

## 2021-06-24 PROBLEM — O47.03 THREATENED PREMATURE LABOR IN THIRD TRIMESTER: Status: ACTIVE | Noted: 2021-06-24

## 2021-06-24 LAB
ABO + RH BLD: NORMAL
ABO + RH BLD: NORMAL
ALBUMIN SERPL BCP-MCNC: 2.2 G/DL (ref 3.5–5.2)
ALBUMIN SERPL BCP-MCNC: 2.8 G/DL (ref 3.5–5.2)
ALP SERPL-CCNC: 159 U/L (ref 55–135)
ALP SERPL-CCNC: 203 U/L (ref 55–135)
ALT SERPL W/O P-5'-P-CCNC: 81 U/L (ref 10–44)
ALT SERPL W/O P-5'-P-CCNC: 85 U/L (ref 10–44)
AMPHET+METHAMPHET UR QL: NEGATIVE
ANION GAP SERPL CALC-SCNC: 10 MMOL/L (ref 8–16)
ANION GAP SERPL CALC-SCNC: 16 MMOL/L (ref 8–16)
AST SERPL-CCNC: 41 U/L (ref 10–40)
AST SERPL-CCNC: 43 U/L (ref 10–40)
BARBITURATES UR QL SCN>200 NG/ML: NEGATIVE
BASOPHILS # BLD AUTO: 0.02 K/UL (ref 0–0.2)
BASOPHILS # BLD AUTO: 0.02 K/UL (ref 0–0.2)
BASOPHILS NFR BLD: 0.2 % (ref 0–1.9)
BASOPHILS NFR BLD: 0.2 % (ref 0–1.9)
BENZODIAZ UR QL SCN>200 NG/ML: NEGATIVE
BILIRUB SERPL-MCNC: 0.5 MG/DL (ref 0.1–1)
BILIRUB SERPL-MCNC: 0.6 MG/DL (ref 0.1–1)
BILIRUB UR QL STRIP: NEGATIVE
BLD GP AB SCN CELLS X3 SERPL QL: NORMAL
BLD GP AB SCN CELLS X3 SERPL QL: NORMAL
BLOOD GROUP ANTIBODIES SERPL: NORMAL
BUN SERPL-MCNC: 4 MG/DL (ref 6–20)
BUN SERPL-MCNC: 6 MG/DL (ref 6–20)
BUPRENORPHINE UR QL: NEGATIVE
BZE UR QL SCN: NEGATIVE
CALCIUM SERPL-MCNC: 8.9 MG/DL (ref 8.7–10.5)
CALCIUM SERPL-MCNC: 9 MG/DL (ref 8.7–10.5)
CANNABINOIDS UR QL SCN: NEGATIVE
CHLORIDE SERPL-SCNC: 102 MMOL/L (ref 95–110)
CHLORIDE SERPL-SCNC: 106 MMOL/L (ref 95–110)
CLARITY UR: ABNORMAL
CO2 SERPL-SCNC: 16 MMOL/L (ref 23–29)
CO2 SERPL-SCNC: 19 MMOL/L (ref 23–29)
COLOR UR: YELLOW
CREAT SERPL-MCNC: 0.4 MG/DL (ref 0.5–1.4)
CREAT SERPL-MCNC: 0.6 MG/DL (ref 0.5–1.4)
CREAT UR-MCNC: 44.5 MG/DL (ref 15–325)
CREAT UR-MCNC: 98 MG/DL (ref 15–325)
CTP QC/QA: YES
DIFFERENTIAL METHOD: ABNORMAL
DIFFERENTIAL METHOD: ABNORMAL
EOSINOPHIL # BLD AUTO: 0 K/UL (ref 0–0.5)
EOSINOPHIL # BLD AUTO: 0.1 K/UL (ref 0–0.5)
EOSINOPHIL NFR BLD: 0 % (ref 0–8)
EOSINOPHIL NFR BLD: 0.8 % (ref 0–8)
ERYTHROCYTE [DISTWIDTH] IN BLOOD BY AUTOMATED COUNT: 14.7 % (ref 11.5–14.5)
ERYTHROCYTE [DISTWIDTH] IN BLOOD BY AUTOMATED COUNT: 15 % (ref 11.5–14.5)
EST. GFR  (AFRICAN AMERICAN): >60 ML/MIN/1.73 M^2
EST. GFR  (AFRICAN AMERICAN): >60 ML/MIN/1.73 M^2
EST. GFR  (NON AFRICAN AMERICAN): >60 ML/MIN/1.73 M^2
EST. GFR  (NON AFRICAN AMERICAN): >60 ML/MIN/1.73 M^2
FIBRONECTIN FETAL SPEC QL: NEGATIVE
GLUCOSE SERPL-MCNC: 82 MG/DL (ref 70–110)
GLUCOSE SERPL-MCNC: 97 MG/DL (ref 70–110)
GLUCOSE UR QL STRIP: NEGATIVE
HCT VFR BLD AUTO: 30.1 % (ref 37–48.5)
HCT VFR BLD AUTO: 33.3 % (ref 37–48.5)
HGB BLD-MCNC: 10.6 G/DL (ref 12–16)
HGB BLD-MCNC: 9.7 G/DL (ref 12–16)
HGB UR QL STRIP: NEGATIVE
IMM GRANULOCYTES # BLD AUTO: 0.05 K/UL (ref 0–0.04)
IMM GRANULOCYTES # BLD AUTO: 0.05 K/UL (ref 0–0.04)
IMM GRANULOCYTES NFR BLD AUTO: 0.5 % (ref 0–0.5)
IMM GRANULOCYTES NFR BLD AUTO: 0.5 % (ref 0–0.5)
KETONES UR QL STRIP: NEGATIVE
LEUKOCYTE ESTERASE UR QL STRIP: NEGATIVE
LYMPHOCYTES # BLD AUTO: 0.7 K/UL (ref 1–4.8)
LYMPHOCYTES # BLD AUTO: 0.8 K/UL (ref 1–4.8)
LYMPHOCYTES NFR BLD: 5.9 % (ref 18–48)
LYMPHOCYTES NFR BLD: 8.7 % (ref 18–48)
MAGNESIUM SERPL-MCNC: 4.7 MG/DL (ref 1.6–2.6)
MCH RBC QN AUTO: 24.8 PG (ref 27–31)
MCH RBC QN AUTO: 25.3 PG (ref 27–31)
MCHC RBC AUTO-ENTMCNC: 31.8 G/DL (ref 32–36)
MCHC RBC AUTO-ENTMCNC: 32.2 G/DL (ref 32–36)
MCV RBC AUTO: 78 FL (ref 82–98)
MCV RBC AUTO: 78 FL (ref 82–98)
MONOCYTES # BLD AUTO: 0.4 K/UL (ref 0.3–1)
MONOCYTES # BLD AUTO: 0.5 K/UL (ref 0.3–1)
MONOCYTES NFR BLD: 3.4 % (ref 4–15)
MONOCYTES NFR BLD: 4.8 % (ref 4–15)
NEUTROPHILS # BLD AUTO: 10 K/UL (ref 1.8–7.7)
NEUTROPHILS # BLD AUTO: 7.9 K/UL (ref 1.8–7.7)
NEUTROPHILS NFR BLD: 85 % (ref 38–73)
NEUTROPHILS NFR BLD: 90 % (ref 38–73)
NITRITE UR QL STRIP: NEGATIVE
NRBC BLD-RTO: 0 /100 WBC
NRBC BLD-RTO: 0 /100 WBC
OPIATES UR QL SCN: NEGATIVE
PCP UR QL SCN>25 NG/ML: NEGATIVE
PH UR STRIP: 8 [PH] (ref 5–8)
PLATELET # BLD AUTO: 458 K/UL (ref 150–450)
PLATELET # BLD AUTO: 471 K/UL (ref 150–450)
PMV BLD AUTO: 10.1 FL (ref 9.2–12.9)
PMV BLD AUTO: 9.2 FL (ref 9.2–12.9)
POC PH, VAGINAL: NORMAL (ref 4.5–5.5)
POTASSIUM SERPL-SCNC: 4 MMOL/L (ref 3.5–5.1)
POTASSIUM SERPL-SCNC: 4.5 MMOL/L (ref 3.5–5.1)
PROT SERPL-MCNC: 7.8 G/DL (ref 6–8.4)
PROT SERPL-MCNC: 8.4 G/DL (ref 6–8.4)
PROT UR QL STRIP: ABNORMAL
PROT UR-MCNC: 13 MG/DL (ref 0–15)
PROT/CREAT UR: 0.29 MG/G{CREAT} (ref 0–0.2)
RBC # BLD AUTO: 3.84 M/UL (ref 4–5.4)
RBC # BLD AUTO: 4.27 M/UL (ref 4–5.4)
RPR SER QL: NORMAL
RUPTURE OF MEMBRANE: NEGATIVE
SARS-COV-2 RDRP RESP QL NAA+PROBE: NEGATIVE
SODIUM SERPL-SCNC: 131 MMOL/L (ref 136–145)
SODIUM SERPL-SCNC: 138 MMOL/L (ref 136–145)
SP GR UR STRIP: 1.02 (ref 1–1.03)
TOXICOLOGY INFORMATION: NORMAL
URN SPEC COLLECT METH UR: ABNORMAL
UROBILINOGEN UR STRIP-ACNC: NEGATIVE EU/DL
WBC # BLD AUTO: 11.08 K/UL (ref 3.9–12.7)
WBC # BLD AUTO: 9.33 K/UL (ref 3.9–12.7)

## 2021-06-24 PROCEDURE — 86900 BLOOD TYPING SEROLOGIC ABO: CPT | Mod: 91 | Performed by: STUDENT IN AN ORGANIZED HEALTH CARE EDUCATION/TRAINING PROGRAM

## 2021-06-24 PROCEDURE — 80307 DRUG TEST PRSMV CHEM ANLYZR: CPT | Performed by: OBSTETRICS & GYNECOLOGY

## 2021-06-24 PROCEDURE — 99222 PR INITIAL HOSPITAL CARE,LEVL II: ICD-10-PCS | Mod: 25,,, | Performed by: OBSTETRICS & GYNECOLOGY

## 2021-06-24 PROCEDURE — 86922 COMPATIBILITY TEST ANTIGLOB: CPT | Performed by: STUDENT IN AN ORGANIZED HEALTH CARE EDUCATION/TRAINING PROGRAM

## 2021-06-24 PROCEDURE — 80053 COMPREHEN METABOLIC PANEL: CPT | Performed by: OBSTETRICS & GYNECOLOGY

## 2021-06-24 PROCEDURE — 63600175 PHARM REV CODE 636 W HCPCS: Performed by: STUDENT IN AN ORGANIZED HEALTH CARE EDUCATION/TRAINING PROGRAM

## 2021-06-24 PROCEDURE — 83735 ASSAY OF MAGNESIUM: CPT | Performed by: OBSTETRICS & GYNECOLOGY

## 2021-06-24 PROCEDURE — 86870 RBC ANTIBODY IDENTIFICATION: CPT | Performed by: OBSTETRICS & GYNECOLOGY

## 2021-06-24 PROCEDURE — 11000001 HC ACUTE MED/SURG PRIVATE ROOM

## 2021-06-24 PROCEDURE — 84156 ASSAY OF PROTEIN URINE: CPT | Performed by: STUDENT IN AN ORGANIZED HEALTH CARE EDUCATION/TRAINING PROGRAM

## 2021-06-24 PROCEDURE — 86592 SYPHILIS TEST NON-TREP QUAL: CPT | Performed by: OBSTETRICS & GYNECOLOGY

## 2021-06-24 PROCEDURE — 99222 1ST HOSP IP/OBS MODERATE 55: CPT | Mod: 25,,, | Performed by: OBSTETRICS & GYNECOLOGY

## 2021-06-24 PROCEDURE — 85025 COMPLETE CBC W/AUTO DIFF WBC: CPT | Mod: 91 | Performed by: STUDENT IN AN ORGANIZED HEALTH CARE EDUCATION/TRAINING PROGRAM

## 2021-06-24 PROCEDURE — U0002 COVID-19 LAB TEST NON-CDC: HCPCS | Performed by: OBSTETRICS & GYNECOLOGY

## 2021-06-24 PROCEDURE — 86920 COMPATIBILITY TEST SPIN: CPT | Performed by: OBSTETRICS & GYNECOLOGY

## 2021-06-24 PROCEDURE — 85025 COMPLETE CBC W/AUTO DIFF WBC: CPT | Performed by: OBSTETRICS & GYNECOLOGY

## 2021-06-24 PROCEDURE — 25000003 PHARM REV CODE 250: Performed by: STUDENT IN AN ORGANIZED HEALTH CARE EDUCATION/TRAINING PROGRAM

## 2021-06-24 PROCEDURE — 36415 COLL VENOUS BLD VENIPUNCTURE: CPT | Performed by: OBSTETRICS & GYNECOLOGY

## 2021-06-24 PROCEDURE — 96372 THER/PROPH/DIAG INJ SC/IM: CPT

## 2021-06-24 PROCEDURE — 63600175 PHARM REV CODE 636 W HCPCS: Performed by: OBSTETRICS & GYNECOLOGY

## 2021-06-24 PROCEDURE — 86900 BLOOD TYPING SEROLOGIC ABO: CPT | Performed by: OBSTETRICS & GYNECOLOGY

## 2021-06-24 PROCEDURE — 82731 ASSAY OF FETAL FIBRONECTIN: CPT | Performed by: OBSTETRICS & GYNECOLOGY

## 2021-06-24 PROCEDURE — 36415 COLL VENOUS BLD VENIPUNCTURE: CPT | Performed by: STUDENT IN AN ORGANIZED HEALTH CARE EDUCATION/TRAINING PROGRAM

## 2021-06-24 PROCEDURE — 99211 OFF/OP EST MAY X REQ PHY/QHP: CPT | Mod: 25

## 2021-06-24 PROCEDURE — 80053 COMPREHEN METABOLIC PANEL: CPT | Mod: 91 | Performed by: STUDENT IN AN ORGANIZED HEALTH CARE EDUCATION/TRAINING PROGRAM

## 2021-06-24 PROCEDURE — 25000003 PHARM REV CODE 250: Performed by: OBSTETRICS & GYNECOLOGY

## 2021-06-24 PROCEDURE — 81003 URINALYSIS AUTO W/O SCOPE: CPT | Performed by: OBSTETRICS & GYNECOLOGY

## 2021-06-24 RX ORDER — HYDROCODONE BITARTRATE AND ACETAMINOPHEN 500; 5 MG/1; MG/1
TABLET ORAL
Status: DISCONTINUED | OUTPATIENT
Start: 2021-06-24 | End: 2021-06-24 | Stop reason: HOSPADM

## 2021-06-24 RX ORDER — MAGNESIUM SULFATE HEPTAHYDRATE 40 MG/ML
2 INJECTION, SOLUTION INTRAVENOUS CONTINUOUS
Status: DISCONTINUED | OUTPATIENT
Start: 2021-06-24 | End: 2021-06-24 | Stop reason: HOSPADM

## 2021-06-24 RX ORDER — ONDANSETRON 8 MG/1
8 TABLET, ORALLY DISINTEGRATING ORAL EVERY 8 HOURS PRN
Status: DISCONTINUED | OUTPATIENT
Start: 2021-06-24 | End: 2021-06-29 | Stop reason: HOSPADM

## 2021-06-24 RX ORDER — MAGNESIUM SULFATE HEPTAHYDRATE 40 MG/ML
2 INJECTION, SOLUTION INTRAVENOUS CONTINUOUS
Status: DISCONTINUED | OUTPATIENT
Start: 2021-06-24 | End: 2021-06-26

## 2021-06-24 RX ORDER — CALCIUM GLUCONATE 98 MG/ML
1 INJECTION, SOLUTION INTRAVENOUS
Status: DISCONTINUED | OUTPATIENT
Start: 2021-06-24 | End: 2021-06-29 | Stop reason: HOSPADM

## 2021-06-24 RX ORDER — SODIUM CHLORIDE, SODIUM LACTATE, POTASSIUM CHLORIDE, CALCIUM CHLORIDE 600; 310; 30; 20 MG/100ML; MG/100ML; MG/100ML; MG/100ML
INJECTION, SOLUTION INTRAVENOUS CONTINUOUS
Status: DISCONTINUED | OUTPATIENT
Start: 2021-06-24 | End: 2021-06-26

## 2021-06-24 RX ORDER — AMOXICILLIN 250 MG
1 CAPSULE ORAL NIGHTLY PRN
Status: DISCONTINUED | OUTPATIENT
Start: 2021-06-24 | End: 2021-06-29 | Stop reason: HOSPADM

## 2021-06-24 RX ORDER — DIPHENHYDRAMINE HYDROCHLORIDE 50 MG/ML
25 INJECTION INTRAMUSCULAR; INTRAVENOUS EVERY 4 HOURS PRN
Status: DISCONTINUED | OUTPATIENT
Start: 2021-06-24 | End: 2021-06-29 | Stop reason: HOSPADM

## 2021-06-24 RX ORDER — PRENATAL WITH FERROUS FUM AND FOLIC ACID 3080; 920; 120; 400; 22; 1.84; 3; 20; 10; 1; 12; 200; 27; 25; 2 [IU]/1; [IU]/1; MG/1; [IU]/1; MG/1; MG/1; MG/1; MG/1; MG/1; MG/1; UG/1; MG/1; MG/1; MG/1; MG/1
1 TABLET ORAL DAILY
Status: DISCONTINUED | OUTPATIENT
Start: 2021-06-25 | End: 2021-06-29 | Stop reason: HOSPADM

## 2021-06-24 RX ORDER — SODIUM CHLORIDE, SODIUM LACTATE, POTASSIUM CHLORIDE, CALCIUM CHLORIDE 600; 310; 30; 20 MG/100ML; MG/100ML; MG/100ML; MG/100ML
INJECTION, SOLUTION INTRAVENOUS CONTINUOUS
Status: DISCONTINUED | OUTPATIENT
Start: 2021-06-24 | End: 2021-06-24 | Stop reason: HOSPADM

## 2021-06-24 RX ORDER — HYDROCODONE BITARTRATE AND ACETAMINOPHEN 500; 5 MG/1; MG/1
TABLET ORAL
Status: DISCONTINUED | OUTPATIENT
Start: 2021-06-24 | End: 2021-06-29 | Stop reason: HOSPADM

## 2021-06-24 RX ORDER — BETAMETHASONE SODIUM PHOSPHATE AND BETAMETHASONE ACETATE 3; 3 MG/ML; MG/ML
12 INJECTION, SUSPENSION INTRA-ARTICULAR; INTRALESIONAL; INTRAMUSCULAR; SOFT TISSUE ONCE
Status: DISCONTINUED | OUTPATIENT
Start: 2021-06-25 | End: 2021-06-25

## 2021-06-24 RX ORDER — BETAMETHASONE SODIUM PHOSPHATE AND BETAMETHASONE ACETATE 3; 3 MG/ML; MG/ML
12 INJECTION, SUSPENSION INTRA-ARTICULAR; INTRALESIONAL; INTRAMUSCULAR; SOFT TISSUE
Status: DISCONTINUED | OUTPATIENT
Start: 2021-06-24 | End: 2021-06-24 | Stop reason: HOSPADM

## 2021-06-24 RX ORDER — SODIUM CHLORIDE 0.9 % (FLUSH) 0.9 %
10 SYRINGE (ML) INJECTION
Status: DISCONTINUED | OUTPATIENT
Start: 2021-06-24 | End: 2021-06-29 | Stop reason: HOSPADM

## 2021-06-24 RX ORDER — CALCIUM GLUCONATE 98 MG/ML
1 INJECTION, SOLUTION INTRAVENOUS
Status: DISCONTINUED | OUTPATIENT
Start: 2021-06-24 | End: 2021-06-24 | Stop reason: HOSPADM

## 2021-06-24 RX ORDER — SIMETHICONE 80 MG
1 TABLET,CHEWABLE ORAL EVERY 6 HOURS PRN
Status: DISCONTINUED | OUTPATIENT
Start: 2021-06-24 | End: 2021-06-29 | Stop reason: HOSPADM

## 2021-06-24 RX ORDER — PROCHLORPERAZINE EDISYLATE 5 MG/ML
5 INJECTION INTRAMUSCULAR; INTRAVENOUS EVERY 6 HOURS PRN
Status: DISCONTINUED | OUTPATIENT
Start: 2021-06-24 | End: 2021-06-29 | Stop reason: HOSPADM

## 2021-06-24 RX ORDER — DIPHENHYDRAMINE HCL 25 MG
25 CAPSULE ORAL EVERY 4 HOURS PRN
Status: DISCONTINUED | OUTPATIENT
Start: 2021-06-24 | End: 2021-06-29 | Stop reason: HOSPADM

## 2021-06-24 RX ORDER — BUTORPHANOL TARTRATE 2 MG/ML
1 INJECTION INTRAMUSCULAR; INTRAVENOUS ONCE
Status: COMPLETED | OUTPATIENT
Start: 2021-06-24 | End: 2021-06-24

## 2021-06-24 RX ORDER — CLINDAMYCIN PHOSPHATE 900 MG/50ML
900 INJECTION, SOLUTION INTRAVENOUS
Status: DISCONTINUED | OUTPATIENT
Start: 2021-06-24 | End: 2021-06-24 | Stop reason: HOSPADM

## 2021-06-24 RX ORDER — ACETAMINOPHEN 325 MG/1
650 TABLET ORAL EVERY 6 HOURS PRN
Status: DISCONTINUED | OUTPATIENT
Start: 2021-06-24 | End: 2021-06-29 | Stop reason: HOSPADM

## 2021-06-24 RX ORDER — TERBUTALINE SULFATE 1 MG/ML
0.25 INJECTION SUBCUTANEOUS ONCE
Status: COMPLETED | OUTPATIENT
Start: 2021-06-24 | End: 2021-06-24

## 2021-06-24 RX ORDER — MAGNESIUM SULFATE HEPTAHYDRATE 40 MG/ML
4 INJECTION, SOLUTION INTRAVENOUS ONCE
Status: COMPLETED | OUTPATIENT
Start: 2021-06-24 | End: 2021-06-24

## 2021-06-24 RX ORDER — BUTORPHANOL TARTRATE 2 MG/ML
1 INJECTION INTRAMUSCULAR; INTRAVENOUS ONCE AS NEEDED
Status: COMPLETED | OUTPATIENT
Start: 2021-06-24 | End: 2021-06-24

## 2021-06-24 RX ADMIN — MAGNESIUM SULFATE IN WATER 2 G/HR: 40 INJECTION, SOLUTION INTRAVENOUS at 10:06

## 2021-06-24 RX ADMIN — SODIUM CHLORIDE, SODIUM LACTATE, POTASSIUM CHLORIDE, AND CALCIUM CHLORIDE: .6; .31; .03; .02 INJECTION, SOLUTION INTRAVENOUS at 01:06

## 2021-06-24 RX ADMIN — BUTORPHANOL TARTRATE 1 MG: 2 INJECTION, SOLUTION INTRAMUSCULAR; INTRAVENOUS at 10:06

## 2021-06-24 RX ADMIN — MAGNESIUM SULFATE IN WATER 2 G/HR: 40 INJECTION, SOLUTION INTRAVENOUS at 08:06

## 2021-06-24 RX ADMIN — TERBUTALINE SULFATE 0.25 MG: 1 INJECTION SUBCUTANEOUS at 08:06

## 2021-06-24 RX ADMIN — SODIUM CHLORIDE, SODIUM LACTATE, POTASSIUM CHLORIDE, AND CALCIUM CHLORIDE 1000 ML: .6; .31; .03; .02 INJECTION, SOLUTION INTRAVENOUS at 08:06

## 2021-06-24 RX ADMIN — ACETAMINOPHEN 650 MG: 325 TABLET, FILM COATED ORAL at 08:06

## 2021-06-24 RX ADMIN — PROMETHAZINE HYDROCHLORIDE 12.5 MG: 25 INJECTION INTRAMUSCULAR; INTRAVENOUS at 10:06

## 2021-06-24 RX ADMIN — MAGNESIUM SULFATE HEPTAHYDRATE 4 G: 40 INJECTION, SOLUTION INTRAVENOUS at 09:06

## 2021-06-24 RX ADMIN — SODIUM CHLORIDE, SODIUM LACTATE, POTASSIUM CHLORIDE, AND CALCIUM CHLORIDE: .6; .31; .03; .02 INJECTION, SOLUTION INTRAVENOUS at 10:06

## 2021-06-24 RX ADMIN — CLINDAMYCIN IN 5 PERCENT DEXTROSE 900 MG: 18 INJECTION, SOLUTION INTRAVENOUS at 09:06

## 2021-06-24 RX ADMIN — BETAMETHASONE ACETATE AND BETAMETHASONE SODIUM PHOSPHATE 12 MG: 3; 3 INJECTION, SUSPENSION INTRA-ARTICULAR; INTRALESIONAL; INTRAMUSCULAR; SOFT TISSUE at 09:06

## 2021-06-25 LAB
ALBUMIN SERPL BCP-MCNC: 2.1 G/DL (ref 3.5–5.2)
ALP SERPL-CCNC: 185 U/L (ref 55–135)
ALT SERPL W/O P-5'-P-CCNC: 81 U/L (ref 10–44)
ANION GAP SERPL CALC-SCNC: 13 MMOL/L (ref 8–16)
AST SERPL-CCNC: 45 U/L (ref 10–40)
BASOPHILS # BLD AUTO: 0.01 K/UL (ref 0–0.2)
BASOPHILS NFR BLD: 0.1 % (ref 0–1.9)
BILIRUB SERPL-MCNC: 0.6 MG/DL (ref 0.1–1)
BUN SERPL-MCNC: 5 MG/DL (ref 6–20)
CALCIUM SERPL-MCNC: 8.5 MG/DL (ref 8.7–10.5)
CHLORIDE SERPL-SCNC: 104 MMOL/L (ref 95–110)
CO2 SERPL-SCNC: 18 MMOL/L (ref 23–29)
CREAT SERPL-MCNC: 0.6 MG/DL (ref 0.5–1.4)
DIFFERENTIAL METHOD: ABNORMAL
EOSINOPHIL # BLD AUTO: 0 K/UL (ref 0–0.5)
EOSINOPHIL NFR BLD: 0.2 % (ref 0–8)
ERYTHROCYTE [DISTWIDTH] IN BLOOD BY AUTOMATED COUNT: 15.2 % (ref 11.5–14.5)
EST. GFR  (AFRICAN AMERICAN): >60 ML/MIN/1.73 M^2
EST. GFR  (NON AFRICAN AMERICAN): >60 ML/MIN/1.73 M^2
GLUCOSE SERPL-MCNC: 101 MG/DL (ref 70–110)
HCT VFR BLD AUTO: 29.4 % (ref 37–48.5)
HGB BLD-MCNC: 9.2 G/DL (ref 12–16)
IMM GRANULOCYTES # BLD AUTO: 0.05 K/UL (ref 0–0.04)
IMM GRANULOCYTES NFR BLD AUTO: 0.6 % (ref 0–0.5)
LYMPHOCYTES # BLD AUTO: 0.9 K/UL (ref 1–4.8)
LYMPHOCYTES NFR BLD: 10 % (ref 18–48)
MAGNESIUM SERPL-MCNC: 4.2 MG/DL (ref 1.6–2.6)
MCH RBC QN AUTO: 24.8 PG (ref 27–31)
MCHC RBC AUTO-ENTMCNC: 31.3 G/DL (ref 32–36)
MCV RBC AUTO: 79 FL (ref 82–98)
MONOCYTES # BLD AUTO: 0.5 K/UL (ref 0.3–1)
MONOCYTES NFR BLD: 5.6 % (ref 4–15)
NEUTROPHILS # BLD AUTO: 7.1 K/UL (ref 1.8–7.7)
NEUTROPHILS NFR BLD: 83.5 % (ref 38–73)
NRBC BLD-RTO: 0 /100 WBC
PLATELET # BLD AUTO: 451 K/UL (ref 150–450)
PMV BLD AUTO: 9.3 FL (ref 9.2–12.9)
POTASSIUM SERPL-SCNC: 3.9 MMOL/L (ref 3.5–5.1)
PROT SERPL-MCNC: 7.8 G/DL (ref 6–8.4)
RBC # BLD AUTO: 3.71 M/UL (ref 4–5.4)
SODIUM SERPL-SCNC: 135 MMOL/L (ref 136–145)
WBC # BLD AUTO: 8.51 K/UL (ref 3.9–12.7)

## 2021-06-25 PROCEDURE — 11000001 HC ACUTE MED/SURG PRIVATE ROOM

## 2021-06-25 PROCEDURE — 59025 PR FETAL 2N-STRESS TEST: ICD-10-PCS | Mod: 26,,, | Performed by: OBSTETRICS & GYNECOLOGY

## 2021-06-25 PROCEDURE — 84156 ASSAY OF PROTEIN URINE: CPT | Performed by: OBSTETRICS & GYNECOLOGY

## 2021-06-25 PROCEDURE — 25000003 PHARM REV CODE 250: Performed by: STUDENT IN AN ORGANIZED HEALTH CARE EDUCATION/TRAINING PROGRAM

## 2021-06-25 PROCEDURE — 63600175 PHARM REV CODE 636 W HCPCS: Performed by: STUDENT IN AN ORGANIZED HEALTH CARE EDUCATION/TRAINING PROGRAM

## 2021-06-25 PROCEDURE — 99232 PR SUBSEQUENT HOSPITAL CARE,LEVL II: ICD-10-PCS | Mod: ,,, | Performed by: OBSTETRICS & GYNECOLOGY

## 2021-06-25 PROCEDURE — 59025 FETAL NON-STRESS TEST: CPT | Mod: 26,,, | Performed by: OBSTETRICS & GYNECOLOGY

## 2021-06-25 PROCEDURE — 99232 SBSQ HOSP IP/OBS MODERATE 35: CPT | Mod: ,,, | Performed by: OBSTETRICS & GYNECOLOGY

## 2021-06-25 PROCEDURE — 80053 COMPREHEN METABOLIC PANEL: CPT | Performed by: OBSTETRICS & GYNECOLOGY

## 2021-06-25 PROCEDURE — 85025 COMPLETE CBC W/AUTO DIFF WBC: CPT | Performed by: STUDENT IN AN ORGANIZED HEALTH CARE EDUCATION/TRAINING PROGRAM

## 2021-06-25 PROCEDURE — 83735 ASSAY OF MAGNESIUM: CPT | Performed by: OBSTETRICS & GYNECOLOGY

## 2021-06-25 PROCEDURE — 36415 COLL VENOUS BLD VENIPUNCTURE: CPT | Performed by: STUDENT IN AN ORGANIZED HEALTH CARE EDUCATION/TRAINING PROGRAM

## 2021-06-25 RX ORDER — BETAMETHASONE SODIUM PHOSPHATE AND BETAMETHASONE ACETATE 3; 3 MG/ML; MG/ML
12 INJECTION, SUSPENSION INTRA-ARTICULAR; INTRALESIONAL; INTRAMUSCULAR; SOFT TISSUE ONCE
Status: COMPLETED | OUTPATIENT
Start: 2021-06-25 | End: 2021-06-25

## 2021-06-25 RX ORDER — INDOMETHACIN 25 MG/1
50 CAPSULE ORAL EVERY 8 HOURS
Status: COMPLETED | OUTPATIENT
Start: 2021-06-25 | End: 2021-06-27

## 2021-06-25 RX ADMIN — INDOMETHACIN 50 MG: 25 CAPSULE ORAL at 10:06

## 2021-06-25 RX ADMIN — INDOMETHACIN 50 MG: 25 CAPSULE ORAL at 09:06

## 2021-06-25 RX ADMIN — STANDARDIZED SENNA CONCENTRATE AND DOCUSATE SODIUM 1 TABLET: 8.6; 5 TABLET ORAL at 09:06

## 2021-06-25 RX ADMIN — BETAMETHASONE SODIUM PHOSPHATE AND BETAMETHASONE ACETATE 12 MG: 3; 3 INJECTION, SUSPENSION INTRA-ARTICULAR; INTRALESIONAL; INTRAMUSCULAR at 01:06

## 2021-06-25 RX ADMIN — MAGNESIUM SULFATE IN WATER 2 G/HR: 40 INJECTION, SOLUTION INTRAVENOUS at 04:06

## 2021-06-25 RX ADMIN — MAGNESIUM SULFATE IN WATER 2 G/HR: 40 INJECTION, SOLUTION INTRAVENOUS at 05:06

## 2021-06-26 LAB
ALBUMIN SERPL BCP-MCNC: 2.1 G/DL (ref 3.5–5.2)
ALP SERPL-CCNC: 180 U/L (ref 55–135)
ALT SERPL W/O P-5'-P-CCNC: 77 U/L (ref 10–44)
ANION GAP SERPL CALC-SCNC: 15 MMOL/L (ref 8–16)
AST SERPL-CCNC: 37 U/L (ref 10–40)
BASOPHILS # BLD AUTO: 0.01 K/UL (ref 0–0.2)
BASOPHILS NFR BLD: 0.1 % (ref 0–1.9)
BILIRUB SERPL-MCNC: 0.7 MG/DL (ref 0.1–1)
BUN SERPL-MCNC: 6 MG/DL (ref 6–20)
CALCIUM SERPL-MCNC: 8.9 MG/DL (ref 8.7–10.5)
CHLORIDE SERPL-SCNC: 103 MMOL/L (ref 95–110)
CO2 SERPL-SCNC: 16 MMOL/L (ref 23–29)
CREAT SERPL-MCNC: 0.6 MG/DL (ref 0.5–1.4)
DIFFERENTIAL METHOD: ABNORMAL
EOSINOPHIL # BLD AUTO: 0 K/UL (ref 0–0.5)
EOSINOPHIL NFR BLD: 0.1 % (ref 0–8)
ERYTHROCYTE [DISTWIDTH] IN BLOOD BY AUTOMATED COUNT: 15.1 % (ref 11.5–14.5)
EST. GFR  (AFRICAN AMERICAN): >60 ML/MIN/1.73 M^2
EST. GFR  (NON AFRICAN AMERICAN): >60 ML/MIN/1.73 M^2
GLUCOSE SERPL-MCNC: 89 MG/DL (ref 70–110)
HCT VFR BLD AUTO: 29.8 % (ref 37–48.5)
HGB BLD-MCNC: 9.5 G/DL (ref 12–16)
IMM GRANULOCYTES # BLD AUTO: 0.07 K/UL (ref 0–0.04)
IMM GRANULOCYTES NFR BLD AUTO: 0.7 % (ref 0–0.5)
LYMPHOCYTES # BLD AUTO: 0.9 K/UL (ref 1–4.8)
LYMPHOCYTES NFR BLD: 8.7 % (ref 18–48)
MAGNESIUM SERPL-MCNC: 5 MG/DL (ref 1.6–2.6)
MCH RBC QN AUTO: 25.1 PG (ref 27–31)
MCHC RBC AUTO-ENTMCNC: 31.9 G/DL (ref 32–36)
MCV RBC AUTO: 79 FL (ref 82–98)
MONOCYTES # BLD AUTO: 0.4 K/UL (ref 0.3–1)
MONOCYTES NFR BLD: 4 % (ref 4–15)
NEUTROPHILS # BLD AUTO: 8.4 K/UL (ref 1.8–7.7)
NEUTROPHILS NFR BLD: 86.4 % (ref 38–73)
NRBC BLD-RTO: 0 /100 WBC
PLATELET # BLD AUTO: 460 K/UL (ref 150–450)
PMV BLD AUTO: 9 FL (ref 9.2–12.9)
POTASSIUM SERPL-SCNC: 3.9 MMOL/L (ref 3.5–5.1)
PROT 24H UR-MRATE: 258 MG/SPEC (ref 0–100)
PROT SERPL-MCNC: 7.8 G/DL (ref 6–8.4)
PROT UR-MCNC: 12 MG/DL (ref 0–15)
RBC # BLD AUTO: 3.79 M/UL (ref 4–5.4)
SODIUM SERPL-SCNC: 134 MMOL/L (ref 136–145)
URINE COLLECTION DURATION: 24 HR
URINE VOLUME: 2150 ML
WBC # BLD AUTO: 9.77 K/UL (ref 3.9–12.7)

## 2021-06-26 PROCEDURE — 59025 FETAL NON-STRESS TEST: CPT | Mod: 26,,, | Performed by: OBSTETRICS & GYNECOLOGY

## 2021-06-26 PROCEDURE — 25000003 PHARM REV CODE 250: Performed by: STUDENT IN AN ORGANIZED HEALTH CARE EDUCATION/TRAINING PROGRAM

## 2021-06-26 PROCEDURE — 85025 COMPLETE CBC W/AUTO DIFF WBC: CPT | Performed by: STUDENT IN AN ORGANIZED HEALTH CARE EDUCATION/TRAINING PROGRAM

## 2021-06-26 PROCEDURE — 99232 SBSQ HOSP IP/OBS MODERATE 35: CPT | Mod: 25,,, | Performed by: OBSTETRICS & GYNECOLOGY

## 2021-06-26 PROCEDURE — 80053 COMPREHEN METABOLIC PANEL: CPT | Performed by: STUDENT IN AN ORGANIZED HEALTH CARE EDUCATION/TRAINING PROGRAM

## 2021-06-26 PROCEDURE — 36415 COLL VENOUS BLD VENIPUNCTURE: CPT | Performed by: STUDENT IN AN ORGANIZED HEALTH CARE EDUCATION/TRAINING PROGRAM

## 2021-06-26 PROCEDURE — 83735 ASSAY OF MAGNESIUM: CPT | Performed by: STUDENT IN AN ORGANIZED HEALTH CARE EDUCATION/TRAINING PROGRAM

## 2021-06-26 PROCEDURE — 11000001 HC ACUTE MED/SURG PRIVATE ROOM

## 2021-06-26 PROCEDURE — 99232 PR SUBSEQUENT HOSPITAL CARE,LEVL II: ICD-10-PCS | Mod: 25,,, | Performed by: OBSTETRICS & GYNECOLOGY

## 2021-06-26 PROCEDURE — 59025 PR FETAL 2N-STRESS TEST: ICD-10-PCS | Mod: 26,,, | Performed by: OBSTETRICS & GYNECOLOGY

## 2021-06-26 RX ORDER — POLYETHYLENE GLYCOL 3350 17 G/17G
17 POWDER, FOR SOLUTION ORAL DAILY PRN
Status: DISCONTINUED | OUTPATIENT
Start: 2021-06-26 | End: 2021-06-29 | Stop reason: HOSPADM

## 2021-06-26 RX ORDER — INDOMETHACIN 25 MG/1
50 CAPSULE ORAL ONCE
Status: COMPLETED | OUTPATIENT
Start: 2021-06-26 | End: 2021-06-26

## 2021-06-26 RX ADMIN — INDOMETHACIN 50 MG: 25 CAPSULE ORAL at 06:06

## 2021-06-26 RX ADMIN — INDOMETHACIN 50 MG: 25 CAPSULE ORAL at 10:06

## 2021-06-26 RX ADMIN — INDOMETHACIN 50 MG: 25 CAPSULE ORAL at 09:06

## 2021-06-26 RX ADMIN — POLYETHYLENE GLYCOL 3350 17 G: 17 POWDER, FOR SOLUTION ORAL at 09:06

## 2021-06-26 RX ADMIN — PRENATAL VIT W/ FE FUMARATE-FA TAB 27-0.8 MG 1 TABLET: 27-0.8 TAB at 09:06

## 2021-06-26 RX ADMIN — INDOMETHACIN 50 MG: 25 CAPSULE ORAL at 01:06

## 2021-06-27 LAB
ALBUMIN SERPL BCP-MCNC: 1.9 G/DL (ref 3.5–5.2)
ALP SERPL-CCNC: 150 U/L (ref 55–135)
ALT SERPL W/O P-5'-P-CCNC: 55 U/L (ref 10–44)
ANION GAP SERPL CALC-SCNC: 10 MMOL/L (ref 8–16)
AST SERPL-CCNC: 27 U/L (ref 10–40)
BASOPHILS # BLD AUTO: 0.02 K/UL (ref 0–0.2)
BASOPHILS NFR BLD: 0.3 % (ref 0–1.9)
BILIRUB SERPL-MCNC: 0.4 MG/DL (ref 0.1–1)
BUN SERPL-MCNC: 5 MG/DL (ref 6–20)
CALCIUM SERPL-MCNC: 9.5 MG/DL (ref 8.7–10.5)
CHLORIDE SERPL-SCNC: 105 MMOL/L (ref 95–110)
CO2 SERPL-SCNC: 22 MMOL/L (ref 23–29)
CREAT SERPL-MCNC: 0.6 MG/DL (ref 0.5–1.4)
DIFFERENTIAL METHOD: ABNORMAL
EOSINOPHIL # BLD AUTO: 0.1 K/UL (ref 0–0.5)
EOSINOPHIL NFR BLD: 1 % (ref 0–8)
ERYTHROCYTE [DISTWIDTH] IN BLOOD BY AUTOMATED COUNT: 14.7 % (ref 11.5–14.5)
EST. GFR  (AFRICAN AMERICAN): >60 ML/MIN/1.73 M^2
EST. GFR  (NON AFRICAN AMERICAN): >60 ML/MIN/1.73 M^2
GLUCOSE SERPL-MCNC: 103 MG/DL (ref 70–110)
HCT VFR BLD AUTO: 27.3 % (ref 37–48.5)
HGB BLD-MCNC: 8.7 G/DL (ref 12–16)
IMM GRANULOCYTES # BLD AUTO: 0.04 K/UL (ref 0–0.04)
IMM GRANULOCYTES NFR BLD AUTO: 0.6 % (ref 0–0.5)
LYMPHOCYTES # BLD AUTO: 0.8 K/UL (ref 1–4.8)
LYMPHOCYTES NFR BLD: 12.4 % (ref 18–48)
MCH RBC QN AUTO: 25.1 PG (ref 27–31)
MCHC RBC AUTO-ENTMCNC: 31.9 G/DL (ref 32–36)
MCV RBC AUTO: 79 FL (ref 82–98)
MONOCYTES # BLD AUTO: 0.4 K/UL (ref 0.3–1)
MONOCYTES NFR BLD: 7 % (ref 4–15)
NEUTROPHILS # BLD AUTO: 4.9 K/UL (ref 1.8–7.7)
NEUTROPHILS NFR BLD: 78.7 % (ref 38–73)
NRBC BLD-RTO: 0 /100 WBC
PLATELET # BLD AUTO: 459 K/UL (ref 150–450)
PMV BLD AUTO: 9.4 FL (ref 9.2–12.9)
POTASSIUM SERPL-SCNC: 3.7 MMOL/L (ref 3.5–5.1)
PROT SERPL-MCNC: 6.9 G/DL (ref 6–8.4)
RBC # BLD AUTO: 3.47 M/UL (ref 4–5.4)
SODIUM SERPL-SCNC: 137 MMOL/L (ref 136–145)
WBC # BLD AUTO: 6.27 K/UL (ref 3.9–12.7)

## 2021-06-27 PROCEDURE — 11000001 HC ACUTE MED/SURG PRIVATE ROOM

## 2021-06-27 PROCEDURE — 25000003 PHARM REV CODE 250: Performed by: STUDENT IN AN ORGANIZED HEALTH CARE EDUCATION/TRAINING PROGRAM

## 2021-06-27 PROCEDURE — 36415 COLL VENOUS BLD VENIPUNCTURE: CPT | Performed by: STUDENT IN AN ORGANIZED HEALTH CARE EDUCATION/TRAINING PROGRAM

## 2021-06-27 PROCEDURE — 80053 COMPREHEN METABOLIC PANEL: CPT | Performed by: STUDENT IN AN ORGANIZED HEALTH CARE EDUCATION/TRAINING PROGRAM

## 2021-06-27 PROCEDURE — 85025 COMPLETE CBC W/AUTO DIFF WBC: CPT | Performed by: STUDENT IN AN ORGANIZED HEALTH CARE EDUCATION/TRAINING PROGRAM

## 2021-06-27 PROCEDURE — 59025 FETAL NON-STRESS TEST: CPT | Mod: 26,,, | Performed by: OBSTETRICS & GYNECOLOGY

## 2021-06-27 PROCEDURE — 99232 PR SUBSEQUENT HOSPITAL CARE,LEVL II: ICD-10-PCS | Mod: 25,,, | Performed by: OBSTETRICS & GYNECOLOGY

## 2021-06-27 PROCEDURE — 99232 SBSQ HOSP IP/OBS MODERATE 35: CPT | Mod: 25,,, | Performed by: OBSTETRICS & GYNECOLOGY

## 2021-06-27 PROCEDURE — 59025 PR FETAL 2N-STRESS TEST: ICD-10-PCS | Mod: 26,,, | Performed by: OBSTETRICS & GYNECOLOGY

## 2021-06-27 RX ORDER — NAPROXEN SODIUM 220 MG/1
81 TABLET, FILM COATED ORAL DAILY
Status: DISCONTINUED | OUTPATIENT
Start: 2021-06-27 | End: 2021-06-29 | Stop reason: HOSPADM

## 2021-06-27 RX ORDER — IRON POLYSACCHARIDE COMPLEX 150 MG
150 CAPSULE ORAL DAILY
Status: DISCONTINUED | OUTPATIENT
Start: 2021-06-27 | End: 2021-06-29 | Stop reason: HOSPADM

## 2021-06-27 RX ORDER — DOCUSATE SODIUM 100 MG/1
100 CAPSULE, LIQUID FILLED ORAL ONCE
Status: COMPLETED | OUTPATIENT
Start: 2021-06-27 | End: 2021-06-27

## 2021-06-27 RX ADMIN — Medication 150 MG: at 09:06

## 2021-06-27 RX ADMIN — PRENATAL VIT W/ FE FUMARATE-FA TAB 27-0.8 MG 1 TABLET: 27-0.8 TAB at 09:06

## 2021-06-27 RX ADMIN — INDOMETHACIN 50 MG: 25 CAPSULE ORAL at 05:06

## 2021-06-27 RX ADMIN — DOCUSATE SODIUM 100 MG: 100 CAPSULE, LIQUID FILLED ORAL at 09:06

## 2021-06-27 RX ADMIN — ASPIRIN 81 MG CHEWABLE TABLET 81 MG: 81 TABLET CHEWABLE at 09:06

## 2021-06-28 PROBLEM — R74.01 TRANSAMINITIS: Status: ACTIVE | Noted: 2021-06-28

## 2021-06-28 LAB
ABO + RH BLD: NORMAL
ALBUMIN SERPL BCP-MCNC: 1.8 G/DL (ref 3.5–5.2)
ALP SERPL-CCNC: 162 U/L (ref 55–135)
ALT SERPL W/O P-5'-P-CCNC: 46 U/L (ref 10–44)
ANION GAP SERPL CALC-SCNC: 10 MMOL/L (ref 8–16)
AST SERPL-CCNC: 24 U/L (ref 10–40)
BASOPHILS # BLD AUTO: 0.02 K/UL (ref 0–0.2)
BASOPHILS NFR BLD: 0.3 % (ref 0–1.9)
BILIRUB SERPL-MCNC: 0.5 MG/DL (ref 0.1–1)
BLD GP AB SCN CELLS X3 SERPL QL: NORMAL
BLD PROD TYP BPU: NORMAL
BLOOD UNIT EXPIRATION DATE: NORMAL
BLOOD UNIT TYPE CODE: 1700
BLOOD UNIT TYPE CODE: 9500
BLOOD UNIT TYPE: NORMAL
BUN SERPL-MCNC: 4 MG/DL (ref 6–20)
CALCIUM SERPL-MCNC: 9.5 MG/DL (ref 8.7–10.5)
CHLORIDE SERPL-SCNC: 106 MMOL/L (ref 95–110)
CO2 SERPL-SCNC: 21 MMOL/L (ref 23–29)
CODING SYSTEM: NORMAL
CREAT SERPL-MCNC: 0.6 MG/DL (ref 0.5–1.4)
DIFFERENTIAL METHOD: ABNORMAL
DISPENSE STATUS: NORMAL
EOSINOPHIL # BLD AUTO: 0.1 K/UL (ref 0–0.5)
EOSINOPHIL NFR BLD: 0.8 % (ref 0–8)
ERYTHROCYTE [DISTWIDTH] IN BLOOD BY AUTOMATED COUNT: 14.8 % (ref 11.5–14.5)
EST. GFR  (AFRICAN AMERICAN): >60 ML/MIN/1.73 M^2
EST. GFR  (NON AFRICAN AMERICAN): >60 ML/MIN/1.73 M^2
GLUCOSE SERPL-MCNC: 95 MG/DL (ref 70–110)
HCT VFR BLD AUTO: 28.7 % (ref 37–48.5)
HGB BLD-MCNC: 9.1 G/DL (ref 12–16)
IMM GRANULOCYTES # BLD AUTO: 0.07 K/UL (ref 0–0.04)
IMM GRANULOCYTES NFR BLD AUTO: 0.9 % (ref 0–0.5)
LYMPHOCYTES # BLD AUTO: 0.7 K/UL (ref 1–4.8)
LYMPHOCYTES NFR BLD: 9.8 % (ref 18–48)
MCH RBC QN AUTO: 24.8 PG (ref 27–31)
MCHC RBC AUTO-ENTMCNC: 31.7 G/DL (ref 32–36)
MCV RBC AUTO: 78 FL (ref 82–98)
MONOCYTES # BLD AUTO: 0.5 K/UL (ref 0.3–1)
MONOCYTES NFR BLD: 6.1 % (ref 4–15)
NEUTROPHILS # BLD AUTO: 6.2 K/UL (ref 1.8–7.7)
NEUTROPHILS NFR BLD: 82.1 % (ref 38–73)
NRBC BLD-RTO: 0 /100 WBC
NUM UNITS TRANS PACKED RBC: NORMAL
PLATELET # BLD AUTO: 469 K/UL (ref 150–450)
PMV BLD AUTO: 9.2 FL (ref 9.2–12.9)
POTASSIUM SERPL-SCNC: 3.9 MMOL/L (ref 3.5–5.1)
PROT SERPL-MCNC: 7.2 G/DL (ref 6–8.4)
RBC # BLD AUTO: 3.67 M/UL (ref 4–5.4)
SODIUM SERPL-SCNC: 137 MMOL/L (ref 136–145)
WBC # BLD AUTO: 7.54 K/UL (ref 3.9–12.7)

## 2021-06-28 PROCEDURE — 86900 BLOOD TYPING SEROLOGIC ABO: CPT | Performed by: STUDENT IN AN ORGANIZED HEALTH CARE EDUCATION/TRAINING PROGRAM

## 2021-06-28 PROCEDURE — 59025 FETAL NON-STRESS TEST: CPT | Mod: 26,,, | Performed by: OBSTETRICS & GYNECOLOGY

## 2021-06-28 PROCEDURE — 25000003 PHARM REV CODE 250: Performed by: STUDENT IN AN ORGANIZED HEALTH CARE EDUCATION/TRAINING PROGRAM

## 2021-06-28 PROCEDURE — 99232 SBSQ HOSP IP/OBS MODERATE 35: CPT | Mod: 25,,, | Performed by: OBSTETRICS & GYNECOLOGY

## 2021-06-28 PROCEDURE — 99232 PR SUBSEQUENT HOSPITAL CARE,LEVL II: ICD-10-PCS | Mod: 25,,, | Performed by: OBSTETRICS & GYNECOLOGY

## 2021-06-28 PROCEDURE — 36415 COLL VENOUS BLD VENIPUNCTURE: CPT | Performed by: STUDENT IN AN ORGANIZED HEALTH CARE EDUCATION/TRAINING PROGRAM

## 2021-06-28 PROCEDURE — 59025 PR FETAL 2N-STRESS TEST: ICD-10-PCS | Mod: 26,,, | Performed by: OBSTETRICS & GYNECOLOGY

## 2021-06-28 PROCEDURE — 80074 ACUTE HEPATITIS PANEL: CPT | Performed by: STUDENT IN AN ORGANIZED HEALTH CARE EDUCATION/TRAINING PROGRAM

## 2021-06-28 PROCEDURE — 86922 COMPATIBILITY TEST ANTIGLOB: CPT | Performed by: STUDENT IN AN ORGANIZED HEALTH CARE EDUCATION/TRAINING PROGRAM

## 2021-06-28 PROCEDURE — 85025 COMPLETE CBC W/AUTO DIFF WBC: CPT | Performed by: STUDENT IN AN ORGANIZED HEALTH CARE EDUCATION/TRAINING PROGRAM

## 2021-06-28 PROCEDURE — 11000001 HC ACUTE MED/SURG PRIVATE ROOM

## 2021-06-28 PROCEDURE — 80053 COMPREHEN METABOLIC PANEL: CPT | Performed by: STUDENT IN AN ORGANIZED HEALTH CARE EDUCATION/TRAINING PROGRAM

## 2021-06-28 RX ORDER — IBUPROFEN 600 MG/1
600 TABLET ORAL ONCE
Status: COMPLETED | OUTPATIENT
Start: 2021-06-28 | End: 2021-06-28

## 2021-06-28 RX ORDER — DOCUSATE SODIUM 100 MG/1
100 CAPSULE, LIQUID FILLED ORAL DAILY
Status: DISCONTINUED | OUTPATIENT
Start: 2021-06-28 | End: 2021-06-29 | Stop reason: HOSPADM

## 2021-06-28 RX ADMIN — Medication 150 MG: at 09:06

## 2021-06-28 RX ADMIN — PRENATAL VIT W/ FE FUMARATE-FA TAB 27-0.8 MG 1 TABLET: 27-0.8 TAB at 09:06

## 2021-06-28 RX ADMIN — DOCUSATE SODIUM 100 MG: 100 CAPSULE, LIQUID FILLED ORAL at 09:06

## 2021-06-28 RX ADMIN — ASPIRIN 81 MG CHEWABLE TABLET 81 MG: 81 TABLET CHEWABLE at 09:06

## 2021-06-28 RX ADMIN — IBUPROFEN 600 MG: 600 TABLET ORAL at 05:06

## 2021-06-29 ENCOUNTER — TELEPHONE (OUTPATIENT)
Dept: OBSTETRICS AND GYNECOLOGY | Facility: CLINIC | Age: 36
End: 2021-06-29

## 2021-06-29 VITALS
RESPIRATION RATE: 16 BRPM | HEART RATE: 95 BPM | HEIGHT: 62 IN | SYSTOLIC BLOOD PRESSURE: 120 MMHG | WEIGHT: 155.44 LBS | TEMPERATURE: 98 F | OXYGEN SATURATION: 100 % | BODY MASS INDEX: 28.61 KG/M2 | DIASTOLIC BLOOD PRESSURE: 71 MMHG

## 2021-06-29 LAB
ALBUMIN SERPL BCP-MCNC: 1.8 G/DL (ref 3.5–5.2)
ALP SERPL-CCNC: 164 U/L (ref 55–135)
ALT SERPL W/O P-5'-P-CCNC: 39 U/L (ref 10–44)
ANION GAP SERPL CALC-SCNC: 10 MMOL/L (ref 8–16)
AST SERPL-CCNC: 21 U/L (ref 10–40)
BASOPHILS # BLD AUTO: 0.03 K/UL (ref 0–0.2)
BASOPHILS NFR BLD: 0.3 % (ref 0–1.9)
BILIRUB SERPL-MCNC: 0.4 MG/DL (ref 0.1–1)
BUN SERPL-MCNC: 5 MG/DL (ref 6–20)
CALCIUM SERPL-MCNC: 9.2 MG/DL (ref 8.7–10.5)
CHLORIDE SERPL-SCNC: 105 MMOL/L (ref 95–110)
CO2 SERPL-SCNC: 21 MMOL/L (ref 23–29)
CREAT SERPL-MCNC: 0.6 MG/DL (ref 0.5–1.4)
DIFFERENTIAL METHOD: ABNORMAL
EOSINOPHIL # BLD AUTO: 0.1 K/UL (ref 0–0.5)
EOSINOPHIL NFR BLD: 1 % (ref 0–8)
ERYTHROCYTE [DISTWIDTH] IN BLOOD BY AUTOMATED COUNT: 14.8 % (ref 11.5–14.5)
EST. GFR  (AFRICAN AMERICAN): >60 ML/MIN/1.73 M^2
EST. GFR  (NON AFRICAN AMERICAN): >60 ML/MIN/1.73 M^2
GLUCOSE SERPL-MCNC: 126 MG/DL (ref 70–110)
HAV IGM SERPL QL IA: NEGATIVE
HBV CORE IGM SERPL QL IA: NEGATIVE
HBV SURFACE AG SERPL QL IA: NEGATIVE
HCT VFR BLD AUTO: 28.4 % (ref 37–48.5)
HCV AB SERPL QL IA: NEGATIVE
HGB BLD-MCNC: 8.9 G/DL (ref 12–16)
IMM GRANULOCYTES # BLD AUTO: 0.07 K/UL (ref 0–0.04)
IMM GRANULOCYTES NFR BLD AUTO: 0.8 % (ref 0–0.5)
LYMPHOCYTES # BLD AUTO: 1 K/UL (ref 1–4.8)
LYMPHOCYTES NFR BLD: 11.3 % (ref 18–48)
MCH RBC QN AUTO: 24.7 PG (ref 27–31)
MCHC RBC AUTO-ENTMCNC: 31.3 G/DL (ref 32–36)
MCV RBC AUTO: 79 FL (ref 82–98)
MONOCYTES # BLD AUTO: 0.6 K/UL (ref 0.3–1)
MONOCYTES NFR BLD: 6.1 % (ref 4–15)
NEUTROPHILS # BLD AUTO: 7.4 K/UL (ref 1.8–7.7)
NEUTROPHILS NFR BLD: 80.5 % (ref 38–73)
NRBC BLD-RTO: 0 /100 WBC
PLATELET # BLD AUTO: 427 K/UL (ref 150–450)
PMV BLD AUTO: 9 FL (ref 9.2–12.9)
POTASSIUM SERPL-SCNC: 3.7 MMOL/L (ref 3.5–5.1)
PROT SERPL-MCNC: 7.1 G/DL (ref 6–8.4)
RBC # BLD AUTO: 3.61 M/UL (ref 4–5.4)
SODIUM SERPL-SCNC: 136 MMOL/L (ref 136–145)
WBC # BLD AUTO: 9.14 K/UL (ref 3.9–12.7)

## 2021-06-29 PROCEDURE — 25000003 PHARM REV CODE 250: Performed by: STUDENT IN AN ORGANIZED HEALTH CARE EDUCATION/TRAINING PROGRAM

## 2021-06-29 PROCEDURE — 99238 HOSP IP/OBS DSCHRG MGMT 30/<: CPT | Mod: ,,, | Performed by: OBSTETRICS & GYNECOLOGY

## 2021-06-29 PROCEDURE — 80053 COMPREHEN METABOLIC PANEL: CPT | Performed by: STUDENT IN AN ORGANIZED HEALTH CARE EDUCATION/TRAINING PROGRAM

## 2021-06-29 PROCEDURE — 36415 COLL VENOUS BLD VENIPUNCTURE: CPT | Performed by: STUDENT IN AN ORGANIZED HEALTH CARE EDUCATION/TRAINING PROGRAM

## 2021-06-29 PROCEDURE — 85025 COMPLETE CBC W/AUTO DIFF WBC: CPT | Performed by: STUDENT IN AN ORGANIZED HEALTH CARE EDUCATION/TRAINING PROGRAM

## 2021-06-29 PROCEDURE — 99238 PR HOSPITAL DISCHARGE DAY,<30 MIN: ICD-10-PCS | Mod: ,,, | Performed by: OBSTETRICS & GYNECOLOGY

## 2021-06-29 RX ORDER — DOCUSATE SODIUM 100 MG/1
100 CAPSULE, LIQUID FILLED ORAL DAILY
Qty: 30 CAPSULE | Refills: 1 | Status: ON HOLD | OUTPATIENT
Start: 2021-06-30 | End: 2021-07-20 | Stop reason: HOSPADM

## 2021-06-29 RX ADMIN — ACETAMINOPHEN 650 MG: 325 TABLET, FILM COATED ORAL at 03:06

## 2021-06-29 RX ADMIN — Medication 150 MG: at 09:06

## 2021-06-29 RX ADMIN — DOCUSATE SODIUM 100 MG: 100 CAPSULE, LIQUID FILLED ORAL at 09:06

## 2021-06-29 RX ADMIN — ASPIRIN 81 MG CHEWABLE TABLET 81 MG: 81 TABLET CHEWABLE at 09:06

## 2021-06-29 RX ADMIN — PRENATAL VIT W/ FE FUMARATE-FA TAB 27-0.8 MG 1 TABLET: 27-0.8 TAB at 09:06

## 2021-06-30 LAB
BLD PROD TYP BPU: NORMAL
BLD PROD TYP BPU: NORMAL
BLOOD UNIT EXPIRATION DATE: NORMAL
BLOOD UNIT EXPIRATION DATE: NORMAL
BLOOD UNIT TYPE CODE: 1700
BLOOD UNIT TYPE CODE: 1700
BLOOD UNIT TYPE: NORMAL
BLOOD UNIT TYPE: NORMAL
CODING SYSTEM: NORMAL
CODING SYSTEM: NORMAL
DISPENSE STATUS: NORMAL
DISPENSE STATUS: NORMAL
NUM UNITS TRANS PACKED RBC: NORMAL
TRANS ERYTHROCYTES VOL PATIENT: NORMAL ML

## 2021-07-01 ENCOUNTER — TELEPHONE (OUTPATIENT)
Dept: OBSTETRICS AND GYNECOLOGY | Facility: CLINIC | Age: 36
End: 2021-07-01

## 2021-07-01 DIAGNOSIS — O14.93 PRE-ECLAMPSIA IN THIRD TRIMESTER: Primary | ICD-10-CM

## 2021-07-02 ENCOUNTER — TELEPHONE (OUTPATIENT)
Dept: OBSTETRICS AND GYNECOLOGY | Facility: CLINIC | Age: 36
End: 2021-07-02

## 2021-07-02 ENCOUNTER — ROUTINE PRENATAL (OUTPATIENT)
Dept: OBSTETRICS AND GYNECOLOGY | Facility: CLINIC | Age: 36
End: 2021-07-02
Payer: COMMERCIAL

## 2021-07-02 VITALS — BODY MASS INDEX: 28.35 KG/M2 | SYSTOLIC BLOOD PRESSURE: 120 MMHG | WEIGHT: 155 LBS | DIASTOLIC BLOOD PRESSURE: 76 MMHG

## 2021-07-02 DIAGNOSIS — D25.1 FIBROIDS, INTRAMURAL: ICD-10-CM

## 2021-07-02 DIAGNOSIS — Z67.91 RH NEGATIVE STATE IN ANTEPARTUM PERIOD, THIRD TRIMESTER: ICD-10-CM

## 2021-07-02 DIAGNOSIS — O34.13 UTERINE FIBROIDS AFFECTING PREGNANCY IN THIRD TRIMESTER: ICD-10-CM

## 2021-07-02 DIAGNOSIS — D25.9 UTERINE FIBROIDS AFFECTING PREGNANCY IN THIRD TRIMESTER: ICD-10-CM

## 2021-07-02 DIAGNOSIS — O09.93 SUPERVISION OF HIGH RISK PREGNANCY IN THIRD TRIMESTER: Primary | ICD-10-CM

## 2021-07-02 DIAGNOSIS — O26.893 RH NEGATIVE STATE IN ANTEPARTUM PERIOD, THIRD TRIMESTER: ICD-10-CM

## 2021-07-02 PROCEDURE — 0502F SUBSEQUENT PRENATAL CARE: CPT | Mod: CPTII,S$GLB,, | Performed by: OBSTETRICS & GYNECOLOGY

## 2021-07-02 PROCEDURE — 99999 PR PBB SHADOW E&M-EST. PATIENT-LVL II: CPT | Mod: PBBFAC,,, | Performed by: OBSTETRICS & GYNECOLOGY

## 2021-07-02 PROCEDURE — 0502F PR SUBSEQUENT PRENATAL CARE: ICD-10-PCS | Mod: CPTII,S$GLB,, | Performed by: OBSTETRICS & GYNECOLOGY

## 2021-07-02 PROCEDURE — 99999 PR PBB SHADOW E&M-EST. PATIENT-LVL II: ICD-10-PCS | Mod: PBBFAC,,, | Performed by: OBSTETRICS & GYNECOLOGY

## 2021-07-02 RX ORDER — IBUPROFEN 600 MG/1
600 TABLET ORAL
Qty: 30 TABLET | Refills: 0 | Status: ON HOLD | OUTPATIENT
Start: 2021-07-02 | End: 2021-07-20 | Stop reason: HOSPADM

## 2021-07-05 ENCOUNTER — PATIENT MESSAGE (OUTPATIENT)
Dept: OBSTETRICS AND GYNECOLOGY | Facility: CLINIC | Age: 36
End: 2021-07-05

## 2021-07-06 ENCOUNTER — PATIENT MESSAGE (OUTPATIENT)
Dept: ADMINISTRATIVE | Facility: HOSPITAL | Age: 36
End: 2021-07-06

## 2021-07-06 ENCOUNTER — TELEPHONE (OUTPATIENT)
Dept: OBSTETRICS AND GYNECOLOGY | Facility: CLINIC | Age: 36
End: 2021-07-06

## 2021-07-06 ENCOUNTER — PATIENT MESSAGE (OUTPATIENT)
Dept: OBSTETRICS AND GYNECOLOGY | Facility: CLINIC | Age: 36
End: 2021-07-06

## 2021-07-09 ENCOUNTER — PATIENT MESSAGE (OUTPATIENT)
Dept: OBSTETRICS AND GYNECOLOGY | Facility: CLINIC | Age: 36
End: 2021-07-09

## 2021-07-09 ENCOUNTER — LAB VISIT (OUTPATIENT)
Dept: LAB | Facility: HOSPITAL | Age: 36
End: 2021-07-09
Attending: OBSTETRICS & GYNECOLOGY
Payer: COMMERCIAL

## 2021-07-09 ENCOUNTER — PROCEDURE VISIT (OUTPATIENT)
Dept: OBSTETRICS AND GYNECOLOGY | Facility: CLINIC | Age: 36
End: 2021-07-09
Payer: COMMERCIAL

## 2021-07-09 DIAGNOSIS — O14.93 PRE-ECLAMPSIA IN THIRD TRIMESTER: ICD-10-CM

## 2021-07-09 DIAGNOSIS — O09.512 AMA (ADVANCED MATERNAL AGE) PRIMIGRAVIDA 35+, SECOND TRIMESTER: ICD-10-CM

## 2021-07-09 DIAGNOSIS — D25.1 FIBROIDS, INTRAMURAL: ICD-10-CM

## 2021-07-09 LAB
ALBUMIN SERPL BCP-MCNC: 2.3 G/DL (ref 3.5–5.2)
ALP SERPL-CCNC: 199 U/L (ref 55–135)
ALT SERPL W/O P-5'-P-CCNC: 125 U/L (ref 10–44)
ANION GAP SERPL CALC-SCNC: 12 MMOL/L (ref 8–16)
AST SERPL-CCNC: 63 U/L (ref 10–40)
BASOPHILS # BLD AUTO: 0.02 K/UL (ref 0–0.2)
BASOPHILS NFR BLD: 0.3 % (ref 0–1.9)
BILIRUB SERPL-MCNC: 0.3 MG/DL (ref 0.1–1)
BUN SERPL-MCNC: 8 MG/DL (ref 6–20)
CALCIUM SERPL-MCNC: 9.9 MG/DL (ref 8.7–10.5)
CHLORIDE SERPL-SCNC: 103 MMOL/L (ref 95–110)
CO2 SERPL-SCNC: 21 MMOL/L (ref 23–29)
CREAT SERPL-MCNC: 0.6 MG/DL (ref 0.5–1.4)
DIFFERENTIAL METHOD: ABNORMAL
EOSINOPHIL # BLD AUTO: 0 K/UL (ref 0–0.5)
EOSINOPHIL NFR BLD: 0.4 % (ref 0–8)
ERYTHROCYTE [DISTWIDTH] IN BLOOD BY AUTOMATED COUNT: 14.9 % (ref 11.5–14.5)
EST. GFR  (AFRICAN AMERICAN): >60 ML/MIN/1.73 M^2
EST. GFR  (NON AFRICAN AMERICAN): >60 ML/MIN/1.73 M^2
GLUCOSE SERPL-MCNC: 105 MG/DL (ref 70–110)
HCT VFR BLD AUTO: 30.9 % (ref 37–48.5)
HGB BLD-MCNC: 9.9 G/DL (ref 12–16)
IMM GRANULOCYTES # BLD AUTO: 0.05 K/UL (ref 0–0.04)
IMM GRANULOCYTES NFR BLD AUTO: 0.7 % (ref 0–0.5)
LYMPHOCYTES # BLD AUTO: 0.9 K/UL (ref 1–4.8)
LYMPHOCYTES NFR BLD: 13.1 % (ref 18–48)
MCH RBC QN AUTO: 24.8 PG (ref 27–31)
MCHC RBC AUTO-ENTMCNC: 32 G/DL (ref 32–36)
MCV RBC AUTO: 77 FL (ref 82–98)
MONOCYTES # BLD AUTO: 0.3 K/UL (ref 0.3–1)
MONOCYTES NFR BLD: 4.6 % (ref 4–15)
NEUTROPHILS # BLD AUTO: 5.5 K/UL (ref 1.8–7.7)
NEUTROPHILS NFR BLD: 80.9 % (ref 38–73)
NRBC BLD-RTO: 0 /100 WBC
PLATELET # BLD AUTO: 532 K/UL (ref 150–450)
PMV BLD AUTO: 9.6 FL (ref 9.2–12.9)
POTASSIUM SERPL-SCNC: 3.9 MMOL/L (ref 3.5–5.1)
PROT SERPL-MCNC: 8 G/DL (ref 6–8.4)
RBC # BLD AUTO: 4 M/UL (ref 4–5.4)
SODIUM SERPL-SCNC: 136 MMOL/L (ref 136–145)
WBC # BLD AUTO: 6.77 K/UL (ref 3.9–12.7)

## 2021-07-09 PROCEDURE — 36415 COLL VENOUS BLD VENIPUNCTURE: CPT | Performed by: OBSTETRICS & GYNECOLOGY

## 2021-07-09 PROCEDURE — 76816 PR  US,PREGNANT UTERUS,F/U,TRANSABD APP: ICD-10-PCS | Mod: S$GLB,,, | Performed by: OBSTETRICS & GYNECOLOGY

## 2021-07-09 PROCEDURE — 80053 COMPREHEN METABOLIC PANEL: CPT | Performed by: OBSTETRICS & GYNECOLOGY

## 2021-07-09 PROCEDURE — 76816 OB US FOLLOW-UP PER FETUS: CPT | Mod: S$GLB,,, | Performed by: OBSTETRICS & GYNECOLOGY

## 2021-07-09 PROCEDURE — 85025 COMPLETE CBC W/AUTO DIFF WBC: CPT | Performed by: OBSTETRICS & GYNECOLOGY

## 2021-07-12 ENCOUNTER — PATIENT MESSAGE (OUTPATIENT)
Dept: OBSTETRICS AND GYNECOLOGY | Facility: CLINIC | Age: 36
End: 2021-07-12

## 2021-07-12 ENCOUNTER — HOSPITAL ENCOUNTER (OUTPATIENT)
Dept: RADIOLOGY | Facility: OTHER | Age: 36
Discharge: HOME | End: 2021-07-12
Attending: OBSTETRICS & GYNECOLOGY
Payer: COMMERCIAL

## 2021-07-12 ENCOUNTER — LAB VISIT (OUTPATIENT)
Dept: LAB | Facility: OTHER | Age: 36
End: 2021-07-12
Attending: OBSTETRICS & GYNECOLOGY
Payer: COMMERCIAL

## 2021-07-12 ENCOUNTER — TELEPHONE (OUTPATIENT)
Dept: OBSTETRICS AND GYNECOLOGY | Facility: CLINIC | Age: 36
End: 2021-07-12

## 2021-07-12 DIAGNOSIS — R74.8 ELEVATED LIVER ENZYMES: ICD-10-CM

## 2021-07-12 DIAGNOSIS — O14.13 SEVERE PRE-ECLAMPSIA IN THIRD TRIMESTER: ICD-10-CM

## 2021-07-12 DIAGNOSIS — O14.13 SEVERE PRE-ECLAMPSIA IN THIRD TRIMESTER: Primary | ICD-10-CM

## 2021-07-12 DIAGNOSIS — R74.8 ELEVATED LIVER ENZYMES: Primary | ICD-10-CM

## 2021-07-12 LAB
CREAT UR-MCNC: 31.4 MG/DL (ref 15–325)
PROT UR-MCNC: <7 MG/DL (ref 0–15)
PROT/CREAT UR: NORMAL MG/G{CREAT} (ref 0–0.2)

## 2021-07-12 PROCEDURE — 82570 ASSAY OF URINE CREATININE: CPT | Performed by: OBSTETRICS & GYNECOLOGY

## 2021-07-12 PROCEDURE — 76705 ECHO EXAM OF ABDOMEN: CPT | Mod: 26,,, | Performed by: RADIOLOGY

## 2021-07-12 PROCEDURE — 76705 ECHO EXAM OF ABDOMEN: CPT | Mod: TC

## 2021-07-12 PROCEDURE — 76705 US ABDOMEN LIMITED: ICD-10-PCS | Mod: 26,,, | Performed by: RADIOLOGY

## 2021-07-14 ENCOUNTER — PATIENT MESSAGE (OUTPATIENT)
Dept: ADMINISTRATIVE | Facility: OTHER | Age: 36
End: 2021-07-14

## 2021-07-16 ENCOUNTER — LAB VISIT (OUTPATIENT)
Dept: LAB | Facility: HOSPITAL | Age: 36
End: 2021-07-16
Attending: OBSTETRICS & GYNECOLOGY
Payer: COMMERCIAL

## 2021-07-16 ENCOUNTER — PROCEDURE VISIT (OUTPATIENT)
Dept: OBSTETRICS AND GYNECOLOGY | Facility: CLINIC | Age: 36
End: 2021-07-16
Payer: COMMERCIAL

## 2021-07-16 DIAGNOSIS — O14.93 PRE-ECLAMPSIA IN THIRD TRIMESTER: ICD-10-CM

## 2021-07-16 LAB
ALBUMIN SERPL BCP-MCNC: 2.3 G/DL (ref 3.5–5.2)
ALP SERPL-CCNC: 207 U/L (ref 55–135)
ALT SERPL W/O P-5'-P-CCNC: 487 U/L (ref 10–44)
ANION GAP SERPL CALC-SCNC: 12 MMOL/L (ref 8–16)
AST SERPL-CCNC: 229 U/L (ref 10–40)
BASOPHILS # BLD AUTO: 0.03 K/UL (ref 0–0.2)
BASOPHILS NFR BLD: 0.4 % (ref 0–1.9)
BILIRUB SERPL-MCNC: 0.5 MG/DL (ref 0.1–1)
BUN SERPL-MCNC: 6 MG/DL (ref 6–20)
CALCIUM SERPL-MCNC: 10.4 MG/DL (ref 8.7–10.5)
CHLORIDE SERPL-SCNC: 101 MMOL/L (ref 95–110)
CO2 SERPL-SCNC: 23 MMOL/L (ref 23–29)
CREAT SERPL-MCNC: 0.6 MG/DL (ref 0.5–1.4)
DIFFERENTIAL METHOD: ABNORMAL
EOSINOPHIL # BLD AUTO: 0.1 K/UL (ref 0–0.5)
EOSINOPHIL NFR BLD: 0.7 % (ref 0–8)
ERYTHROCYTE [DISTWIDTH] IN BLOOD BY AUTOMATED COUNT: 15.4 % (ref 11.5–14.5)
EST. GFR  (AFRICAN AMERICAN): >60 ML/MIN/1.73 M^2
EST. GFR  (NON AFRICAN AMERICAN): >60 ML/MIN/1.73 M^2
GLUCOSE SERPL-MCNC: 96 MG/DL (ref 70–110)
HCT VFR BLD AUTO: 30.5 % (ref 37–48.5)
HGB BLD-MCNC: 9.8 G/DL (ref 12–16)
IMM GRANULOCYTES # BLD AUTO: 0.04 K/UL (ref 0–0.04)
IMM GRANULOCYTES NFR BLD AUTO: 0.6 % (ref 0–0.5)
LYMPHOCYTES # BLD AUTO: 1 K/UL (ref 1–4.8)
LYMPHOCYTES NFR BLD: 14.4 % (ref 18–48)
MCH RBC QN AUTO: 25 PG (ref 27–31)
MCHC RBC AUTO-ENTMCNC: 32.1 G/DL (ref 32–36)
MCV RBC AUTO: 78 FL (ref 82–98)
MONOCYTES # BLD AUTO: 0.4 K/UL (ref 0.3–1)
MONOCYTES NFR BLD: 5.4 % (ref 4–15)
NEUTROPHILS # BLD AUTO: 5.4 K/UL (ref 1.8–7.7)
NEUTROPHILS NFR BLD: 78.5 % (ref 38–73)
NRBC BLD-RTO: 0 /100 WBC
PLATELET # BLD AUTO: 450 K/UL (ref 150–450)
PMV BLD AUTO: 9.5 FL (ref 9.2–12.9)
POTASSIUM SERPL-SCNC: 3.8 MMOL/L (ref 3.5–5.1)
PROT SERPL-MCNC: 8.6 G/DL (ref 6–8.4)
RBC # BLD AUTO: 3.92 M/UL (ref 4–5.4)
SODIUM SERPL-SCNC: 136 MMOL/L (ref 136–145)
WBC # BLD AUTO: 6.82 K/UL (ref 3.9–12.7)

## 2021-07-16 PROCEDURE — 36415 COLL VENOUS BLD VENIPUNCTURE: CPT | Performed by: OBSTETRICS & GYNECOLOGY

## 2021-07-16 PROCEDURE — 76819 FETAL BIOPHYS PROFIL W/O NST: CPT | Mod: S$GLB,,, | Performed by: OBSTETRICS & GYNECOLOGY

## 2021-07-16 PROCEDURE — 76819 PR US, OB, FETAL BIOPHYSICAL, W/O NST: ICD-10-PCS | Mod: S$GLB,,, | Performed by: OBSTETRICS & GYNECOLOGY

## 2021-07-16 PROCEDURE — 80053 COMPREHEN METABOLIC PANEL: CPT | Performed by: OBSTETRICS & GYNECOLOGY

## 2021-07-16 PROCEDURE — 85025 COMPLETE CBC W/AUTO DIFF WBC: CPT | Performed by: OBSTETRICS & GYNECOLOGY

## 2021-07-18 ENCOUNTER — TELEPHONE (OUTPATIENT)
Dept: OBSTETRICS AND GYNECOLOGY | Facility: CLINIC | Age: 36
End: 2021-07-18

## 2021-07-18 ENCOUNTER — PATIENT MESSAGE (OUTPATIENT)
Dept: OBSTETRICS AND GYNECOLOGY | Facility: CLINIC | Age: 36
End: 2021-07-18

## 2021-07-18 ENCOUNTER — HOSPITAL ENCOUNTER (OUTPATIENT)
Facility: HOSPITAL | Age: 36
Discharge: HOME | End: 2021-07-20
Attending: OBSTETRICS & GYNECOLOGY | Admitting: OBSTETRICS & GYNECOLOGY
Payer: COMMERCIAL

## 2021-07-18 DIAGNOSIS — R74.8 ELEVATED LIVER ENZYMES: Primary | ICD-10-CM

## 2021-07-18 DIAGNOSIS — R74.01 TRANSAMINITIS: ICD-10-CM

## 2021-07-18 LAB
ALBUMIN SERPL BCP-MCNC: 2.1 G/DL (ref 3.5–5.2)
ALP SERPL-CCNC: 186 U/L (ref 55–135)
ALT SERPL W/O P-5'-P-CCNC: 544 U/L (ref 10–44)
AMMONIA PLAS-SCNC: 33 UMOL/L (ref 10–50)
AMYLASE SERPL-CCNC: 45 U/L (ref 20–110)
ANION GAP SERPL CALC-SCNC: 10 MMOL/L (ref 8–16)
APAP SERPL-MCNC: <3 UG/ML (ref 10–20)
AST SERPL-CCNC: 223 U/L (ref 10–40)
BASOPHILS # BLD AUTO: 0.02 K/UL (ref 0–0.2)
BASOPHILS NFR BLD: 0.3 % (ref 0–1.9)
BILIRUB SERPL-MCNC: 0.3 MG/DL (ref 0.1–1)
BUN SERPL-MCNC: 5 MG/DL (ref 6–20)
CALCIUM SERPL-MCNC: 9.5 MG/DL (ref 8.7–10.5)
CHLORIDE SERPL-SCNC: 107 MMOL/L (ref 95–110)
CO2 SERPL-SCNC: 18 MMOL/L (ref 23–29)
CREAT SERPL-MCNC: 0.7 MG/DL (ref 0.5–1.4)
CREAT UR-MCNC: 37.1 MG/DL (ref 15–325)
DIFFERENTIAL METHOD: ABNORMAL
EOSINOPHIL # BLD AUTO: 0.1 K/UL (ref 0–0.5)
EOSINOPHIL NFR BLD: 0.8 % (ref 0–8)
ERYTHROCYTE [DISTWIDTH] IN BLOOD BY AUTOMATED COUNT: 15.5 % (ref 11.5–14.5)
EST. GFR  (AFRICAN AMERICAN): >60 ML/MIN/1.73 M^2
EST. GFR  (NON AFRICAN AMERICAN): >60 ML/MIN/1.73 M^2
GLUCOSE SERPL-MCNC: 98 MG/DL (ref 70–110)
HCT VFR BLD AUTO: 27.5 % (ref 37–48.5)
HGB BLD-MCNC: 9 G/DL (ref 12–16)
IMM GRANULOCYTES # BLD AUTO: 0.06 K/UL (ref 0–0.04)
IMM GRANULOCYTES NFR BLD AUTO: 0.8 % (ref 0–0.5)
LIPASE SERPL-CCNC: 44 U/L (ref 4–60)
LYMPHOCYTES # BLD AUTO: 1 K/UL (ref 1–4.8)
LYMPHOCYTES NFR BLD: 13.4 % (ref 18–48)
MCH RBC QN AUTO: 25.1 PG (ref 27–31)
MCHC RBC AUTO-ENTMCNC: 32.7 G/DL (ref 32–36)
MCV RBC AUTO: 77 FL (ref 82–98)
MONOCYTES # BLD AUTO: 0.4 K/UL (ref 0.3–1)
MONOCYTES NFR BLD: 5.9 % (ref 4–15)
NEUTROPHILS # BLD AUTO: 5.9 K/UL (ref 1.8–7.7)
NEUTROPHILS NFR BLD: 78.8 % (ref 38–73)
NRBC BLD-RTO: 0 /100 WBC
PLATELET # BLD AUTO: 374 K/UL (ref 150–450)
PMV BLD AUTO: 9.3 FL (ref 9.2–12.9)
POTASSIUM SERPL-SCNC: 4 MMOL/L (ref 3.5–5.1)
PROT SERPL-MCNC: 7.2 G/DL (ref 6–8.4)
PROT UR-MCNC: <7 MG/DL (ref 0–15)
PROT/CREAT UR: NORMAL MG/G{CREAT} (ref 0–0.2)
RBC # BLD AUTO: 3.58 M/UL (ref 4–5.4)
SARS-COV-2 RDRP RESP QL NAA+PROBE: NEGATIVE
SODIUM SERPL-SCNC: 135 MMOL/L (ref 136–145)
WBC # BLD AUTO: 7.48 K/UL (ref 3.9–12.7)

## 2021-07-18 PROCEDURE — 80143 DRUG ASSAY ACETAMINOPHEN: CPT | Performed by: OBSTETRICS & GYNECOLOGY

## 2021-07-18 PROCEDURE — 99211 OFF/OP EST MAY X REQ PHY/QHP: CPT

## 2021-07-18 PROCEDURE — 82140 ASSAY OF AMMONIA: CPT | Performed by: OBSTETRICS & GYNECOLOGY

## 2021-07-18 PROCEDURE — 80053 COMPREHEN METABOLIC PANEL: CPT | Performed by: OBSTETRICS & GYNECOLOGY

## 2021-07-18 PROCEDURE — 82150 ASSAY OF AMYLASE: CPT | Performed by: OBSTETRICS & GYNECOLOGY

## 2021-07-18 PROCEDURE — U0002 COVID-19 LAB TEST NON-CDC: HCPCS | Performed by: OBSTETRICS & GYNECOLOGY

## 2021-07-18 PROCEDURE — 82570 ASSAY OF URINE CREATININE: CPT | Performed by: OBSTETRICS & GYNECOLOGY

## 2021-07-18 PROCEDURE — 99219 PR INITIAL OBSERVATION CARE,LEVL II: CPT | Mod: ,,, | Performed by: OBSTETRICS & GYNECOLOGY

## 2021-07-18 PROCEDURE — 85025 COMPLETE CBC W/AUTO DIFF WBC: CPT | Performed by: OBSTETRICS & GYNECOLOGY

## 2021-07-18 PROCEDURE — 99219 PR INITIAL OBSERVATION CARE,LEVL II: ICD-10-PCS | Mod: ,,, | Performed by: OBSTETRICS & GYNECOLOGY

## 2021-07-18 PROCEDURE — 83690 ASSAY OF LIPASE: CPT | Performed by: OBSTETRICS & GYNECOLOGY

## 2021-07-18 PROCEDURE — 36415 COLL VENOUS BLD VENIPUNCTURE: CPT | Performed by: OBSTETRICS & GYNECOLOGY

## 2021-07-18 RX ORDER — PROCHLORPERAZINE EDISYLATE 5 MG/ML
5 INJECTION INTRAMUSCULAR; INTRAVENOUS EVERY 6 HOURS PRN
Status: DISCONTINUED | OUTPATIENT
Start: 2021-07-18 | End: 2021-07-20 | Stop reason: HOSPADM

## 2021-07-18 RX ORDER — MUPIROCIN 20 MG/G
OINTMENT TOPICAL 2 TIMES DAILY
Status: DISCONTINUED | OUTPATIENT
Start: 2021-07-18 | End: 2021-07-18

## 2021-07-18 RX ORDER — PRENATAL WITH FERROUS FUM AND FOLIC ACID 3080; 920; 120; 400; 22; 1.84; 3; 20; 10; 1; 12; 200; 27; 25; 2 [IU]/1; [IU]/1; MG/1; [IU]/1; MG/1; MG/1; MG/1; MG/1; MG/1; MG/1; UG/1; MG/1; MG/1; MG/1; MG/1
1 TABLET ORAL DAILY
Status: DISCONTINUED | OUTPATIENT
Start: 2021-07-19 | End: 2021-07-20 | Stop reason: HOSPADM

## 2021-07-18 RX ORDER — SODIUM CHLORIDE 0.9 % (FLUSH) 0.9 %
10 SYRINGE (ML) INJECTION
Status: DISCONTINUED | OUTPATIENT
Start: 2021-07-18 | End: 2021-07-20 | Stop reason: HOSPADM

## 2021-07-18 RX ORDER — ONDANSETRON 8 MG/1
8 TABLET, ORALLY DISINTEGRATING ORAL EVERY 8 HOURS PRN
Status: DISCONTINUED | OUTPATIENT
Start: 2021-07-18 | End: 2021-07-20 | Stop reason: HOSPADM

## 2021-07-18 RX ORDER — AMOXICILLIN 250 MG
1 CAPSULE ORAL NIGHTLY PRN
Status: DISCONTINUED | OUTPATIENT
Start: 2021-07-18 | End: 2021-07-20 | Stop reason: HOSPADM

## 2021-07-18 RX ORDER — SIMETHICONE 80 MG
1 TABLET,CHEWABLE ORAL EVERY 6 HOURS PRN
Status: DISCONTINUED | OUTPATIENT
Start: 2021-07-18 | End: 2021-07-20 | Stop reason: HOSPADM

## 2021-07-18 RX ORDER — ACETAMINOPHEN 325 MG/1
650 TABLET ORAL EVERY 6 HOURS PRN
Status: DISCONTINUED | OUTPATIENT
Start: 2021-07-18 | End: 2021-07-20 | Stop reason: HOSPADM

## 2021-07-18 RX ORDER — MUPIROCIN 20 MG/G
OINTMENT TOPICAL 2 TIMES DAILY
Status: CANCELLED | OUTPATIENT
Start: 2021-07-18 | End: 2021-07-23

## 2021-07-18 RX ORDER — DIPHENHYDRAMINE HCL 25 MG
25 CAPSULE ORAL EVERY 4 HOURS PRN
Status: DISCONTINUED | OUTPATIENT
Start: 2021-07-18 | End: 2021-07-20 | Stop reason: HOSPADM

## 2021-07-18 RX ORDER — DIPHENHYDRAMINE HYDROCHLORIDE 50 MG/ML
25 INJECTION INTRAMUSCULAR; INTRAVENOUS EVERY 4 HOURS PRN
Status: DISCONTINUED | OUTPATIENT
Start: 2021-07-18 | End: 2021-07-20 | Stop reason: HOSPADM

## 2021-07-18 RX ORDER — PRENATAL WITH FERROUS FUM AND FOLIC ACID 3080; 920; 120; 400; 22; 1.84; 3; 20; 10; 1; 12; 200; 27; 25; 2 [IU]/1; [IU]/1; MG/1; [IU]/1; MG/1; MG/1; MG/1; MG/1; MG/1; MG/1; UG/1; MG/1; MG/1; MG/1; MG/1
1 TABLET ORAL DAILY
Status: DISCONTINUED | OUTPATIENT
Start: 2021-07-18 | End: 2021-07-20 | Stop reason: HOSPADM

## 2021-07-18 RX ORDER — ONDANSETRON 4 MG/1
8 TABLET, ORALLY DISINTEGRATING ORAL EVERY 8 HOURS PRN
Status: DISCONTINUED | OUTPATIENT
Start: 2021-07-18 | End: 2021-07-20 | Stop reason: HOSPADM

## 2021-07-19 LAB — CK SERPL-CCNC: 8 U/L (ref 20–180)

## 2021-07-19 PROCEDURE — 96372 THER/PROPH/DIAG INJ SC/IM: CPT

## 2021-07-19 PROCEDURE — 63600175 PHARM REV CODE 636 W HCPCS: Performed by: OBSTETRICS & GYNECOLOGY

## 2021-07-19 PROCEDURE — 86665 EPSTEIN-BARR CAPSID VCA: CPT | Performed by: INTERNAL MEDICINE

## 2021-07-19 PROCEDURE — 82550 ASSAY OF CK (CPK): CPT | Performed by: INTERNAL MEDICINE

## 2021-07-19 PROCEDURE — 25000003 PHARM REV CODE 250: Performed by: OBSTETRICS & GYNECOLOGY

## 2021-07-19 PROCEDURE — 99225 PR SUBSEQUENT OBSERVATION CARE,LEVEL II: ICD-10-PCS | Mod: ,,, | Performed by: OBSTETRICS & GYNECOLOGY

## 2021-07-19 PROCEDURE — 59025 FETAL NON-STRESS TEST: CPT | Mod: 76

## 2021-07-19 PROCEDURE — 86644 CMV ANTIBODY: CPT | Performed by: INTERNAL MEDICINE

## 2021-07-19 PROCEDURE — 36415 COLL VENOUS BLD VENIPUNCTURE: CPT | Performed by: INTERNAL MEDICINE

## 2021-07-19 PROCEDURE — 91300 PHARM REV CODE 636 W HCPCS: CPT | Performed by: OBSTETRICS & GYNECOLOGY

## 2021-07-19 PROCEDURE — 86645 CMV ANTIBODY IGM: CPT | Performed by: INTERNAL MEDICINE

## 2021-07-19 PROCEDURE — 86695 HERPES SIMPLEX TYPE 1 TEST: CPT | Performed by: INTERNAL MEDICINE

## 2021-07-19 PROCEDURE — 86696 HERPES SIMPLEX TYPE 2 TEST: CPT | Performed by: INTERNAL MEDICINE

## 2021-07-19 PROCEDURE — 86694 HERPES SIMPLEX NES ANTBDY: CPT | Performed by: INTERNAL MEDICINE

## 2021-07-19 PROCEDURE — 86663 EPSTEIN-BARR ANTIBODY: CPT | Performed by: INTERNAL MEDICINE

## 2021-07-19 PROCEDURE — 99225 PR SUBSEQUENT OBSERVATION CARE,LEVEL II: CPT | Mod: ,,, | Performed by: OBSTETRICS & GYNECOLOGY

## 2021-07-19 PROCEDURE — 0001A HC IMMUNIZ ADMIN, SARS-COV-2 COVID-19 VACC, 30MCG/0.3ML, 1ST DOSE: CPT | Mod: CV19 | Performed by: OBSTETRICS & GYNECOLOGY

## 2021-07-19 RX ADMIN — DOCUSATE SODIUM - SENNOSIDES 1 TABLET: 50; 8.6 TABLET, FILM COATED ORAL at 09:07

## 2021-07-19 RX ADMIN — RNA INGREDIENT BNT-162B2 0.3 ML: 0.23 INJECTION, SUSPENSION INTRAMUSCULAR at 02:07

## 2021-07-19 RX ADMIN — PRENATAL VIT W/ FE FUMARATE-FA TAB 27-0.8 MG 1 TABLET: 27-0.8 TAB at 02:07

## 2021-07-20 ENCOUNTER — TELEPHONE (OUTPATIENT)
Dept: OBSTETRICS AND GYNECOLOGY | Facility: HOSPITAL | Age: 36
End: 2021-07-20

## 2021-07-20 ENCOUNTER — DOCUMENTATION ONLY (OUTPATIENT)
Dept: TRANSPLANT | Facility: CLINIC | Age: 36
End: 2021-07-20

## 2021-07-20 VITALS
SYSTOLIC BLOOD PRESSURE: 119 MMHG | WEIGHT: 154 LBS | TEMPERATURE: 99 F | DIASTOLIC BLOOD PRESSURE: 78 MMHG | HEIGHT: 62 IN | OXYGEN SATURATION: 100 % | RESPIRATION RATE: 16 BRPM | HEART RATE: 94 BPM | BODY MASS INDEX: 28.34 KG/M2

## 2021-07-20 LAB
ALBUMIN SERPL BCP-MCNC: 2 G/DL (ref 3.5–5.2)
ALP SERPL-CCNC: 176 U/L (ref 55–135)
ALT SERPL W/O P-5'-P-CCNC: 406 U/L (ref 10–44)
ANION GAP SERPL CALC-SCNC: 10 MMOL/L (ref 8–16)
AST SERPL-CCNC: 120 U/L (ref 10–40)
BASOPHILS # BLD AUTO: 0.02 K/UL (ref 0–0.2)
BASOPHILS NFR BLD: 0.3 % (ref 0–1.9)
BILIRUB SERPL-MCNC: 0.4 MG/DL (ref 0.1–1)
BUN SERPL-MCNC: 5 MG/DL (ref 6–20)
CALCIUM SERPL-MCNC: 9.8 MG/DL (ref 8.7–10.5)
CHLORIDE SERPL-SCNC: 106 MMOL/L (ref 95–110)
CMV IGG SERPL QL IA: REACTIVE
CO2 SERPL-SCNC: 18 MMOL/L (ref 23–29)
CREAT SERPL-MCNC: 0.6 MG/DL (ref 0.5–1.4)
CREAT UR-MCNC: 48.7 MG/DL (ref 15–325)
DIFFERENTIAL METHOD: ABNORMAL
EOSINOPHIL # BLD AUTO: 0.1 K/UL (ref 0–0.5)
EOSINOPHIL NFR BLD: 1.1 % (ref 0–8)
ERYTHROCYTE [DISTWIDTH] IN BLOOD BY AUTOMATED COUNT: 16 % (ref 11.5–14.5)
EST. GFR  (AFRICAN AMERICAN): >60 ML/MIN/1.73 M^2
EST. GFR  (NON AFRICAN AMERICAN): >60 ML/MIN/1.73 M^2
GLUCOSE SERPL-MCNC: 96 MG/DL (ref 70–110)
HCT VFR BLD AUTO: 28.1 % (ref 37–48.5)
HGB BLD-MCNC: 8.8 G/DL (ref 12–16)
IMM GRANULOCYTES # BLD AUTO: 0.05 K/UL (ref 0–0.04)
IMM GRANULOCYTES NFR BLD AUTO: 0.7 % (ref 0–0.5)
LYMPHOCYTES # BLD AUTO: 1.1 K/UL (ref 1–4.8)
LYMPHOCYTES NFR BLD: 15.8 % (ref 18–48)
MCH RBC QN AUTO: 24.1 PG (ref 27–31)
MCHC RBC AUTO-ENTMCNC: 31.3 G/DL (ref 32–36)
MCV RBC AUTO: 77 FL (ref 82–98)
MONOCYTES # BLD AUTO: 0.5 K/UL (ref 0.3–1)
MONOCYTES NFR BLD: 7.3 % (ref 4–15)
NEUTROPHILS # BLD AUTO: 5.2 K/UL (ref 1.8–7.7)
NEUTROPHILS NFR BLD: 74.8 % (ref 38–73)
NRBC BLD-RTO: 0 /100 WBC
PLATELET # BLD AUTO: 375 K/UL (ref 150–450)
PMV BLD AUTO: 9.9 FL (ref 9.2–12.9)
POTASSIUM SERPL-SCNC: 4.1 MMOL/L (ref 3.5–5.1)
PROT SERPL-MCNC: 7.2 G/DL (ref 6–8.4)
PROT UR-MCNC: 7 MG/DL (ref 0–15)
PROT/CREAT UR: 0.14 MG/G{CREAT} (ref 0–0.2)
RBC # BLD AUTO: 3.65 M/UL (ref 4–5.4)
SODIUM SERPL-SCNC: 134 MMOL/L (ref 136–145)
WBC # BLD AUTO: 6.98 K/UL (ref 3.9–12.7)

## 2021-07-20 PROCEDURE — 25000003 PHARM REV CODE 250: Performed by: OBSTETRICS & GYNECOLOGY

## 2021-07-20 PROCEDURE — 59025 FETAL NON-STRESS TEST: CPT

## 2021-07-20 PROCEDURE — 82570 ASSAY OF URINE CREATININE: CPT | Mod: 59 | Performed by: OBSTETRICS & GYNECOLOGY

## 2021-07-20 PROCEDURE — 99225 PR SUBSEQUENT OBSERVATION CARE,LEVEL II: CPT | Mod: ,,, | Performed by: OBSTETRICS & GYNECOLOGY

## 2021-07-20 PROCEDURE — 85025 COMPLETE CBC W/AUTO DIFF WBC: CPT | Performed by: OBSTETRICS & GYNECOLOGY

## 2021-07-20 PROCEDURE — 36415 COLL VENOUS BLD VENIPUNCTURE: CPT | Performed by: OBSTETRICS & GYNECOLOGY

## 2021-07-20 PROCEDURE — 99225 PR SUBSEQUENT OBSERVATION CARE,LEVEL II: ICD-10-PCS | Mod: ,,, | Performed by: OBSTETRICS & GYNECOLOGY

## 2021-07-20 PROCEDURE — 80053 COMPREHEN METABOLIC PANEL: CPT | Performed by: OBSTETRICS & GYNECOLOGY

## 2021-07-20 RX ADMIN — PRENATAL VIT W/ FE FUMARATE-FA TAB 27-0.8 MG 1 TABLET: 27-0.8 TAB at 08:07

## 2021-07-21 ENCOUNTER — PATIENT MESSAGE (OUTPATIENT)
Dept: OBSTETRICS AND GYNECOLOGY | Facility: CLINIC | Age: 36
End: 2021-07-21

## 2021-07-22 ENCOUNTER — PROCEDURE VISIT (OUTPATIENT)
Dept: OBSTETRICS AND GYNECOLOGY | Facility: CLINIC | Age: 36
End: 2021-07-22
Payer: COMMERCIAL

## 2021-07-22 ENCOUNTER — LAB VISIT (OUTPATIENT)
Dept: LAB | Facility: HOSPITAL | Age: 36
End: 2021-07-22
Attending: OBSTETRICS & GYNECOLOGY
Payer: COMMERCIAL

## 2021-07-22 ENCOUNTER — TELEPHONE (OUTPATIENT)
Dept: MATERNAL FETAL MEDICINE | Facility: CLINIC | Age: 36
End: 2021-07-22

## 2021-07-22 DIAGNOSIS — O09.523 MULTIGRAVIDA OF ADVANCED MATERNAL AGE IN THIRD TRIMESTER: ICD-10-CM

## 2021-07-22 DIAGNOSIS — O14.93 PRE-ECLAMPSIA IN THIRD TRIMESTER: ICD-10-CM

## 2021-07-22 DIAGNOSIS — R74.8 ELEVATED LIVER ENZYMES: ICD-10-CM

## 2021-07-22 LAB
ALBUMIN SERPL BCP-MCNC: 2.1 G/DL (ref 3.5–5.2)
ALP SERPL-CCNC: 180 U/L (ref 55–135)
ALT SERPL W/O P-5'-P-CCNC: 358 U/L (ref 10–44)
ANION GAP SERPL CALC-SCNC: 6 MMOL/L (ref 8–16)
AST SERPL-CCNC: 131 U/L (ref 10–40)
BASOPHILS # BLD AUTO: 0.01 K/UL (ref 0–0.2)
BASOPHILS NFR BLD: 0.2 % (ref 0–1.9)
BILIRUB SERPL-MCNC: 0.5 MG/DL (ref 0.1–1)
BUN SERPL-MCNC: 5 MG/DL (ref 6–20)
CALCIUM SERPL-MCNC: 9.9 MG/DL (ref 8.7–10.5)
CHLORIDE SERPL-SCNC: 107 MMOL/L (ref 95–110)
CMV IGM SERPL IA-ACNC: <8 AU/ML
CO2 SERPL-SCNC: 23 MMOL/L (ref 23–29)
CREAT SERPL-MCNC: 0.6 MG/DL (ref 0.5–1.4)
CREAT UR-MCNC: 34.1 MG/DL (ref 15–325)
DIFFERENTIAL METHOD: ABNORMAL
EBV EA IGG SER-ACNC: <5 U/ML
EBV VCA IGM SER QL IA: NEGATIVE
EOSINOPHIL # BLD AUTO: 0 K/UL (ref 0–0.5)
EOSINOPHIL NFR BLD: 0.7 % (ref 0–8)
ERYTHROCYTE [DISTWIDTH] IN BLOOD BY AUTOMATED COUNT: 16.2 % (ref 11.5–14.5)
EST. GFR  (AFRICAN AMERICAN): >60 ML/MIN/1.73 M^2
EST. GFR  (NON AFRICAN AMERICAN): >60 ML/MIN/1.73 M^2
GLUCOSE SERPL-MCNC: 99 MG/DL (ref 70–110)
HCT VFR BLD AUTO: 29 % (ref 37–48.5)
HGB BLD-MCNC: 9 G/DL (ref 12–16)
HSV1 IGG SERPL QL IA: POSITIVE
HSV2 IGG SERPL QL IA: POSITIVE
IMM GRANULOCYTES # BLD AUTO: 0.05 K/UL (ref 0–0.04)
IMM GRANULOCYTES NFR BLD AUTO: 0.8 % (ref 0–0.5)
LYMPHOCYTES # BLD AUTO: 0.7 K/UL (ref 1–4.8)
LYMPHOCYTES NFR BLD: 12.2 % (ref 18–48)
MCH RBC QN AUTO: 24.3 PG (ref 27–31)
MCHC RBC AUTO-ENTMCNC: 31 G/DL (ref 32–36)
MCV RBC AUTO: 78 FL (ref 82–98)
MONOCYTES # BLD AUTO: 0.4 K/UL (ref 0.3–1)
MONOCYTES NFR BLD: 7.3 % (ref 4–15)
NEUTROPHILS # BLD AUTO: 4.8 K/UL (ref 1.8–7.7)
NEUTROPHILS NFR BLD: 78.8 % (ref 38–73)
NRBC BLD-RTO: 0 /100 WBC
PLATELET # BLD AUTO: 395 K/UL (ref 150–450)
PMV BLD AUTO: 9.7 FL (ref 9.2–12.9)
POTASSIUM SERPL-SCNC: 4.1 MMOL/L (ref 3.5–5.1)
PROT SERPL-MCNC: 7.4 G/DL (ref 6–8.4)
PROT UR-MCNC: <7 MG/DL (ref 0–15)
PROT/CREAT UR: NORMAL MG/G{CREAT} (ref 0–0.2)
RBC # BLD AUTO: 3.71 M/UL (ref 4–5.4)
SODIUM SERPL-SCNC: 136 MMOL/L (ref 136–145)
WBC # BLD AUTO: 6.05 K/UL (ref 3.9–12.7)

## 2021-07-22 PROCEDURE — 82570 ASSAY OF URINE CREATININE: CPT | Performed by: OBSTETRICS & GYNECOLOGY

## 2021-07-22 PROCEDURE — 76819 FETAL BIOPHYS PROFIL W/O NST: CPT | Mod: S$GLB,,, | Performed by: OBSTETRICS & GYNECOLOGY

## 2021-07-22 PROCEDURE — 85025 COMPLETE CBC W/AUTO DIFF WBC: CPT | Performed by: OBSTETRICS & GYNECOLOGY

## 2021-07-22 PROCEDURE — 80053 COMPREHEN METABOLIC PANEL: CPT | Performed by: OBSTETRICS & GYNECOLOGY

## 2021-07-22 PROCEDURE — 36415 COLL VENOUS BLD VENIPUNCTURE: CPT | Performed by: OBSTETRICS & GYNECOLOGY

## 2021-07-22 PROCEDURE — 76819 PR US, OB, FETAL BIOPHYSICAL, W/O NST: ICD-10-PCS | Mod: S$GLB,,, | Performed by: OBSTETRICS & GYNECOLOGY

## 2021-07-23 ENCOUNTER — PROCEDURE VISIT (OUTPATIENT)
Dept: MATERNAL FETAL MEDICINE | Facility: CLINIC | Age: 36
End: 2021-07-23
Payer: COMMERCIAL

## 2021-07-23 DIAGNOSIS — Z36.89 ENCOUNTER FOR ULTRASOUND TO CHECK FETAL GROWTH: ICD-10-CM

## 2021-07-23 LAB — HSV AB, IGM BY EIA: 0.7 INDEX

## 2021-07-23 PROCEDURE — 76816 OB US FOLLOW-UP PER FETUS: CPT | Mod: S$GLB,,, | Performed by: OBSTETRICS & GYNECOLOGY

## 2021-07-23 PROCEDURE — 76816 PR  US,PREGNANT UTERUS,F/U,TRANSABD APP: ICD-10-PCS | Mod: S$GLB,,, | Performed by: OBSTETRICS & GYNECOLOGY

## 2021-07-26 ENCOUNTER — OFFICE VISIT (OUTPATIENT)
Dept: HEPATOLOGY | Facility: CLINIC | Age: 36
End: 2021-07-26
Payer: COMMERCIAL

## 2021-07-26 ENCOUNTER — LAB VISIT (OUTPATIENT)
Dept: LAB | Facility: HOSPITAL | Age: 36
End: 2021-07-26
Attending: INTERNAL MEDICINE
Payer: COMMERCIAL

## 2021-07-26 VITALS
DIASTOLIC BLOOD PRESSURE: 62 MMHG | BODY MASS INDEX: 29.05 KG/M2 | TEMPERATURE: 98 F | OXYGEN SATURATION: 100 % | SYSTOLIC BLOOD PRESSURE: 115 MMHG | WEIGHT: 157.88 LBS | HEART RATE: 102 BPM | HEIGHT: 62 IN | RESPIRATION RATE: 19 BRPM

## 2021-07-26 DIAGNOSIS — R79.89 ELEVATED LIVER FUNCTION TESTS: ICD-10-CM

## 2021-07-26 DIAGNOSIS — R79.89 HIGH SERUM BILE ACID: Primary | ICD-10-CM

## 2021-07-26 DIAGNOSIS — R74.8 ELEVATED LIVER ENZYMES: ICD-10-CM

## 2021-07-26 DIAGNOSIS — R79.89 ELEVATED LIVER FUNCTION TESTS: Primary | ICD-10-CM

## 2021-07-26 LAB
ALBUMIN SERPL BCP-MCNC: 2.2 G/DL (ref 3.5–5.2)
ALP SERPL-CCNC: 197 U/L (ref 55–135)
ALT SERPL W/O P-5'-P-CCNC: 804 U/L (ref 10–44)
ANION GAP SERPL CALC-SCNC: 11 MMOL/L (ref 8–16)
AST SERPL-CCNC: 373 U/L (ref 10–40)
BASOPHILS # BLD AUTO: 0.01 K/UL (ref 0–0.2)
BASOPHILS NFR BLD: 0.2 % (ref 0–1.9)
BILIRUB DIRECT SERPL-MCNC: 0.3 MG/DL (ref 0.1–0.3)
BILIRUB SERPL-MCNC: 0.6 MG/DL (ref 0.1–1)
BUN SERPL-MCNC: 7 MG/DL (ref 6–20)
CALCIUM SERPL-MCNC: 10.2 MG/DL (ref 8.7–10.5)
CERULOPLASMIN SERPL-MCNC: >65 MG/DL (ref 15–45)
CHLORIDE SERPL-SCNC: 104 MMOL/L (ref 95–110)
CO2 SERPL-SCNC: 20 MMOL/L (ref 23–29)
CREAT SERPL-MCNC: 0.6 MG/DL (ref 0.5–1.4)
DIFFERENTIAL METHOD: ABNORMAL
EOSINOPHIL # BLD AUTO: 0 K/UL (ref 0–0.5)
EOSINOPHIL NFR BLD: 0.5 % (ref 0–8)
ERYTHROCYTE [DISTWIDTH] IN BLOOD BY AUTOMATED COUNT: 16.8 % (ref 11.5–14.5)
EST. GFR  (AFRICAN AMERICAN): >60 ML/MIN/1.73 M^2
EST. GFR  (NON AFRICAN AMERICAN): >60 ML/MIN/1.73 M^2
GLUCOSE SERPL-MCNC: 110 MG/DL (ref 70–110)
HCT VFR BLD AUTO: 30.8 % (ref 37–48.5)
HGB BLD-MCNC: 9.7 G/DL (ref 12–16)
IGG SERPL-MCNC: 1622 MG/DL (ref 650–1600)
IMM GRANULOCYTES # BLD AUTO: 0.05 K/UL (ref 0–0.04)
IMM GRANULOCYTES NFR BLD AUTO: 0.9 % (ref 0–0.5)
INR PPP: 0.9 (ref 0.8–1.2)
LYMPHOCYTES # BLD AUTO: 0.8 K/UL (ref 1–4.8)
LYMPHOCYTES NFR BLD: 14.2 % (ref 18–48)
MAGNESIUM SERPL-MCNC: 1.8 MG/DL (ref 1.6–2.6)
MCH RBC QN AUTO: 24.6 PG (ref 27–31)
MCHC RBC AUTO-ENTMCNC: 31.5 G/DL (ref 32–36)
MCV RBC AUTO: 78 FL (ref 82–98)
MONOCYTES # BLD AUTO: 0.2 K/UL (ref 0.3–1)
MONOCYTES NFR BLD: 4.2 % (ref 4–15)
NEUTROPHILS # BLD AUTO: 4.6 K/UL (ref 1.8–7.7)
NEUTROPHILS NFR BLD: 80 % (ref 38–73)
NRBC BLD-RTO: 0 /100 WBC
PLATELET # BLD AUTO: 446 K/UL (ref 150–450)
PMV BLD AUTO: 9.9 FL (ref 9.2–12.9)
POTASSIUM SERPL-SCNC: 3.7 MMOL/L (ref 3.5–5.1)
PROT SERPL-MCNC: 8 G/DL (ref 6–8.4)
PROTHROMBIN TIME: 10.1 SEC (ref 9–12.5)
RBC # BLD AUTO: 3.94 M/UL (ref 4–5.4)
SODIUM SERPL-SCNC: 135 MMOL/L (ref 136–145)
WBC # BLD AUTO: 5.76 K/UL (ref 3.9–12.7)

## 2021-07-26 PROCEDURE — 80321 ALCOHOLS BIOMARKERS 1OR 2: CPT | Performed by: INTERNAL MEDICINE

## 2021-07-26 PROCEDURE — 82248 BILIRUBIN DIRECT: CPT | Performed by: INTERNAL MEDICINE

## 2021-07-26 PROCEDURE — 87522 HEPATITIS C REVRS TRNSCRPJ: CPT | Performed by: INTERNAL MEDICINE

## 2021-07-26 PROCEDURE — 80053 COMPREHEN METABOLIC PANEL: CPT | Performed by: INTERNAL MEDICINE

## 2021-07-26 PROCEDURE — 3008F PR BODY MASS INDEX (BMI) DOCUMENTED: ICD-10-PCS | Mod: CPTII,S$GLB,, | Performed by: INTERNAL MEDICINE

## 2021-07-26 PROCEDURE — 1159F MED LIST DOCD IN RCRD: CPT | Mod: CPTII,S$GLB,, | Performed by: INTERNAL MEDICINE

## 2021-07-26 PROCEDURE — 85610 PROTHROMBIN TIME: CPT | Performed by: INTERNAL MEDICINE

## 2021-07-26 PROCEDURE — 1126F PR PAIN SEVERITY QUANTIFIED, NO PAIN PRESENT: ICD-10-PCS | Mod: CPTII,S$GLB,, | Performed by: INTERNAL MEDICINE

## 2021-07-26 PROCEDURE — 85025 COMPLETE CBC W/AUTO DIFF WBC: CPT | Performed by: INTERNAL MEDICINE

## 2021-07-26 PROCEDURE — 99214 OFFICE O/P EST MOD 30 MIN: CPT | Mod: S$GLB,,, | Performed by: INTERNAL MEDICINE

## 2021-07-26 PROCEDURE — 1160F PR REVIEW ALL MEDS BY PRESCRIBER/CLIN PHARMACIST DOCUMENTED: ICD-10-PCS | Mod: CPTII,S$GLB,, | Performed by: INTERNAL MEDICINE

## 2021-07-26 PROCEDURE — 86256 FLUORESCENT ANTIBODY TITER: CPT | Performed by: INTERNAL MEDICINE

## 2021-07-26 PROCEDURE — 86235 NUCLEAR ANTIGEN ANTIBODY: CPT | Mod: 91 | Performed by: INTERNAL MEDICINE

## 2021-07-26 PROCEDURE — 86790 VIRUS ANTIBODY NOS: CPT | Performed by: INTERNAL MEDICINE

## 2021-07-26 PROCEDURE — 82390 ASSAY OF CERULOPLASMIN: CPT | Performed by: INTERNAL MEDICINE

## 2021-07-26 PROCEDURE — 3074F SYST BP LT 130 MM HG: CPT | Mod: CPTII,S$GLB,, | Performed by: INTERNAL MEDICINE

## 2021-07-26 PROCEDURE — 3074F PR MOST RECENT SYSTOLIC BLOOD PRESSURE < 130 MM HG: ICD-10-PCS | Mod: CPTII,S$GLB,, | Performed by: INTERNAL MEDICINE

## 2021-07-26 PROCEDURE — 3078F DIAST BP <80 MM HG: CPT | Mod: CPTII,S$GLB,, | Performed by: INTERNAL MEDICINE

## 2021-07-26 PROCEDURE — 99999 PR PBB SHADOW E&M-EST. PATIENT-LVL IV: ICD-10-PCS | Mod: PBBFAC,,, | Performed by: INTERNAL MEDICINE

## 2021-07-26 PROCEDURE — 1126F AMNT PAIN NOTED NONE PRSNT: CPT | Mod: CPTII,S$GLB,, | Performed by: INTERNAL MEDICINE

## 2021-07-26 PROCEDURE — 36415 COLL VENOUS BLD VENIPUNCTURE: CPT | Mod: PN | Performed by: INTERNAL MEDICINE

## 2021-07-26 PROCEDURE — 82239 BILE ACIDS TOTAL: CPT | Performed by: INTERNAL MEDICINE

## 2021-07-26 PROCEDURE — 99214 PR OFFICE/OUTPT VISIT, EST, LEVL IV, 30-39 MIN: ICD-10-PCS | Mod: S$GLB,,, | Performed by: INTERNAL MEDICINE

## 2021-07-26 PROCEDURE — 82784 ASSAY IGA/IGD/IGG/IGM EACH: CPT | Performed by: INTERNAL MEDICINE

## 2021-07-26 PROCEDURE — 3008F BODY MASS INDEX DOCD: CPT | Mod: CPTII,S$GLB,, | Performed by: INTERNAL MEDICINE

## 2021-07-26 PROCEDURE — 99999 PR PBB SHADOW E&M-EST. PATIENT-LVL IV: CPT | Mod: PBBFAC,,, | Performed by: INTERNAL MEDICINE

## 2021-07-26 PROCEDURE — 87799 DETECT AGENT NOS DNA QUANT: CPT | Performed by: INTERNAL MEDICINE

## 2021-07-26 PROCEDURE — 1111F PR DISCHARGE MEDS RECONCILED W/ CURRENT OUTPATIENT MED LIST: ICD-10-PCS | Mod: CPTII,S$GLB,, | Performed by: INTERNAL MEDICINE

## 2021-07-26 PROCEDURE — 86694 HERPES SIMPLEX NES ANTBDY: CPT | Performed by: INTERNAL MEDICINE

## 2021-07-26 PROCEDURE — 80307 DRUG TEST PRSMV CHEM ANLYZR: CPT | Performed by: INTERNAL MEDICINE

## 2021-07-26 PROCEDURE — 1111F DSCHRG MED/CURRENT MED MERGE: CPT | Mod: CPTII,S$GLB,, | Performed by: INTERNAL MEDICINE

## 2021-07-26 PROCEDURE — 86039 ANTINUCLEAR ANTIBODIES (ANA): CPT | Performed by: INTERNAL MEDICINE

## 2021-07-26 PROCEDURE — 83735 ASSAY OF MAGNESIUM: CPT | Performed by: INTERNAL MEDICINE

## 2021-07-26 PROCEDURE — 3078F PR MOST RECENT DIASTOLIC BLOOD PRESSURE < 80 MM HG: ICD-10-PCS | Mod: CPTII,S$GLB,, | Performed by: INTERNAL MEDICINE

## 2021-07-26 PROCEDURE — 86038 ANTINUCLEAR ANTIBODIES: CPT | Performed by: INTERNAL MEDICINE

## 2021-07-26 PROCEDURE — 86235 NUCLEAR ANTIGEN ANTIBODY: CPT | Mod: 59 | Performed by: INTERNAL MEDICINE

## 2021-07-26 PROCEDURE — 1160F RVW MEDS BY RX/DR IN RCRD: CPT | Mod: CPTII,S$GLB,, | Performed by: INTERNAL MEDICINE

## 2021-07-26 PROCEDURE — 1159F PR MEDICATION LIST DOCUMENTED IN MEDICAL RECORD: ICD-10-PCS | Mod: CPTII,S$GLB,, | Performed by: INTERNAL MEDICINE

## 2021-07-27 ENCOUNTER — TELEPHONE (OUTPATIENT)
Dept: TRANSPLANT | Facility: CLINIC | Age: 36
End: 2021-07-27

## 2021-07-27 ENCOUNTER — TELEPHONE (OUTPATIENT)
Dept: OBSTETRICS AND GYNECOLOGY | Facility: CLINIC | Age: 36
End: 2021-07-27

## 2021-07-27 ENCOUNTER — PATIENT MESSAGE (OUTPATIENT)
Dept: OBSTETRICS AND GYNECOLOGY | Facility: CLINIC | Age: 36
End: 2021-07-27

## 2021-07-27 DIAGNOSIS — R74.8 ELEVATED LIVER ENZYMES: Primary | ICD-10-CM

## 2021-07-27 LAB
ANA PATTERN 1: NORMAL
ANA PATTERN 2: NORMAL
ANA SER QL IF: POSITIVE
ANA TITER 2: NORMAL
ANA TITR SER IF: NORMAL {TITER}

## 2021-07-28 ENCOUNTER — TELEPHONE (OUTPATIENT)
Dept: TRANSPLANT | Facility: CLINIC | Age: 36
End: 2021-07-28

## 2021-07-28 ENCOUNTER — LAB VISIT (OUTPATIENT)
Dept: LAB | Facility: HOSPITAL | Age: 36
End: 2021-07-28
Attending: OBSTETRICS & GYNECOLOGY
Payer: COMMERCIAL

## 2021-07-28 ENCOUNTER — TELEPHONE (OUTPATIENT)
Dept: OBSTETRICS AND GYNECOLOGY | Facility: CLINIC | Age: 36
End: 2021-07-28

## 2021-07-28 DIAGNOSIS — R74.8 ELEVATED LIVER ENZYMES: Primary | ICD-10-CM

## 2021-07-28 DIAGNOSIS — R74.8 ELEVATED LIVER ENZYMES: ICD-10-CM

## 2021-07-28 LAB
BASOPHILS # BLD AUTO: 0.01 K/UL (ref 0–0.2)
BASOPHILS NFR BLD: 0.2 % (ref 0–1.9)
BILE AC SERPL-SCNC: 38 MCMOL/L
CYTOMEGALOVIRUS DNA: NOT DETECTED
CYTOMEGALOVIRUS LOG (IU/ML): NOT DETECTED LOGIU/ML
CYTOMEGALOVIRUS PCR, QUANT: NOT DETECTED IU/ML
DIFFERENTIAL METHOD: ABNORMAL
EOSINOPHIL # BLD AUTO: 0 K/UL (ref 0–0.5)
EOSINOPHIL NFR BLD: 0.5 % (ref 0–8)
ERYTHROCYTE [DISTWIDTH] IN BLOOD BY AUTOMATED COUNT: 17.2 % (ref 11.5–14.5)
HCT VFR BLD AUTO: 31 % (ref 37–48.5)
HEPATITIS C VIRUS (HCV) RNA DETECTION/QUANTIFICATION RT-PCR: NORMAL IU/ML
HGB BLD-MCNC: 9.5 G/DL (ref 12–16)
IMM GRANULOCYTES # BLD AUTO: 0.04 K/UL (ref 0–0.04)
IMM GRANULOCYTES NFR BLD AUTO: 0.7 % (ref 0–0.5)
INR PPP: 0.9 (ref 0.8–1.2)
LYMPHOCYTES # BLD AUTO: 0.8 K/UL (ref 1–4.8)
LYMPHOCYTES NFR BLD: 14.3 % (ref 18–48)
MCH RBC QN AUTO: 24.3 PG (ref 27–31)
MCHC RBC AUTO-ENTMCNC: 30.6 G/DL (ref 32–36)
MCV RBC AUTO: 79 FL (ref 82–98)
MITOCHONDRIA AB TITR SER IF: NORMAL {TITER}
MONOCYTES # BLD AUTO: 0.3 K/UL (ref 0.3–1)
MONOCYTES NFR BLD: 5.4 % (ref 4–15)
NEUTROPHILS # BLD AUTO: 4.4 K/UL (ref 1.8–7.7)
NEUTROPHILS NFR BLD: 78.9 % (ref 38–73)
NRBC BLD-RTO: 0 /100 WBC
PLATELET # BLD AUTO: 388 K/UL (ref 150–450)
PMV BLD AUTO: 9.9 FL (ref 9.2–12.9)
PROTHROMBIN TIME: 10.2 SEC (ref 9–12.5)
RBC # BLD AUTO: 3.91 M/UL (ref 4–5.4)
WBC # BLD AUTO: 5.58 K/UL (ref 3.9–12.7)

## 2021-07-28 PROCEDURE — 80053 COMPREHEN METABOLIC PANEL: CPT | Performed by: OBSTETRICS & GYNECOLOGY

## 2021-07-28 PROCEDURE — 36415 COLL VENOUS BLD VENIPUNCTURE: CPT | Mod: PO | Performed by: OBSTETRICS & GYNECOLOGY

## 2021-07-28 PROCEDURE — 85610 PROTHROMBIN TIME: CPT | Performed by: OBSTETRICS & GYNECOLOGY

## 2021-07-28 PROCEDURE — 85025 COMPLETE CBC W/AUTO DIFF WBC: CPT | Performed by: OBSTETRICS & GYNECOLOGY

## 2021-07-28 RX ORDER — URSODIOL 300 MG/1
300 CAPSULE ORAL 3 TIMES DAILY
Qty: 90 CAPSULE | Refills: 11 | Status: ON HOLD | OUTPATIENT
Start: 2021-07-28 | End: 2022-01-02 | Stop reason: HOSPADM

## 2021-07-29 ENCOUNTER — PROCEDURE VISIT (OUTPATIENT)
Dept: OBSTETRICS AND GYNECOLOGY | Facility: CLINIC | Age: 36
End: 2021-07-29
Payer: COMMERCIAL

## 2021-07-29 ENCOUNTER — LAB VISIT (OUTPATIENT)
Dept: LAB | Facility: HOSPITAL | Age: 36
End: 2021-07-29
Attending: OBSTETRICS & GYNECOLOGY
Payer: COMMERCIAL

## 2021-07-29 DIAGNOSIS — O14.93 PRE-ECLAMPSIA IN THIRD TRIMESTER: ICD-10-CM

## 2021-07-29 DIAGNOSIS — R74.8 ELEVATED LIVER ENZYMES: ICD-10-CM

## 2021-07-29 LAB
ALBUMIN SERPL BCP-MCNC: 2.2 G/DL (ref 3.5–5.2)
ALBUMIN SERPL BCP-MCNC: 2.2 G/DL (ref 3.5–5.2)
ALP SERPL-CCNC: 186 U/L (ref 55–135)
ALP SERPL-CCNC: 188 U/L (ref 55–135)
ALT SERPL W/O P-5'-P-CCNC: 625 U/L (ref 10–44)
ALT SERPL W/O P-5'-P-CCNC: 626 U/L (ref 10–44)
AMPHETAMINES SERPL QL: NEGATIVE
ANION GAP SERPL CALC-SCNC: 10 MMOL/L (ref 8–16)
ANION GAP SERPL CALC-SCNC: 9 MMOL/L (ref 8–16)
ANTI SM ANTIBODY: 0.06 RATIO (ref 0–0.99)
ANTI SM/RNP ANTIBODY: 0.06 RATIO (ref 0–0.99)
ANTI-SM INTERPRETATION: NEGATIVE
ANTI-SM/RNP INTERPRETATION: NEGATIVE
ANTI-SSA ANTIBODY: 0.05 RATIO (ref 0–0.99)
ANTI-SSA INTERPRETATION: NEGATIVE
ANTI-SSB ANTIBODY: 0.06 RATIO (ref 0–0.99)
ANTI-SSB INTERPRETATION: NEGATIVE
AST SERPL-CCNC: 232 U/L (ref 10–40)
AST SERPL-CCNC: 240 U/L (ref 10–40)
BARBITURATES SERPL QL SCN: NEGATIVE
BASOPHILS # BLD AUTO: 0.01 K/UL (ref 0–0.2)
BASOPHILS NFR BLD: 0.2 % (ref 0–1.9)
BENZODIAZ SERPL QL SCN: NEGATIVE
BILIRUB SERPL-MCNC: 0.5 MG/DL (ref 0.1–1)
BILIRUB SERPL-MCNC: 0.5 MG/DL (ref 0.1–1)
BILIRUB SERPL-MCNC: 0.6 MG/DL (ref 0.1–1)
BILIRUB SERPL-MCNC: 0.6 MG/DL (ref 0.1–1)
BUN SERPL-MCNC: 5 MG/DL (ref 6–20)
BUN SERPL-MCNC: 5 MG/DL (ref 6–20)
BZE SERPL QL: NEGATIVE
CALCIUM SERPL-MCNC: 10 MG/DL (ref 8.7–10.5)
CALCIUM SERPL-MCNC: 9.9 MG/DL (ref 8.7–10.5)
CARBOXYTHC SERPL QL SCN: NEGATIVE
CHLORIDE SERPL-SCNC: 103 MMOL/L (ref 95–110)
CHLORIDE SERPL-SCNC: 104 MMOL/L (ref 95–110)
CO2 SERPL-SCNC: 21 MMOL/L (ref 23–29)
CO2 SERPL-SCNC: 21 MMOL/L (ref 23–29)
CREAT SERPL-MCNC: 0.6 MG/DL (ref 0.5–1.4)
CREAT SERPL-MCNC: 0.7 MG/DL (ref 0.5–1.4)
CREAT UR-MCNC: 25.2 MG/DL (ref 15–325)
DIFFERENTIAL METHOD: ABNORMAL
DSDNA AB SER-ACNC: NORMAL [IU]/ML
EOSINOPHIL # BLD AUTO: 0 K/UL (ref 0–0.5)
EOSINOPHIL NFR BLD: 0.4 % (ref 0–8)
ERYTHROCYTE [DISTWIDTH] IN BLOOD BY AUTOMATED COUNT: 16.9 % (ref 11.5–14.5)
EST. GFR  (AFRICAN AMERICAN): >60 ML/MIN/1.73 M^2
EST. GFR  (AFRICAN AMERICAN): >60 ML/MIN/1.73 M^2
EST. GFR  (NON AFRICAN AMERICAN): >60 ML/MIN/1.73 M^2
EST. GFR  (NON AFRICAN AMERICAN): >60 ML/MIN/1.73 M^2
ETHANOL SERPL QL SCN: NEGATIVE
GLUCOSE SERPL-MCNC: 133 MG/DL (ref 70–110)
GLUCOSE SERPL-MCNC: 91 MG/DL (ref 70–110)
HCT VFR BLD AUTO: 31.3 % (ref 37–48.5)
HGB BLD-MCNC: 9.7 G/DL (ref 12–16)
IMM GRANULOCYTES # BLD AUTO: 0.09 K/UL (ref 0–0.04)
IMM GRANULOCYTES NFR BLD AUTO: 1.8 % (ref 0–0.5)
INR PPP: 1 (ref 0.8–1.2)
LYMPHOCYTES # BLD AUTO: 0.8 K/UL (ref 1–4.8)
LYMPHOCYTES NFR BLD: 15.7 % (ref 18–48)
MCH RBC QN AUTO: 24 PG (ref 27–31)
MCHC RBC AUTO-ENTMCNC: 31 G/DL (ref 32–36)
MCV RBC AUTO: 78 FL (ref 82–98)
METHADONE SERPL QL SCN: NEGATIVE
MONOCYTES # BLD AUTO: 0.3 K/UL (ref 0.3–1)
MONOCYTES NFR BLD: 6.3 % (ref 4–15)
NEUTROPHILS # BLD AUTO: 3.7 K/UL (ref 1.8–7.7)
NEUTROPHILS NFR BLD: 75.6 % (ref 38–73)
NRBC BLD-RTO: 0 /100 WBC
OPIATES SERPL QL SCN: NEGATIVE
PCP SERPL QL SCN: NEGATIVE
PLATELET # BLD AUTO: 377 K/UL (ref 150–450)
PMV BLD AUTO: 9.3 FL (ref 9.2–12.9)
POTASSIUM SERPL-SCNC: 3.7 MMOL/L (ref 3.5–5.1)
POTASSIUM SERPL-SCNC: 3.9 MMOL/L (ref 3.5–5.1)
PROPOXYPH SERPL QL: NEGATIVE
PROT SERPL-MCNC: 7.8 G/DL (ref 6–8.4)
PROT SERPL-MCNC: 8 G/DL (ref 6–8.4)
PROT UR-MCNC: <7 MG/DL (ref 0–15)
PROT/CREAT UR: NORMAL MG/G{CREAT} (ref 0–0.2)
PROTHROMBIN TIME: 10.2 SEC (ref 9–12.5)
RBC # BLD AUTO: 4.04 M/UL (ref 4–5.4)
SODIUM SERPL-SCNC: 134 MMOL/L (ref 136–145)
SODIUM SERPL-SCNC: 134 MMOL/L (ref 136–145)
WBC # BLD AUTO: 4.9 K/UL (ref 3.9–12.7)

## 2021-07-29 PROCEDURE — 76819 FETAL BIOPHYS PROFIL W/O NST: CPT | Mod: S$GLB,,, | Performed by: OBSTETRICS & GYNECOLOGY

## 2021-07-29 PROCEDURE — 85610 PROTHROMBIN TIME: CPT | Performed by: OBSTETRICS & GYNECOLOGY

## 2021-07-29 PROCEDURE — 85025 COMPLETE CBC W/AUTO DIFF WBC: CPT | Performed by: OBSTETRICS & GYNECOLOGY

## 2021-07-29 PROCEDURE — 76819 PR US, OB, FETAL BIOPHYSICAL, W/O NST: ICD-10-PCS | Mod: S$GLB,,, | Performed by: OBSTETRICS & GYNECOLOGY

## 2021-07-29 PROCEDURE — 36415 COLL VENOUS BLD VENIPUNCTURE: CPT | Performed by: OBSTETRICS & GYNECOLOGY

## 2021-07-29 PROCEDURE — 80053 COMPREHEN METABOLIC PANEL: CPT | Performed by: OBSTETRICS & GYNECOLOGY

## 2021-07-29 PROCEDURE — 84156 ASSAY OF PROTEIN URINE: CPT | Performed by: OBSTETRICS & GYNECOLOGY

## 2021-07-30 ENCOUNTER — TELEPHONE (OUTPATIENT)
Dept: HEPATOLOGY | Facility: CLINIC | Age: 36
End: 2021-07-30

## 2021-07-30 ENCOUNTER — LAB VISIT (OUTPATIENT)
Dept: LAB | Facility: HOSPITAL | Age: 36
End: 2021-07-30
Attending: OBSTETRICS & GYNECOLOGY
Payer: COMMERCIAL

## 2021-07-30 DIAGNOSIS — R74.8 ELEVATED LIVER ENZYMES: ICD-10-CM

## 2021-07-30 DIAGNOSIS — R79.89 HIGH SERUM BILE ACID: Primary | ICD-10-CM

## 2021-07-30 LAB
HSV AB, IGM BY EIA: 0.59 INDEX
INR PPP: 0.9 (ref 0.8–1.2)
PROTHROMBIN TIME: 10.1 SEC (ref 9–12.5)
SMOOTH MUSCLE AB TITR SER IF: ABNORMAL {TITER}

## 2021-07-30 PROCEDURE — 36415 COLL VENOUS BLD VENIPUNCTURE: CPT | Mod: PO | Performed by: OBSTETRICS & GYNECOLOGY

## 2021-07-30 PROCEDURE — 80053 COMPREHEN METABOLIC PANEL: CPT | Performed by: OBSTETRICS & GYNECOLOGY

## 2021-07-30 PROCEDURE — 85610 PROTHROMBIN TIME: CPT | Performed by: OBSTETRICS & GYNECOLOGY

## 2021-07-31 LAB
ALBUMIN SERPL BCP-MCNC: 2.1 G/DL (ref 3.5–5.2)
ALP SERPL-CCNC: 186 U/L (ref 55–135)
ALT SERPL W/O P-5'-P-CCNC: 496 U/L (ref 10–44)
ANION GAP SERPL CALC-SCNC: 8 MMOL/L (ref 8–16)
AST SERPL-CCNC: 172 U/L (ref 10–40)
BILIRUB SERPL-MCNC: 0.5 MG/DL (ref 0.1–1)
BILIRUB SERPL-MCNC: 0.5 MG/DL (ref 0.1–1)
BUN SERPL-MCNC: 6 MG/DL (ref 6–20)
CALCIUM SERPL-MCNC: 9.7 MG/DL (ref 8.7–10.5)
CHLORIDE SERPL-SCNC: 105 MMOL/L (ref 95–110)
CO2 SERPL-SCNC: 21 MMOL/L (ref 23–29)
CREAT SERPL-MCNC: 0.6 MG/DL (ref 0.5–1.4)
EST. GFR  (AFRICAN AMERICAN): >60 ML/MIN/1.73 M^2
EST. GFR  (NON AFRICAN AMERICAN): >60 ML/MIN/1.73 M^2
GLUCOSE SERPL-MCNC: 77 MG/DL (ref 70–110)
HEV IGM SER QL: NOT DETECTED
POTASSIUM SERPL-SCNC: 4.4 MMOL/L (ref 3.5–5.1)
PROT SERPL-MCNC: 7.5 G/DL (ref 6–8.4)
SODIUM SERPL-SCNC: 134 MMOL/L (ref 136–145)

## 2021-08-01 ENCOUNTER — PATIENT MESSAGE (OUTPATIENT)
Dept: OBSTETRICS AND GYNECOLOGY | Facility: CLINIC | Age: 36
End: 2021-08-01

## 2021-08-02 ENCOUNTER — LAB VISIT (OUTPATIENT)
Dept: LAB | Facility: HOSPITAL | Age: 36
End: 2021-08-02
Attending: OBSTETRICS & GYNECOLOGY
Payer: COMMERCIAL

## 2021-08-02 ENCOUNTER — PATIENT MESSAGE (OUTPATIENT)
Dept: HEPATOLOGY | Facility: CLINIC | Age: 36
End: 2021-08-02

## 2021-08-02 DIAGNOSIS — R74.8 ELEVATED LIVER ENZYMES: ICD-10-CM

## 2021-08-02 DIAGNOSIS — R79.89 HIGH SERUM BILE ACID: ICD-10-CM

## 2021-08-02 LAB — EBV DNA SERPL NAA+PROBE-ACNC: NORMAL [IU]/ML

## 2021-08-03 ENCOUNTER — LAB VISIT (OUTPATIENT)
Dept: LAB | Facility: HOSPITAL | Age: 36
End: 2021-08-03
Attending: OBSTETRICS & GYNECOLOGY
Payer: COMMERCIAL

## 2021-08-03 ENCOUNTER — PATIENT MESSAGE (OUTPATIENT)
Dept: OBSTETRICS AND GYNECOLOGY | Facility: CLINIC | Age: 36
End: 2021-08-03

## 2021-08-03 DIAGNOSIS — R74.8 ELEVATED LIVER ENZYMES: ICD-10-CM

## 2021-08-03 DIAGNOSIS — O14.93 PRE-ECLAMPSIA IN THIRD TRIMESTER: ICD-10-CM

## 2021-08-03 LAB
ALBUMIN SERPL BCP-MCNC: 2.1 G/DL (ref 3.5–5.2)
ALP SERPL-CCNC: 148 U/L (ref 55–135)
ALT SERPL W/O P-5'-P-CCNC: 316 U/L (ref 10–44)
ANION GAP SERPL CALC-SCNC: 6 MMOL/L (ref 8–16)
AST SERPL-CCNC: 109 U/L (ref 10–40)
BASOPHILS # BLD AUTO: 0.01 K/UL (ref 0–0.2)
BASOPHILS NFR BLD: 0.2 % (ref 0–1.9)
BILIRUB SERPL-MCNC: 0.4 MG/DL (ref 0.1–1)
BILIRUB SERPL-MCNC: 0.4 MG/DL (ref 0.1–1)
BUN SERPL-MCNC: 6 MG/DL (ref 6–20)
CALCIUM SERPL-MCNC: 9.6 MG/DL (ref 8.7–10.5)
CHLORIDE SERPL-SCNC: 105 MMOL/L (ref 95–110)
CO2 SERPL-SCNC: 23 MMOL/L (ref 23–29)
CREAT SERPL-MCNC: 0.5 MG/DL (ref 0.5–1.4)
DIFFERENTIAL METHOD: ABNORMAL
EOSINOPHIL # BLD AUTO: 0 K/UL (ref 0–0.5)
EOSINOPHIL NFR BLD: 0.3 % (ref 0–8)
ERYTHROCYTE [DISTWIDTH] IN BLOOD BY AUTOMATED COUNT: 17.7 % (ref 11.5–14.5)
EST. GFR  (AFRICAN AMERICAN): >60 ML/MIN/1.73 M^2
EST. GFR  (NON AFRICAN AMERICAN): >60 ML/MIN/1.73 M^2
GLUCOSE SERPL-MCNC: 91 MG/DL (ref 70–110)
HCT VFR BLD AUTO: 27.5 % (ref 37–48.5)
HGB BLD-MCNC: 8.7 G/DL (ref 12–16)
IMM GRANULOCYTES # BLD AUTO: 0.05 K/UL (ref 0–0.04)
IMM GRANULOCYTES NFR BLD AUTO: 0.8 % (ref 0–0.5)
INR PPP: 0.9 (ref 0.8–1.2)
LYMPHOCYTES # BLD AUTO: 0.8 K/UL (ref 1–4.8)
LYMPHOCYTES NFR BLD: 13.4 % (ref 18–48)
MCH RBC QN AUTO: 24.3 PG (ref 27–31)
MCHC RBC AUTO-ENTMCNC: 31.6 G/DL (ref 32–36)
MCV RBC AUTO: 77 FL (ref 82–98)
MONOCYTES # BLD AUTO: 0.3 K/UL (ref 0.3–1)
MONOCYTES NFR BLD: 4.6 % (ref 4–15)
NEUTROPHILS # BLD AUTO: 5.1 K/UL (ref 1.8–7.7)
NEUTROPHILS NFR BLD: 80.7 % (ref 38–73)
NRBC BLD-RTO: 0 /100 WBC
PHOSPHATIDYLETHANOL (PETH): NEGATIVE NG/ML
PLATELET # BLD AUTO: 385 K/UL (ref 150–450)
PMV BLD AUTO: 9.9 FL (ref 9.2–12.9)
POTASSIUM SERPL-SCNC: 3.8 MMOL/L (ref 3.5–5.1)
PROT SERPL-MCNC: 7.1 G/DL (ref 6–8.4)
PROTHROMBIN TIME: 9.8 SEC (ref 9–12.5)
RBC # BLD AUTO: 3.58 M/UL (ref 4–5.4)
SODIUM SERPL-SCNC: 134 MMOL/L (ref 136–145)
WBC # BLD AUTO: 6.29 K/UL (ref 3.9–12.7)

## 2021-08-03 PROCEDURE — 82239 BILE ACIDS TOTAL: CPT | Performed by: OBSTETRICS & GYNECOLOGY

## 2021-08-03 PROCEDURE — 85610 PROTHROMBIN TIME: CPT | Performed by: OBSTETRICS & GYNECOLOGY

## 2021-08-03 PROCEDURE — 80053 COMPREHEN METABOLIC PANEL: CPT | Performed by: OBSTETRICS & GYNECOLOGY

## 2021-08-03 PROCEDURE — 36415 COLL VENOUS BLD VENIPUNCTURE: CPT | Mod: PO | Performed by: OBSTETRICS & GYNECOLOGY

## 2021-08-03 PROCEDURE — 85025 COMPLETE CBC W/AUTO DIFF WBC: CPT | Performed by: OBSTETRICS & GYNECOLOGY

## 2021-08-04 ENCOUNTER — LAB VISIT (OUTPATIENT)
Dept: LAB | Facility: HOSPITAL | Age: 36
End: 2021-08-04
Attending: OBSTETRICS & GYNECOLOGY
Payer: COMMERCIAL

## 2021-08-04 ENCOUNTER — PATIENT MESSAGE (OUTPATIENT)
Dept: OBSTETRICS AND GYNECOLOGY | Facility: CLINIC | Age: 36
End: 2021-08-04

## 2021-08-04 ENCOUNTER — TELEPHONE (OUTPATIENT)
Dept: HEPATOLOGY | Facility: CLINIC | Age: 36
End: 2021-08-04

## 2021-08-04 DIAGNOSIS — O14.93 PRE-ECLAMPSIA IN THIRD TRIMESTER: ICD-10-CM

## 2021-08-04 DIAGNOSIS — R74.8 ELEVATED LIVER ENZYMES: ICD-10-CM

## 2021-08-04 LAB
ALBUMIN SERPL BCP-MCNC: 1.9 G/DL (ref 3.5–5.2)
ALP SERPL-CCNC: 141 U/L (ref 55–135)
ALT SERPL W/O P-5'-P-CCNC: 240 U/L (ref 10–44)
ANION GAP SERPL CALC-SCNC: 7 MMOL/L (ref 8–16)
AST SERPL-CCNC: 80 U/L (ref 10–40)
BASOPHILS # BLD AUTO: 0 K/UL (ref 0–0.2)
BASOPHILS NFR BLD: 0 % (ref 0–1.9)
BILIRUB SERPL-MCNC: 0.4 MG/DL (ref 0.1–1)
BUN SERPL-MCNC: 6 MG/DL (ref 6–20)
CALCIUM SERPL-MCNC: 9.5 MG/DL (ref 8.7–10.5)
CHLORIDE SERPL-SCNC: 104 MMOL/L (ref 95–110)
CO2 SERPL-SCNC: 23 MMOL/L (ref 23–29)
CREAT SERPL-MCNC: 0.5 MG/DL (ref 0.5–1.4)
DIFFERENTIAL METHOD: ABNORMAL
EOSINOPHIL # BLD AUTO: 0 K/UL (ref 0–0.5)
EOSINOPHIL NFR BLD: 0.3 % (ref 0–8)
ERYTHROCYTE [DISTWIDTH] IN BLOOD BY AUTOMATED COUNT: 17.8 % (ref 11.5–14.5)
EST. GFR  (AFRICAN AMERICAN): >60 ML/MIN/1.73 M^2
EST. GFR  (NON AFRICAN AMERICAN): >60 ML/MIN/1.73 M^2
GLUCOSE SERPL-MCNC: 105 MG/DL (ref 70–110)
HCT VFR BLD AUTO: 27.2 % (ref 37–48.5)
HGB BLD-MCNC: 8.5 G/DL (ref 12–16)
IMM GRANULOCYTES # BLD AUTO: 0.06 K/UL (ref 0–0.04)
IMM GRANULOCYTES NFR BLD AUTO: 1 % (ref 0–0.5)
INR PPP: 0.9 (ref 0.8–1.2)
LYMPHOCYTES # BLD AUTO: 0.8 K/UL (ref 1–4.8)
LYMPHOCYTES NFR BLD: 12.9 % (ref 18–48)
MCH RBC QN AUTO: 24.7 PG (ref 27–31)
MCHC RBC AUTO-ENTMCNC: 31.3 G/DL (ref 32–36)
MCV RBC AUTO: 79 FL (ref 82–98)
MONOCYTES # BLD AUTO: 0.3 K/UL (ref 0.3–1)
MONOCYTES NFR BLD: 5.2 % (ref 4–15)
NEUTROPHILS # BLD AUTO: 5 K/UL (ref 1.8–7.7)
NEUTROPHILS NFR BLD: 80.6 % (ref 38–73)
NRBC BLD-RTO: 0 /100 WBC
PLATELET # BLD AUTO: 385 K/UL (ref 150–450)
PMV BLD AUTO: 10.3 FL (ref 9.2–12.9)
POTASSIUM SERPL-SCNC: 3.6 MMOL/L (ref 3.5–5.1)
PROT SERPL-MCNC: 6.9 G/DL (ref 6–8.4)
PROTHROMBIN TIME: 10 SEC (ref 9–12.5)
RBC # BLD AUTO: 3.44 M/UL (ref 4–5.4)
SODIUM SERPL-SCNC: 134 MMOL/L (ref 136–145)
WBC # BLD AUTO: 6.18 K/UL (ref 3.9–12.7)

## 2021-08-04 PROCEDURE — 85025 COMPLETE CBC W/AUTO DIFF WBC: CPT | Performed by: OBSTETRICS & GYNECOLOGY

## 2021-08-04 PROCEDURE — 80053 COMPREHEN METABOLIC PANEL: CPT | Performed by: OBSTETRICS & GYNECOLOGY

## 2021-08-04 PROCEDURE — 85610 PROTHROMBIN TIME: CPT | Performed by: OBSTETRICS & GYNECOLOGY

## 2021-08-04 PROCEDURE — 82239 BILE ACIDS TOTAL: CPT | Performed by: OBSTETRICS & GYNECOLOGY

## 2021-08-05 ENCOUNTER — ROUTINE PRENATAL (OUTPATIENT)
Dept: OBSTETRICS AND GYNECOLOGY | Facility: CLINIC | Age: 36
End: 2021-08-05
Payer: COMMERCIAL

## 2021-08-05 ENCOUNTER — LAB VISIT (OUTPATIENT)
Dept: LAB | Facility: HOSPITAL | Age: 36
End: 2021-08-05
Attending: OBSTETRICS & GYNECOLOGY
Payer: COMMERCIAL

## 2021-08-05 VITALS
BODY MASS INDEX: 29.76 KG/M2 | SYSTOLIC BLOOD PRESSURE: 116 MMHG | DIASTOLIC BLOOD PRESSURE: 74 MMHG | WEIGHT: 162.69 LBS

## 2021-08-05 DIAGNOSIS — J45.20 MILD INTERMITTENT ASTHMA WITHOUT COMPLICATION: ICD-10-CM

## 2021-08-05 DIAGNOSIS — O09.513 SUPERVISION OF HIGH RISK ELDERLY PRIMIGRAVIDA IN THIRD TRIMESTER: Primary | ICD-10-CM

## 2021-08-05 DIAGNOSIS — R74.01 TRANSAMINITIS: ICD-10-CM

## 2021-08-05 DIAGNOSIS — O26.899 RH NEGATIVE STATE IN ANTEPARTUM PERIOD: ICD-10-CM

## 2021-08-05 DIAGNOSIS — O34.29 PREGNANCY WITH HISTORY OF UTERINE MYOMECTOMY: ICD-10-CM

## 2021-08-05 DIAGNOSIS — O14.93 PRE-ECLAMPSIA IN THIRD TRIMESTER: ICD-10-CM

## 2021-08-05 DIAGNOSIS — R74.8 ELEVATED LIVER ENZYMES: ICD-10-CM

## 2021-08-05 DIAGNOSIS — Z67.91 RH NEGATIVE STATE IN ANTEPARTUM PERIOD: ICD-10-CM

## 2021-08-05 DIAGNOSIS — O09.512 AMA (ADVANCED MATERNAL AGE) PRIMIGRAVIDA 35+, SECOND TRIMESTER: ICD-10-CM

## 2021-08-05 DIAGNOSIS — D25.1 FIBROIDS, INTRAMURAL: ICD-10-CM

## 2021-08-05 LAB
ALBUMIN SERPL BCP-MCNC: 1.9 G/DL (ref 3.5–5.2)
ALP SERPL-CCNC: 133 U/L (ref 55–135)
ALT SERPL W/O P-5'-P-CCNC: 192 U/L (ref 10–44)
ANION GAP SERPL CALC-SCNC: 6 MMOL/L (ref 8–16)
AST SERPL-CCNC: 61 U/L (ref 10–40)
BASOPHILS # BLD AUTO: 0.01 K/UL (ref 0–0.2)
BASOPHILS NFR BLD: 0.2 % (ref 0–1.9)
BILE AC SERPL-SCNC: 11 MCMOL/L
BILIRUB SERPL-MCNC: 0.4 MG/DL (ref 0.1–1)
BUN SERPL-MCNC: 5 MG/DL (ref 6–20)
CALCIUM SERPL-MCNC: 9.3 MG/DL (ref 8.7–10.5)
CHLORIDE SERPL-SCNC: 107 MMOL/L (ref 95–110)
CO2 SERPL-SCNC: 23 MMOL/L (ref 23–29)
CREAT SERPL-MCNC: 0.6 MG/DL (ref 0.5–1.4)
DIFFERENTIAL METHOD: ABNORMAL
EOSINOPHIL # BLD AUTO: 0 K/UL (ref 0–0.5)
EOSINOPHIL NFR BLD: 0.4 % (ref 0–8)
ERYTHROCYTE [DISTWIDTH] IN BLOOD BY AUTOMATED COUNT: 17.8 % (ref 11.5–14.5)
EST. GFR  (AFRICAN AMERICAN): >60 ML/MIN/1.73 M^2
EST. GFR  (NON AFRICAN AMERICAN): >60 ML/MIN/1.73 M^2
GLUCOSE SERPL-MCNC: 85 MG/DL (ref 70–110)
HCT VFR BLD AUTO: 27.3 % (ref 37–48.5)
HGB BLD-MCNC: 8.4 G/DL (ref 12–16)
IMM GRANULOCYTES # BLD AUTO: 0.04 K/UL (ref 0–0.04)
IMM GRANULOCYTES NFR BLD AUTO: 0.7 % (ref 0–0.5)
INR PPP: 0.9 (ref 0.8–1.2)
LYMPHOCYTES # BLD AUTO: 0.9 K/UL (ref 1–4.8)
LYMPHOCYTES NFR BLD: 15.8 % (ref 18–48)
MCH RBC QN AUTO: 24.5 PG (ref 27–31)
MCHC RBC AUTO-ENTMCNC: 30.8 G/DL (ref 32–36)
MCV RBC AUTO: 80 FL (ref 82–98)
MONOCYTES # BLD AUTO: 0.3 K/UL (ref 0.3–1)
MONOCYTES NFR BLD: 6.3 % (ref 4–15)
NEUTROPHILS # BLD AUTO: 4.1 K/UL (ref 1.8–7.7)
NEUTROPHILS NFR BLD: 76.6 % (ref 38–73)
NRBC BLD-RTO: 0 /100 WBC
PLATELET # BLD AUTO: 387 K/UL (ref 150–450)
PMV BLD AUTO: 10.1 FL (ref 9.2–12.9)
POTASSIUM SERPL-SCNC: 4 MMOL/L (ref 3.5–5.1)
PROT SERPL-MCNC: 6.7 G/DL (ref 6–8.4)
PROTHROMBIN TIME: 9.8 SEC (ref 9–12.5)
RBC # BLD AUTO: 3.43 M/UL (ref 4–5.4)
SODIUM SERPL-SCNC: 136 MMOL/L (ref 136–145)
WBC # BLD AUTO: 5.38 K/UL (ref 3.9–12.7)

## 2021-08-05 PROCEDURE — 85025 COMPLETE CBC W/AUTO DIFF WBC: CPT | Performed by: OBSTETRICS & GYNECOLOGY

## 2021-08-05 PROCEDURE — 85610 PROTHROMBIN TIME: CPT | Performed by: OBSTETRICS & GYNECOLOGY

## 2021-08-05 PROCEDURE — 99999 PR PBB SHADOW E&M-EST. PATIENT-LVL II: ICD-10-PCS | Mod: PBBFAC,,, | Performed by: OBSTETRICS & GYNECOLOGY

## 2021-08-05 PROCEDURE — 80053 COMPREHEN METABOLIC PANEL: CPT | Performed by: OBSTETRICS & GYNECOLOGY

## 2021-08-05 PROCEDURE — 36415 COLL VENOUS BLD VENIPUNCTURE: CPT | Mod: PO | Performed by: OBSTETRICS & GYNECOLOGY

## 2021-08-05 PROCEDURE — 82239 BILE ACIDS TOTAL: CPT | Performed by: OBSTETRICS & GYNECOLOGY

## 2021-08-05 PROCEDURE — 0502F PR SUBSEQUENT PRENATAL CARE: ICD-10-PCS | Mod: CPTII,S$GLB,, | Performed by: OBSTETRICS & GYNECOLOGY

## 2021-08-05 PROCEDURE — 99999 PR PBB SHADOW E&M-EST. PATIENT-LVL II: CPT | Mod: PBBFAC,,, | Performed by: OBSTETRICS & GYNECOLOGY

## 2021-08-05 PROCEDURE — 0502F SUBSEQUENT PRENATAL CARE: CPT | Mod: CPTII,S$GLB,, | Performed by: OBSTETRICS & GYNECOLOGY

## 2021-08-06 ENCOUNTER — TELEPHONE (OUTPATIENT)
Dept: OBSTETRICS AND GYNECOLOGY | Facility: CLINIC | Age: 36
End: 2021-08-06

## 2021-08-06 ENCOUNTER — PROCEDURE VISIT (OUTPATIENT)
Dept: OBSTETRICS AND GYNECOLOGY | Facility: CLINIC | Age: 36
End: 2021-08-06
Payer: COMMERCIAL

## 2021-08-06 ENCOUNTER — LAB VISIT (OUTPATIENT)
Dept: LAB | Facility: HOSPITAL | Age: 36
End: 2021-08-06
Attending: OBSTETRICS & GYNECOLOGY
Payer: COMMERCIAL

## 2021-08-06 DIAGNOSIS — O26.899 RH NEGATIVE STATE IN ANTEPARTUM PERIOD: ICD-10-CM

## 2021-08-06 DIAGNOSIS — Z67.91 RH NEGATIVE STATE IN ANTEPARTUM PERIOD: ICD-10-CM

## 2021-08-06 DIAGNOSIS — O14.93 PRE-ECLAMPSIA IN THIRD TRIMESTER: ICD-10-CM

## 2021-08-06 DIAGNOSIS — R74.8 ELEVATED LIVER ENZYMES: ICD-10-CM

## 2021-08-06 DIAGNOSIS — R79.89 HIGH SERUM BILE ACID: ICD-10-CM

## 2021-08-06 LAB
ALBUMIN SERPL BCP-MCNC: 2.3 G/DL (ref 3.5–5.2)
ALP SERPL-CCNC: 164 U/L (ref 55–135)
ALT SERPL W/O P-5'-P-CCNC: 210 U/L (ref 10–44)
ANION GAP SERPL CALC-SCNC: 10 MMOL/L (ref 8–16)
APAP SERPL-MCNC: <3 UG/ML (ref 10–20)
AST SERPL-CCNC: 72 U/L (ref 10–40)
BASOPHILS # BLD AUTO: 0.02 K/UL (ref 0–0.2)
BASOPHILS NFR BLD: 0.3 % (ref 0–1.9)
BILE AC SERPL-SCNC: 10 MCMOL/L
BILIRUB SERPL-MCNC: 0.5 MG/DL (ref 0.1–1)
BUN SERPL-MCNC: 5 MG/DL (ref 6–20)
CALCIUM SERPL-MCNC: 10 MG/DL (ref 8.7–10.5)
CHLORIDE SERPL-SCNC: 105 MMOL/L (ref 95–110)
CO2 SERPL-SCNC: 21 MMOL/L (ref 23–29)
CREAT SERPL-MCNC: 0.7 MG/DL (ref 0.5–1.4)
DIFFERENTIAL METHOD: ABNORMAL
EOSINOPHIL # BLD AUTO: 0 K/UL (ref 0–0.5)
EOSINOPHIL NFR BLD: 0.3 % (ref 0–8)
ERYTHROCYTE [DISTWIDTH] IN BLOOD BY AUTOMATED COUNT: 17.5 % (ref 11.5–14.5)
EST. GFR  (AFRICAN AMERICAN): >60 ML/MIN/1.73 M^2
EST. GFR  (NON AFRICAN AMERICAN): >60 ML/MIN/1.73 M^2
GLUCOSE SERPL-MCNC: 100 MG/DL (ref 70–110)
HCT VFR BLD AUTO: 27.3 % (ref 37–48.5)
HGB BLD-MCNC: 8.5 G/DL (ref 12–16)
IMM GRANULOCYTES # BLD AUTO: 0.07 K/UL (ref 0–0.04)
IMM GRANULOCYTES NFR BLD AUTO: 1.2 % (ref 0–0.5)
INR PPP: 0.9 (ref 0.8–1.2)
LYMPHOCYTES # BLD AUTO: 0.8 K/UL (ref 1–4.8)
LYMPHOCYTES NFR BLD: 12.6 % (ref 18–48)
MCH RBC QN AUTO: 24.6 PG (ref 27–31)
MCHC RBC AUTO-ENTMCNC: 31.1 G/DL (ref 32–36)
MCV RBC AUTO: 79 FL (ref 82–98)
MONOCYTES # BLD AUTO: 0.3 K/UL (ref 0.3–1)
MONOCYTES NFR BLD: 4.4 % (ref 4–15)
NEUTROPHILS # BLD AUTO: 4.8 K/UL (ref 1.8–7.7)
NEUTROPHILS NFR BLD: 81.2 % (ref 38–73)
NRBC BLD-RTO: 0 /100 WBC
PLATELET # BLD AUTO: 386 K/UL (ref 150–450)
PMV BLD AUTO: 9.7 FL (ref 9.2–12.9)
POTASSIUM SERPL-SCNC: 4 MMOL/L (ref 3.5–5.1)
PROT SERPL-MCNC: 8.2 G/DL (ref 6–8.4)
PROTHROMBIN TIME: 10 SEC (ref 9–12.5)
RBC # BLD AUTO: 3.45 M/UL (ref 4–5.4)
SODIUM SERPL-SCNC: 136 MMOL/L (ref 136–145)
WBC # BLD AUTO: 5.93 K/UL (ref 3.9–12.7)

## 2021-08-06 PROCEDURE — 76819 FETAL BIOPHYS PROFIL W/O NST: CPT | Mod: S$GLB,,, | Performed by: OBSTETRICS & GYNECOLOGY

## 2021-08-06 PROCEDURE — 80053 COMPREHEN METABOLIC PANEL: CPT | Performed by: OBSTETRICS & GYNECOLOGY

## 2021-08-06 PROCEDURE — 85025 COMPLETE CBC W/AUTO DIFF WBC: CPT | Performed by: OBSTETRICS & GYNECOLOGY

## 2021-08-06 PROCEDURE — 76819 PR US, OB, FETAL BIOPHYSICAL, W/O NST: ICD-10-PCS | Mod: S$GLB,,, | Performed by: OBSTETRICS & GYNECOLOGY

## 2021-08-06 PROCEDURE — 80143 DRUG ASSAY ACETAMINOPHEN: CPT | Performed by: OBSTETRICS & GYNECOLOGY

## 2021-08-06 PROCEDURE — 82239 BILE ACIDS TOTAL: CPT | Performed by: OBSTETRICS & GYNECOLOGY

## 2021-08-06 PROCEDURE — 85610 PROTHROMBIN TIME: CPT | Performed by: OBSTETRICS & GYNECOLOGY

## 2021-08-07 LAB
BILE AC SERPL-SCNC: 11 MCMOL/L
BILE AC SERPL-SCNC: 11 MCMOL/L
BILE AC SERPL-SCNC: 26 MCMOL/L

## 2021-08-08 ENCOUNTER — PATIENT MESSAGE (OUTPATIENT)
Dept: OBSTETRICS AND GYNECOLOGY | Facility: CLINIC | Age: 36
End: 2021-08-08

## 2021-08-09 ENCOUNTER — IMMUNIZATION (OUTPATIENT)
Dept: PRIMARY CARE CLINIC | Facility: CLINIC | Age: 36
End: 2021-08-09
Payer: COMMERCIAL

## 2021-08-09 DIAGNOSIS — Z23 NEED FOR VACCINATION: Primary | ICD-10-CM

## 2021-08-09 PROCEDURE — 0002A COVID-19, MRNA, LNP-S, PF, 30 MCG/0.3 ML DOSE VACCINE: ICD-10-PCS | Mod: CV19,S$GLB,, | Performed by: FAMILY MEDICINE

## 2021-08-09 PROCEDURE — 91300 COVID-19, MRNA, LNP-S, PF, 30 MCG/0.3 ML DOSE VACCINE: ICD-10-PCS | Mod: S$GLB,,, | Performed by: FAMILY MEDICINE

## 2021-08-09 PROCEDURE — 0002A COVID-19, MRNA, LNP-S, PF, 30 MCG/0.3 ML DOSE VACCINE: CPT | Mod: CV19,S$GLB,, | Performed by: FAMILY MEDICINE

## 2021-08-09 PROCEDURE — 91300 COVID-19, MRNA, LNP-S, PF, 30 MCG/0.3 ML DOSE VACCINE: CPT | Mod: S$GLB,,, | Performed by: FAMILY MEDICINE

## 2021-08-10 ENCOUNTER — LAB VISIT (OUTPATIENT)
Dept: LAB | Facility: HOSPITAL | Age: 36
End: 2021-08-10
Attending: OBSTETRICS & GYNECOLOGY
Payer: COMMERCIAL

## 2021-08-10 DIAGNOSIS — R74.8 ELEVATED LIVER ENZYMES: ICD-10-CM

## 2021-08-10 LAB
INR PPP: 0.9 (ref 0.8–1.2)
PROTHROMBIN TIME: 9.8 SEC (ref 9–12.5)

## 2021-08-10 PROCEDURE — 80053 COMPREHEN METABOLIC PANEL: CPT | Performed by: OBSTETRICS & GYNECOLOGY

## 2021-08-10 PROCEDURE — 85610 PROTHROMBIN TIME: CPT | Performed by: OBSTETRICS & GYNECOLOGY

## 2021-08-10 PROCEDURE — 82239 BILE ACIDS TOTAL: CPT | Performed by: OBSTETRICS & GYNECOLOGY

## 2021-08-11 ENCOUNTER — PATIENT MESSAGE (OUTPATIENT)
Dept: OBSTETRICS AND GYNECOLOGY | Facility: CLINIC | Age: 36
End: 2021-08-11

## 2021-08-11 LAB
ALBUMIN SERPL BCP-MCNC: 2.1 G/DL (ref 3.5–5.2)
ALP SERPL-CCNC: 132 U/L (ref 55–135)
ALT SERPL W/O P-5'-P-CCNC: 138 U/L (ref 10–44)
ANION GAP SERPL CALC-SCNC: 10 MMOL/L (ref 8–16)
AST SERPL-CCNC: 57 U/L (ref 10–40)
BILIRUB SERPL-MCNC: 0.4 MG/DL (ref 0.1–1)
BUN SERPL-MCNC: 6 MG/DL (ref 6–20)
CALCIUM SERPL-MCNC: 9.6 MG/DL (ref 8.7–10.5)
CHLORIDE SERPL-SCNC: 104 MMOL/L (ref 95–110)
CO2 SERPL-SCNC: 21 MMOL/L (ref 23–29)
CREAT SERPL-MCNC: 0.6 MG/DL (ref 0.5–1.4)
EST. GFR  (AFRICAN AMERICAN): >60 ML/MIN/1.73 M^2
EST. GFR  (NON AFRICAN AMERICAN): >60 ML/MIN/1.73 M^2
GLUCOSE SERPL-MCNC: 91 MG/DL (ref 70–110)
POTASSIUM SERPL-SCNC: 4.2 MMOL/L (ref 3.5–5.1)
PROT SERPL-MCNC: 7.2 G/DL (ref 6–8.4)
SODIUM SERPL-SCNC: 135 MMOL/L (ref 136–145)

## 2021-08-12 ENCOUNTER — LAB VISIT (OUTPATIENT)
Dept: LAB | Facility: HOSPITAL | Age: 36
End: 2021-08-12
Attending: OBSTETRICS & GYNECOLOGY
Payer: COMMERCIAL

## 2021-08-12 ENCOUNTER — ROUTINE PRENATAL (OUTPATIENT)
Dept: OBSTETRICS AND GYNECOLOGY | Facility: CLINIC | Age: 36
End: 2021-08-12
Payer: COMMERCIAL

## 2021-08-12 VITALS
BODY MASS INDEX: 29.88 KG/M2 | WEIGHT: 163.38 LBS | DIASTOLIC BLOOD PRESSURE: 82 MMHG | SYSTOLIC BLOOD PRESSURE: 126 MMHG

## 2021-08-12 DIAGNOSIS — O09.512 AMA (ADVANCED MATERNAL AGE) PRIMIGRAVIDA 35+, SECOND TRIMESTER: ICD-10-CM

## 2021-08-12 DIAGNOSIS — R74.8 ELEVATED LIVER ENZYMES: ICD-10-CM

## 2021-08-12 DIAGNOSIS — O34.29 PREGNANCY WITH HISTORY OF UTERINE MYOMECTOMY: ICD-10-CM

## 2021-08-12 DIAGNOSIS — Z67.91 RH NEGATIVE STATE IN ANTEPARTUM PERIOD: ICD-10-CM

## 2021-08-12 DIAGNOSIS — O09.513 SUPERVISION OF HIGH RISK ELDERLY PRIMIGRAVIDA IN THIRD TRIMESTER: Primary | ICD-10-CM

## 2021-08-12 DIAGNOSIS — O26.899 RH NEGATIVE STATE IN ANTEPARTUM PERIOD: ICD-10-CM

## 2021-08-12 DIAGNOSIS — R74.01 TRANSAMINITIS: ICD-10-CM

## 2021-08-12 DIAGNOSIS — D25.1 FIBROIDS, INTRAMURAL: ICD-10-CM

## 2021-08-12 LAB
ALBUMIN SERPL BCP-MCNC: 2.3 G/DL (ref 3.5–5.2)
ALP SERPL-CCNC: 146 U/L (ref 55–135)
ALT SERPL W/O P-5'-P-CCNC: 128 U/L (ref 10–44)
ANION GAP SERPL CALC-SCNC: 10 MMOL/L (ref 8–16)
AST SERPL-CCNC: 52 U/L (ref 10–40)
BILE AC SERPL-SCNC: 13 MCMOL/L
BILIRUB SERPL-MCNC: 0.4 MG/DL (ref 0.1–1)
BUN SERPL-MCNC: 6 MG/DL (ref 6–20)
CALCIUM SERPL-MCNC: 9.8 MG/DL (ref 8.7–10.5)
CHLORIDE SERPL-SCNC: 106 MMOL/L (ref 95–110)
CO2 SERPL-SCNC: 20 MMOL/L (ref 23–29)
CREAT SERPL-MCNC: 0.6 MG/DL (ref 0.5–1.4)
EST. GFR  (AFRICAN AMERICAN): >60 ML/MIN/1.73 M^2
EST. GFR  (NON AFRICAN AMERICAN): >60 ML/MIN/1.73 M^2
GLUCOSE SERPL-MCNC: 89 MG/DL (ref 70–110)
INR PPP: 0.9 (ref 0.8–1.2)
POTASSIUM SERPL-SCNC: 4.2 MMOL/L (ref 3.5–5.1)
PROT SERPL-MCNC: 7.9 G/DL (ref 6–8.4)
PROTHROMBIN TIME: 9.7 SEC (ref 9–12.5)
SODIUM SERPL-SCNC: 136 MMOL/L (ref 136–145)

## 2021-08-12 PROCEDURE — 87081 CULTURE SCREEN ONLY: CPT | Performed by: OBSTETRICS & GYNECOLOGY

## 2021-08-12 PROCEDURE — 80053 COMPREHEN METABOLIC PANEL: CPT | Performed by: OBSTETRICS & GYNECOLOGY

## 2021-08-12 PROCEDURE — 0502F PR SUBSEQUENT PRENATAL CARE: ICD-10-PCS | Mod: CPTII,S$GLB,, | Performed by: OBSTETRICS & GYNECOLOGY

## 2021-08-12 PROCEDURE — 99999 PR PBB SHADOW E&M-EST. PATIENT-LVL II: ICD-10-PCS | Mod: PBBFAC,,, | Performed by: OBSTETRICS & GYNECOLOGY

## 2021-08-12 PROCEDURE — 85610 PROTHROMBIN TIME: CPT | Performed by: OBSTETRICS & GYNECOLOGY

## 2021-08-12 PROCEDURE — 82239 BILE ACIDS TOTAL: CPT | Performed by: OBSTETRICS & GYNECOLOGY

## 2021-08-12 PROCEDURE — 0502F SUBSEQUENT PRENATAL CARE: CPT | Mod: CPTII,S$GLB,, | Performed by: OBSTETRICS & GYNECOLOGY

## 2021-08-12 PROCEDURE — 99999 PR PBB SHADOW E&M-EST. PATIENT-LVL II: CPT | Mod: PBBFAC,,, | Performed by: OBSTETRICS & GYNECOLOGY

## 2021-08-12 RX ORDER — SODIUM CITRATE AND CITRIC ACID MONOHYDRATE 334; 500 MG/5ML; MG/5ML
30 SOLUTION ORAL
Status: CANCELLED | OUTPATIENT
Start: 2021-08-12

## 2021-08-12 RX ORDER — NALBUPHINE HYDROCHLORIDE 10 MG/ML
5 INJECTION, SOLUTION INTRAMUSCULAR; INTRAVENOUS; SUBCUTANEOUS ONCE AS NEEDED
Status: CANCELLED | OUTPATIENT
Start: 2021-08-12 | End: 2033-01-08

## 2021-08-12 RX ORDER — IBUPROFEN 200 MG
800 TABLET ORAL
Status: CANCELLED | OUTPATIENT
Start: 2021-08-13

## 2021-08-12 RX ORDER — FAMOTIDINE 10 MG/ML
20 INJECTION INTRAVENOUS
Status: CANCELLED | OUTPATIENT
Start: 2021-08-12

## 2021-08-12 RX ORDER — OXYCODONE HYDROCHLORIDE 5 MG/1
5 TABLET ORAL EVERY 4 HOURS PRN
Status: CANCELLED | OUTPATIENT
Start: 2021-08-12

## 2021-08-12 RX ORDER — ACETAMINOPHEN 325 MG/1
650 TABLET ORAL
Status: CANCELLED | OUTPATIENT
Start: 2021-08-12

## 2021-08-12 RX ORDER — AMOXICILLIN 250 MG
1 CAPSULE ORAL NIGHTLY PRN
Status: CANCELLED | OUTPATIENT
Start: 2021-08-12

## 2021-08-12 RX ORDER — SODIUM CHLORIDE, SODIUM LACTATE, POTASSIUM CHLORIDE, CALCIUM CHLORIDE 600; 310; 30; 20 MG/100ML; MG/100ML; MG/100ML; MG/100ML
INJECTION, SOLUTION INTRAVENOUS CONTINUOUS
Status: CANCELLED | OUTPATIENT
Start: 2021-08-12

## 2021-08-12 RX ORDER — DIPHENHYDRAMINE HYDROCHLORIDE 50 MG/ML
12.5 INJECTION INTRAMUSCULAR; INTRAVENOUS
Status: CANCELLED | OUTPATIENT
Start: 2021-08-12

## 2021-08-12 RX ORDER — PROCHLORPERAZINE EDISYLATE 5 MG/ML
5 INJECTION INTRAMUSCULAR; INTRAVENOUS EVERY 6 HOURS PRN
Status: CANCELLED | OUTPATIENT
Start: 2021-08-12

## 2021-08-12 RX ORDER — ONDANSETRON 2 MG/ML
4 INJECTION INTRAMUSCULAR; INTRAVENOUS EVERY 6 HOURS PRN
Status: CANCELLED | OUTPATIENT
Start: 2021-08-12

## 2021-08-12 RX ORDER — KETOROLAC TROMETHAMINE 30 MG/ML
30 INJECTION, SOLUTION INTRAMUSCULAR; INTRAVENOUS
Status: CANCELLED | OUTPATIENT
Start: 2021-08-12 | End: 2021-08-13

## 2021-08-12 RX ORDER — ONDANSETRON 4 MG/1
8 TABLET, ORALLY DISINTEGRATING ORAL EVERY 8 HOURS PRN
Status: CANCELLED | OUTPATIENT
Start: 2021-08-12

## 2021-08-12 RX ORDER — SIMETHICONE 80 MG
1 TABLET,CHEWABLE ORAL EVERY 6 HOURS PRN
Status: CANCELLED | OUTPATIENT
Start: 2021-08-12

## 2021-08-12 RX ORDER — HYDROCORTISONE 25 MG/G
CREAM TOPICAL 3 TIMES DAILY PRN
Status: CANCELLED | OUTPATIENT
Start: 2021-08-12

## 2021-08-12 RX ORDER — OXYCODONE HYDROCHLORIDE 5 MG/1
10 TABLET ORAL EVERY 4 HOURS PRN
Status: CANCELLED | OUTPATIENT
Start: 2021-08-12

## 2021-08-12 RX ORDER — CARBOPROST TROMETHAMINE 250 UG/ML
250 INJECTION, SOLUTION INTRAMUSCULAR
Status: CANCELLED | OUTPATIENT
Start: 2021-08-12

## 2021-08-12 RX ORDER — METHYLERGONOVINE MALEATE 0.2 MG/ML
200 INJECTION INTRAVENOUS ONCE AS NEEDED
Status: CANCELLED | OUTPATIENT
Start: 2021-08-12 | End: 2033-01-08

## 2021-08-12 RX ORDER — PRENATAL WITH FERROUS FUM AND FOLIC ACID 3080; 920; 120; 400; 22; 1.84; 3; 20; 10; 1; 12; 200; 27; 25; 2 [IU]/1; [IU]/1; MG/1; [IU]/1; MG/1; MG/1; MG/1; MG/1; MG/1; MG/1; UG/1; MG/1; MG/1; MG/1; MG/1
1 TABLET ORAL DAILY
Status: CANCELLED | OUTPATIENT
Start: 2021-08-13

## 2021-08-12 RX ORDER — OXYTOCIN/RINGER'S LACTATE 30/500 ML
95 PLASTIC BAG, INJECTION (ML) INTRAVENOUS CONTINUOUS
Status: CANCELLED | OUTPATIENT
Start: 2021-08-12

## 2021-08-12 RX ORDER — ACETAMINOPHEN 500 MG
1000 TABLET ORAL
Status: CANCELLED | OUTPATIENT
Start: 2021-08-12

## 2021-08-12 RX ORDER — DOCUSATE SODIUM 100 MG/1
200 CAPSULE, LIQUID FILLED ORAL 2 TIMES DAILY
Status: CANCELLED | OUTPATIENT
Start: 2021-08-12

## 2021-08-12 RX ORDER — DIPHENHYDRAMINE HCL 25 MG
25 CAPSULE ORAL EVERY 6 HOURS PRN
Status: CANCELLED | OUTPATIENT
Start: 2021-08-12

## 2021-08-12 RX ORDER — MISOPROSTOL 100 UG/1
800 TABLET ORAL ONCE AS NEEDED
Status: CANCELLED | OUTPATIENT
Start: 2021-08-12 | End: 2033-01-08

## 2021-08-13 ENCOUNTER — TELEPHONE (OUTPATIENT)
Dept: OBSTETRICS AND GYNECOLOGY | Facility: CLINIC | Age: 36
End: 2021-08-13

## 2021-08-13 ENCOUNTER — PROCEDURE VISIT (OUTPATIENT)
Dept: OBSTETRICS AND GYNECOLOGY | Facility: CLINIC | Age: 36
End: 2021-08-13
Payer: COMMERCIAL

## 2021-08-13 DIAGNOSIS — O09.513 ADVANCED MATERNAL AGE, PRIMIGRAVIDA IN THIRD TRIMESTER, ANTEPARTUM: Primary | ICD-10-CM

## 2021-08-13 DIAGNOSIS — O14.93 PRE-ECLAMPSIA IN THIRD TRIMESTER: ICD-10-CM

## 2021-08-13 PROCEDURE — 76816 OB US FOLLOW-UP PER FETUS: CPT | Mod: S$GLB,,, | Performed by: OBSTETRICS & GYNECOLOGY

## 2021-08-13 PROCEDURE — 76819 PR US, OB, FETAL BIOPHYSICAL, W/O NST: ICD-10-PCS | Mod: S$GLB,,, | Performed by: OBSTETRICS & GYNECOLOGY

## 2021-08-13 PROCEDURE — 76819 FETAL BIOPHYS PROFIL W/O NST: CPT | Mod: S$GLB,,, | Performed by: OBSTETRICS & GYNECOLOGY

## 2021-08-13 PROCEDURE — 76816 PR  US,PREGNANT UTERUS,F/U,TRANSABD APP: ICD-10-PCS | Mod: S$GLB,,, | Performed by: OBSTETRICS & GYNECOLOGY

## 2021-08-14 LAB — BILE AC SERPL-SCNC: 25 MCMOL/L

## 2021-08-15 ENCOUNTER — PATIENT MESSAGE (OUTPATIENT)
Dept: OBSTETRICS AND GYNECOLOGY | Facility: HOSPITAL | Age: 36
End: 2021-08-15

## 2021-08-16 LAB — BACTERIA SPEC AEROBE CULT: NORMAL

## 2021-08-17 ENCOUNTER — LAB VISIT (OUTPATIENT)
Dept: LAB | Facility: HOSPITAL | Age: 36
End: 2021-08-17
Attending: OBSTETRICS & GYNECOLOGY
Payer: COMMERCIAL

## 2021-08-17 DIAGNOSIS — O09.513 SUPERVISION OF HIGH RISK ELDERLY PRIMIGRAVIDA IN THIRD TRIMESTER: ICD-10-CM

## 2021-08-17 DIAGNOSIS — R74.8 ELEVATED LIVER ENZYMES: ICD-10-CM

## 2021-08-17 LAB
ALBUMIN SERPL BCP-MCNC: 2.1 G/DL (ref 3.5–5.2)
ALP SERPL-CCNC: 125 U/L (ref 55–135)
ALT SERPL W/O P-5'-P-CCNC: 99 U/L (ref 10–44)
ANION GAP SERPL CALC-SCNC: 10 MMOL/L (ref 8–16)
AST SERPL-CCNC: 37 U/L (ref 10–40)
BILIRUB SERPL-MCNC: 0.5 MG/DL (ref 0.1–1)
BUN SERPL-MCNC: 6 MG/DL (ref 6–20)
CALCIUM SERPL-MCNC: 8.6 MG/DL (ref 8.7–10.5)
CHLORIDE SERPL-SCNC: 106 MMOL/L (ref 95–110)
CO2 SERPL-SCNC: 17 MMOL/L (ref 23–29)
CREAT SERPL-MCNC: 0.6 MG/DL (ref 0.5–1.4)
EST. GFR  (AFRICAN AMERICAN): >60 ML/MIN/1.73 M^2
EST. GFR  (NON AFRICAN AMERICAN): >60 ML/MIN/1.73 M^2
GLUCOSE SERPL-MCNC: 77 MG/DL (ref 70–110)
INR PPP: 0.9 (ref 0.8–1.2)
POTASSIUM SERPL-SCNC: 4.1 MMOL/L (ref 3.5–5.1)
PROT SERPL-MCNC: 6.9 G/DL (ref 6–8.4)
PROTHROMBIN TIME: 9.9 SEC (ref 9–12.5)
SODIUM SERPL-SCNC: 133 MMOL/L (ref 136–145)

## 2021-08-17 PROCEDURE — 86592 SYPHILIS TEST NON-TREP QUAL: CPT | Performed by: OBSTETRICS & GYNECOLOGY

## 2021-08-17 PROCEDURE — 82239 BILE ACIDS TOTAL: CPT | Performed by: OBSTETRICS & GYNECOLOGY

## 2021-08-17 PROCEDURE — 85610 PROTHROMBIN TIME: CPT | Performed by: OBSTETRICS & GYNECOLOGY

## 2021-08-17 PROCEDURE — 36415 COLL VENOUS BLD VENIPUNCTURE: CPT | Mod: PO | Performed by: OBSTETRICS & GYNECOLOGY

## 2021-08-17 PROCEDURE — 80053 COMPREHEN METABOLIC PANEL: CPT | Performed by: OBSTETRICS & GYNECOLOGY

## 2021-08-18 ENCOUNTER — PATIENT MESSAGE (OUTPATIENT)
Dept: OBSTETRICS AND GYNECOLOGY | Facility: CLINIC | Age: 36
End: 2021-08-18

## 2021-08-18 ENCOUNTER — PATIENT MESSAGE (OUTPATIENT)
Dept: OBSTETRICS AND GYNECOLOGY | Facility: HOSPITAL | Age: 36
End: 2021-08-18

## 2021-08-18 LAB — RPR SER QL: NORMAL

## 2021-08-19 ENCOUNTER — HOSPITAL ENCOUNTER (OUTPATIENT)
Dept: OBSTETRICS AND GYNECOLOGY | Facility: HOSPITAL | Age: 36
Discharge: HOME | End: 2021-08-19
Attending: OBSTETRICS & GYNECOLOGY
Payer: COMMERCIAL

## 2021-08-19 ENCOUNTER — ANESTHESIA EVENT (OUTPATIENT)
Dept: OBSTETRICS AND GYNECOLOGY | Facility: HOSPITAL | Age: 36
End: 2021-08-19
Payer: COMMERCIAL

## 2021-08-19 DIAGNOSIS — O14.93 PRE-ECLAMPSIA IN THIRD TRIMESTER: ICD-10-CM

## 2021-08-19 DIAGNOSIS — O09.512 AMA (ADVANCED MATERNAL AGE) PRIMIGRAVIDA 35+, SECOND TRIMESTER: Primary | ICD-10-CM

## 2021-08-19 LAB — BILE AC SERPL-SCNC: 22 MCMOL/L

## 2021-08-19 PROCEDURE — 59025 FETAL NON-STRESS TEST: CPT

## 2021-08-20 ENCOUNTER — ANESTHESIA (OUTPATIENT)
Dept: OBSTETRICS AND GYNECOLOGY | Facility: HOSPITAL | Age: 36
End: 2021-08-20
Payer: COMMERCIAL

## 2021-08-20 ENCOUNTER — ANESTHESIA EVENT (OUTPATIENT)
Dept: OBSTETRICS AND GYNECOLOGY | Facility: HOSPITAL | Age: 36
End: 2021-08-20
Payer: COMMERCIAL

## 2021-08-20 ENCOUNTER — HOSPITAL ENCOUNTER (INPATIENT)
Facility: HOSPITAL | Age: 36
LOS: 4 days | Discharge: HOME | End: 2021-08-24
Attending: OBSTETRICS & GYNECOLOGY | Admitting: OBSTETRICS & GYNECOLOGY
Payer: COMMERCIAL

## 2021-08-20 DIAGNOSIS — O34.29 PREGNANCY WITH HISTORY OF UTERINE MYOMECTOMY: ICD-10-CM

## 2021-08-20 DIAGNOSIS — O09.513 SUPERVISION OF HIGH RISK ELDERLY PRIMIGRAVIDA IN THIRD TRIMESTER: ICD-10-CM

## 2021-08-20 DIAGNOSIS — Z98.891 S/P CESAREAN SECTION: ICD-10-CM

## 2021-08-20 LAB
ABO + RH BLD: NORMAL
ALBUMIN SERPL BCP-MCNC: 2.3 G/DL (ref 3.5–5.2)
ALLENS TEST: ABNORMAL
ALP SERPL-CCNC: 50 U/L (ref 55–135)
ALT SERPL W/O P-5'-P-CCNC: 21 U/L (ref 10–44)
ANION GAP SERPL CALC-SCNC: 7 MMOL/L (ref 8–16)
ANION GAP SERPL CALC-SCNC: 9 MMOL/L (ref 8–16)
ANION GAP SERPL CALC-SCNC: 9 MMOL/L (ref 8–16)
APTT BLDCRRT: 25.9 SEC (ref 21–32)
APTT BLDCRRT: 28.1 SEC (ref 21–32)
APTT BLDCRRT: 39.1 SEC (ref 21–32)
AST SERPL-CCNC: 16 U/L (ref 10–40)
BASOPHILS # BLD AUTO: 0.02 K/UL (ref 0–0.2)
BASOPHILS # BLD AUTO: 0.03 K/UL (ref 0–0.2)
BASOPHILS # BLD AUTO: 0.03 K/UL (ref 0–0.2)
BASOPHILS # BLD AUTO: 0.04 K/UL (ref 0–0.2)
BASOPHILS NFR BLD: 0.1 % (ref 0–1.9)
BASOPHILS NFR BLD: 0.2 % (ref 0–1.9)
BASOPHILS NFR BLD: 0.3 % (ref 0–1.9)
BILIRUB SERPL-MCNC: 1 MG/DL (ref 0.1–1)
BLD GP AB SCN CELLS X3 SERPL QL: NORMAL
BLD PROD TYP BPU: NORMAL
BLOOD UNIT EXPIRATION DATE: NORMAL
BLOOD UNIT TYPE CODE: 1700
BLOOD UNIT TYPE CODE: 1700
BLOOD UNIT TYPE CODE: 5100
BLOOD UNIT TYPE CODE: 6200
BLOOD UNIT TYPE CODE: 9500
BLOOD UNIT TYPE CODE: 9500
BLOOD UNIT TYPE: NORMAL
BUN SERPL-MCNC: 7 MG/DL (ref 6–20)
BUN SERPL-MCNC: 7 MG/DL (ref 6–20)
BUN SERPL-MCNC: 8 MG/DL (ref 6–20)
CALCIUM SERPL-MCNC: 6.5 MG/DL (ref 8.7–10.5)
CALCIUM SERPL-MCNC: 9.4 MG/DL (ref 8.7–10.5)
CALCIUM SERPL-MCNC: 9.4 MG/DL (ref 8.7–10.5)
CHLORIDE SERPL-SCNC: 113 MMOL/L (ref 95–110)
CHLORIDE SERPL-SCNC: 113 MMOL/L (ref 95–110)
CHLORIDE SERPL-SCNC: 115 MMOL/L (ref 95–110)
CO2 SERPL-SCNC: 16 MMOL/L (ref 23–29)
CODING SYSTEM: NORMAL
CREAT SERPL-MCNC: 0.5 MG/DL (ref 0.5–1.4)
DIFFERENTIAL METHOD: ABNORMAL
DISPENSE STATUS: NORMAL
EOSINOPHIL # BLD AUTO: 0 K/UL (ref 0–0.5)
EOSINOPHIL NFR BLD: 0 % (ref 0–8)
EOSINOPHIL NFR BLD: 0.1 % (ref 0–8)
EOSINOPHIL NFR BLD: 0.6 % (ref 0–8)
ERYTHROCYTE [DISTWIDTH] IN BLOOD BY AUTOMATED COUNT: 15.9 % (ref 11.5–14.5)
ERYTHROCYTE [DISTWIDTH] IN BLOOD BY AUTOMATED COUNT: 15.9 % (ref 11.5–14.5)
ERYTHROCYTE [DISTWIDTH] IN BLOOD BY AUTOMATED COUNT: 17.3 % (ref 11.5–14.5)
ERYTHROCYTE [DISTWIDTH] IN BLOOD BY AUTOMATED COUNT: 17.7 % (ref 11.5–14.5)
ERYTHROCYTE [DISTWIDTH] IN BLOOD BY AUTOMATED COUNT: 17.7 % (ref 11.5–14.5)
ERYTHROCYTE [DISTWIDTH] IN BLOOD BY AUTOMATED COUNT: 18.9 % (ref 11.5–14.5)
EST. GFR  (AFRICAN AMERICAN): >60 ML/MIN/1.73 M^2
EST. GFR  (NON AFRICAN AMERICAN): >60 ML/MIN/1.73 M^2
FIBRINOGEN PPP-MCNC: 162 MG/DL (ref 182–400)
FIBRINOGEN PPP-MCNC: 210 MG/DL (ref 182–400)
FIBRINOGEN PPP-MCNC: 210 MG/DL (ref 182–400)
FIBRINOGEN PPP-MCNC: 344 MG/DL (ref 182–400)
GLUCOSE SERPL-MCNC: 117 MG/DL (ref 70–110)
GLUCOSE SERPL-MCNC: 117 MG/DL (ref 70–110)
GLUCOSE SERPL-MCNC: 122 MG/DL (ref 70–110)
GLUCOSE SERPL-MCNC: 123 MG/DL (ref 70–110)
HBV SURFACE AG SERPL QL IA: NEGATIVE
HCO3 UR-SCNC: 19.5 MMOL/L (ref 24–28)
HCT VFR BLD AUTO: 18.3 % (ref 37–48.5)
HCT VFR BLD AUTO: 19.2 % (ref 37–48.5)
HCT VFR BLD AUTO: 20.8 % (ref 37–48.5)
HCT VFR BLD AUTO: 24.8 % (ref 37–48.5)
HCT VFR BLD AUTO: 24.8 % (ref 37–48.5)
HCT VFR BLD AUTO: 28.6 % (ref 37–48.5)
HCT VFR BLD CALC: 18 %PCV (ref 36–54)
HGB BLD-MCNC: 5.9 G/DL (ref 12–16)
HGB BLD-MCNC: 6 G/DL
HGB BLD-MCNC: 6.3 G/DL (ref 12–16)
HGB BLD-MCNC: 6.8 G/DL (ref 12–16)
HGB BLD-MCNC: 8.3 G/DL (ref 12–16)
HGB BLD-MCNC: 8.3 G/DL (ref 12–16)
HGB BLD-MCNC: 9.1 G/DL (ref 12–16)
HIV1+2 IGG SERPL QL IA.RAPID: NORMAL
IMM GRANULOCYTES # BLD AUTO: 0.07 K/UL (ref 0–0.04)
IMM GRANULOCYTES # BLD AUTO: 0.11 K/UL (ref 0–0.04)
IMM GRANULOCYTES # BLD AUTO: 0.16 K/UL (ref 0–0.04)
IMM GRANULOCYTES # BLD AUTO: 0.22 K/UL (ref 0–0.04)
IMM GRANULOCYTES # BLD AUTO: 0.24 K/UL (ref 0–0.04)
IMM GRANULOCYTES # BLD AUTO: 0.24 K/UL (ref 0–0.04)
IMM GRANULOCYTES NFR BLD AUTO: 0.8 % (ref 0–0.5)
IMM GRANULOCYTES NFR BLD AUTO: 1 % (ref 0–0.5)
IMM GRANULOCYTES NFR BLD AUTO: 1.1 % (ref 0–0.5)
IMM GRANULOCYTES NFR BLD AUTO: 1.1 % (ref 0–0.5)
IMM GRANULOCYTES NFR BLD AUTO: 1.4 % (ref 0–0.5)
IMM GRANULOCYTES NFR BLD AUTO: 1.4 % (ref 0–0.5)
INR PPP: 1 (ref 0.8–1.2)
INR PPP: 1.1 (ref 0.8–1.2)
INR PPP: 1.1 (ref 0.8–1.2)
INR PPP: 1.2 (ref 0.8–1.2)
LYMPHOCYTES # BLD AUTO: 0.9 K/UL (ref 1–4.8)
LYMPHOCYTES # BLD AUTO: 1.1 K/UL (ref 1–4.8)
LYMPHOCYTES # BLD AUTO: 1.4 K/UL (ref 1–4.8)
LYMPHOCYTES NFR BLD: 16.1 % (ref 18–48)
LYMPHOCYTES NFR BLD: 5.1 % (ref 18–48)
LYMPHOCYTES NFR BLD: 5.1 % (ref 18–48)
LYMPHOCYTES NFR BLD: 5.4 % (ref 18–48)
LYMPHOCYTES NFR BLD: 6.8 % (ref 18–48)
LYMPHOCYTES NFR BLD: 7 % (ref 18–48)
MCH RBC QN AUTO: 25.1 PG (ref 27–31)
MCH RBC QN AUTO: 26.2 PG (ref 27–31)
MCH RBC QN AUTO: 27.2 PG (ref 27–31)
MCH RBC QN AUTO: 28.4 PG (ref 27–31)
MCH RBC QN AUTO: 29.1 PG (ref 27–31)
MCH RBC QN AUTO: 29.1 PG (ref 27–31)
MCHC RBC AUTO-ENTMCNC: 31.8 G/DL (ref 32–36)
MCHC RBC AUTO-ENTMCNC: 32.2 G/DL (ref 32–36)
MCHC RBC AUTO-ENTMCNC: 32.7 G/DL (ref 32–36)
MCHC RBC AUTO-ENTMCNC: 32.8 G/DL (ref 32–36)
MCHC RBC AUTO-ENTMCNC: 33.5 G/DL (ref 32–36)
MCHC RBC AUTO-ENTMCNC: 33.5 G/DL (ref 32–36)
MCV RBC AUTO: 79 FL (ref 82–98)
MCV RBC AUTO: 81 FL (ref 82–98)
MCV RBC AUTO: 83 FL (ref 82–98)
MCV RBC AUTO: 87 FL (ref 82–98)
MONOCYTES # BLD AUTO: 0.5 K/UL (ref 0.3–1)
MONOCYTES # BLD AUTO: 0.6 K/UL (ref 0.3–1)
MONOCYTES # BLD AUTO: 0.8 K/UL (ref 0.3–1)
MONOCYTES # BLD AUTO: 0.8 K/UL (ref 0.3–1)
MONOCYTES # BLD AUTO: 0.9 K/UL (ref 0.3–1)
MONOCYTES # BLD AUTO: 1 K/UL (ref 0.3–1)
MONOCYTES NFR BLD: 4.5 % (ref 4–15)
MONOCYTES NFR BLD: 4.6 % (ref 4–15)
MONOCYTES NFR BLD: 4.6 % (ref 4–15)
MONOCYTES NFR BLD: 5.2 % (ref 4–15)
MONOCYTES NFR BLD: 5.6 % (ref 4–15)
MONOCYTES NFR BLD: 7.8 % (ref 4–15)
NEUTROPHILS # BLD AUTO: 11.7 K/UL (ref 1.8–7.7)
NEUTROPHILS # BLD AUTO: 14.6 K/UL (ref 1.8–7.7)
NEUTROPHILS # BLD AUTO: 15.1 K/UL (ref 1.8–7.7)
NEUTROPHILS # BLD AUTO: 15.1 K/UL (ref 1.8–7.7)
NEUTROPHILS # BLD AUTO: 17.3 K/UL (ref 1.8–7.7)
NEUTROPHILS # BLD AUTO: 4.8 K/UL (ref 1.8–7.7)
NEUTROPHILS NFR BLD: 74.1 % (ref 38–73)
NEUTROPHILS NFR BLD: 86.5 % (ref 38–73)
NEUTROPHILS NFR BLD: 87.6 % (ref 38–73)
NEUTROPHILS NFR BLD: 87.9 % (ref 38–73)
NEUTROPHILS NFR BLD: 88.7 % (ref 38–73)
NEUTROPHILS NFR BLD: 88.7 % (ref 38–73)
NRBC BLD-RTO: 0 /100 WBC
PCO2 BLDA: 46.1 MMHG (ref 35–45)
PH SMN: 7.24 [PH] (ref 7.35–7.45)
PLATELET # BLD AUTO: 184 K/UL (ref 150–450)
PLATELET # BLD AUTO: 184 K/UL (ref 150–450)
PLATELET # BLD AUTO: 213 K/UL (ref 150–450)
PLATELET # BLD AUTO: 259 K/UL (ref 150–450)
PLATELET # BLD AUTO: 272 K/UL (ref 150–450)
PLATELET # BLD AUTO: 356 K/UL (ref 150–450)
PMV BLD AUTO: 10.2 FL (ref 9.2–12.9)
PMV BLD AUTO: 10.3 FL (ref 9.2–12.9)
PMV BLD AUTO: 10.4 FL (ref 9.2–12.9)
PMV BLD AUTO: 10.4 FL (ref 9.2–12.9)
PO2 BLDA: 197 MMHG (ref 80–100)
POC BE: -8 MMOL/L
POC SATURATED O2: 100 % (ref 95–100)
POC TCO2: 21 MMOL/L (ref 23–27)
POTASSIUM BLD-SCNC: 4.6 MMOL/L (ref 3.5–5.1)
POTASSIUM SERPL-SCNC: 4.3 MMOL/L (ref 3.5–5.1)
POTASSIUM SERPL-SCNC: 4.3 MMOL/L (ref 3.5–5.1)
POTASSIUM SERPL-SCNC: 4.5 MMOL/L (ref 3.5–5.1)
PROT SERPL-MCNC: 4 G/DL (ref 6–8.4)
PROTHROMBIN TIME: 10.8 SEC (ref 9–12.5)
PROTHROMBIN TIME: 11.6 SEC (ref 9–12.5)
PROTHROMBIN TIME: 11.6 SEC (ref 9–12.5)
PROTHROMBIN TIME: 12.3 SEC (ref 9–12.5)
RBC # BLD AUTO: 2.22 M/UL (ref 4–5.4)
RBC # BLD AUTO: 2.25 M/UL (ref 4–5.4)
RBC # BLD AUTO: 2.5 M/UL (ref 4–5.4)
RBC # BLD AUTO: 2.85 M/UL (ref 4–5.4)
RBC # BLD AUTO: 2.85 M/UL (ref 4–5.4)
RBC # BLD AUTO: 3.62 M/UL (ref 4–5.4)
SAMPLE: ABNORMAL
SARS-COV-2 RDRP RESP QL NAA+PROBE: NEGATIVE
SITE: ABNORMAL
SODIUM BLD-SCNC: 141 MMOL/L (ref 136–145)
SODIUM SERPL-SCNC: 138 MMOL/L (ref 136–145)
TRANS ERYTHROCYTES VOL PATIENT: NORMAL ML
UNIT NUMBER: NORMAL
UNIT NUMBER: NORMAL
WBC # BLD AUTO: 13.31 K/UL (ref 3.9–12.7)
WBC # BLD AUTO: 16.63 K/UL (ref 3.9–12.7)
WBC # BLD AUTO: 16.96 K/UL (ref 3.9–12.7)
WBC # BLD AUTO: 16.96 K/UL (ref 3.9–12.7)
WBC # BLD AUTO: 20.05 K/UL (ref 3.9–12.7)
WBC # BLD AUTO: 6.53 K/UL (ref 3.9–12.7)

## 2021-08-20 PROCEDURE — 86592 SYPHILIS TEST NON-TREP QUAL: CPT | Performed by: OBSTETRICS & GYNECOLOGY

## 2021-08-20 PROCEDURE — 85730 THROMBOPLASTIN TIME PARTIAL: CPT | Mod: 91 | Performed by: STUDENT IN AN ORGANIZED HEALTH CARE EDUCATION/TRAINING PROGRAM

## 2021-08-20 PROCEDURE — 80048 BASIC METABOLIC PNL TOTAL CA: CPT | Performed by: STUDENT IN AN ORGANIZED HEALTH CARE EDUCATION/TRAINING PROGRAM

## 2021-08-20 PROCEDURE — 87340 HEPATITIS B SURFACE AG IA: CPT | Performed by: OBSTETRICS & GYNECOLOGY

## 2021-08-20 PROCEDURE — 86703 HIV-1/HIV-2 1 RESULT ANTBDY: CPT | Performed by: OBSTETRICS & GYNECOLOGY

## 2021-08-20 PROCEDURE — 25000003 PHARM REV CODE 250: Performed by: STUDENT IN AN ORGANIZED HEALTH CARE EDUCATION/TRAINING PROGRAM

## 2021-08-20 PROCEDURE — 88305 TISSUE EXAM BY PATHOLOGIST: ICD-10-PCS | Mod: 26,,, | Performed by: PATHOLOGY

## 2021-08-20 PROCEDURE — 85025 COMPLETE CBC W/AUTO DIFF WBC: CPT | Performed by: OBSTETRICS & GYNECOLOGY

## 2021-08-20 PROCEDURE — 85610 PROTHROMBIN TIME: CPT | Mod: 91 | Performed by: STUDENT IN AN ORGANIZED HEALTH CARE EDUCATION/TRAINING PROGRAM

## 2021-08-20 PROCEDURE — 35840 PR EXPLOR POSTOP BLEED,INFEC,CLOT-ABD: ICD-10-PCS | Mod: 80,78,, | Performed by: OBSTETRICS & GYNECOLOGY

## 2021-08-20 PROCEDURE — 88305 TISSUE EXAM BY PATHOLOGIST: CPT | Mod: 26,,, | Performed by: PATHOLOGY

## 2021-08-20 PROCEDURE — 88305 TISSUE EXAM BY PATHOLOGIST: CPT | Performed by: PATHOLOGY

## 2021-08-20 PROCEDURE — 35840 PR EXPLOR POSTOP BLEED,INFEC,CLOT-ABD: ICD-10-PCS | Mod: 78,,, | Performed by: OBSTETRICS & GYNECOLOGY

## 2021-08-20 PROCEDURE — 25000003 PHARM REV CODE 250: Performed by: OBSTETRICS & GYNECOLOGY

## 2021-08-20 PROCEDURE — 35840 EXPLORE ABDOMINAL VESSELS: CPT | Mod: 78,,, | Performed by: OBSTETRICS & GYNECOLOGY

## 2021-08-20 PROCEDURE — 11000001 HC ACUTE MED/SURG PRIVATE ROOM

## 2021-08-20 PROCEDURE — 85025 COMPLETE CBC W/AUTO DIFF WBC: CPT | Mod: 91 | Performed by: STUDENT IN AN ORGANIZED HEALTH CARE EDUCATION/TRAINING PROGRAM

## 2021-08-20 PROCEDURE — 37000009 HC ANESTHESIA EA ADD 15 MINS: Performed by: OBSTETRICS & GYNECOLOGY

## 2021-08-20 PROCEDURE — 51702 INSERT TEMP BLADDER CATH: CPT

## 2021-08-20 PROCEDURE — 37000008 HC ANESTHESIA 1ST 15 MINUTES: Performed by: OBSTETRICS & GYNECOLOGY

## 2021-08-20 PROCEDURE — 85384 FIBRINOGEN ACTIVITY: CPT | Performed by: OBSTETRICS & GYNECOLOGY

## 2021-08-20 PROCEDURE — 63600175 PHARM REV CODE 636 W HCPCS: Performed by: OBSTETRICS & GYNECOLOGY

## 2021-08-20 PROCEDURE — 27201477 HC KIT, HEMOSTATIC MATRIX

## 2021-08-20 PROCEDURE — 71000033 HC RECOVERY, INTIAL HOUR: Performed by: OBSTETRICS & GYNECOLOGY

## 2021-08-20 PROCEDURE — 36430 TRANSFUSION BLD/BLD COMPNT: CPT

## 2021-08-20 PROCEDURE — 85384 FIBRINOGEN ACTIVITY: CPT | Mod: 91 | Performed by: STUDENT IN AN ORGANIZED HEALTH CARE EDUCATION/TRAINING PROGRAM

## 2021-08-20 PROCEDURE — 63600175 PHARM REV CODE 636 W HCPCS: Performed by: RADIOLOGY

## 2021-08-20 PROCEDURE — P9021 RED BLOOD CELLS UNIT: HCPCS | Performed by: OBSTETRICS & GYNECOLOGY

## 2021-08-20 PROCEDURE — 86900 BLOOD TYPING SEROLOGIC ABO: CPT | Performed by: OBSTETRICS & GYNECOLOGY

## 2021-08-20 PROCEDURE — 63600175 PHARM REV CODE 636 W HCPCS: Performed by: STUDENT IN AN ORGANIZED HEALTH CARE EDUCATION/TRAINING PROGRAM

## 2021-08-20 PROCEDURE — 36415 COLL VENOUS BLD VENIPUNCTURE: CPT | Performed by: OBSTETRICS & GYNECOLOGY

## 2021-08-20 PROCEDURE — 99900035 HC TECH TIME PER 15 MIN (STAT)

## 2021-08-20 PROCEDURE — 35840 EXPLORE ABDOMINAL VESSELS: CPT | Mod: 80,78,, | Performed by: OBSTETRICS & GYNECOLOGY

## 2021-08-20 PROCEDURE — 80053 COMPREHEN METABOLIC PANEL: CPT | Performed by: OBSTETRICS & GYNECOLOGY

## 2021-08-20 PROCEDURE — 86965 POOLING BLOOD PLATELETS: CPT | Performed by: OBSTETRICS & GYNECOLOGY

## 2021-08-20 PROCEDURE — 36004725 HC OB OR TIME LEV III - EA ADD 15 MIN: Performed by: OBSTETRICS & GYNECOLOGY

## 2021-08-20 PROCEDURE — 27201121 HC TRAY,SPINAL-HYPERBARIC: Performed by: STUDENT IN AN ORGANIZED HEALTH CARE EDUCATION/TRAINING PROGRAM

## 2021-08-20 PROCEDURE — 36004724 HC OB OR TIME LEV III - 1ST 15 MIN: Performed by: OBSTETRICS & GYNECOLOGY

## 2021-08-20 PROCEDURE — 85730 THROMBOPLASTIN TIME PARTIAL: CPT | Mod: 91 | Performed by: OBSTETRICS & GYNECOLOGY

## 2021-08-20 PROCEDURE — 59514 CESAREAN DELIVERY ONLY: CPT | Mod: 80,,, | Performed by: OBSTETRICS & GYNECOLOGY

## 2021-08-20 PROCEDURE — 86920 COMPATIBILITY TEST SPIN: CPT | Performed by: OBSTETRICS & GYNECOLOGY

## 2021-08-20 PROCEDURE — 25500020 PHARM REV CODE 255: Performed by: OBSTETRICS & GYNECOLOGY

## 2021-08-20 PROCEDURE — P9045 ALBUMIN (HUMAN), 5%, 250 ML: HCPCS | Mod: JG | Performed by: STUDENT IN AN ORGANIZED HEALTH CARE EDUCATION/TRAINING PROGRAM

## 2021-08-20 PROCEDURE — 71000039 HC RECOVERY, EACH ADD'L HOUR: Performed by: OBSTETRICS & GYNECOLOGY

## 2021-08-20 PROCEDURE — P9012 CRYOPRECIPITATE EACH UNIT: HCPCS | Performed by: OBSTETRICS & GYNECOLOGY

## 2021-08-20 PROCEDURE — 36415 COLL VENOUS BLD VENIPUNCTURE: CPT | Performed by: STUDENT IN AN ORGANIZED HEALTH CARE EDUCATION/TRAINING PROGRAM

## 2021-08-20 PROCEDURE — 85610 PROTHROMBIN TIME: CPT | Mod: 91 | Performed by: OBSTETRICS & GYNECOLOGY

## 2021-08-20 PROCEDURE — 63600175 PHARM REV CODE 636 W HCPCS: Mod: JG | Performed by: STUDENT IN AN ORGANIZED HEALTH CARE EDUCATION/TRAINING PROGRAM

## 2021-08-20 PROCEDURE — 25000003 PHARM REV CODE 250: Performed by: RADIOLOGY

## 2021-08-20 PROCEDURE — 59514 PR CESAREAN DELIVERY ONLY: ICD-10-PCS | Mod: 80,,, | Performed by: OBSTETRICS & GYNECOLOGY

## 2021-08-20 PROCEDURE — P9035 PLATELET PHERES LEUKOREDUCED: HCPCS | Performed by: OBSTETRICS & GYNECOLOGY

## 2021-08-20 PROCEDURE — U0002 COVID-19 LAB TEST NON-CDC: HCPCS | Performed by: OBSTETRICS & GYNECOLOGY

## 2021-08-20 PROCEDURE — 59510 CESAREAN DELIVERY: CPT | Mod: ,,, | Performed by: OBSTETRICS & GYNECOLOGY

## 2021-08-20 PROCEDURE — 59510 PR FULL ROUT OBSTE CARE,CESAREAN DELIV: ICD-10-PCS | Mod: ,,, | Performed by: OBSTETRICS & GYNECOLOGY

## 2021-08-20 PROCEDURE — 25000003 PHARM REV CODE 250

## 2021-08-20 RX ORDER — FENTANYL CITRATE 50 UG/ML
INJECTION, SOLUTION INTRAMUSCULAR; INTRAVENOUS
Status: DISCONTINUED | OUTPATIENT
Start: 2021-08-20 | End: 2021-08-20

## 2021-08-20 RX ORDER — OXYTOCIN 10 [USP'U]/ML
INJECTION, SOLUTION INTRAMUSCULAR; INTRAVENOUS
Status: DISCONTINUED | OUTPATIENT
Start: 2021-08-20 | End: 2021-08-20

## 2021-08-20 RX ORDER — SIMETHICONE 80 MG
1 TABLET,CHEWABLE ORAL EVERY 6 HOURS PRN
Status: DISCONTINUED | OUTPATIENT
Start: 2021-08-20 | End: 2021-08-24 | Stop reason: HOSPADM

## 2021-08-20 RX ORDER — HYDROCODONE BITARTRATE AND ACETAMINOPHEN 500; 5 MG/1; MG/1
TABLET ORAL
Status: DISCONTINUED | OUTPATIENT
Start: 2021-08-20 | End: 2021-08-21

## 2021-08-20 RX ORDER — SODIUM CHLORIDE, SODIUM LACTATE, POTASSIUM CHLORIDE, CALCIUM CHLORIDE 600; 310; 30; 20 MG/100ML; MG/100ML; MG/100ML; MG/100ML
INJECTION, SOLUTION INTRAVENOUS CONTINUOUS
Status: DISCONTINUED | OUTPATIENT
Start: 2021-08-20 | End: 2021-08-21

## 2021-08-20 RX ORDER — MISOPROSTOL 200 UG/1
800 TABLET ORAL ONCE AS NEEDED
Status: DISCONTINUED | OUTPATIENT
Start: 2021-08-20 | End: 2021-08-24 | Stop reason: HOSPADM

## 2021-08-20 RX ORDER — MORPHINE SULFATE 4 MG/ML
2 INJECTION, SOLUTION INTRAMUSCULAR; INTRAVENOUS
Status: ACTIVE | OUTPATIENT
Start: 2021-08-20 | End: 2021-08-21

## 2021-08-20 RX ORDER — AMOXICILLIN 250 MG
1 CAPSULE ORAL NIGHTLY PRN
Status: DISCONTINUED | OUTPATIENT
Start: 2021-08-20 | End: 2021-08-24 | Stop reason: HOSPADM

## 2021-08-20 RX ORDER — OXYCODONE HYDROCHLORIDE 5 MG/1
10 TABLET ORAL EVERY 4 HOURS PRN
Status: DISCONTINUED | OUTPATIENT
Start: 2021-08-20 | End: 2021-08-24 | Stop reason: HOSPADM

## 2021-08-20 RX ORDER — DOCUSATE SODIUM 100 MG/1
200 CAPSULE, LIQUID FILLED ORAL 2 TIMES DAILY
Status: DISCONTINUED | OUTPATIENT
Start: 2021-08-20 | End: 2021-08-24 | Stop reason: HOSPADM

## 2021-08-20 RX ORDER — MISOPROSTOL 200 UG/1
800 TABLET ORAL ONCE AS NEEDED
Status: DISCONTINUED | OUTPATIENT
Start: 2021-08-20 | End: 2021-08-20 | Stop reason: SDUPTHER

## 2021-08-20 RX ORDER — TRANEXAMIC ACID 100 MG/ML
1000 INJECTION, SOLUTION INTRAVENOUS ONCE AS NEEDED
Status: COMPLETED | OUTPATIENT
Start: 2021-08-20 | End: 2021-08-20

## 2021-08-20 RX ORDER — ONDANSETRON 2 MG/ML
INJECTION INTRAMUSCULAR; INTRAVENOUS
Status: DISCONTINUED | OUTPATIENT
Start: 2021-08-20 | End: 2021-08-20

## 2021-08-20 RX ORDER — LIDOCAINE HCL/PF 100 MG/5ML
SYRINGE (ML) INTRAVENOUS
Status: DISCONTINUED | OUTPATIENT
Start: 2021-08-20 | End: 2021-08-20

## 2021-08-20 RX ORDER — DIPHENOXYLATE HYDROCHLORIDE AND ATROPINE SULFATE 2.5; .025 MG/1; MG/1
2 TABLET ORAL 4 TIMES DAILY PRN
Status: DISCONTINUED | OUTPATIENT
Start: 2021-08-20 | End: 2021-08-24 | Stop reason: HOSPADM

## 2021-08-20 RX ORDER — ALBUMIN HUMAN 50 G/1000ML
SOLUTION INTRAVENOUS CONTINUOUS PRN
Status: DISCONTINUED | OUTPATIENT
Start: 2021-08-20 | End: 2021-08-20

## 2021-08-20 RX ORDER — HYDROCORTISONE 25 MG/G
CREAM TOPICAL 3 TIMES DAILY PRN
Status: DISCONTINUED | OUTPATIENT
Start: 2021-08-20 | End: 2021-08-21

## 2021-08-20 RX ORDER — ACETAMINOPHEN 325 MG/1
650 TABLET ORAL EVERY 6 HOURS
Status: DISPENSED | OUTPATIENT
Start: 2021-08-20 | End: 2021-08-21

## 2021-08-20 RX ORDER — HEPARIN SODIUM 200 [USP'U]/100ML
INJECTION, SOLUTION INTRAVENOUS
Status: COMPLETED | OUTPATIENT
Start: 2021-08-20 | End: 2021-08-20

## 2021-08-20 RX ORDER — CARBOPROST TROMETHAMINE 250 UG/ML
250 INJECTION, SOLUTION INTRAMUSCULAR
Status: DISCONTINUED | OUTPATIENT
Start: 2021-08-20 | End: 2021-08-24 | Stop reason: HOSPADM

## 2021-08-20 RX ORDER — KETOROLAC TROMETHAMINE 30 MG/ML
30 INJECTION, SOLUTION INTRAMUSCULAR; INTRAVENOUS
Status: COMPLETED | OUTPATIENT
Start: 2021-08-20 | End: 2021-08-21

## 2021-08-20 RX ORDER — OXYCODONE HYDROCHLORIDE 5 MG/1
5 TABLET ORAL
Status: DISCONTINUED | OUTPATIENT
Start: 2021-08-20 | End: 2021-08-21

## 2021-08-20 RX ORDER — OXYTOCIN 10 [USP'U]/ML
INJECTION, SOLUTION INTRAMUSCULAR; INTRAVENOUS
Status: DISCONTINUED
Start: 2021-08-20 | End: 2021-08-20 | Stop reason: WASHOUT

## 2021-08-20 RX ORDER — SODIUM CHLORIDE, SODIUM LACTATE, POTASSIUM CHLORIDE, CALCIUM CHLORIDE 600; 310; 30; 20 MG/100ML; MG/100ML; MG/100ML; MG/100ML
INJECTION, SOLUTION INTRAVENOUS CONTINUOUS PRN
Status: DISCONTINUED | OUTPATIENT
Start: 2021-08-20 | End: 2021-08-20

## 2021-08-20 RX ORDER — SUCCINYLCHOLINE CHLORIDE 20 MG/ML
INJECTION INTRAMUSCULAR; INTRAVENOUS
Status: DISCONTINUED | OUTPATIENT
Start: 2021-08-20 | End: 2021-08-20

## 2021-08-20 RX ORDER — ONDANSETRON 2 MG/ML
4 INJECTION INTRAMUSCULAR; INTRAVENOUS EVERY 6 HOURS PRN
Status: ACTIVE | OUTPATIENT
Start: 2021-08-20 | End: 2021-08-21

## 2021-08-20 RX ORDER — ONDANSETRON 2 MG/ML
4 INJECTION INTRAMUSCULAR; INTRAVENOUS EVERY 6 HOURS PRN
Status: DISCONTINUED | OUTPATIENT
Start: 2021-08-20 | End: 2021-08-24 | Stop reason: HOSPADM

## 2021-08-20 RX ORDER — CEFAZOLIN SODIUM 2 G/50ML
2 SOLUTION INTRAVENOUS
Status: COMPLETED | OUTPATIENT
Start: 2021-08-20 | End: 2021-08-20

## 2021-08-20 RX ORDER — PRENATAL WITH FERROUS FUM AND FOLIC ACID 3080; 920; 120; 400; 22; 1.84; 3; 20; 10; 1; 12; 200; 27; 25; 2 [IU]/1; [IU]/1; MG/1; [IU]/1; MG/1; MG/1; MG/1; MG/1; MG/1; MG/1; UG/1; MG/1; MG/1; MG/1; MG/1
1 TABLET ORAL DAILY
Status: DISCONTINUED | OUTPATIENT
Start: 2021-08-20 | End: 2021-08-24 | Stop reason: HOSPADM

## 2021-08-20 RX ORDER — MIDAZOLAM HYDROCHLORIDE 1 MG/ML
INJECTION INTRAMUSCULAR; INTRAVENOUS CODE/TRAUMA/SEDATION MEDICATION
Status: COMPLETED | OUTPATIENT
Start: 2021-08-20 | End: 2021-08-20

## 2021-08-20 RX ORDER — NALBUPHINE HYDROCHLORIDE 10 MG/ML
2.5 INJECTION, SOLUTION INTRAMUSCULAR; INTRAVENOUS; SUBCUTANEOUS ONCE
Status: DISCONTINUED | OUTPATIENT
Start: 2021-08-20 | End: 2021-08-24 | Stop reason: HOSPADM

## 2021-08-20 RX ORDER — DIPHENHYDRAMINE HCL 25 MG
25 CAPSULE ORAL EVERY 6 HOURS PRN
Status: DISCONTINUED | OUTPATIENT
Start: 2021-08-20 | End: 2021-08-24 | Stop reason: HOSPADM

## 2021-08-20 RX ORDER — SODIUM CHLORIDE 0.9 % (FLUSH) 0.9 %
10 SYRINGE (ML) INJECTION
Status: DISCONTINUED | OUTPATIENT
Start: 2021-08-20 | End: 2021-08-21

## 2021-08-20 RX ORDER — OXYCODONE HYDROCHLORIDE 5 MG/1
5 TABLET ORAL EVERY 4 HOURS PRN
Status: DISCONTINUED | OUTPATIENT
Start: 2021-08-20 | End: 2021-08-20 | Stop reason: SDUPTHER

## 2021-08-20 RX ORDER — PROCHLORPERAZINE EDISYLATE 5 MG/ML
5 INJECTION INTRAMUSCULAR; INTRAVENOUS EVERY 6 HOURS PRN
Status: DISCONTINUED | OUTPATIENT
Start: 2021-08-20 | End: 2021-08-20 | Stop reason: SDUPTHER

## 2021-08-20 RX ORDER — MIDAZOLAM HYDROCHLORIDE 1 MG/ML
INJECTION INTRAMUSCULAR; INTRAVENOUS
Status: DISCONTINUED | OUTPATIENT
Start: 2021-08-20 | End: 2021-08-20

## 2021-08-20 RX ORDER — CARBOPROST TROMETHAMINE 250 UG/ML
INJECTION, SOLUTION INTRAMUSCULAR
Status: COMPLETED
Start: 2021-08-20 | End: 2021-08-20

## 2021-08-20 RX ORDER — METHYLERGONOVINE MALEATE 0.2 MG/ML
INJECTION INTRAVENOUS
Status: DISCONTINUED
Start: 2021-08-20 | End: 2021-08-20 | Stop reason: WASHOUT

## 2021-08-20 RX ORDER — MISOPROSTOL 200 UG/1
TABLET ORAL
Status: DISCONTINUED
Start: 2021-08-20 | End: 2021-08-20 | Stop reason: WASHOUT

## 2021-08-20 RX ORDER — EPHEDRINE SULFATE 50 MG/ML
INJECTION, SOLUTION INTRAVENOUS
Status: DISCONTINUED | OUTPATIENT
Start: 2021-08-20 | End: 2021-08-20

## 2021-08-20 RX ORDER — FENTANYL CITRATE 50 UG/ML
INJECTION, SOLUTION INTRAMUSCULAR; INTRAVENOUS CODE/TRAUMA/SEDATION MEDICATION
Status: COMPLETED | OUTPATIENT
Start: 2021-08-20 | End: 2021-08-20

## 2021-08-20 RX ORDER — BUPIVACAINE HYDROCHLORIDE 7.5 MG/ML
INJECTION, SOLUTION INTRASPINAL
Status: DISCONTINUED | OUTPATIENT
Start: 2021-08-20 | End: 2021-08-20

## 2021-08-20 RX ORDER — ACETAMINOPHEN 500 MG
1000 TABLET ORAL
Status: DISCONTINUED | OUTPATIENT
Start: 2021-08-20 | End: 2021-08-21

## 2021-08-20 RX ORDER — KETAMINE HCL IN 0.9 % NACL 50 MG/5 ML
SYRINGE (ML) INTRAVENOUS
Status: DISCONTINUED | OUTPATIENT
Start: 2021-08-20 | End: 2021-08-20

## 2021-08-20 RX ORDER — DIPHENHYDRAMINE HYDROCHLORIDE 50 MG/ML
12.5 INJECTION INTRAMUSCULAR; INTRAVENOUS
Status: DISCONTINUED | OUTPATIENT
Start: 2021-08-20 | End: 2021-08-24 | Stop reason: HOSPADM

## 2021-08-20 RX ORDER — TRANEXAMIC ACID 100 MG/ML
INJECTION, SOLUTION INTRAVENOUS
Status: COMPLETED
Start: 2021-08-20 | End: 2021-08-20

## 2021-08-20 RX ORDER — OXYCODONE HYDROCHLORIDE 5 MG/1
5 TABLET ORAL EVERY 4 HOURS PRN
Status: DISCONTINUED | OUTPATIENT
Start: 2021-08-20 | End: 2021-08-24 | Stop reason: HOSPADM

## 2021-08-20 RX ORDER — KETOROLAC TROMETHAMINE 30 MG/ML
30 INJECTION, SOLUTION INTRAMUSCULAR; INTRAVENOUS ONCE
Status: COMPLETED | OUTPATIENT
Start: 2021-08-20 | End: 2021-08-21

## 2021-08-20 RX ORDER — MORPHINE SULFATE 1 MG/ML
INJECTION, SOLUTION EPIDURAL; INTRATHECAL; INTRAVENOUS
Status: DISCONTINUED | OUTPATIENT
Start: 2021-08-20 | End: 2021-08-20

## 2021-08-20 RX ORDER — HYDROMORPHONE HYDROCHLORIDE 2 MG/ML
0.2 INJECTION, SOLUTION INTRAMUSCULAR; INTRAVENOUS; SUBCUTANEOUS EVERY 5 MIN PRN
Status: ACTIVE | OUTPATIENT
Start: 2021-08-20 | End: 2021-08-20

## 2021-08-20 RX ORDER — HYDROCODONE BITARTRATE AND ACETAMINOPHEN 7.5; 325 MG/1; MG/1
1 TABLET ORAL EVERY 6 HOURS PRN
Qty: 20 TABLET | Refills: 0 | Status: SHIPPED | OUTPATIENT
Start: 2021-08-20 | End: 2021-12-18

## 2021-08-20 RX ORDER — IBUPROFEN 400 MG/1
800 TABLET ORAL
Status: DISCONTINUED | OUTPATIENT
Start: 2021-08-21 | End: 2021-08-24 | Stop reason: HOSPADM

## 2021-08-20 RX ORDER — ONDANSETRON HYDROCHLORIDE 2 MG/ML
INJECTION, SOLUTION INTRAMUSCULAR; INTRAVENOUS
Status: DISCONTINUED | OUTPATIENT
Start: 2021-08-20 | End: 2021-08-20

## 2021-08-20 RX ORDER — PHENYLEPHRINE HYDROCHLORIDE 10 MG/ML
INJECTION INTRAVENOUS
Status: DISCONTINUED | OUTPATIENT
Start: 2021-08-20 | End: 2021-08-20

## 2021-08-20 RX ORDER — OXYTOCIN/RINGER'S LACTATE 30/500 ML
95 PLASTIC BAG, INJECTION (ML) INTRAVENOUS CONTINUOUS
Status: DISCONTINUED | OUTPATIENT
Start: 2021-08-20 | End: 2021-08-21

## 2021-08-20 RX ORDER — FAMOTIDINE 10 MG/ML
20 INJECTION INTRAVENOUS
Status: COMPLETED | OUTPATIENT
Start: 2021-08-20 | End: 2021-08-20

## 2021-08-20 RX ORDER — ONDANSETRON 8 MG/1
8 TABLET, ORALLY DISINTEGRATING ORAL EVERY 8 HOURS PRN
Status: DISCONTINUED | OUTPATIENT
Start: 2021-08-20 | End: 2021-08-24 | Stop reason: HOSPADM

## 2021-08-20 RX ORDER — ACETAMINOPHEN 325 MG/1
650 TABLET ORAL
Status: DISCONTINUED | OUTPATIENT
Start: 2021-08-20 | End: 2021-08-20 | Stop reason: SDUPTHER

## 2021-08-20 RX ORDER — ONDANSETRON 2 MG/ML
4 INJECTION INTRAMUSCULAR; INTRAVENOUS DAILY PRN
Status: ACTIVE | OUTPATIENT
Start: 2021-08-20 | End: 2021-08-20

## 2021-08-20 RX ORDER — METHYLERGONOVINE MALEATE 0.2 MG/ML
200 INJECTION INTRAVENOUS ONCE AS NEEDED
Status: DISCONTINUED | OUTPATIENT
Start: 2021-08-20 | End: 2021-08-24 | Stop reason: HOSPADM

## 2021-08-20 RX ORDER — PROCHLORPERAZINE EDISYLATE 5 MG/ML
5 INJECTION INTRAMUSCULAR; INTRAVENOUS EVERY 6 HOURS PRN
Status: DISCONTINUED | OUTPATIENT
Start: 2021-08-20 | End: 2021-08-24 | Stop reason: HOSPADM

## 2021-08-20 RX ORDER — IBUPROFEN 800 MG/1
800 TABLET ORAL EVERY 8 HOURS PRN
Qty: 30 TABLET | Refills: 0 | Status: SHIPPED | OUTPATIENT
Start: 2021-08-20 | End: 2021-09-16 | Stop reason: SDUPTHER

## 2021-08-20 RX ORDER — LIDOCAINE HYDROCHLORIDE 10 MG/ML
INJECTION INFILTRATION; PERINEURAL CODE/TRAUMA/SEDATION MEDICATION
Status: COMPLETED | OUTPATIENT
Start: 2021-08-20 | End: 2021-08-20

## 2021-08-20 RX ORDER — OXYCODONE HYDROCHLORIDE 5 MG/1
10 TABLET ORAL EVERY 4 HOURS PRN
Status: DISCONTINUED | OUTPATIENT
Start: 2021-08-20 | End: 2021-08-20 | Stop reason: SDUPTHER

## 2021-08-20 RX ORDER — NALBUPHINE HYDROCHLORIDE 10 MG/ML
5 INJECTION, SOLUTION INTRAMUSCULAR; INTRAVENOUS; SUBCUTANEOUS ONCE AS NEEDED
Status: DISCONTINUED | OUTPATIENT
Start: 2021-08-20 | End: 2021-08-24 | Stop reason: HOSPADM

## 2021-08-20 RX ORDER — SODIUM CITRATE AND CITRIC ACID MONOHYDRATE 334; 500 MG/5ML; MG/5ML
30 SOLUTION ORAL
Status: COMPLETED | OUTPATIENT
Start: 2021-08-20 | End: 2021-08-20

## 2021-08-20 RX ADMIN — OXYTOCIN 30 UNITS: 10 INJECTION, SOLUTION INTRAMUSCULAR; INTRAVENOUS at 07:08

## 2021-08-20 RX ADMIN — MIDAZOLAM HYDROCHLORIDE 1 MG: 1 INJECTION, SOLUTION INTRAMUSCULAR; INTRAVENOUS at 04:08

## 2021-08-20 RX ADMIN — PHENYLEPHRINE HYDROCHLORIDE 100 MCG: 10 INJECTION INTRAVENOUS at 01:08

## 2021-08-20 RX ADMIN — MIDAZOLAM HYDROCHLORIDE 2 MG: 1 INJECTION, SOLUTION INTRAMUSCULAR; INTRAVENOUS at 01:08

## 2021-08-20 RX ADMIN — FENTANYL CITRATE 15 MCG: 50 INJECTION, SOLUTION INTRAMUSCULAR; INTRAVENOUS at 07:08

## 2021-08-20 RX ADMIN — DEXTROSE 2 G: 50 INJECTION, SOLUTION INTRAVENOUS at 07:08

## 2021-08-20 RX ADMIN — ALBUMIN (HUMAN): 12.5 SOLUTION INTRAVENOUS at 08:08

## 2021-08-20 RX ADMIN — LIDOCAINE HYDROCHLORIDE 100 MG: 20 INJECTION, SOLUTION INTRAVENOUS at 01:08

## 2021-08-20 RX ADMIN — KETOROLAC TROMETHAMINE 30 MG: 30 INJECTION, SOLUTION INTRAMUSCULAR; INTRAVENOUS at 05:08

## 2021-08-20 RX ADMIN — TRANEXAMIC ACID 1000 MG: 100 INJECTION, SOLUTION INTRAVENOUS at 01:08

## 2021-08-20 RX ADMIN — Medication 95 MILLI-UNITS/MIN: at 02:08

## 2021-08-20 RX ADMIN — EPHEDRINE SULFATE 15 MG: 50 INJECTION INTRAVENOUS at 01:08

## 2021-08-20 RX ADMIN — ONDANSETRON 4 MG: 2 INJECTION, SOLUTION INTRAMUSCULAR; INTRAVENOUS at 07:08

## 2021-08-20 RX ADMIN — SODIUM CHLORIDE, SODIUM LACTATE, POTASSIUM CHLORIDE, AND CALCIUM CHLORIDE: .6; .31; .03; .02 INJECTION, SOLUTION INTRAVENOUS at 06:08

## 2021-08-20 RX ADMIN — MORPHINE SULFATE 0.15 MG: 1 INJECTION, SOLUTION EPIDURAL; INTRATHECAL; INTRAVENOUS at 07:08

## 2021-08-20 RX ADMIN — CARBOPROST TROMETHAMINE: 250 INJECTION, SOLUTION INTRAMUSCULAR at 08:08

## 2021-08-20 RX ADMIN — SODIUM CHLORIDE, SODIUM LACTATE, POTASSIUM CHLORIDE, AND CALCIUM CHLORIDE: .6; .31; .03; .02 INJECTION, SOLUTION INTRAVENOUS at 01:08

## 2021-08-20 RX ADMIN — KETOROLAC TROMETHAMINE 30 MG: 30 INJECTION, SOLUTION INTRAMUSCULAR; INTRAVENOUS at 10:08

## 2021-08-20 RX ADMIN — SUCCINYLCHOLINE CHLORIDE 100 MG: 20 INJECTION, SOLUTION INTRAMUSCULAR; INTRAVENOUS at 01:08

## 2021-08-20 RX ADMIN — LIDOCAINE HYDROCHLORIDE 5 ML: 10 INJECTION, SOLUTION INFILTRATION; PERINEURAL at 05:08

## 2021-08-20 RX ADMIN — ONDANSETRON 4 MG: 2 INJECTION, SOLUTION INTRAMUSCULAR; INTRAVENOUS at 09:08

## 2021-08-20 RX ADMIN — CALCIUM CHLORIDE 1 G: 100 INJECTION, SOLUTION INTRAVENOUS at 02:08

## 2021-08-20 RX ADMIN — DIPHENOXYLATE HYDROCHLORIDE AND ATROPINE SULFATE 2 TABLET: 2.5; .025 TABLET ORAL at 09:08

## 2021-08-20 RX ADMIN — PHENYLEPHRINE HYDROCHLORIDE 200 MCG: 10 INJECTION INTRAVENOUS at 01:08

## 2021-08-20 RX ADMIN — CEFAZOLIN SODIUM 2 G: 2 SOLUTION INTRAVENOUS at 07:08

## 2021-08-20 RX ADMIN — FAMOTIDINE 20 MG: 10 INJECTION, SOLUTION INTRAVENOUS at 07:08

## 2021-08-20 RX ADMIN — HEPARIN SODIUM 3000 UNITS/HR: 200 INJECTION, SOLUTION INTRAVENOUS at 05:08

## 2021-08-20 RX ADMIN — ONDANSETRON 4 MG: 2 INJECTION, SOLUTION INTRAMUSCULAR; INTRAVENOUS at 01:08

## 2021-08-20 RX ADMIN — IOHEXOL 65 ML: 350 INJECTION, SOLUTION INTRAVENOUS at 06:08

## 2021-08-20 RX ADMIN — GELATIN ABSORBABLE SPONGE SIZE 100 1 APPLICATOR: MISC at 05:08

## 2021-08-20 RX ADMIN — SODIUM CHLORIDE, SODIUM LACTATE, POTASSIUM CHLORIDE, AND CALCIUM CHLORIDE: .6; .31; .03; .02 INJECTION, SOLUTION INTRAVENOUS at 07:08

## 2021-08-20 RX ADMIN — Medication 50 MG: at 01:08

## 2021-08-20 RX ADMIN — SODIUM CHLORIDE: 0.9 INJECTION, SOLUTION INTRAVENOUS at 11:08

## 2021-08-20 RX ADMIN — SODIUM CITRATE AND CITRIC ACID MONOHYDRATE 30 ML: 500; 334 SOLUTION ORAL at 07:08

## 2021-08-20 RX ADMIN — ACETAMINOPHEN 650 MG: 325 TABLET ORAL at 10:08

## 2021-08-20 RX ADMIN — PHENYLEPHRINE HYDROCHLORIDE 40 MCG/MIN: 10 INJECTION INTRAVENOUS at 07:08

## 2021-08-20 RX ADMIN — ALBUMIN (HUMAN): 12.5 SOLUTION INTRAVENOUS at 01:08

## 2021-08-20 RX ADMIN — BUPIVACAINE HYDROCHLORIDE 1.6 MG: 7.5 INJECTION, SOLUTION SUBARACHNOID at 07:08

## 2021-08-20 RX ADMIN — FENTANYL CITRATE 50 MCG: 50 INJECTION, SOLUTION INTRAMUSCULAR; INTRAVENOUS at 01:08

## 2021-08-20 RX ADMIN — DEXTROSE 2 G: 50 INJECTION, SOLUTION INTRAVENOUS at 08:08

## 2021-08-20 RX ADMIN — CALCIUM CHLORIDE 1 G: 100 INJECTION, SOLUTION INTRAVENOUS at 01:08

## 2021-08-20 RX ADMIN — FENTANYL CITRATE 50 MCG: 50 INJECTION, SOLUTION INTRAMUSCULAR; INTRAVENOUS at 04:08

## 2021-08-21 LAB
ANION GAP SERPL CALC-SCNC: 7 MMOL/L (ref 8–16)
BASOPHILS # BLD AUTO: 0.02 K/UL (ref 0–0.2)
BASOPHILS NFR BLD: 0.3 % (ref 0–1.9)
BUN SERPL-MCNC: 6 MG/DL (ref 6–20)
CALCIUM SERPL-MCNC: 8 MG/DL (ref 8.7–10.5)
CHLORIDE SERPL-SCNC: 107 MMOL/L (ref 95–110)
CO2 SERPL-SCNC: 18 MMOL/L (ref 23–29)
CREAT SERPL-MCNC: 0.5 MG/DL (ref 0.5–1.4)
DIFFERENTIAL METHOD: ABNORMAL
EOSINOPHIL # BLD AUTO: 0 K/UL (ref 0–0.5)
EOSINOPHIL NFR BLD: 0.1 % (ref 0–8)
ERYTHROCYTE [DISTWIDTH] IN BLOOD BY AUTOMATED COUNT: 15.8 % (ref 11.5–14.5)
EST. GFR  (AFRICAN AMERICAN): >60 ML/MIN/1.73 M^2
EST. GFR  (NON AFRICAN AMERICAN): >60 ML/MIN/1.73 M^2
GLUCOSE SERPL-MCNC: 110 MG/DL (ref 70–110)
HCT VFR BLD AUTO: 23.1 % (ref 37–48.5)
HGB BLD-MCNC: 7.6 G/DL (ref 12–16)
IMM GRANULOCYTES # BLD AUTO: 0.07 K/UL (ref 0–0.04)
IMM GRANULOCYTES NFR BLD AUTO: 0.9 % (ref 0–0.5)
INJECT RH IG VOL PATIENT: NORMAL ML
LYMPHOCYTES # BLD AUTO: 0.6 K/UL (ref 1–4.8)
LYMPHOCYTES NFR BLD: 8.2 % (ref 18–48)
MAGNESIUM SERPL-MCNC: 1.3 MG/DL (ref 1.6–2.6)
MCH RBC QN AUTO: 28 PG (ref 27–31)
MCHC RBC AUTO-ENTMCNC: 32.9 G/DL (ref 32–36)
MCV RBC AUTO: 85 FL (ref 82–98)
MONOCYTES # BLD AUTO: 0.6 K/UL (ref 0.3–1)
MONOCYTES NFR BLD: 7.7 % (ref 4–15)
NEUTROPHILS # BLD AUTO: 6.1 K/UL (ref 1.8–7.7)
NEUTROPHILS NFR BLD: 82.8 % (ref 38–73)
NRBC BLD-RTO: 0 /100 WBC
PHOSPHATE SERPL-MCNC: 3.5 MG/DL (ref 2.7–4.5)
PLATELET # BLD AUTO: 147 K/UL (ref 150–450)
PMV BLD AUTO: 10.8 FL (ref 9.2–12.9)
POTASSIUM SERPL-SCNC: 4.1 MMOL/L (ref 3.5–5.1)
RBC # BLD AUTO: 2.71 M/UL (ref 4–5.4)
RPR SER QL: NORMAL
SODIUM SERPL-SCNC: 132 MMOL/L (ref 136–145)
WBC # BLD AUTO: 7.42 K/UL (ref 3.9–12.7)

## 2021-08-21 PROCEDURE — 99024 PR POST-OP FOLLOW-UP VISIT: ICD-10-PCS | Mod: ,,, | Performed by: OBSTETRICS & GYNECOLOGY

## 2021-08-21 PROCEDURE — 25000003 PHARM REV CODE 250: Performed by: OBSTETRICS & GYNECOLOGY

## 2021-08-21 PROCEDURE — 94799 UNLISTED PULMONARY SVC/PX: CPT

## 2021-08-21 PROCEDURE — 63600175 PHARM REV CODE 636 W HCPCS: Performed by: RADIOLOGY

## 2021-08-21 PROCEDURE — 80048 BASIC METABOLIC PNL TOTAL CA: CPT | Performed by: OBSTETRICS & GYNECOLOGY

## 2021-08-21 PROCEDURE — 94761 N-INVAS EAR/PLS OXIMETRY MLT: CPT

## 2021-08-21 PROCEDURE — 36415 COLL VENOUS BLD VENIPUNCTURE: CPT | Performed by: OBSTETRICS & GYNECOLOGY

## 2021-08-21 PROCEDURE — 99024 POSTOP FOLLOW-UP VISIT: CPT | Mod: ,,, | Performed by: OBSTETRICS & GYNECOLOGY

## 2021-08-21 PROCEDURE — 63600175 PHARM REV CODE 636 W HCPCS: Performed by: OBSTETRICS & GYNECOLOGY

## 2021-08-21 PROCEDURE — 83735 ASSAY OF MAGNESIUM: CPT | Performed by: OBSTETRICS & GYNECOLOGY

## 2021-08-21 PROCEDURE — 99900035 HC TECH TIME PER 15 MIN (STAT)

## 2021-08-21 PROCEDURE — 11000001 HC ACUTE MED/SURG PRIVATE ROOM

## 2021-08-21 PROCEDURE — 85025 COMPLETE CBC W/AUTO DIFF WBC: CPT | Performed by: OBSTETRICS & GYNECOLOGY

## 2021-08-21 PROCEDURE — 25000003 PHARM REV CODE 250: Performed by: STUDENT IN AN ORGANIZED HEALTH CARE EDUCATION/TRAINING PROGRAM

## 2021-08-21 PROCEDURE — 63600519 RHOGAM PHARM REV CODE 636 ALT 250 W HCPCS: Performed by: OBSTETRICS & GYNECOLOGY

## 2021-08-21 PROCEDURE — 84100 ASSAY OF PHOSPHORUS: CPT | Performed by: OBSTETRICS & GYNECOLOGY

## 2021-08-21 RX ORDER — SODIUM,POTASSIUM PHOSPHATES 280-250MG
1 POWDER IN PACKET (EA) ORAL ONCE
Status: COMPLETED | OUTPATIENT
Start: 2021-08-21 | End: 2021-08-21

## 2021-08-21 RX ORDER — LANOLIN ALCOHOL/MO/W.PET/CERES
400 CREAM (GRAM) TOPICAL ONCE
Status: COMPLETED | OUTPATIENT
Start: 2021-08-21 | End: 2021-08-21

## 2021-08-21 RX ORDER — SODIUM CHLORIDE 9 MG/ML
INJECTION, SOLUTION INTRAVENOUS CONTINUOUS
Status: DISCONTINUED | OUTPATIENT
Start: 2021-08-21 | End: 2021-08-22

## 2021-08-21 RX ADMIN — OXYCODONE HYDROCHLORIDE 10 MG: 5 TABLET ORAL at 03:08

## 2021-08-21 RX ADMIN — SODIUM CHLORIDE 75 ML/HR: 0.9 INJECTION, SOLUTION INTRAVENOUS at 07:08

## 2021-08-21 RX ADMIN — Medication 400 MG: at 10:08

## 2021-08-21 RX ADMIN — PRENATAL VIT W/ FE FUMARATE-FA TAB 27-0.8 MG 1 TABLET: 27-0.8 TAB at 09:08

## 2021-08-21 RX ADMIN — OXYCODONE HYDROCHLORIDE 5 MG: 5 TABLET ORAL at 10:08

## 2021-08-21 RX ADMIN — KETOROLAC TROMETHAMINE 30 MG: 30 INJECTION, SOLUTION INTRAMUSCULAR; INTRAVENOUS at 04:08

## 2021-08-21 RX ADMIN — POTASSIUM & SODIUM PHOSPHATES POWDER PACK 280-160-250 MG 1 PACKET: 280-160-250 PACK at 09:08

## 2021-08-21 RX ADMIN — IBUPROFEN 800 MG: 400 TABLET ORAL at 10:08

## 2021-08-21 RX ADMIN — OXYCODONE HYDROCHLORIDE 10 MG: 5 TABLET ORAL at 11:08

## 2021-08-21 RX ADMIN — DOCUSATE SODIUM 200 MG: 100 CAPSULE, LIQUID FILLED ORAL at 09:08

## 2021-08-21 RX ADMIN — KETOROLAC TROMETHAMINE 30 MG: 30 INJECTION, SOLUTION INTRAMUSCULAR; INTRAVENOUS at 02:08

## 2021-08-21 RX ADMIN — HUMAN RHO(D) IMMUNE GLOBULIN 300 MCG: 300 INJECTION, SOLUTION INTRAMUSCULAR at 06:08

## 2021-08-21 RX ADMIN — SODIUM CHLORIDE: 0.9 INJECTION, SOLUTION INTRAVENOUS at 03:08

## 2021-08-21 RX ADMIN — ACETAMINOPHEN 650 MG: 325 TABLET ORAL at 02:08

## 2021-08-21 RX ADMIN — ACETAMINOPHEN 650 MG: 325 TABLET ORAL at 05:08

## 2021-08-21 RX ADMIN — IBUPROFEN 800 MG: 400 TABLET ORAL at 05:08

## 2021-08-21 RX ADMIN — OXYCODONE HYDROCHLORIDE 10 MG: 5 TABLET ORAL at 07:08

## 2021-08-22 LAB
ANION GAP SERPL CALC-SCNC: 9 MMOL/L (ref 8–16)
BASOPHILS # BLD AUTO: 0.03 K/UL (ref 0–0.2)
BASOPHILS NFR BLD: 0.2 % (ref 0–1.9)
BUN SERPL-MCNC: 6 MG/DL (ref 6–20)
CALCIUM SERPL-MCNC: 8.5 MG/DL (ref 8.7–10.5)
CHLORIDE SERPL-SCNC: 106 MMOL/L (ref 95–110)
CO2 SERPL-SCNC: 21 MMOL/L (ref 23–29)
CREAT SERPL-MCNC: 0.6 MG/DL (ref 0.5–1.4)
DIFFERENTIAL METHOD: ABNORMAL
EOSINOPHIL # BLD AUTO: 0.1 K/UL (ref 0–0.5)
EOSINOPHIL NFR BLD: 0.7 % (ref 0–8)
ERYTHROCYTE [DISTWIDTH] IN BLOOD BY AUTOMATED COUNT: 16.2 % (ref 11.5–14.5)
EST. GFR  (AFRICAN AMERICAN): >60 ML/MIN/1.73 M^2
EST. GFR  (NON AFRICAN AMERICAN): >60 ML/MIN/1.73 M^2
GLUCOSE SERPL-MCNC: 103 MG/DL (ref 70–110)
HCT VFR BLD AUTO: 23 % (ref 37–48.5)
HGB BLD-MCNC: 7.6 G/DL (ref 12–16)
IMM GRANULOCYTES # BLD AUTO: 0.17 K/UL (ref 0–0.04)
IMM GRANULOCYTES NFR BLD AUTO: 1.3 % (ref 0–0.5)
LYMPHOCYTES # BLD AUTO: 0.6 K/UL (ref 1–4.8)
LYMPHOCYTES NFR BLD: 4.7 % (ref 18–48)
MAGNESIUM SERPL-MCNC: 1.8 MG/DL (ref 1.6–2.6)
MCH RBC QN AUTO: 28.9 PG (ref 27–31)
MCHC RBC AUTO-ENTMCNC: 33 G/DL (ref 32–36)
MCV RBC AUTO: 88 FL (ref 82–98)
MONOCYTES # BLD AUTO: 0.6 K/UL (ref 0.3–1)
MONOCYTES NFR BLD: 4.8 % (ref 4–15)
NEUTROPHILS # BLD AUTO: 11.7 K/UL (ref 1.8–7.7)
NEUTROPHILS NFR BLD: 88.3 % (ref 38–73)
NRBC BLD-RTO: 0 /100 WBC
PHOSPHATE SERPL-MCNC: 4 MG/DL (ref 2.7–4.5)
PLATELET # BLD AUTO: 257 K/UL (ref 150–450)
PLATELET BLD QL SMEAR: ABNORMAL
PMV BLD AUTO: 9.8 FL (ref 9.2–12.9)
POTASSIUM SERPL-SCNC: 3.8 MMOL/L (ref 3.5–5.1)
RBC # BLD AUTO: 2.63 M/UL (ref 4–5.4)
SODIUM SERPL-SCNC: 136 MMOL/L (ref 136–145)
WBC # BLD AUTO: 13.2 K/UL (ref 3.9–12.7)

## 2021-08-22 PROCEDURE — 11000001 HC ACUTE MED/SURG PRIVATE ROOM

## 2021-08-22 PROCEDURE — 84100 ASSAY OF PHOSPHORUS: CPT | Performed by: OBSTETRICS & GYNECOLOGY

## 2021-08-22 PROCEDURE — 99024 PR POST-OP FOLLOW-UP VISIT: ICD-10-PCS | Mod: ,,, | Performed by: OBSTETRICS & GYNECOLOGY

## 2021-08-22 PROCEDURE — 36415 COLL VENOUS BLD VENIPUNCTURE: CPT | Performed by: OBSTETRICS & GYNECOLOGY

## 2021-08-22 PROCEDURE — 25000003 PHARM REV CODE 250: Performed by: OBSTETRICS & GYNECOLOGY

## 2021-08-22 PROCEDURE — 99024 POSTOP FOLLOW-UP VISIT: CPT | Mod: ,,, | Performed by: OBSTETRICS & GYNECOLOGY

## 2021-08-22 PROCEDURE — 83735 ASSAY OF MAGNESIUM: CPT | Performed by: OBSTETRICS & GYNECOLOGY

## 2021-08-22 PROCEDURE — 85025 COMPLETE CBC W/AUTO DIFF WBC: CPT | Performed by: OBSTETRICS & GYNECOLOGY

## 2021-08-22 PROCEDURE — 99900035 HC TECH TIME PER 15 MIN (STAT)

## 2021-08-22 PROCEDURE — 80048 BASIC METABOLIC PNL TOTAL CA: CPT | Performed by: OBSTETRICS & GYNECOLOGY

## 2021-08-22 RX ORDER — ACETAMINOPHEN 325 MG/1
650 TABLET ORAL ONCE
Status: COMPLETED | OUTPATIENT
Start: 2021-08-22 | End: 2021-08-22

## 2021-08-22 RX ORDER — FERROUS SULFATE 325(65) MG
325 TABLET, DELAYED RELEASE (ENTERIC COATED) ORAL 2 TIMES DAILY
Status: DISCONTINUED | OUTPATIENT
Start: 2021-08-22 | End: 2021-08-24 | Stop reason: HOSPADM

## 2021-08-22 RX ADMIN — SIMETHICONE 80 MG: 80 TABLET, CHEWABLE ORAL at 04:08

## 2021-08-22 RX ADMIN — OXYCODONE HYDROCHLORIDE 5 MG: 5 TABLET ORAL at 08:08

## 2021-08-22 RX ADMIN — DOCUSATE SODIUM 200 MG: 100 CAPSULE, LIQUID FILLED ORAL at 08:08

## 2021-08-22 RX ADMIN — OXYCODONE HYDROCHLORIDE 10 MG: 5 TABLET ORAL at 04:08

## 2021-08-22 RX ADMIN — FERROUS SULFATE TAB EC 325 MG (65 MG FE EQUIVALENT) 325 MG: 325 (65 FE) TABLET DELAYED RESPONSE at 11:08

## 2021-08-22 RX ADMIN — IBUPROFEN 800 MG: 400 TABLET ORAL at 01:08

## 2021-08-22 RX ADMIN — FERROUS SULFATE TAB EC 325 MG (65 MG FE EQUIVALENT) 325 MG: 325 (65 FE) TABLET DELAYED RESPONSE at 08:08

## 2021-08-22 RX ADMIN — OXYCODONE HYDROCHLORIDE 10 MG: 5 TABLET ORAL at 03:08

## 2021-08-22 RX ADMIN — OXYCODONE HYDROCHLORIDE 10 MG: 5 TABLET ORAL at 11:08

## 2021-08-22 RX ADMIN — Medication 1 TABLET: at 04:08

## 2021-08-22 RX ADMIN — IBUPROFEN 800 MG: 400 TABLET ORAL at 11:08

## 2021-08-22 RX ADMIN — IBUPROFEN 800 MG: 400 TABLET ORAL at 06:08

## 2021-08-22 RX ADMIN — SIMETHICONE 80 MG: 80 TABLET, CHEWABLE ORAL at 08:08

## 2021-08-22 RX ADMIN — ACETAMINOPHEN 650 MG: 325 TABLET ORAL at 08:08

## 2021-08-22 RX ADMIN — OXYCODONE HYDROCHLORIDE 10 MG: 5 TABLET ORAL at 08:08

## 2021-08-22 RX ADMIN — SODIUM CHLORIDE: 0.9 INJECTION, SOLUTION INTRAVENOUS at 08:08

## 2021-08-22 RX ADMIN — PRENATAL VIT W/ FE FUMARATE-FA TAB 27-0.8 MG 1 TABLET: 27-0.8 TAB at 08:08

## 2021-08-23 ENCOUNTER — TELEPHONE (OUTPATIENT)
Dept: OBSTETRICS AND GYNECOLOGY | Facility: HOSPITAL | Age: 36
End: 2021-08-23

## 2021-08-23 ENCOUNTER — PATIENT MESSAGE (OUTPATIENT)
Dept: OBSTETRICS AND GYNECOLOGY | Facility: CLINIC | Age: 36
End: 2021-08-23

## 2021-08-23 LAB
BLD PROD TYP BPU: NORMAL
BLOOD UNIT EXPIRATION DATE: NORMAL
BLOOD UNIT TYPE CODE: 7300
BLOOD UNIT TYPE: NORMAL
CODING SYSTEM: NORMAL
DISPENSE STATUS: NORMAL
NUM UNITS TRANS FFP: NORMAL

## 2021-08-23 PROCEDURE — 94799 UNLISTED PULMONARY SVC/PX: CPT

## 2021-08-23 PROCEDURE — 25000003 PHARM REV CODE 250: Performed by: OBSTETRICS & GYNECOLOGY

## 2021-08-23 PROCEDURE — 11000001 HC ACUTE MED/SURG PRIVATE ROOM

## 2021-08-23 RX ADMIN — FERROUS SULFATE TAB EC 325 MG (65 MG FE EQUIVALENT) 325 MG: 325 (65 FE) TABLET DELAYED RESPONSE at 08:08

## 2021-08-23 RX ADMIN — OXYCODONE HYDROCHLORIDE 10 MG: 5 TABLET ORAL at 01:08

## 2021-08-23 RX ADMIN — SIMETHICONE 80 MG: 80 TABLET, CHEWABLE ORAL at 05:08

## 2021-08-23 RX ADMIN — Medication 1 TABLET: at 05:08

## 2021-08-23 RX ADMIN — SIMETHICONE 80 MG: 80 TABLET, CHEWABLE ORAL at 11:08

## 2021-08-23 RX ADMIN — OXYCODONE HYDROCHLORIDE 10 MG: 5 TABLET ORAL at 05:08

## 2021-08-23 RX ADMIN — OXYCODONE HYDROCHLORIDE 10 MG: 5 TABLET ORAL at 04:08

## 2021-08-23 RX ADMIN — IBUPROFEN 800 MG: 400 TABLET ORAL at 05:08

## 2021-08-23 RX ADMIN — DOCUSATE SODIUM 200 MG: 100 CAPSULE, LIQUID FILLED ORAL at 10:08

## 2021-08-23 RX ADMIN — OXYCODONE HYDROCHLORIDE 10 MG: 5 TABLET ORAL at 09:08

## 2021-08-23 RX ADMIN — OXYCODONE HYDROCHLORIDE 10 MG: 5 TABLET ORAL at 12:08

## 2021-08-23 RX ADMIN — IBUPROFEN 800 MG: 400 TABLET ORAL at 10:08

## 2021-08-23 RX ADMIN — SIMETHICONE 80 MG: 80 TABLET, CHEWABLE ORAL at 08:08

## 2021-08-23 RX ADMIN — IBUPROFEN 800 MG: 400 TABLET ORAL at 02:08

## 2021-08-23 RX ADMIN — PRENATAL VIT W/ FE FUMARATE-FA TAB 27-0.8 MG 1 TABLET: 27-0.8 TAB at 08:08

## 2021-08-23 RX ADMIN — DOCUSATE SODIUM 200 MG: 100 CAPSULE, LIQUID FILLED ORAL at 09:08

## 2021-08-23 RX ADMIN — OXYCODONE HYDROCHLORIDE 10 MG: 5 TABLET ORAL at 08:08

## 2021-08-23 RX ADMIN — FERROUS SULFATE TAB EC 325 MG (65 MG FE EQUIVALENT) 325 MG: 325 (65 FE) TABLET DELAYED RESPONSE at 09:08

## 2021-08-24 ENCOUNTER — HOSPITAL ENCOUNTER (EMERGENCY)
Facility: HOSPITAL | Age: 36
Discharge: HOME | End: 2021-08-25
Attending: EMERGENCY MEDICINE
Payer: COMMERCIAL

## 2021-08-24 ENCOUNTER — NURSE TRIAGE (OUTPATIENT)
Dept: ADMINISTRATIVE | Facility: CLINIC | Age: 36
End: 2021-08-24

## 2021-08-24 VITALS
OXYGEN SATURATION: 99 % | RESPIRATION RATE: 18 BRPM | TEMPERATURE: 99 F | HEART RATE: 89 BPM | SYSTOLIC BLOOD PRESSURE: 124 MMHG | DIASTOLIC BLOOD PRESSURE: 78 MMHG

## 2021-08-24 DIAGNOSIS — R07.9 CHEST PAIN: ICD-10-CM

## 2021-08-24 DIAGNOSIS — R11.2 NAUSEA AND VOMITING, INTRACTABILITY OF VOMITING NOT SPECIFIED, UNSPECIFIED VOMITING TYPE: ICD-10-CM

## 2021-08-24 DIAGNOSIS — R06.02 SOB (SHORTNESS OF BREATH): Primary | ICD-10-CM

## 2021-08-24 LAB
BLD PROD TYP BPU: NORMAL
BLOOD UNIT EXPIRATION DATE: NORMAL
BLOOD UNIT TYPE CODE: 1700
BLOOD UNIT TYPE CODE: 1700
BLOOD UNIT TYPE CODE: 9500
BLOOD UNIT TYPE: NORMAL
CODING SYSTEM: NORMAL
DISPENSE STATUS: NORMAL
NUM UNITS TRANS PACKED RBC: NORMAL
TRANS ERYTHROCYTES VOL PATIENT: NORMAL ML

## 2021-08-24 PROCEDURE — 99284 EMERGENCY DEPT VISIT MOD MDM: CPT | Mod: 25

## 2021-08-24 PROCEDURE — 99024 PR POST-OP FOLLOW-UP VISIT: ICD-10-PCS | Mod: ,,, | Performed by: OBSTETRICS & GYNECOLOGY

## 2021-08-24 PROCEDURE — 96374 THER/PROPH/DIAG INJ IV PUSH: CPT

## 2021-08-24 PROCEDURE — 25000003 PHARM REV CODE 250: Performed by: OBSTETRICS & GYNECOLOGY

## 2021-08-24 PROCEDURE — 96361 HYDRATE IV INFUSION ADD-ON: CPT

## 2021-08-24 PROCEDURE — 99024 POSTOP FOLLOW-UP VISIT: CPT | Mod: ,,, | Performed by: OBSTETRICS & GYNECOLOGY

## 2021-08-24 PROCEDURE — 96375 TX/PRO/DX INJ NEW DRUG ADDON: CPT

## 2021-08-24 RX ADMIN — PRENATAL VIT W/ FE FUMARATE-FA TAB 27-0.8 MG 1 TABLET: 27-0.8 TAB at 09:08

## 2021-08-24 RX ADMIN — FERROUS SULFATE TAB EC 325 MG (65 MG FE EQUIVALENT) 325 MG: 325 (65 FE) TABLET DELAYED RESPONSE at 09:08

## 2021-08-24 RX ADMIN — OXYCODONE HYDROCHLORIDE 10 MG: 5 TABLET ORAL at 09:08

## 2021-08-24 RX ADMIN — OXYCODONE HYDROCHLORIDE 10 MG: 5 TABLET ORAL at 05:08

## 2021-08-24 RX ADMIN — OXYCODONE HYDROCHLORIDE 10 MG: 5 TABLET ORAL at 02:08

## 2021-08-24 RX ADMIN — IBUPROFEN 800 MG: 400 TABLET ORAL at 09:08

## 2021-08-24 RX ADMIN — DOCUSATE SODIUM 200 MG: 100 CAPSULE, LIQUID FILLED ORAL at 09:08

## 2021-08-24 RX ADMIN — IBUPROFEN 800 MG: 400 TABLET ORAL at 01:08

## 2021-08-24 RX ADMIN — SIMETHICONE 80 MG: 80 TABLET, CHEWABLE ORAL at 05:08

## 2021-08-24 RX ADMIN — OXYCODONE HYDROCHLORIDE 10 MG: 5 TABLET ORAL at 01:08

## 2021-08-25 ENCOUNTER — PATIENT MESSAGE (OUTPATIENT)
Dept: OBSTETRICS AND GYNECOLOGY | Facility: CLINIC | Age: 36
End: 2021-08-25

## 2021-08-25 ENCOUNTER — TELEPHONE (OUTPATIENT)
Dept: OBSTETRICS AND GYNECOLOGY | Facility: HOSPITAL | Age: 36
End: 2021-08-25

## 2021-08-25 ENCOUNTER — HOSPITAL ENCOUNTER (INPATIENT)
Facility: HOSPITAL | Age: 36
LOS: 5 days | Discharge: HOME | DRG: 776 | End: 2021-08-30
Attending: OBSTETRICS & GYNECOLOGY | Admitting: OBSTETRICS & GYNECOLOGY
Payer: COMMERCIAL

## 2021-08-25 VITALS
RESPIRATION RATE: 18 BRPM | HEIGHT: 62 IN | TEMPERATURE: 99 F | WEIGHT: 160 LBS | HEART RATE: 77 BPM | OXYGEN SATURATION: 100 % | SYSTOLIC BLOOD PRESSURE: 146 MMHG | DIASTOLIC BLOOD PRESSURE: 75 MMHG | BODY MASS INDEX: 29.44 KG/M2

## 2021-08-25 DIAGNOSIS — K56.7 POSTOPERATIVE ILEUS: ICD-10-CM

## 2021-08-25 DIAGNOSIS — K91.89 POSTOPERATIVE ILEUS: ICD-10-CM

## 2021-08-25 LAB
ABO GROUP BLD: NORMAL
ALBUMIN SERPL BCP-MCNC: 2.3 G/DL (ref 3.5–5.2)
ALP SERPL-CCNC: 109 U/L (ref 55–135)
ALT SERPL W/O P-5'-P-CCNC: 18 U/L (ref 10–44)
ANION GAP SERPL CALC-SCNC: 12 MMOL/L (ref 8–16)
ANION GAP SERPL CALC-SCNC: 13 MMOL/L (ref 8–16)
AST SERPL-CCNC: 17 U/L (ref 10–40)
BASOPHILS # BLD AUTO: 0.01 K/UL (ref 0–0.2)
BASOPHILS NFR BLD: 0.1 % (ref 0–1.9)
BILIRUB SERPL-MCNC: 1.1 MG/DL (ref 0.1–1)
BILIRUB UR QL STRIP: NEGATIVE
BLD GP AB SCN CELLS X3 SERPL QL: NORMAL
BLOOD GROUP ANTIBODIES SERPL: NORMAL
BUN SERPL-MCNC: 6 MG/DL (ref 6–20)
BUN SERPL-MCNC: 8 MG/DL (ref 6–20)
CALCIUM SERPL-MCNC: 8 MG/DL (ref 8.7–10.5)
CALCIUM SERPL-MCNC: 9.1 MG/DL (ref 8.7–10.5)
CHLORIDE SERPL-SCNC: 106 MMOL/L (ref 95–110)
CHLORIDE SERPL-SCNC: 108 MMOL/L (ref 95–110)
CLARITY UR: CLEAR
CO2 SERPL-SCNC: 19 MMOL/L (ref 23–29)
CO2 SERPL-SCNC: 20 MMOL/L (ref 23–29)
COLOR UR: YELLOW
CREAT SERPL-MCNC: 0.5 MG/DL (ref 0.5–1.4)
CREAT SERPL-MCNC: 0.6 MG/DL (ref 0.5–1.4)
DIFFERENTIAL METHOD: ABNORMAL
EOSINOPHIL # BLD AUTO: 0 K/UL (ref 0–0.5)
EOSINOPHIL NFR BLD: 0.3 % (ref 0–8)
ERYTHROCYTE [DISTWIDTH] IN BLOOD BY AUTOMATED COUNT: 15.4 % (ref 11.5–14.5)
EST. GFR  (AFRICAN AMERICAN): >60 ML/MIN/1.73 M^2
EST. GFR  (AFRICAN AMERICAN): >60 ML/MIN/1.73 M^2
EST. GFR  (NON AFRICAN AMERICAN): >60 ML/MIN/1.73 M^2
EST. GFR  (NON AFRICAN AMERICAN): >60 ML/MIN/1.73 M^2
FINAL PATHOLOGIC DIAGNOSIS: NORMAL
GLUCOSE SERPL-MCNC: 109 MG/DL (ref 70–110)
GLUCOSE SERPL-MCNC: 113 MG/DL (ref 70–110)
GLUCOSE UR QL STRIP: NEGATIVE
GROSS: NORMAL
HCT VFR BLD AUTO: 25.8 % (ref 37–48.5)
HGB BLD-MCNC: 8.3 G/DL (ref 12–16)
HGB UR QL STRIP: NEGATIVE
IMM GRANULOCYTES # BLD AUTO: 0.28 K/UL (ref 0–0.04)
IMM GRANULOCYTES NFR BLD AUTO: 2.7 % (ref 0–0.5)
KETONES UR QL STRIP: ABNORMAL
LACTATE SERPL-SCNC: 0.8 MMOL/L (ref 0.5–2.2)
LEUKOCYTE ESTERASE UR QL STRIP: NEGATIVE
LYMPHOCYTES # BLD AUTO: 0.7 K/UL (ref 1–4.8)
LYMPHOCYTES NFR BLD: 6.9 % (ref 18–48)
Lab: NORMAL
MAGNESIUM SERPL-MCNC: 1.9 MG/DL (ref 1.6–2.6)
MCH RBC QN AUTO: 28.4 PG (ref 27–31)
MCHC RBC AUTO-ENTMCNC: 32.2 G/DL (ref 32–36)
MCV RBC AUTO: 88 FL (ref 82–98)
MONOCYTES # BLD AUTO: 0.5 K/UL (ref 0.3–1)
MONOCYTES NFR BLD: 4.8 % (ref 4–15)
NEUTROPHILS # BLD AUTO: 9 K/UL (ref 1.8–7.7)
NEUTROPHILS NFR BLD: 85.2 % (ref 38–73)
NITRITE UR QL STRIP: NEGATIVE
NRBC BLD-RTO: 0 /100 WBC
PH UR STRIP: 8 [PH] (ref 5–8)
PHOSPHATE SERPL-MCNC: 3.9 MG/DL (ref 2.7–4.5)
PLATELET # BLD AUTO: 395 K/UL (ref 150–450)
PLATELET BLD QL SMEAR: ABNORMAL
PMV BLD AUTO: 9.5 FL (ref 9.2–12.9)
POTASSIUM SERPL-SCNC: 3.3 MMOL/L (ref 3.5–5.1)
POTASSIUM SERPL-SCNC: 3.5 MMOL/L (ref 3.5–5.1)
PROT SERPL-MCNC: 6.5 G/DL (ref 6–8.4)
PROT UR QL STRIP: NEGATIVE
RBC # BLD AUTO: 2.92 M/UL (ref 4–5.4)
RH BLD: NORMAL
SODIUM SERPL-SCNC: 139 MMOL/L (ref 136–145)
SODIUM SERPL-SCNC: 139 MMOL/L (ref 136–145)
SP GR UR STRIP: <=1.005 (ref 1–1.03)
TROPONIN I SERPL DL<=0.01 NG/ML-MCNC: <0.006 NG/ML (ref 0–0.03)
URN SPEC COLLECT METH UR: ABNORMAL
UROBILINOGEN UR STRIP-ACNC: NEGATIVE EU/DL
WBC # BLD AUTO: 10.49 K/UL (ref 3.9–12.7)

## 2021-08-25 PROCEDURE — 85025 COMPLETE CBC W/AUTO DIFF WBC: CPT | Performed by: EMERGENCY MEDICINE

## 2021-08-25 PROCEDURE — 86901 BLOOD TYPING SEROLOGIC RH(D): CPT | Performed by: EMERGENCY MEDICINE

## 2021-08-25 PROCEDURE — 63600175 PHARM REV CODE 636 W HCPCS: Performed by: EMERGENCY MEDICINE

## 2021-08-25 PROCEDURE — 93010 EKG 12-LEAD: ICD-10-PCS | Mod: ,,, | Performed by: INTERNAL MEDICINE

## 2021-08-25 PROCEDURE — 86850 RBC ANTIBODY SCREEN: CPT | Performed by: EMERGENCY MEDICINE

## 2021-08-25 PROCEDURE — 36415 COLL VENOUS BLD VENIPUNCTURE: CPT | Performed by: EMERGENCY MEDICINE

## 2021-08-25 PROCEDURE — 84484 ASSAY OF TROPONIN QUANT: CPT | Performed by: EMERGENCY MEDICINE

## 2021-08-25 PROCEDURE — 25500020 PHARM REV CODE 255

## 2021-08-25 PROCEDURE — 99222 PR INITIAL HOSPITAL CARE,LEVL II: ICD-10-PCS | Mod: ,,, | Performed by: OBSTETRICS & GYNECOLOGY

## 2021-08-25 PROCEDURE — 80048 BASIC METABOLIC PNL TOTAL CA: CPT | Performed by: OBSTETRICS & GYNECOLOGY

## 2021-08-25 PROCEDURE — 63600175 PHARM REV CODE 636 W HCPCS: Performed by: OBSTETRICS & GYNECOLOGY

## 2021-08-25 PROCEDURE — 93010 ELECTROCARDIOGRAM REPORT: CPT | Mod: ,,, | Performed by: INTERNAL MEDICINE

## 2021-08-25 PROCEDURE — 84100 ASSAY OF PHOSPHORUS: CPT | Performed by: OBSTETRICS & GYNECOLOGY

## 2021-08-25 PROCEDURE — 25000003 PHARM REV CODE 250: Performed by: OBSTETRICS & GYNECOLOGY

## 2021-08-25 PROCEDURE — 93005 ELECTROCARDIOGRAM TRACING: CPT

## 2021-08-25 PROCEDURE — 36415 COLL VENOUS BLD VENIPUNCTURE: CPT | Performed by: OBSTETRICS & GYNECOLOGY

## 2021-08-25 PROCEDURE — 86870 RBC ANTIBODY IDENTIFICATION: CPT | Performed by: EMERGENCY MEDICINE

## 2021-08-25 PROCEDURE — 87040 BLOOD CULTURE FOR BACTERIA: CPT | Performed by: EMERGENCY MEDICINE

## 2021-08-25 PROCEDURE — 11000001 HC ACUTE MED/SURG PRIVATE ROOM

## 2021-08-25 PROCEDURE — 80053 COMPREHEN METABOLIC PANEL: CPT | Performed by: EMERGENCY MEDICINE

## 2021-08-25 PROCEDURE — 83605 ASSAY OF LACTIC ACID: CPT | Performed by: EMERGENCY MEDICINE

## 2021-08-25 PROCEDURE — 86900 BLOOD TYPING SEROLOGIC ABO: CPT | Performed by: EMERGENCY MEDICINE

## 2021-08-25 PROCEDURE — 81003 URINALYSIS AUTO W/O SCOPE: CPT | Performed by: EMERGENCY MEDICINE

## 2021-08-25 PROCEDURE — 83735 ASSAY OF MAGNESIUM: CPT | Performed by: OBSTETRICS & GYNECOLOGY

## 2021-08-25 PROCEDURE — 99222 1ST HOSP IP/OBS MODERATE 55: CPT | Mod: ,,, | Performed by: OBSTETRICS & GYNECOLOGY

## 2021-08-25 RX ORDER — DEXTROSE MONOHYDRATE AND SODIUM CHLORIDE 5; .9 G/100ML; G/100ML
INJECTION, SOLUTION INTRAVENOUS CONTINUOUS
Status: DISCONTINUED | OUTPATIENT
Start: 2021-08-25 | End: 2021-08-30

## 2021-08-25 RX ORDER — SODIUM CHLORIDE 0.9 % (FLUSH) 0.9 %
10 SYRINGE (ML) INJECTION
Status: DISCONTINUED | OUTPATIENT
Start: 2021-08-25 | End: 2021-08-31 | Stop reason: HOSPADM

## 2021-08-25 RX ORDER — POTASSIUM CHLORIDE 7.45 MG/ML
10 INJECTION INTRAVENOUS ONCE
Status: COMPLETED | OUTPATIENT
Start: 2021-08-25 | End: 2021-08-25

## 2021-08-25 RX ORDER — DIPHENHYDRAMINE HYDROCHLORIDE 50 MG/ML
25 INJECTION INTRAMUSCULAR; INTRAVENOUS EVERY 4 HOURS PRN
Status: CANCELLED | OUTPATIENT
Start: 2021-08-25

## 2021-08-25 RX ORDER — ONDANSETRON 4 MG/1
4 TABLET, ORALLY DISINTEGRATING ORAL EVERY 6 HOURS PRN
Qty: 12 TABLET | Refills: 0 | Status: SHIPPED | OUTPATIENT
Start: 2021-08-25 | End: 2021-12-18

## 2021-08-25 RX ORDER — SODIUM CHLORIDE 0.9 % (FLUSH) 0.9 %
10 SYRINGE (ML) INJECTION
Status: CANCELLED | OUTPATIENT
Start: 2021-08-25

## 2021-08-25 RX ORDER — ACETAMINOPHEN 325 MG/1
650 TABLET ORAL EVERY 6 HOURS PRN
Status: CANCELLED | OUTPATIENT
Start: 2021-08-25

## 2021-08-25 RX ORDER — IBUPROFEN 400 MG/1
800 TABLET ORAL EVERY 8 HOURS PRN
Status: DISCONTINUED | OUTPATIENT
Start: 2021-08-25 | End: 2021-08-27

## 2021-08-25 RX ORDER — ONDANSETRON 8 MG/1
8 TABLET, ORALLY DISINTEGRATING ORAL EVERY 8 HOURS PRN
Status: DISCONTINUED | OUTPATIENT
Start: 2021-08-25 | End: 2021-08-31 | Stop reason: HOSPADM

## 2021-08-25 RX ORDER — METOCLOPRAMIDE HYDROCHLORIDE 5 MG/ML
10 INJECTION INTRAMUSCULAR; INTRAVENOUS EVERY 6 HOURS PRN
Status: DISCONTINUED | OUTPATIENT
Start: 2021-08-25 | End: 2021-08-27

## 2021-08-25 RX ORDER — SIMETHICONE 80 MG
80 TABLET,CHEWABLE ORAL EVERY 6 HOURS PRN
Qty: 30 TABLET | Refills: 0 | Status: SHIPPED | OUTPATIENT
Start: 2021-08-25 | End: 2022-01-06

## 2021-08-25 RX ORDER — ONDANSETRON 2 MG/ML
4 INJECTION INTRAMUSCULAR; INTRAVENOUS
Status: COMPLETED | OUTPATIENT
Start: 2021-08-25 | End: 2021-08-25

## 2021-08-25 RX ORDER — PRENATAL WITH FERROUS FUM AND FOLIC ACID 3080; 920; 120; 400; 22; 1.84; 3; 20; 10; 1; 12; 200; 27; 25; 2 [IU]/1; [IU]/1; MG/1; [IU]/1; MG/1; MG/1; MG/1; MG/1; MG/1; MG/1; UG/1; MG/1; MG/1; MG/1; MG/1
1 TABLET ORAL DAILY
Status: CANCELLED | OUTPATIENT
Start: 2021-08-25

## 2021-08-25 RX ORDER — AMOXICILLIN 250 MG
1 CAPSULE ORAL NIGHTLY PRN
Status: CANCELLED | OUTPATIENT
Start: 2021-08-25

## 2021-08-25 RX ORDER — METOCLOPRAMIDE HYDROCHLORIDE 5 MG/ML
10 INJECTION INTRAMUSCULAR; INTRAVENOUS EVERY 6 HOURS PRN
Status: CANCELLED | OUTPATIENT
Start: 2021-08-25

## 2021-08-25 RX ORDER — HYDROCODONE BITARTRATE AND ACETAMINOPHEN 5; 325 MG/1; MG/1
1 TABLET ORAL EVERY 4 HOURS PRN
Status: DISCONTINUED | OUTPATIENT
Start: 2021-08-25 | End: 2021-08-31 | Stop reason: HOSPADM

## 2021-08-25 RX ORDER — DIPHENHYDRAMINE HCL 25 MG
25 CAPSULE ORAL EVERY 4 HOURS PRN
Status: CANCELLED | OUTPATIENT
Start: 2021-08-25

## 2021-08-25 RX ORDER — ONDANSETRON 4 MG/1
8 TABLET, ORALLY DISINTEGRATING ORAL EVERY 8 HOURS PRN
Status: CANCELLED | OUTPATIENT
Start: 2021-08-25

## 2021-08-25 RX ORDER — PROCHLORPERAZINE EDISYLATE 5 MG/ML
5 INJECTION INTRAMUSCULAR; INTRAVENOUS EVERY 6 HOURS PRN
Status: DISCONTINUED | OUTPATIENT
Start: 2021-08-25 | End: 2021-08-31 | Stop reason: HOSPADM

## 2021-08-25 RX ORDER — MORPHINE SULFATE 2 MG/ML
6 INJECTION, SOLUTION INTRAMUSCULAR; INTRAVENOUS
Status: COMPLETED | OUTPATIENT
Start: 2021-08-25 | End: 2021-08-25

## 2021-08-25 RX ORDER — SIMETHICONE 80 MG
1 TABLET,CHEWABLE ORAL EVERY 6 HOURS PRN
Status: CANCELLED | OUTPATIENT
Start: 2021-08-25

## 2021-08-25 RX ORDER — PRENATAL WITH FERROUS FUM AND FOLIC ACID 3080; 920; 120; 400; 22; 1.84; 3; 20; 10; 1; 12; 200; 27; 25; 2 [IU]/1; [IU]/1; MG/1; [IU]/1; MG/1; MG/1; MG/1; MG/1; MG/1; MG/1; UG/1; MG/1; MG/1; MG/1; MG/1
1 TABLET ORAL DAILY
Status: DISCONTINUED | OUTPATIENT
Start: 2021-08-25 | End: 2021-08-31 | Stop reason: HOSPADM

## 2021-08-25 RX ORDER — PROCHLORPERAZINE EDISYLATE 5 MG/ML
5 INJECTION INTRAMUSCULAR; INTRAVENOUS EVERY 6 HOURS PRN
Status: CANCELLED | OUTPATIENT
Start: 2021-08-25

## 2021-08-25 RX ORDER — SIMETHICONE 80 MG
1 TABLET,CHEWABLE ORAL EVERY 6 HOURS PRN
Status: DISCONTINUED | OUTPATIENT
Start: 2021-08-25 | End: 2021-08-27

## 2021-08-25 RX ORDER — HYDROCODONE BITARTRATE AND ACETAMINOPHEN 5; 325 MG/1; MG/1
1 TABLET ORAL EVERY 4 HOURS PRN
Status: CANCELLED | OUTPATIENT
Start: 2021-08-25

## 2021-08-25 RX ORDER — DEXTROSE MONOHYDRATE AND SODIUM CHLORIDE 5; .9 G/100ML; G/100ML
INJECTION, SOLUTION INTRAVENOUS CONTINUOUS
Status: CANCELLED | OUTPATIENT
Start: 2021-08-25

## 2021-08-25 RX ORDER — ACETAMINOPHEN 325 MG/1
650 TABLET ORAL EVERY 6 HOURS PRN
Status: DISCONTINUED | OUTPATIENT
Start: 2021-08-25 | End: 2021-08-25

## 2021-08-25 RX ADMIN — ONDANSETRON 4 MG: 2 INJECTION INTRAMUSCULAR; INTRAVENOUS at 12:08

## 2021-08-25 RX ADMIN — IOHEXOL 100 ML: 350 INJECTION, SOLUTION INTRAVENOUS at 01:08

## 2021-08-25 RX ADMIN — HYDROCODONE BITARTRATE AND ACETAMINOPHEN 1 TABLET: 5; 325 TABLET ORAL at 04:08

## 2021-08-25 RX ADMIN — DEXTROSE AND SODIUM CHLORIDE: 5; .9 INJECTION, SOLUTION INTRAVENOUS at 03:08

## 2021-08-25 RX ADMIN — MORPHINE SULFATE 6 MG: 2 INJECTION, SOLUTION INTRAMUSCULAR; INTRAVENOUS at 02:08

## 2021-08-25 RX ADMIN — PROCHLORPERAZINE EDISYLATE 5 MG: 5 INJECTION INTRAMUSCULAR; INTRAVENOUS at 08:08

## 2021-08-25 RX ADMIN — HYDROCODONE BITARTRATE AND ACETAMINOPHEN 1 TABLET: 5; 325 TABLET ORAL at 08:08

## 2021-08-25 RX ADMIN — METOCLOPRAMIDE 10 MG: 5 INJECTION, SOLUTION INTRAMUSCULAR; INTRAVENOUS at 04:08

## 2021-08-25 RX ADMIN — SODIUM CHLORIDE, SODIUM LACTATE, POTASSIUM CHLORIDE, AND CALCIUM CHLORIDE 1000 ML: .6; .31; .03; .02 INJECTION, SOLUTION INTRAVENOUS at 12:08

## 2021-08-25 RX ADMIN — POTASSIUM CHLORIDE 10 MEQ: 7.46 INJECTION, SOLUTION INTRAVENOUS at 11:08

## 2021-08-26 LAB
ANION GAP SERPL CALC-SCNC: 10 MMOL/L (ref 8–16)
BUN SERPL-MCNC: 7 MG/DL (ref 6–20)
CALCIUM SERPL-MCNC: 7.7 MG/DL (ref 8.7–10.5)
CHLORIDE SERPL-SCNC: 110 MMOL/L (ref 95–110)
CO2 SERPL-SCNC: 20 MMOL/L (ref 23–29)
CREAT SERPL-MCNC: 0.6 MG/DL (ref 0.5–1.4)
EST. GFR  (AFRICAN AMERICAN): >60 ML/MIN/1.73 M^2
EST. GFR  (NON AFRICAN AMERICAN): >60 ML/MIN/1.73 M^2
GLUCOSE SERPL-MCNC: 109 MG/DL (ref 70–110)
MAGNESIUM SERPL-MCNC: 2 MG/DL (ref 1.6–2.6)
PHOSPHATE SERPL-MCNC: 3.6 MG/DL (ref 2.7–4.5)
POTASSIUM SERPL-SCNC: 3.3 MMOL/L (ref 3.5–5.1)
SODIUM SERPL-SCNC: 140 MMOL/L (ref 136–145)

## 2021-08-26 PROCEDURE — 63600175 PHARM REV CODE 636 W HCPCS: Performed by: OBSTETRICS & GYNECOLOGY

## 2021-08-26 PROCEDURE — 36415 COLL VENOUS BLD VENIPUNCTURE: CPT | Performed by: OBSTETRICS & GYNECOLOGY

## 2021-08-26 PROCEDURE — 83735 ASSAY OF MAGNESIUM: CPT | Performed by: OBSTETRICS & GYNECOLOGY

## 2021-08-26 PROCEDURE — 99232 SBSQ HOSP IP/OBS MODERATE 35: CPT | Mod: ,,, | Performed by: OBSTETRICS & GYNECOLOGY

## 2021-08-26 PROCEDURE — 84100 ASSAY OF PHOSPHORUS: CPT | Performed by: OBSTETRICS & GYNECOLOGY

## 2021-08-26 PROCEDURE — 25000003 PHARM REV CODE 250: Performed by: OBSTETRICS & GYNECOLOGY

## 2021-08-26 PROCEDURE — 11000001 HC ACUTE MED/SURG PRIVATE ROOM

## 2021-08-26 PROCEDURE — 80048 BASIC METABOLIC PNL TOTAL CA: CPT | Performed by: OBSTETRICS & GYNECOLOGY

## 2021-08-26 PROCEDURE — 99232 PR SUBSEQUENT HOSPITAL CARE,LEVL II: ICD-10-PCS | Mod: ,,, | Performed by: OBSTETRICS & GYNECOLOGY

## 2021-08-26 RX ADMIN — HYDROCODONE BITARTRATE AND ACETAMINOPHEN 1 TABLET: 5; 325 TABLET ORAL at 10:08

## 2021-08-26 RX ADMIN — HYDROCODONE BITARTRATE AND ACETAMINOPHEN 1 TABLET: 5; 325 TABLET ORAL at 04:08

## 2021-08-26 RX ADMIN — DEXTROSE AND SODIUM CHLORIDE: 5; .9 INJECTION, SOLUTION INTRAVENOUS at 10:08

## 2021-08-26 RX ADMIN — ONDANSETRON 8 MG: 8 TABLET, ORALLY DISINTEGRATING ORAL at 04:08

## 2021-08-26 RX ADMIN — POTASSIUM BICARBONATE 20 MEQ: 391 TABLET, EFFERVESCENT ORAL at 07:08

## 2021-08-26 RX ADMIN — DEXTROSE AND SODIUM CHLORIDE: 5; .9 INJECTION, SOLUTION INTRAVENOUS at 06:08

## 2021-08-26 RX ADMIN — METOCLOPRAMIDE 10 MG: 5 INJECTION, SOLUTION INTRAMUSCULAR; INTRAVENOUS at 07:08

## 2021-08-27 PROCEDURE — 63600175 PHARM REV CODE 636 W HCPCS: Performed by: OBSTETRICS & GYNECOLOGY

## 2021-08-27 PROCEDURE — 25000003 PHARM REV CODE 250: Performed by: OBSTETRICS & GYNECOLOGY

## 2021-08-27 PROCEDURE — 25500020 PHARM REV CODE 255: Performed by: OBSTETRICS & GYNECOLOGY

## 2021-08-27 PROCEDURE — 11000001 HC ACUTE MED/SURG PRIVATE ROOM

## 2021-08-27 PROCEDURE — A9698 NON-RAD CONTRAST MATERIALNOC: HCPCS | Performed by: OBSTETRICS & GYNECOLOGY

## 2021-08-27 RX ORDER — BISACODYL 10 MG
10 SUPPOSITORY, RECTAL RECTAL DAILY PRN
Status: DISCONTINUED | OUTPATIENT
Start: 2021-08-27 | End: 2021-08-31 | Stop reason: HOSPADM

## 2021-08-27 RX ORDER — SIMETHICONE 125 MG
125 TABLET,CHEWABLE ORAL EVERY 6 HOURS
Status: DISCONTINUED | OUTPATIENT
Start: 2021-08-27 | End: 2021-08-31 | Stop reason: HOSPADM

## 2021-08-27 RX ORDER — DIPHENHYDRAMINE HCL 25 MG
25 CAPSULE ORAL EVERY 4 HOURS PRN
Status: DISCONTINUED | OUTPATIENT
Start: 2021-08-27 | End: 2021-08-31 | Stop reason: HOSPADM

## 2021-08-27 RX ORDER — ADHESIVE BANDAGE
30 BANDAGE TOPICAL 2 TIMES DAILY PRN
Status: DISCONTINUED | OUTPATIENT
Start: 2021-08-27 | End: 2021-08-31 | Stop reason: HOSPADM

## 2021-08-27 RX ORDER — DIPHENHYDRAMINE HYDROCHLORIDE 50 MG/ML
25 INJECTION INTRAMUSCULAR; INTRAVENOUS EVERY 4 HOURS PRN
Status: DISCONTINUED | OUTPATIENT
Start: 2021-08-27 | End: 2021-08-31 | Stop reason: HOSPADM

## 2021-08-27 RX ORDER — METOCLOPRAMIDE HYDROCHLORIDE 5 MG/ML
10 INJECTION INTRAMUSCULAR; INTRAVENOUS EVERY 6 HOURS
Status: DISCONTINUED | OUTPATIENT
Start: 2021-08-27 | End: 2021-08-29

## 2021-08-27 RX ORDER — AMOXICILLIN 250 MG
1 CAPSULE ORAL NIGHTLY PRN
Status: DISCONTINUED | OUTPATIENT
Start: 2021-08-27 | End: 2021-08-31 | Stop reason: HOSPADM

## 2021-08-27 RX ORDER — KETOROLAC TROMETHAMINE 30 MG/ML
30 INJECTION, SOLUTION INTRAMUSCULAR; INTRAVENOUS EVERY 6 HOURS
Status: DISCONTINUED | OUTPATIENT
Start: 2021-08-27 | End: 2021-08-31 | Stop reason: HOSPADM

## 2021-08-27 RX ADMIN — DEXTROSE AND SODIUM CHLORIDE: 5; .9 INJECTION, SOLUTION INTRAVENOUS at 03:08

## 2021-08-27 RX ADMIN — METOCLOPRAMIDE 10 MG: 5 INJECTION, SOLUTION INTRAMUSCULAR; INTRAVENOUS at 12:08

## 2021-08-27 RX ADMIN — DEXTROSE AND SODIUM CHLORIDE: 5; .9 INJECTION, SOLUTION INTRAVENOUS at 10:08

## 2021-08-27 RX ADMIN — SIMETHICONE 125 MG: 125 TABLET, CHEWABLE ORAL at 05:08

## 2021-08-27 RX ADMIN — SIMETHICONE 80 MG: 80 TABLET, CHEWABLE ORAL at 08:08

## 2021-08-27 RX ADMIN — KETOROLAC TROMETHAMINE 30 MG: 30 INJECTION, SOLUTION INTRAMUSCULAR; INTRAVENOUS at 11:08

## 2021-08-27 RX ADMIN — IOHEXOL 75 ML: 350 INJECTION, SOLUTION INTRAVENOUS at 02:08

## 2021-08-27 RX ADMIN — METOCLOPRAMIDE 10 MG: 5 INJECTION, SOLUTION INTRAMUSCULAR; INTRAVENOUS at 07:08

## 2021-08-27 RX ADMIN — KETOROLAC TROMETHAMINE 30 MG: 30 INJECTION, SOLUTION INTRAMUSCULAR; INTRAVENOUS at 05:08

## 2021-08-27 RX ADMIN — BISACODYL 10 MG: 10 SUPPOSITORY RECTAL at 10:08

## 2021-08-27 RX ADMIN — IOHEXOL 1000 ML: 9 SOLUTION ORAL at 12:08

## 2021-08-27 RX ADMIN — ONDANSETRON 8 MG: 8 TABLET, ORALLY DISINTEGRATING ORAL at 07:08

## 2021-08-27 RX ADMIN — DEXTROSE AND SODIUM CHLORIDE: 5; .9 INJECTION, SOLUTION INTRAVENOUS at 12:08

## 2021-08-28 LAB
ALBUMIN SERPL BCP-MCNC: 2.1 G/DL (ref 3.5–5.2)
ALP SERPL-CCNC: 88 U/L (ref 55–135)
ALT SERPL W/O P-5'-P-CCNC: 7 U/L (ref 10–44)
ANION GAP SERPL CALC-SCNC: 8 MMOL/L (ref 8–16)
AST SERPL-CCNC: 10 U/L (ref 10–40)
BILIRUB SERPL-MCNC: 0.9 MG/DL (ref 0.1–1)
BUN SERPL-MCNC: 3 MG/DL (ref 6–20)
CALCIUM SERPL-MCNC: 7.9 MG/DL (ref 8.7–10.5)
CHLORIDE SERPL-SCNC: 111 MMOL/L (ref 95–110)
CO2 SERPL-SCNC: 21 MMOL/L (ref 23–29)
CREAT SERPL-MCNC: 0.5 MG/DL (ref 0.5–1.4)
EST. GFR  (AFRICAN AMERICAN): >60 ML/MIN/1.73 M^2
EST. GFR  (NON AFRICAN AMERICAN): >60 ML/MIN/1.73 M^2
GLUCOSE SERPL-MCNC: 107 MG/DL (ref 70–110)
POTASSIUM SERPL-SCNC: 3.3 MMOL/L (ref 3.5–5.1)
PROT SERPL-MCNC: 5.6 G/DL (ref 6–8.4)
SODIUM SERPL-SCNC: 140 MMOL/L (ref 136–145)

## 2021-08-28 PROCEDURE — 63600175 PHARM REV CODE 636 W HCPCS: Performed by: OBSTETRICS & GYNECOLOGY

## 2021-08-28 PROCEDURE — 80053 COMPREHEN METABOLIC PANEL: CPT | Performed by: OBSTETRICS & GYNECOLOGY

## 2021-08-28 PROCEDURE — 36415 COLL VENOUS BLD VENIPUNCTURE: CPT | Performed by: OBSTETRICS & GYNECOLOGY

## 2021-08-28 PROCEDURE — 25000003 PHARM REV CODE 250: Performed by: OBSTETRICS & GYNECOLOGY

## 2021-08-28 PROCEDURE — 11000001 HC ACUTE MED/SURG PRIVATE ROOM

## 2021-08-28 RX ADMIN — SIMETHICONE 125 MG: 125 TABLET, CHEWABLE ORAL at 12:08

## 2021-08-28 RX ADMIN — SIMETHICONE 125 MG: 125 TABLET, CHEWABLE ORAL at 05:08

## 2021-08-28 RX ADMIN — METOCLOPRAMIDE 10 MG: 5 INJECTION, SOLUTION INTRAMUSCULAR; INTRAVENOUS at 12:08

## 2021-08-28 RX ADMIN — DEXTROSE AND SODIUM CHLORIDE: 5; .9 INJECTION, SOLUTION INTRAVENOUS at 08:08

## 2021-08-28 RX ADMIN — MAGNESIUM HYDROXIDE 2400 MG: 400 SUSPENSION ORAL at 11:08

## 2021-08-28 RX ADMIN — SIMETHICONE 125 MG: 125 TABLET, CHEWABLE ORAL at 11:08

## 2021-08-28 RX ADMIN — KETOROLAC TROMETHAMINE 30 MG: 30 INJECTION, SOLUTION INTRAMUSCULAR; INTRAVENOUS at 06:08

## 2021-08-28 RX ADMIN — METOCLOPRAMIDE 10 MG: 5 INJECTION, SOLUTION INTRAMUSCULAR; INTRAVENOUS at 05:08

## 2021-08-28 RX ADMIN — KETOROLAC TROMETHAMINE 30 MG: 30 INJECTION, SOLUTION INTRAMUSCULAR; INTRAVENOUS at 05:08

## 2021-08-28 RX ADMIN — SIMETHICONE 125 MG: 125 TABLET, CHEWABLE ORAL at 06:08

## 2021-08-28 RX ADMIN — METOCLOPRAMIDE 10 MG: 5 INJECTION, SOLUTION INTRAMUSCULAR; INTRAVENOUS at 11:08

## 2021-08-28 RX ADMIN — KETOROLAC TROMETHAMINE 30 MG: 30 INJECTION, SOLUTION INTRAMUSCULAR; INTRAVENOUS at 11:08

## 2021-08-28 RX ADMIN — KETOROLAC TROMETHAMINE 30 MG: 30 INJECTION, SOLUTION INTRAMUSCULAR; INTRAVENOUS at 12:08

## 2021-08-28 RX ADMIN — METOCLOPRAMIDE 10 MG: 5 INJECTION, SOLUTION INTRAMUSCULAR; INTRAVENOUS at 06:08

## 2021-08-29 LAB
ANION GAP SERPL CALC-SCNC: 10 MMOL/L (ref 8–16)
BUN SERPL-MCNC: 3 MG/DL (ref 6–20)
CALCIUM SERPL-MCNC: 7.6 MG/DL (ref 8.7–10.5)
CHLORIDE SERPL-SCNC: 110 MMOL/L (ref 95–110)
CO2 SERPL-SCNC: 20 MMOL/L (ref 23–29)
CREAT SERPL-MCNC: 0.6 MG/DL (ref 0.5–1.4)
EST. GFR  (AFRICAN AMERICAN): >60 ML/MIN/1.73 M^2
EST. GFR  (NON AFRICAN AMERICAN): >60 ML/MIN/1.73 M^2
GLUCOSE SERPL-MCNC: 104 MG/DL (ref 70–110)
POTASSIUM SERPL-SCNC: 3 MMOL/L (ref 3.5–5.1)
SODIUM SERPL-SCNC: 140 MMOL/L (ref 136–145)

## 2021-08-29 PROCEDURE — 25000003 PHARM REV CODE 250: Performed by: OBSTETRICS & GYNECOLOGY

## 2021-08-29 PROCEDURE — 11000001 HC ACUTE MED/SURG PRIVATE ROOM

## 2021-08-29 PROCEDURE — 80048 BASIC METABOLIC PNL TOTAL CA: CPT | Performed by: OBSTETRICS & GYNECOLOGY

## 2021-08-29 PROCEDURE — 63600175 PHARM REV CODE 636 W HCPCS: Performed by: OBSTETRICS & GYNECOLOGY

## 2021-08-29 PROCEDURE — 36415 COLL VENOUS BLD VENIPUNCTURE: CPT | Performed by: OBSTETRICS & GYNECOLOGY

## 2021-08-29 RX ORDER — METOCLOPRAMIDE 10 MG/1
10 TABLET ORAL EVERY 6 HOURS
Status: DISCONTINUED | OUTPATIENT
Start: 2021-08-29 | End: 2021-08-31 | Stop reason: HOSPADM

## 2021-08-29 RX ORDER — POTASSIUM CHLORIDE 7.45 MG/ML
10 INJECTION INTRAVENOUS
Status: COMPLETED | OUTPATIENT
Start: 2021-08-29 | End: 2021-08-29

## 2021-08-29 RX ORDER — SODIUM CHLORIDE, SODIUM LACTATE, POTASSIUM CHLORIDE, CALCIUM CHLORIDE 600; 310; 30; 20 MG/100ML; MG/100ML; MG/100ML; MG/100ML
INJECTION, SOLUTION INTRAVENOUS CONTINUOUS
Status: DISCONTINUED | OUTPATIENT
Start: 2021-08-29 | End: 2021-08-30

## 2021-08-29 RX ORDER — SODIUM CHLORIDE, SODIUM LACTATE, POTASSIUM CHLORIDE, CALCIUM CHLORIDE 600; 310; 30; 20 MG/100ML; MG/100ML; MG/100ML; MG/100ML
INJECTION, SOLUTION INTRAVENOUS CONTINUOUS
Status: DISCONTINUED | OUTPATIENT
Start: 2021-08-29 | End: 2021-08-29

## 2021-08-29 RX ADMIN — POTASSIUM PHOSPHATE, MONOBASIC 500 MG: 500 TABLET, SOLUBLE ORAL at 10:08

## 2021-08-29 RX ADMIN — KETOROLAC TROMETHAMINE 30 MG: 30 INJECTION, SOLUTION INTRAMUSCULAR; INTRAVENOUS at 06:08

## 2021-08-29 RX ADMIN — METOCLOPRAMIDE 10 MG: 5 INJECTION, SOLUTION INTRAMUSCULAR; INTRAVENOUS at 05:08

## 2021-08-29 RX ADMIN — PRENATAL VIT W/ FE FUMARATE-FA TAB 27-0.8 MG 1 TABLET: 27-0.8 TAB at 10:08

## 2021-08-29 RX ADMIN — METOCLOPRAMIDE 10 MG: 10 TABLET ORAL at 06:08

## 2021-08-29 RX ADMIN — BISACODYL 10 MG: 10 SUPPOSITORY RECTAL at 05:08

## 2021-08-29 RX ADMIN — METOCLOPRAMIDE 10 MG: 10 TABLET ORAL at 12:08

## 2021-08-29 RX ADMIN — SIMETHICONE 125 MG: 125 TABLET, CHEWABLE ORAL at 06:08

## 2021-08-29 RX ADMIN — SIMETHICONE 125 MG: 125 TABLET, CHEWABLE ORAL at 12:08

## 2021-08-29 RX ADMIN — POTASSIUM CHLORIDE 10 MEQ: 7.46 INJECTION, SOLUTION INTRAVENOUS at 06:08

## 2021-08-29 RX ADMIN — KETOROLAC TROMETHAMINE 30 MG: 30 INJECTION, SOLUTION INTRAMUSCULAR; INTRAVENOUS at 05:08

## 2021-08-29 RX ADMIN — DEXTROSE AND SODIUM CHLORIDE: 5; .9 INJECTION, SOLUTION INTRAVENOUS at 05:08

## 2021-08-29 RX ADMIN — POTASSIUM CHLORIDE 10 MEQ: 7.46 INJECTION, SOLUTION INTRAVENOUS at 03:08

## 2021-08-29 RX ADMIN — HYDROCODONE BITARTRATE AND ACETAMINOPHEN 1 TABLET: 5; 325 TABLET ORAL at 10:08

## 2021-08-29 RX ADMIN — SIMETHICONE 125 MG: 125 TABLET, CHEWABLE ORAL at 05:08

## 2021-08-29 RX ADMIN — POTASSIUM CHLORIDE 10 MEQ: 7.46 INJECTION, SOLUTION INTRAVENOUS at 04:08

## 2021-08-29 RX ADMIN — DEXTROSE AND SODIUM CHLORIDE: 5; .9 INJECTION, SOLUTION INTRAVENOUS at 10:08

## 2021-08-29 RX ADMIN — KETOROLAC TROMETHAMINE 30 MG: 30 INJECTION, SOLUTION INTRAMUSCULAR; INTRAVENOUS at 12:08

## 2021-08-30 VITALS
RESPIRATION RATE: 18 BRPM | HEART RATE: 76 BPM | DIASTOLIC BLOOD PRESSURE: 84 MMHG | SYSTOLIC BLOOD PRESSURE: 156 MMHG | TEMPERATURE: 99 F | OXYGEN SATURATION: 98 % | WEIGHT: 160.94 LBS | BODY MASS INDEX: 29.62 KG/M2 | HEIGHT: 62 IN

## 2021-08-30 LAB
ANION GAP SERPL CALC-SCNC: 9 MMOL/L (ref 8–16)
BACTERIA BLD CULT: NORMAL
BACTERIA BLD CULT: NORMAL
BUN SERPL-MCNC: 3 MG/DL (ref 6–20)
CALCIUM SERPL-MCNC: 7.9 MG/DL (ref 8.7–10.5)
CHLORIDE SERPL-SCNC: 107 MMOL/L (ref 95–110)
CO2 SERPL-SCNC: 22 MMOL/L (ref 23–29)
CREAT SERPL-MCNC: 0.5 MG/DL (ref 0.5–1.4)
EST. GFR  (AFRICAN AMERICAN): >60 ML/MIN/1.73 M^2
EST. GFR  (NON AFRICAN AMERICAN): >60 ML/MIN/1.73 M^2
GLUCOSE SERPL-MCNC: 76 MG/DL (ref 70–110)
POTASSIUM SERPL-SCNC: 3.2 MMOL/L (ref 3.5–5.1)
SODIUM SERPL-SCNC: 138 MMOL/L (ref 136–145)

## 2021-08-30 PROCEDURE — 25000003 PHARM REV CODE 250: Performed by: OBSTETRICS & GYNECOLOGY

## 2021-08-30 PROCEDURE — 63600175 PHARM REV CODE 636 W HCPCS: Performed by: OBSTETRICS & GYNECOLOGY

## 2021-08-30 PROCEDURE — 11000001 HC ACUTE MED/SURG PRIVATE ROOM

## 2021-08-30 RX ADMIN — PRENATAL VIT W/ FE FUMARATE-FA TAB 27-0.8 MG 1 TABLET: 27-0.8 TAB at 08:08

## 2021-08-30 RX ADMIN — KETOROLAC TROMETHAMINE 30 MG: 30 INJECTION, SOLUTION INTRAMUSCULAR; INTRAVENOUS at 12:08

## 2021-08-30 RX ADMIN — SIMETHICONE 125 MG: 125 TABLET, CHEWABLE ORAL at 12:08

## 2021-08-30 RX ADMIN — SIMETHICONE 125 MG: 125 TABLET, CHEWABLE ORAL at 05:08

## 2021-08-30 RX ADMIN — POTASSIUM PHOSPHATE, MONOBASIC 500 MG: 500 TABLET, SOLUBLE ORAL at 08:08

## 2021-08-30 RX ADMIN — KETOROLAC TROMETHAMINE 30 MG: 30 INJECTION, SOLUTION INTRAMUSCULAR; INTRAVENOUS at 05:08

## 2021-08-30 RX ADMIN — METOCLOPRAMIDE 10 MG: 10 TABLET ORAL at 12:08

## 2021-08-30 RX ADMIN — METOCLOPRAMIDE 10 MG: 10 TABLET ORAL at 05:08

## 2021-08-31 ENCOUNTER — PATIENT MESSAGE (OUTPATIENT)
Dept: OBSTETRICS AND GYNECOLOGY | Facility: CLINIC | Age: 36
End: 2021-08-31

## 2021-08-31 DIAGNOSIS — O13.9 GESTATIONAL HYPERTENSION, ANTEPARTUM: Primary | ICD-10-CM

## 2021-09-03 RX ORDER — LABETALOL 100 MG/1
100 TABLET, FILM COATED ORAL 2 TIMES DAILY
Qty: 60 TABLET | Refills: 11 | Status: ON HOLD | OUTPATIENT
Start: 2021-09-03 | End: 2022-01-01 | Stop reason: CLARIF

## 2021-09-04 ENCOUNTER — PATIENT MESSAGE (OUTPATIENT)
Dept: OBSTETRICS AND GYNECOLOGY | Facility: CLINIC | Age: 36
End: 2021-09-04

## 2021-09-15 ENCOUNTER — TELEPHONE (OUTPATIENT)
Dept: OBSTETRICS AND GYNECOLOGY | Facility: CLINIC | Age: 36
End: 2021-09-15

## 2021-09-16 ENCOUNTER — POSTPARTUM VISIT (OUTPATIENT)
Dept: OBSTETRICS AND GYNECOLOGY | Facility: CLINIC | Age: 36
End: 2021-09-16
Payer: COMMERCIAL

## 2021-09-16 ENCOUNTER — PATIENT MESSAGE (OUTPATIENT)
Dept: OBSTETRICS AND GYNECOLOGY | Facility: CLINIC | Age: 36
End: 2021-09-16

## 2021-09-16 VITALS — BODY MASS INDEX: 28.17 KG/M2 | DIASTOLIC BLOOD PRESSURE: 80 MMHG | SYSTOLIC BLOOD PRESSURE: 120 MMHG | WEIGHT: 154 LBS

## 2021-09-16 DIAGNOSIS — Z09 SURGERY FOLLOW-UP EXAMINATION: Primary | ICD-10-CM

## 2021-09-16 PROCEDURE — 99024 PR POST-OP FOLLOW-UP VISIT: ICD-10-PCS | Mod: S$GLB,,, | Performed by: NURSE PRACTITIONER

## 2021-09-16 PROCEDURE — 99024 POSTOP FOLLOW-UP VISIT: CPT | Mod: S$GLB,,, | Performed by: NURSE PRACTITIONER

## 2021-09-16 PROCEDURE — 99999 PR PBB SHADOW E&M-EST. PATIENT-LVL III: ICD-10-PCS | Mod: PBBFAC,,, | Performed by: NURSE PRACTITIONER

## 2021-09-16 PROCEDURE — 99999 PR PBB SHADOW E&M-EST. PATIENT-LVL III: CPT | Mod: PBBFAC,,, | Performed by: NURSE PRACTITIONER

## 2021-09-16 RX ORDER — IBUPROFEN 800 MG/1
800 TABLET ORAL EVERY 8 HOURS PRN
Qty: 60 TABLET | Refills: 2 | Status: SHIPPED | OUTPATIENT
Start: 2021-09-16 | End: 2021-11-02 | Stop reason: SDUPTHER

## 2021-10-06 ENCOUNTER — OFFICE VISIT (OUTPATIENT)
Dept: INTERNAL MEDICINE | Facility: CLINIC | Age: 36
End: 2021-10-06
Payer: COMMERCIAL

## 2021-10-06 ENCOUNTER — POSTPARTUM VISIT (OUTPATIENT)
Dept: OBSTETRICS AND GYNECOLOGY | Facility: CLINIC | Age: 36
End: 2021-10-06
Payer: COMMERCIAL

## 2021-10-06 VITALS
OXYGEN SATURATION: 99 % | WEIGHT: 156.06 LBS | TEMPERATURE: 97 F | BODY MASS INDEX: 28.72 KG/M2 | SYSTOLIC BLOOD PRESSURE: 130 MMHG | DIASTOLIC BLOOD PRESSURE: 88 MMHG | HEIGHT: 62 IN | HEART RATE: 82 BPM | RESPIRATION RATE: 18 BRPM

## 2021-10-06 VITALS
SYSTOLIC BLOOD PRESSURE: 128 MMHG | WEIGHT: 154.75 LBS | DIASTOLIC BLOOD PRESSURE: 79 MMHG | BODY MASS INDEX: 28.31 KG/M2

## 2021-10-06 DIAGNOSIS — Z00.00 ROUTINE MEDICAL EXAM: Primary | ICD-10-CM

## 2021-10-06 PROCEDURE — 0503F PR POSTPARTUM CARE VISIT: ICD-10-PCS | Mod: CPTII,S$GLB,, | Performed by: NURSE PRACTITIONER

## 2021-10-06 PROCEDURE — 3079F PR MOST RECENT DIASTOLIC BLOOD PRESSURE 80-89 MM HG: ICD-10-PCS | Mod: CPTII,S$GLB,, | Performed by: INTERNAL MEDICINE

## 2021-10-06 PROCEDURE — 0503F POSTPARTUM CARE VISIT: CPT | Mod: CPTII,S$GLB,, | Performed by: NURSE PRACTITIONER

## 2021-10-06 PROCEDURE — 99999 PR PBB SHADOW E&M-EST. PATIENT-LVL II: CPT | Mod: PBBFAC,,, | Performed by: NURSE PRACTITIONER

## 2021-10-06 PROCEDURE — 1160F PR REVIEW ALL MEDS BY PRESCRIBER/CLIN PHARMACIST DOCUMENTED: ICD-10-PCS | Mod: CPTII,S$GLB,, | Performed by: INTERNAL MEDICINE

## 2021-10-06 PROCEDURE — 1160F RVW MEDS BY RX/DR IN RCRD: CPT | Mod: CPTII,S$GLB,, | Performed by: INTERNAL MEDICINE

## 2021-10-06 PROCEDURE — 99395 PREV VISIT EST AGE 18-39: CPT | Mod: S$GLB,,, | Performed by: INTERNAL MEDICINE

## 2021-10-06 PROCEDURE — 99999 PR PBB SHADOW E&M-EST. PATIENT-LVL IV: CPT | Mod: PBBFAC,,, | Performed by: INTERNAL MEDICINE

## 2021-10-06 PROCEDURE — 99999 PR PBB SHADOW E&M-EST. PATIENT-LVL II: ICD-10-PCS | Mod: PBBFAC,,, | Performed by: NURSE PRACTITIONER

## 2021-10-06 PROCEDURE — 3008F PR BODY MASS INDEX (BMI) DOCUMENTED: ICD-10-PCS | Mod: CPTII,S$GLB,, | Performed by: INTERNAL MEDICINE

## 2021-10-06 PROCEDURE — 3075F SYST BP GE 130 - 139MM HG: CPT | Mod: CPTII,S$GLB,, | Performed by: INTERNAL MEDICINE

## 2021-10-06 PROCEDURE — 3075F PR MOST RECENT SYSTOLIC BLOOD PRESS GE 130-139MM HG: ICD-10-PCS | Mod: CPTII,S$GLB,, | Performed by: INTERNAL MEDICINE

## 2021-10-06 PROCEDURE — 1159F MED LIST DOCD IN RCRD: CPT | Mod: CPTII,S$GLB,, | Performed by: INTERNAL MEDICINE

## 2021-10-06 PROCEDURE — 99999 PR PBB SHADOW E&M-EST. PATIENT-LVL IV: ICD-10-PCS | Mod: PBBFAC,,, | Performed by: INTERNAL MEDICINE

## 2021-10-06 PROCEDURE — 3079F DIAST BP 80-89 MM HG: CPT | Mod: CPTII,S$GLB,, | Performed by: INTERNAL MEDICINE

## 2021-10-06 PROCEDURE — 3008F BODY MASS INDEX DOCD: CPT | Mod: CPTII,S$GLB,, | Performed by: INTERNAL MEDICINE

## 2021-10-06 PROCEDURE — 99395 PR PREVENTIVE VISIT,EST,18-39: ICD-10-PCS | Mod: S$GLB,,, | Performed by: INTERNAL MEDICINE

## 2021-10-06 PROCEDURE — 1159F PR MEDICATION LIST DOCUMENTED IN MEDICAL RECORD: ICD-10-PCS | Mod: CPTII,S$GLB,, | Performed by: INTERNAL MEDICINE

## 2021-10-06 RX ORDER — METOCLOPRAMIDE 10 MG/1
10 TABLET ORAL EVERY 6 HOURS PRN
COMMUNITY
Start: 2021-09-03 | End: 2022-01-06

## 2021-10-13 ENCOUNTER — PATIENT MESSAGE (OUTPATIENT)
Dept: OBSTETRICS AND GYNECOLOGY | Facility: CLINIC | Age: 36
End: 2021-10-13

## 2021-10-13 DIAGNOSIS — R10.2 PELVIC PAIN IN FEMALE: Primary | ICD-10-CM

## 2021-10-15 ENCOUNTER — PATIENT MESSAGE (OUTPATIENT)
Dept: OBSTETRICS AND GYNECOLOGY | Facility: CLINIC | Age: 36
End: 2021-10-15
Payer: COMMERCIAL

## 2021-10-23 ENCOUNTER — HOSPITAL ENCOUNTER (OUTPATIENT)
Dept: RADIOLOGY | Facility: HOSPITAL | Age: 36
Discharge: HOME | End: 2021-10-23
Attending: OBSTETRICS & GYNECOLOGY
Payer: COMMERCIAL

## 2021-10-23 DIAGNOSIS — R10.2 PELVIC PAIN IN FEMALE: ICD-10-CM

## 2021-10-23 PROCEDURE — 76856 US PELVIS COMPLETE NON OB: ICD-10-PCS | Mod: 26,,, | Performed by: RADIOLOGY

## 2021-10-23 PROCEDURE — 76856 US EXAM PELVIC COMPLETE: CPT | Mod: TC

## 2021-10-23 PROCEDURE — 76856 US EXAM PELVIC COMPLETE: CPT | Mod: 26,,, | Performed by: RADIOLOGY

## 2021-11-01 ENCOUNTER — PATIENT MESSAGE (OUTPATIENT)
Dept: OBSTETRICS AND GYNECOLOGY | Facility: CLINIC | Age: 36
End: 2021-11-01
Payer: COMMERCIAL

## 2021-11-01 ENCOUNTER — PATIENT MESSAGE (OUTPATIENT)
Dept: INTERNAL MEDICINE | Facility: CLINIC | Age: 36
End: 2021-11-01
Payer: COMMERCIAL

## 2021-11-01 DIAGNOSIS — Z00.00 ROUTINE MEDICAL EXAM: Primary | ICD-10-CM

## 2021-11-02 RX ORDER — IBUPROFEN 800 MG/1
800 TABLET ORAL EVERY 8 HOURS PRN
Qty: 60 TABLET | Refills: 2 | Status: SHIPPED | OUTPATIENT
Start: 2021-11-02 | End: 2022-11-02

## 2021-11-08 ENCOUNTER — PATIENT OUTREACH (OUTPATIENT)
Dept: ADMINISTRATIVE | Facility: OTHER | Age: 36
End: 2021-11-08
Payer: COMMERCIAL

## 2021-11-08 ENCOUNTER — LAB VISIT (OUTPATIENT)
Dept: LAB | Facility: HOSPITAL | Age: 36
End: 2021-11-08
Attending: INTERNAL MEDICINE
Payer: COMMERCIAL

## 2021-11-08 DIAGNOSIS — Z00.00 ROUTINE MEDICAL EXAM: ICD-10-CM

## 2021-11-08 LAB
ALBUMIN SERPL BCP-MCNC: 3.5 G/DL (ref 3.5–5.2)
ALP SERPL-CCNC: 87 U/L (ref 55–135)
ALT SERPL W/O P-5'-P-CCNC: 61 U/L (ref 10–44)
ANION GAP SERPL CALC-SCNC: 11 MMOL/L (ref 8–16)
AST SERPL-CCNC: 60 U/L (ref 10–40)
BASOPHILS # BLD AUTO: 0.01 K/UL (ref 0–0.2)
BASOPHILS NFR BLD: 0.3 % (ref 0–1.9)
BILIRUB SERPL-MCNC: 0.4 MG/DL (ref 0.1–1)
BUN SERPL-MCNC: 12 MG/DL (ref 6–20)
CALCIUM SERPL-MCNC: 9.3 MG/DL (ref 8.7–10.5)
CHLORIDE SERPL-SCNC: 103 MMOL/L (ref 95–110)
CHOLEST SERPL-MCNC: 160 MG/DL (ref 120–199)
CHOLEST/HDLC SERPL: 5.2 {RATIO} (ref 2–5)
CK SERPL-CCNC: 58 U/L (ref 20–180)
CO2 SERPL-SCNC: 23 MMOL/L (ref 23–29)
CREAT SERPL-MCNC: 0.7 MG/DL (ref 0.5–1.4)
DIFFERENTIAL METHOD: ABNORMAL
EOSINOPHIL # BLD AUTO: 0 K/UL (ref 0–0.5)
EOSINOPHIL NFR BLD: 1.1 % (ref 0–8)
ERYTHROCYTE [DISTWIDTH] IN BLOOD BY AUTOMATED COUNT: 15.3 % (ref 11.5–14.5)
EST. GFR  (AFRICAN AMERICAN): >60 ML/MIN/1.73 M^2
EST. GFR  (NON AFRICAN AMERICAN): >60 ML/MIN/1.73 M^2
ESTIMATED AVG GLUCOSE: 126 MG/DL (ref 68–131)
FERRITIN SERPL-MCNC: 153 NG/ML (ref 20–300)
GLUCOSE SERPL-MCNC: 79 MG/DL (ref 70–110)
HBA1C MFR BLD: 6 % (ref 4–5.6)
HCT VFR BLD AUTO: 35.6 % (ref 37–48.5)
HDLC SERPL-MCNC: 31 MG/DL (ref 40–75)
HDLC SERPL: 19.4 % (ref 20–50)
HGB BLD-MCNC: 11.1 G/DL (ref 12–16)
IMM GRANULOCYTES # BLD AUTO: 0.01 K/UL (ref 0–0.04)
IMM GRANULOCYTES NFR BLD AUTO: 0.3 % (ref 0–0.5)
IRON SERPL-MCNC: 36 UG/DL (ref 30–160)
LDLC SERPL CALC-MCNC: 109 MG/DL (ref 63–159)
LYMPHOCYTES # BLD AUTO: 0.9 K/UL (ref 1–4.8)
LYMPHOCYTES NFR BLD: 24.4 % (ref 18–48)
MAGNESIUM SERPL-MCNC: 2.2 MG/DL (ref 1.6–2.6)
MCH RBC QN AUTO: 24.3 PG (ref 27–31)
MCHC RBC AUTO-ENTMCNC: 31.2 G/DL (ref 32–36)
MCV RBC AUTO: 78 FL (ref 82–98)
MONOCYTES # BLD AUTO: 0.2 K/UL (ref 0.3–1)
MONOCYTES NFR BLD: 4.8 % (ref 4–15)
NEUTROPHILS # BLD AUTO: 2.4 K/UL (ref 1.8–7.7)
NEUTROPHILS NFR BLD: 69.1 % (ref 38–73)
NONHDLC SERPL-MCNC: 129 MG/DL
NRBC BLD-RTO: 0 /100 WBC
PLATELET # BLD AUTO: 420 K/UL (ref 150–450)
PMV BLD AUTO: 10.6 FL (ref 9.2–12.9)
POTASSIUM SERPL-SCNC: 3.9 MMOL/L (ref 3.5–5.1)
PROT SERPL-MCNC: 8.5 G/DL (ref 6–8.4)
RBC # BLD AUTO: 4.57 M/UL (ref 4–5.4)
SATURATED IRON: 10 % (ref 20–50)
SODIUM SERPL-SCNC: 137 MMOL/L (ref 136–145)
TOTAL IRON BINDING CAPACITY: 346 UG/DL (ref 250–450)
TRANSFERRIN SERPL-MCNC: 234 MG/DL (ref 200–375)
TRIGL SERPL-MCNC: 100 MG/DL (ref 30–150)
TSH SERPL DL<=0.005 MIU/L-ACNC: 1.61 UIU/ML (ref 0.4–4)
WBC # BLD AUTO: 3.53 K/UL (ref 3.9–12.7)

## 2021-11-08 PROCEDURE — 82550 ASSAY OF CK (CPK): CPT | Performed by: INTERNAL MEDICINE

## 2021-11-08 PROCEDURE — 82728 ASSAY OF FERRITIN: CPT | Performed by: INTERNAL MEDICINE

## 2021-11-08 PROCEDURE — 83036 HEMOGLOBIN GLYCOSYLATED A1C: CPT | Performed by: INTERNAL MEDICINE

## 2021-11-08 PROCEDURE — 36415 COLL VENOUS BLD VENIPUNCTURE: CPT | Mod: PO | Performed by: INTERNAL MEDICINE

## 2021-11-08 PROCEDURE — 84443 ASSAY THYROID STIM HORMONE: CPT | Performed by: INTERNAL MEDICINE

## 2021-11-08 PROCEDURE — 80061 LIPID PANEL: CPT | Performed by: INTERNAL MEDICINE

## 2021-11-08 PROCEDURE — 85025 COMPLETE CBC W/AUTO DIFF WBC: CPT | Performed by: INTERNAL MEDICINE

## 2021-11-08 PROCEDURE — 83735 ASSAY OF MAGNESIUM: CPT | Performed by: INTERNAL MEDICINE

## 2021-11-08 PROCEDURE — 80053 COMPREHEN METABOLIC PANEL: CPT | Performed by: INTERNAL MEDICINE

## 2021-11-08 PROCEDURE — 84466 ASSAY OF TRANSFERRIN: CPT | Performed by: INTERNAL MEDICINE

## 2021-11-09 ENCOUNTER — IMMUNIZATION (OUTPATIENT)
Dept: FAMILY MEDICINE | Facility: CLINIC | Age: 36
End: 2021-11-09
Payer: COMMERCIAL

## 2021-11-09 ENCOUNTER — PATIENT MESSAGE (OUTPATIENT)
Dept: INTERNAL MEDICINE | Facility: CLINIC | Age: 36
End: 2021-11-09
Payer: COMMERCIAL

## 2021-11-09 ENCOUNTER — OFFICE VISIT (OUTPATIENT)
Dept: OBSTETRICS AND GYNECOLOGY | Facility: CLINIC | Age: 36
End: 2021-11-09
Payer: COMMERCIAL

## 2021-11-09 VITALS
WEIGHT: 155.44 LBS | DIASTOLIC BLOOD PRESSURE: 82 MMHG | SYSTOLIC BLOOD PRESSURE: 118 MMHG | BODY MASS INDEX: 28.43 KG/M2

## 2021-11-09 DIAGNOSIS — Z01.30 BP CHECK: Primary | ICD-10-CM

## 2021-11-09 DIAGNOSIS — Z98.891 S/P CESAREAN SECTION: ICD-10-CM

## 2021-11-09 PROCEDURE — 3079F DIAST BP 80-89 MM HG: CPT | Mod: CPTII,S$GLB,, | Performed by: NURSE PRACTITIONER

## 2021-11-09 PROCEDURE — 3044F PR MOST RECENT HEMOGLOBIN A1C LEVEL <7.0%: ICD-10-PCS | Mod: CPTII,S$GLB,, | Performed by: NURSE PRACTITIONER

## 2021-11-09 PROCEDURE — 3074F PR MOST RECENT SYSTOLIC BLOOD PRESSURE < 130 MM HG: ICD-10-PCS | Mod: CPTII,S$GLB,, | Performed by: NURSE PRACTITIONER

## 2021-11-09 PROCEDURE — 3008F BODY MASS INDEX DOCD: CPT | Mod: CPTII,S$GLB,, | Performed by: NURSE PRACTITIONER

## 2021-11-09 PROCEDURE — 99212 PR OFFICE/OUTPT VISIT, EST, LEVL II, 10-19 MIN: ICD-10-PCS | Mod: S$GLB,,, | Performed by: NURSE PRACTITIONER

## 2021-11-09 PROCEDURE — 90471 IMMUNIZATION ADMIN: CPT | Mod: S$GLB,,, | Performed by: INTERNAL MEDICINE

## 2021-11-09 PROCEDURE — 3044F HG A1C LEVEL LT 7.0%: CPT | Mod: CPTII,S$GLB,, | Performed by: NURSE PRACTITIONER

## 2021-11-09 PROCEDURE — 99212 OFFICE O/P EST SF 10 MIN: CPT | Mod: S$GLB,,, | Performed by: NURSE PRACTITIONER

## 2021-11-09 PROCEDURE — 90686 FLU VACCINE (QUAD) GREATER THAN OR EQUAL TO 3YO PRESERVATIVE FREE IM: ICD-10-PCS | Mod: S$GLB,,, | Performed by: INTERNAL MEDICINE

## 2021-11-09 PROCEDURE — 90471 FLU VACCINE (QUAD) GREATER THAN OR EQUAL TO 3YO PRESERVATIVE FREE IM: ICD-10-PCS | Mod: S$GLB,,, | Performed by: INTERNAL MEDICINE

## 2021-11-09 PROCEDURE — 99999 PR PBB SHADOW E&M-EST. PATIENT-LVL III: ICD-10-PCS | Mod: PBBFAC,,, | Performed by: NURSE PRACTITIONER

## 2021-11-09 PROCEDURE — 3074F SYST BP LT 130 MM HG: CPT | Mod: CPTII,S$GLB,, | Performed by: NURSE PRACTITIONER

## 2021-11-09 PROCEDURE — 90686 IIV4 VACC NO PRSV 0.5 ML IM: CPT | Mod: S$GLB,,, | Performed by: INTERNAL MEDICINE

## 2021-11-09 PROCEDURE — 1159F MED LIST DOCD IN RCRD: CPT | Mod: CPTII,S$GLB,, | Performed by: NURSE PRACTITIONER

## 2021-11-09 PROCEDURE — 3079F PR MOST RECENT DIASTOLIC BLOOD PRESSURE 80-89 MM HG: ICD-10-PCS | Mod: CPTII,S$GLB,, | Performed by: NURSE PRACTITIONER

## 2021-11-09 PROCEDURE — 3008F PR BODY MASS INDEX (BMI) DOCUMENTED: ICD-10-PCS | Mod: CPTII,S$GLB,, | Performed by: NURSE PRACTITIONER

## 2021-11-09 PROCEDURE — 1159F PR MEDICATION LIST DOCUMENTED IN MEDICAL RECORD: ICD-10-PCS | Mod: CPTII,S$GLB,, | Performed by: NURSE PRACTITIONER

## 2021-11-09 PROCEDURE — 99999 PR PBB SHADOW E&M-EST. PATIENT-LVL III: CPT | Mod: PBBFAC,,, | Performed by: NURSE PRACTITIONER

## 2021-11-19 ENCOUNTER — PATIENT MESSAGE (OUTPATIENT)
Dept: OBSTETRICS AND GYNECOLOGY | Facility: CLINIC | Age: 36
End: 2021-11-19
Payer: COMMERCIAL

## 2021-11-19 RX ORDER — NORETHINDRONE ACETATE AND ETHINYL ESTRADIOL .02; 1 MG/1; MG/1
1 TABLET ORAL DAILY
Qty: 90 TABLET | Refills: 3 | Status: SHIPPED | OUTPATIENT
Start: 2021-11-19 | End: 2023-02-27

## 2021-11-20 ENCOUNTER — PATIENT MESSAGE (OUTPATIENT)
Dept: INTERNAL MEDICINE | Facility: CLINIC | Age: 36
End: 2021-11-20
Payer: COMMERCIAL

## 2021-11-22 ENCOUNTER — TELEPHONE (OUTPATIENT)
Dept: INTERNAL MEDICINE | Facility: CLINIC | Age: 36
End: 2021-11-22
Payer: COMMERCIAL

## 2021-11-22 DIAGNOSIS — M79.10 MYALGIA: Primary | ICD-10-CM

## 2021-11-22 DIAGNOSIS — R74.8 ELEVATED LIVER ENZYMES: ICD-10-CM

## 2021-11-27 ENCOUNTER — PATIENT MESSAGE (OUTPATIENT)
Dept: OBSTETRICS AND GYNECOLOGY | Facility: CLINIC | Age: 36
End: 2021-11-27
Payer: COMMERCIAL

## 2021-11-27 ENCOUNTER — PATIENT MESSAGE (OUTPATIENT)
Dept: INTERNAL MEDICINE | Facility: CLINIC | Age: 36
End: 2021-11-27
Payer: COMMERCIAL

## 2021-11-29 ENCOUNTER — PATIENT MESSAGE (OUTPATIENT)
Dept: INTERNAL MEDICINE | Facility: CLINIC | Age: 36
End: 2021-11-29
Payer: COMMERCIAL

## 2021-12-01 ENCOUNTER — LAB VISIT (OUTPATIENT)
Dept: LAB | Facility: HOSPITAL | Age: 36
End: 2021-12-01
Attending: INTERNAL MEDICINE
Payer: COMMERCIAL

## 2021-12-01 DIAGNOSIS — R74.8 ELEVATED LIVER ENZYMES: ICD-10-CM

## 2021-12-01 LAB
ALBUMIN SERPL BCP-MCNC: 3.1 G/DL (ref 3.5–5.2)
ALP SERPL-CCNC: 91 U/L (ref 55–135)
ALT SERPL W/O P-5'-P-CCNC: 69 U/L (ref 10–44)
AST SERPL-CCNC: 39 U/L (ref 10–40)
BILIRUB DIRECT SERPL-MCNC: 0.3 MG/DL (ref 0.1–0.3)
BILIRUB SERPL-MCNC: 0.8 MG/DL (ref 0.1–1)
PROT SERPL-MCNC: 7.4 G/DL (ref 6–8.4)

## 2021-12-01 PROCEDURE — 80076 HEPATIC FUNCTION PANEL: CPT | Performed by: INTERNAL MEDICINE

## 2021-12-01 PROCEDURE — 36415 COLL VENOUS BLD VENIPUNCTURE: CPT | Mod: PO | Performed by: INTERNAL MEDICINE

## 2021-12-04 ENCOUNTER — PATIENT MESSAGE (OUTPATIENT)
Dept: INTERNAL MEDICINE | Facility: CLINIC | Age: 36
End: 2021-12-04
Payer: COMMERCIAL

## 2021-12-04 RX ORDER — NITROFURANTOIN 25; 75 MG/1; MG/1
100 CAPSULE ORAL 2 TIMES DAILY
Qty: 14 CAPSULE | Refills: 0 | Status: SHIPPED | OUTPATIENT
Start: 2021-12-04 | End: 2022-01-01

## 2021-12-08 ENCOUNTER — OFFICE VISIT (OUTPATIENT)
Dept: URGENT CARE | Facility: CLINIC | Age: 36
End: 2021-12-08
Payer: COMMERCIAL

## 2021-12-08 VITALS
WEIGHT: 163.19 LBS | DIASTOLIC BLOOD PRESSURE: 78 MMHG | OXYGEN SATURATION: 100 % | BODY MASS INDEX: 30.03 KG/M2 | TEMPERATURE: 98 F | RESPIRATION RATE: 16 BRPM | SYSTOLIC BLOOD PRESSURE: 117 MMHG | HEART RATE: 63 BPM | HEIGHT: 62 IN

## 2021-12-08 DIAGNOSIS — A08.4 VIRAL GASTROENTERITIS: Primary | ICD-10-CM

## 2021-12-08 PROCEDURE — 99213 OFFICE O/P EST LOW 20 MIN: CPT | Mod: S$GLB,,, | Performed by: PHYSICIAN ASSISTANT

## 2021-12-08 PROCEDURE — 99213 PR OFFICE/OUTPT VISIT, EST, LEVL III, 20-29 MIN: ICD-10-PCS | Mod: S$GLB,,, | Performed by: PHYSICIAN ASSISTANT

## 2021-12-08 RX ORDER — ONDANSETRON 4 MG/1
8 TABLET, ORALLY DISINTEGRATING ORAL EVERY 8 HOURS PRN
Qty: 30 TABLET | Refills: 0 | OUTPATIENT
Start: 2021-12-08 | End: 2021-12-18

## 2021-12-08 NOTE — ED NOTES
Pt darby Shell checked and correct    LOC: The patient is awake, alert, aware of environment with an appropriate affect. Oriented x3, speaking appropriately  APPEARANCE: Pt resting comfortably, in no acute distress, pt is clean and well groomed, clothing properly fastened  SKIN: Skin warm, dry and intact, normal skin turgor, moist mucus membranes  RESPIRATORY: Airway is open and patent, respirations are spontaneous, even and unlabored, normal effort and rate  CARDIAC: Normal rate and rhythm, no peripheral edema noted, capillary refill < 3 seconds, bilateral radial pulses 2+  ABDOMEN: Soft, nontender, nondistended. Bowel sounds present   GENITOURINARY: yellow discharge with burning and itching  NEUROLOGIC: PERRL, facial expression is symmetrical, patient moving all extremities spontaneously, normal sensation in all extremities when touched with a finger.  Follows all commands appropriately  MUSCULOSKELETAL: No obvious deformities.      
normal S1, S2 heard

## 2021-12-16 ENCOUNTER — OFFICE VISIT (OUTPATIENT)
Dept: OBSTETRICS AND GYNECOLOGY | Facility: CLINIC | Age: 36
End: 2021-12-16
Payer: COMMERCIAL

## 2021-12-16 DIAGNOSIS — R74.8 LIVER ENZYME ELEVATION: ICD-10-CM

## 2021-12-16 DIAGNOSIS — D25.1 FIBROIDS, INTRAMURAL: Primary | ICD-10-CM

## 2021-12-16 DIAGNOSIS — D64.9 CHRONIC ANEMIA: ICD-10-CM

## 2021-12-16 PROCEDURE — 99214 OFFICE O/P EST MOD 30 MIN: CPT | Mod: 95,,, | Performed by: OBSTETRICS & GYNECOLOGY

## 2021-12-16 PROCEDURE — 3044F HG A1C LEVEL LT 7.0%: CPT | Mod: CPTII,95,, | Performed by: OBSTETRICS & GYNECOLOGY

## 2021-12-16 PROCEDURE — 1159F MED LIST DOCD IN RCRD: CPT | Mod: CPTII,95,, | Performed by: OBSTETRICS & GYNECOLOGY

## 2021-12-16 PROCEDURE — 3044F PR MOST RECENT HEMOGLOBIN A1C LEVEL <7.0%: ICD-10-PCS | Mod: CPTII,95,, | Performed by: OBSTETRICS & GYNECOLOGY

## 2021-12-16 PROCEDURE — 1160F PR REVIEW ALL MEDS BY PRESCRIBER/CLIN PHARMACIST DOCUMENTED: ICD-10-PCS | Mod: CPTII,95,, | Performed by: OBSTETRICS & GYNECOLOGY

## 2021-12-16 PROCEDURE — 1160F RVW MEDS BY RX/DR IN RCRD: CPT | Mod: CPTII,95,, | Performed by: OBSTETRICS & GYNECOLOGY

## 2021-12-16 PROCEDURE — 1159F PR MEDICATION LIST DOCUMENTED IN MEDICAL RECORD: ICD-10-PCS | Mod: CPTII,95,, | Performed by: OBSTETRICS & GYNECOLOGY

## 2021-12-16 PROCEDURE — 99214 PR OFFICE/OUTPT VISIT, EST, LEVL IV, 30-39 MIN: ICD-10-PCS | Mod: 95,,, | Performed by: OBSTETRICS & GYNECOLOGY

## 2021-12-18 ENCOUNTER — HOSPITAL ENCOUNTER (EMERGENCY)
Facility: HOSPITAL | Age: 36
Discharge: HOME | End: 2021-12-18
Attending: EMERGENCY MEDICINE
Payer: COMMERCIAL

## 2021-12-18 VITALS
TEMPERATURE: 99 F | HEIGHT: 62 IN | HEART RATE: 69 BPM | WEIGHT: 163 LBS | OXYGEN SATURATION: 97 % | BODY MASS INDEX: 30 KG/M2 | DIASTOLIC BLOOD PRESSURE: 66 MMHG | SYSTOLIC BLOOD PRESSURE: 142 MMHG | RESPIRATION RATE: 22 BRPM

## 2021-12-18 DIAGNOSIS — R10.9 ABDOMINAL PAIN, UNSPECIFIED ABDOMINAL LOCATION: ICD-10-CM

## 2021-12-18 DIAGNOSIS — K46.9 ABDOMINAL HERNIA WITHOUT OBSTRUCTION AND WITHOUT GANGRENE, RECURRENCE NOT SPECIFIED, UNSPECIFIED HERNIA TYPE: Primary | ICD-10-CM

## 2021-12-18 LAB
ALBUMIN SERPL BCP-MCNC: 3.3 G/DL (ref 3.5–5.2)
ALP SERPL-CCNC: 87 U/L (ref 55–135)
ALT SERPL W/O P-5'-P-CCNC: 18 U/L (ref 10–44)
ANION GAP SERPL CALC-SCNC: 11 MMOL/L (ref 8–16)
AST SERPL-CCNC: 17 U/L (ref 10–40)
B-HCG UR QL: NEGATIVE
BACTERIA #/AREA URNS HPF: NORMAL /HPF
BASOPHILS # BLD AUTO: 0.01 K/UL (ref 0–0.2)
BASOPHILS NFR BLD: 0.1 % (ref 0–1.9)
BILIRUB SERPL-MCNC: 0.6 MG/DL (ref 0.1–1)
BILIRUB UR QL STRIP: NEGATIVE
BUN SERPL-MCNC: 13 MG/DL (ref 6–20)
CALCIUM SERPL-MCNC: 8.9 MG/DL (ref 8.7–10.5)
CHLORIDE SERPL-SCNC: 106 MMOL/L (ref 95–110)
CLARITY UR: CLEAR
CO2 SERPL-SCNC: 20 MMOL/L (ref 23–29)
COLOR UR: YELLOW
CREAT SERPL-MCNC: 0.8 MG/DL (ref 0.5–1.4)
CTP QC/QA: YES
DIFFERENTIAL METHOD: ABNORMAL
EOSINOPHIL # BLD AUTO: 0.1 K/UL (ref 0–0.5)
EOSINOPHIL NFR BLD: 1.2 % (ref 0–8)
ERYTHROCYTE [DISTWIDTH] IN BLOOD BY AUTOMATED COUNT: 19.4 % (ref 11.5–14.5)
EST. GFR  (AFRICAN AMERICAN): >60 ML/MIN/1.73 M^2
EST. GFR  (NON AFRICAN AMERICAN): >60 ML/MIN/1.73 M^2
GLUCOSE SERPL-MCNC: 100 MG/DL (ref 70–110)
GLUCOSE UR QL STRIP: NEGATIVE
HCT VFR BLD AUTO: 34.9 % (ref 37–48.5)
HGB BLD-MCNC: 11.3 G/DL (ref 12–16)
HGB UR QL STRIP: ABNORMAL
IMM GRANULOCYTES # BLD AUTO: 0.01 K/UL (ref 0–0.04)
IMM GRANULOCYTES NFR BLD AUTO: 0.1 % (ref 0–0.5)
KETONES UR QL STRIP: NEGATIVE
LEUKOCYTE ESTERASE UR QL STRIP: NEGATIVE
LIPASE SERPL-CCNC: 44 U/L (ref 4–60)
LYMPHOCYTES # BLD AUTO: 1.1 K/UL (ref 1–4.8)
LYMPHOCYTES NFR BLD: 15.1 % (ref 18–48)
MCH RBC QN AUTO: 24.6 PG (ref 27–31)
MCHC RBC AUTO-ENTMCNC: 32.4 G/DL (ref 32–36)
MCV RBC AUTO: 76 FL (ref 82–98)
MICROSCOPIC COMMENT: NORMAL
MONOCYTES # BLD AUTO: 0.6 K/UL (ref 0.3–1)
MONOCYTES NFR BLD: 7.8 % (ref 4–15)
NEUTROPHILS # BLD AUTO: 5.5 K/UL (ref 1.8–7.7)
NEUTROPHILS NFR BLD: 75.7 % (ref 38–73)
NITRITE UR QL STRIP: NEGATIVE
NRBC BLD-RTO: 0 /100 WBC
PH UR STRIP: 7 [PH] (ref 5–8)
PLATELET # BLD AUTO: 390 K/UL (ref 150–450)
PMV BLD AUTO: 9.8 FL (ref 9.2–12.9)
POTASSIUM SERPL-SCNC: 4 MMOL/L (ref 3.5–5.1)
PROT SERPL-MCNC: 8.2 G/DL (ref 6–8.4)
PROT UR QL STRIP: NEGATIVE
RBC # BLD AUTO: 4.6 M/UL (ref 4–5.4)
RBC #/AREA URNS HPF: 3 /HPF (ref 0–4)
SODIUM SERPL-SCNC: 137 MMOL/L (ref 136–145)
SP GR UR STRIP: 1.01 (ref 1–1.03)
URN SPEC COLLECT METH UR: ABNORMAL
UROBILINOGEN UR STRIP-ACNC: 1 EU/DL
WBC # BLD AUTO: 7.27 K/UL (ref 3.9–12.7)

## 2021-12-18 PROCEDURE — 81025 URINE PREGNANCY TEST: CPT | Performed by: PHYSICIAN ASSISTANT

## 2021-12-18 PROCEDURE — 36415 COLL VENOUS BLD VENIPUNCTURE: CPT | Performed by: PHYSICIAN ASSISTANT

## 2021-12-18 PROCEDURE — 63600175 PHARM REV CODE 636 W HCPCS: Performed by: PHYSICIAN ASSISTANT

## 2021-12-18 PROCEDURE — 25500020 PHARM REV CODE 255

## 2021-12-18 PROCEDURE — 96374 THER/PROPH/DIAG INJ IV PUSH: CPT

## 2021-12-18 PROCEDURE — 25000003 PHARM REV CODE 250: Performed by: PHYSICIAN ASSISTANT

## 2021-12-18 PROCEDURE — 96375 TX/PRO/DX INJ NEW DRUG ADDON: CPT

## 2021-12-18 PROCEDURE — 81000 URINALYSIS NONAUTO W/SCOPE: CPT | Performed by: PHYSICIAN ASSISTANT

## 2021-12-18 PROCEDURE — 80053 COMPREHEN METABOLIC PANEL: CPT | Performed by: PHYSICIAN ASSISTANT

## 2021-12-18 PROCEDURE — 85025 COMPLETE CBC W/AUTO DIFF WBC: CPT | Performed by: PHYSICIAN ASSISTANT

## 2021-12-18 PROCEDURE — 99285 EMERGENCY DEPT VISIT HI MDM: CPT | Mod: 25

## 2021-12-18 PROCEDURE — 83690 ASSAY OF LIPASE: CPT | Performed by: PHYSICIAN ASSISTANT

## 2021-12-18 PROCEDURE — 96361 HYDRATE IV INFUSION ADD-ON: CPT

## 2021-12-18 RX ORDER — ONDANSETRON 2 MG/ML
8 INJECTION INTRAMUSCULAR; INTRAVENOUS
Status: COMPLETED | OUTPATIENT
Start: 2021-12-18 | End: 2021-12-18

## 2021-12-18 RX ORDER — ONDANSETRON 8 MG/1
8 TABLET, ORALLY DISINTEGRATING ORAL 3 TIMES DAILY PRN
Qty: 20 TABLET | Refills: 0 | Status: SHIPPED | OUTPATIENT
Start: 2021-12-18 | End: 2023-02-27

## 2021-12-18 RX ORDER — MORPHINE SULFATE 2 MG/ML
6 INJECTION, SOLUTION INTRAMUSCULAR; INTRAVENOUS
Status: COMPLETED | OUTPATIENT
Start: 2021-12-18 | End: 2021-12-18

## 2021-12-18 RX ORDER — HYDROCODONE BITARTRATE AND ACETAMINOPHEN 5; 325 MG/1; MG/1
1 TABLET ORAL EVERY 6 HOURS PRN
Qty: 12 TABLET | Refills: 0 | Status: SHIPPED | OUTPATIENT
Start: 2021-12-18 | End: 2021-12-21

## 2021-12-18 RX ADMIN — IOHEXOL 75 ML: 350 INJECTION, SOLUTION INTRAVENOUS at 07:12

## 2021-12-18 RX ADMIN — ONDANSETRON 8 MG: 2 INJECTION INTRAMUSCULAR; INTRAVENOUS at 06:12

## 2021-12-18 RX ADMIN — SODIUM CHLORIDE 500 ML: 0.9 INJECTION, SOLUTION INTRAVENOUS at 06:12

## 2021-12-18 RX ADMIN — MORPHINE SULFATE 6 MG: 2 INJECTION, SOLUTION INTRAMUSCULAR; INTRAVENOUS at 06:12

## 2021-12-20 ENCOUNTER — OFFICE VISIT (OUTPATIENT)
Dept: SURGERY | Facility: CLINIC | Age: 36
End: 2021-12-20
Payer: COMMERCIAL

## 2021-12-20 ENCOUNTER — TELEPHONE (OUTPATIENT)
Dept: SURGERY | Facility: CLINIC | Age: 36
End: 2021-12-20
Payer: COMMERCIAL

## 2021-12-20 VITALS — WEIGHT: 162.94 LBS | HEIGHT: 62 IN | BODY MASS INDEX: 29.98 KG/M2

## 2021-12-20 DIAGNOSIS — K46.9 ABDOMINAL HERNIA WITHOUT OBSTRUCTION AND WITHOUT GANGRENE, RECURRENCE NOT SPECIFIED, UNSPECIFIED HERNIA TYPE: ICD-10-CM

## 2021-12-20 PROCEDURE — 99999 PR PBB SHADOW E&M-EST. PATIENT-LVL III: ICD-10-PCS | Mod: PBBFAC,,, | Performed by: STUDENT IN AN ORGANIZED HEALTH CARE EDUCATION/TRAINING PROGRAM

## 2021-12-20 PROCEDURE — 99999 PR PBB SHADOW E&M-EST. PATIENT-LVL III: CPT | Mod: PBBFAC,,, | Performed by: STUDENT IN AN ORGANIZED HEALTH CARE EDUCATION/TRAINING PROGRAM

## 2021-12-20 PROCEDURE — 99024 POSTOP FOLLOW-UP VISIT: CPT | Mod: S$GLB,,, | Performed by: STUDENT IN AN ORGANIZED HEALTH CARE EDUCATION/TRAINING PROGRAM

## 2021-12-20 PROCEDURE — 99024 PR POST-OP FOLLOW-UP VISIT: ICD-10-PCS | Mod: S$GLB,,, | Performed by: STUDENT IN AN ORGANIZED HEALTH CARE EDUCATION/TRAINING PROGRAM

## 2021-12-22 ENCOUNTER — LAB VISIT (OUTPATIENT)
Dept: LAB | Facility: HOSPITAL | Age: 36
End: 2021-12-22
Attending: OBSTETRICS & GYNECOLOGY
Payer: COMMERCIAL

## 2021-12-22 DIAGNOSIS — D64.9 CHRONIC ANEMIA: ICD-10-CM

## 2021-12-22 DIAGNOSIS — R74.8 LIVER ENZYME ELEVATION: ICD-10-CM

## 2021-12-22 LAB
ALBUMIN SERPL BCP-MCNC: 3.4 G/DL (ref 3.5–5.2)
ALP SERPL-CCNC: 77 U/L (ref 55–135)
ALT SERPL W/O P-5'-P-CCNC: 16 U/L (ref 10–44)
AST SERPL-CCNC: 17 U/L (ref 10–40)
BASOPHILS # BLD AUTO: 0.02 K/UL (ref 0–0.2)
BASOPHILS NFR BLD: 0.4 % (ref 0–1.9)
BILIRUB DIRECT SERPL-MCNC: 0.3 MG/DL (ref 0.1–0.3)
BILIRUB SERPL-MCNC: 0.7 MG/DL (ref 0.1–1)
DIFFERENTIAL METHOD: ABNORMAL
EOSINOPHIL # BLD AUTO: 0.1 K/UL (ref 0–0.5)
EOSINOPHIL NFR BLD: 2.2 % (ref 0–8)
ERYTHROCYTE [DISTWIDTH] IN BLOOD BY AUTOMATED COUNT: 20.1 % (ref 11.5–14.5)
HCT VFR BLD AUTO: 36.1 % (ref 37–48.5)
HGB BLD-MCNC: 11.1 G/DL (ref 12–16)
IMM GRANULOCYTES # BLD AUTO: 0.02 K/UL (ref 0–0.04)
IMM GRANULOCYTES NFR BLD AUTO: 0.4 % (ref 0–0.5)
LYMPHOCYTES # BLD AUTO: 1 K/UL (ref 1–4.8)
LYMPHOCYTES NFR BLD: 21.7 % (ref 18–48)
MCH RBC QN AUTO: 24.3 PG (ref 27–31)
MCHC RBC AUTO-ENTMCNC: 30.7 G/DL (ref 32–36)
MCV RBC AUTO: 79 FL (ref 82–98)
MONOCYTES # BLD AUTO: 0.3 K/UL (ref 0.3–1)
MONOCYTES NFR BLD: 5.6 % (ref 4–15)
NEUTROPHILS # BLD AUTO: 3.1 K/UL (ref 1.8–7.7)
NEUTROPHILS NFR BLD: 69.7 % (ref 38–73)
NRBC BLD-RTO: 0 /100 WBC
PLATELET # BLD AUTO: 435 K/UL (ref 150–450)
PMV BLD AUTO: 10.5 FL (ref 9.2–12.9)
PROT SERPL-MCNC: 8.4 G/DL (ref 6–8.4)
RBC # BLD AUTO: 4.56 M/UL (ref 4–5.4)
WBC # BLD AUTO: 4.47 K/UL (ref 3.9–12.7)

## 2021-12-22 PROCEDURE — 80076 HEPATIC FUNCTION PANEL: CPT | Performed by: OBSTETRICS & GYNECOLOGY

## 2021-12-22 PROCEDURE — 85025 COMPLETE CBC W/AUTO DIFF WBC: CPT | Performed by: OBSTETRICS & GYNECOLOGY

## 2021-12-22 PROCEDURE — 36415 COLL VENOUS BLD VENIPUNCTURE: CPT | Mod: PO | Performed by: OBSTETRICS & GYNECOLOGY

## 2021-12-23 ENCOUNTER — HOSPITAL ENCOUNTER (INPATIENT)
Facility: HOSPITAL | Age: 36
LOS: 5 days | Discharge: HOME | DRG: 329 | End: 2021-12-29
Attending: EMERGENCY MEDICINE | Admitting: STUDENT IN AN ORGANIZED HEALTH CARE EDUCATION/TRAINING PROGRAM
Payer: COMMERCIAL

## 2021-12-23 DIAGNOSIS — R07.9 CHEST PAIN: ICD-10-CM

## 2021-12-23 DIAGNOSIS — R10.33 PERIUMBILICAL ABDOMINAL PAIN: ICD-10-CM

## 2021-12-23 DIAGNOSIS — R11.2 NON-INTRACTABLE VOMITING WITH NAUSEA, UNSPECIFIED VOMITING TYPE: ICD-10-CM

## 2021-12-23 DIAGNOSIS — R10.13 EPIGASTRIC PAIN: ICD-10-CM

## 2021-12-23 DIAGNOSIS — K56.600 PARTIAL SMALL BOWEL OBSTRUCTION: Primary | ICD-10-CM

## 2021-12-23 DIAGNOSIS — R19.00 INTRAABDOMINAL MASS: ICD-10-CM

## 2021-12-23 PROBLEM — D50.9 MICROCYTIC ANEMIA: Status: ACTIVE | Noted: 2018-06-02

## 2021-12-23 LAB
ALBUMIN SERPL BCP-MCNC: 3.5 G/DL (ref 3.5–5.2)
ALP SERPL-CCNC: 74 U/L (ref 55–135)
ALT SERPL W/O P-5'-P-CCNC: 19 U/L (ref 10–44)
ANION GAP SERPL CALC-SCNC: 15 MMOL/L (ref 8–16)
AST SERPL-CCNC: 15 U/L (ref 10–40)
B-HCG UR QL: NEGATIVE
BASOPHILS # BLD AUTO: 0.01 K/UL (ref 0–0.2)
BASOPHILS NFR BLD: 0.1 % (ref 0–1.9)
BILIRUB SERPL-MCNC: 0.8 MG/DL (ref 0.1–1)
BUN SERPL-MCNC: 11 MG/DL (ref 6–20)
CALCIUM SERPL-MCNC: 9.3 MG/DL (ref 8.7–10.5)
CHLORIDE SERPL-SCNC: 103 MMOL/L (ref 95–110)
CO2 SERPL-SCNC: 18 MMOL/L (ref 23–29)
CREAT SERPL-MCNC: 0.8 MG/DL (ref 0.5–1.4)
CTP QC/QA: YES
DIFFERENTIAL METHOD: ABNORMAL
EOSINOPHIL # BLD AUTO: 0.1 K/UL (ref 0–0.5)
EOSINOPHIL NFR BLD: 0.7 % (ref 0–8)
ERYTHROCYTE [DISTWIDTH] IN BLOOD BY AUTOMATED COUNT: 19.5 % (ref 11.5–14.5)
EST. GFR  (AFRICAN AMERICAN): >60 ML/MIN/1.73 M^2
EST. GFR  (NON AFRICAN AMERICAN): >60 ML/MIN/1.73 M^2
GLUCOSE SERPL-MCNC: 149 MG/DL (ref 70–110)
HCT VFR BLD AUTO: 33.8 % (ref 37–48.5)
HGB BLD-MCNC: 11.2 G/DL (ref 12–16)
IMM GRANULOCYTES # BLD AUTO: 0.04 K/UL (ref 0–0.04)
IMM GRANULOCYTES NFR BLD AUTO: 0.4 % (ref 0–0.5)
LACTATE SERPL-SCNC: 1.3 MMOL/L (ref 0.5–2.2)
LIPASE SERPL-CCNC: 28 U/L (ref 4–60)
LYMPHOCYTES # BLD AUTO: 1 K/UL (ref 1–4.8)
LYMPHOCYTES NFR BLD: 10.1 % (ref 18–48)
MCH RBC QN AUTO: 24.9 PG (ref 27–31)
MCHC RBC AUTO-ENTMCNC: 33.1 G/DL (ref 32–36)
MCV RBC AUTO: 75 FL (ref 82–98)
MONOCYTES # BLD AUTO: 0.3 K/UL (ref 0.3–1)
MONOCYTES NFR BLD: 2.9 % (ref 4–15)
NEUTROPHILS # BLD AUTO: 8.3 K/UL (ref 1.8–7.7)
NEUTROPHILS NFR BLD: 85.8 % (ref 38–73)
NRBC BLD-RTO: 0 /100 WBC
PLATELET # BLD AUTO: 450 K/UL (ref 150–450)
PMV BLD AUTO: 9.5 FL (ref 9.2–12.9)
POTASSIUM SERPL-SCNC: 3.5 MMOL/L (ref 3.5–5.1)
PROT SERPL-MCNC: 8.5 G/DL (ref 6–8.4)
RBC # BLD AUTO: 4.49 M/UL (ref 4–5.4)
SODIUM SERPL-SCNC: 136 MMOL/L (ref 136–145)
WBC # BLD AUTO: 9.69 K/UL (ref 3.9–12.7)

## 2021-12-23 PROCEDURE — 36415 COLL VENOUS BLD VENIPUNCTURE: CPT | Performed by: EMERGENCY MEDICINE

## 2021-12-23 PROCEDURE — 99285 EMERGENCY DEPT VISIT HI MDM: CPT | Mod: 25

## 2021-12-23 PROCEDURE — 83605 ASSAY OF LACTIC ACID: CPT | Performed by: EMERGENCY MEDICINE

## 2021-12-23 PROCEDURE — 96361 HYDRATE IV INFUSION ADD-ON: CPT

## 2021-12-23 PROCEDURE — 25500020 PHARM REV CODE 255

## 2021-12-23 PROCEDURE — 96375 TX/PRO/DX INJ NEW DRUG ADDON: CPT

## 2021-12-23 PROCEDURE — 81025 URINE PREGNANCY TEST: CPT | Performed by: EMERGENCY MEDICINE

## 2021-12-23 PROCEDURE — 83690 ASSAY OF LIPASE: CPT | Performed by: EMERGENCY MEDICINE

## 2021-12-23 PROCEDURE — G0378 HOSPITAL OBSERVATION PER HR: HCPCS

## 2021-12-23 PROCEDURE — 96374 THER/PROPH/DIAG INJ IV PUSH: CPT

## 2021-12-23 PROCEDURE — 80053 COMPREHEN METABOLIC PANEL: CPT | Performed by: EMERGENCY MEDICINE

## 2021-12-23 PROCEDURE — 85025 COMPLETE CBC W/AUTO DIFF WBC: CPT | Performed by: EMERGENCY MEDICINE

## 2021-12-23 PROCEDURE — 63600175 PHARM REV CODE 636 W HCPCS: Performed by: EMERGENCY MEDICINE

## 2021-12-23 RX ORDER — GLUCAGON 1 MG
1 KIT INJECTION
Status: DISCONTINUED | OUTPATIENT
Start: 2021-12-24 | End: 2021-12-29 | Stop reason: HOSPADM

## 2021-12-23 RX ORDER — SODIUM CHLORIDE 9 MG/ML
INJECTION, SOLUTION INTRAVENOUS CONTINUOUS
Status: DISCONTINUED | OUTPATIENT
Start: 2021-12-24 | End: 2021-12-29 | Stop reason: HOSPADM

## 2021-12-23 RX ORDER — HYDROMORPHONE HYDROCHLORIDE 2 MG/ML
1 INJECTION, SOLUTION INTRAMUSCULAR; INTRAVENOUS; SUBCUTANEOUS EVERY 6 HOURS PRN
Status: DISCONTINUED | OUTPATIENT
Start: 2021-12-24 | End: 2021-12-24

## 2021-12-23 RX ORDER — NALOXONE HCL 0.4 MG/ML
0.02 VIAL (ML) INJECTION
Status: DISCONTINUED | OUTPATIENT
Start: 2021-12-24 | End: 2021-12-29 | Stop reason: HOSPADM

## 2021-12-23 RX ORDER — ONDANSETRON 2 MG/ML
4 INJECTION INTRAMUSCULAR; INTRAVENOUS
Status: COMPLETED | OUTPATIENT
Start: 2021-12-23 | End: 2021-12-23

## 2021-12-23 RX ORDER — SODIUM CHLORIDE 0.9 % (FLUSH) 0.9 %
3 SYRINGE (ML) INJECTION EVERY 12 HOURS PRN
Status: DISCONTINUED | OUTPATIENT
Start: 2021-12-24 | End: 2021-12-29 | Stop reason: HOSPADM

## 2021-12-23 RX ORDER — LANOLIN ALCOHOL/MO/W.PET/CERES
800 CREAM (GRAM) TOPICAL
Status: DISCONTINUED | OUTPATIENT
Start: 2021-12-24 | End: 2021-12-29 | Stop reason: HOSPADM

## 2021-12-23 RX ORDER — MAG HYDROX/ALUMINUM HYD/SIMETH 200-200-20
30 SUSPENSION, ORAL (FINAL DOSE FORM) ORAL 4 TIMES DAILY PRN
Status: DISCONTINUED | OUTPATIENT
Start: 2021-12-24 | End: 2021-12-29 | Stop reason: HOSPADM

## 2021-12-23 RX ORDER — ACETAMINOPHEN 325 MG/1
650 TABLET ORAL EVERY 4 HOURS PRN
Status: DISCONTINUED | OUTPATIENT
Start: 2021-12-24 | End: 2021-12-29 | Stop reason: HOSPADM

## 2021-12-23 RX ORDER — ENOXAPARIN SODIUM 100 MG/ML
40 INJECTION SUBCUTANEOUS EVERY 24 HOURS
Status: DISCONTINUED | OUTPATIENT
Start: 2021-12-24 | End: 2021-12-29 | Stop reason: HOSPADM

## 2021-12-23 RX ORDER — IBUPROFEN 200 MG
16 TABLET ORAL
Status: DISCONTINUED | OUTPATIENT
Start: 2021-12-24 | End: 2021-12-29 | Stop reason: HOSPADM

## 2021-12-23 RX ORDER — MORPHINE SULFATE 2 MG/ML
2 INJECTION, SOLUTION INTRAMUSCULAR; INTRAVENOUS EVERY 4 HOURS PRN
Status: DISCONTINUED | OUTPATIENT
Start: 2021-12-24 | End: 2021-12-23

## 2021-12-23 RX ORDER — MORPHINE SULFATE 4 MG/ML
4 INJECTION, SOLUTION INTRAMUSCULAR; INTRAVENOUS EVERY 4 HOURS PRN
Status: DISCONTINUED | OUTPATIENT
Start: 2021-12-24 | End: 2021-12-24

## 2021-12-23 RX ORDER — TALC
9 POWDER (GRAM) TOPICAL NIGHTLY PRN
Status: DISCONTINUED | OUTPATIENT
Start: 2021-12-24 | End: 2021-12-29 | Stop reason: HOSPADM

## 2021-12-23 RX ORDER — ONDANSETRON 2 MG/ML
8 INJECTION INTRAMUSCULAR; INTRAVENOUS EVERY 6 HOURS PRN
Status: DISCONTINUED | OUTPATIENT
Start: 2021-12-24 | End: 2021-12-29 | Stop reason: HOSPADM

## 2021-12-23 RX ORDER — MORPHINE SULFATE 4 MG/ML
4 INJECTION, SOLUTION INTRAMUSCULAR; INTRAVENOUS EVERY 4 HOURS PRN
Status: DISCONTINUED | OUTPATIENT
Start: 2021-12-24 | End: 2021-12-23

## 2021-12-23 RX ORDER — SIMETHICONE 80 MG
1 TABLET,CHEWABLE ORAL 4 TIMES DAILY PRN
Status: DISCONTINUED | OUTPATIENT
Start: 2021-12-24 | End: 2021-12-29 | Stop reason: HOSPADM

## 2021-12-23 RX ORDER — SODIUM,POTASSIUM PHOSPHATES 280-250MG
2 POWDER IN PACKET (EA) ORAL
Status: DISCONTINUED | OUTPATIENT
Start: 2021-12-24 | End: 2021-12-29 | Stop reason: HOSPADM

## 2021-12-23 RX ORDER — MORPHINE SULFATE 2 MG/ML
7 INJECTION, SOLUTION INTRAMUSCULAR; INTRAVENOUS
Status: COMPLETED | OUTPATIENT
Start: 2021-12-23 | End: 2021-12-23

## 2021-12-23 RX ORDER — IBUPROFEN 200 MG
24 TABLET ORAL
Status: DISCONTINUED | OUTPATIENT
Start: 2021-12-24 | End: 2021-12-29 | Stop reason: HOSPADM

## 2021-12-23 RX ORDER — IPRATROPIUM BROMIDE AND ALBUTEROL SULFATE 2.5; .5 MG/3ML; MG/3ML
3 SOLUTION RESPIRATORY (INHALATION) EVERY 6 HOURS PRN
Status: DISCONTINUED | OUTPATIENT
Start: 2021-12-24 | End: 2021-12-29 | Stop reason: HOSPADM

## 2021-12-23 RX ADMIN — ONDANSETRON 4 MG: 2 INJECTION INTRAMUSCULAR; INTRAVENOUS at 09:12

## 2021-12-23 RX ADMIN — MORPHINE SULFATE 7 MG: 2 INJECTION, SOLUTION INTRAMUSCULAR; INTRAVENOUS at 09:12

## 2021-12-23 RX ADMIN — IOHEXOL 75 ML: 350 INJECTION, SOLUTION INTRAVENOUS at 09:12

## 2021-12-23 RX ADMIN — SODIUM CHLORIDE, SODIUM LACTATE, POTASSIUM CHLORIDE, AND CALCIUM CHLORIDE 1000 ML: .6; .31; .03; .02 INJECTION, SOLUTION INTRAVENOUS at 09:12

## 2021-12-24 ENCOUNTER — ANESTHESIA EVENT (OUTPATIENT)
Dept: SURGERY | Facility: HOSPITAL | Age: 36
DRG: 329 | End: 2021-12-24
Payer: COMMERCIAL

## 2021-12-24 ENCOUNTER — ANESTHESIA (OUTPATIENT)
Dept: SURGERY | Facility: HOSPITAL | Age: 36
DRG: 329 | End: 2021-12-24
Payer: COMMERCIAL

## 2021-12-24 PROBLEM — R19.00 INTRAABDOMINAL MASS: Status: ACTIVE | Noted: 2021-12-24

## 2021-12-24 LAB — LDH SERPL L TO P-CCNC: 162 U/L (ref 110–260)

## 2021-12-24 PROCEDURE — 36000708 HC OR TIME LEV III 1ST 15 MIN: Performed by: STUDENT IN AN ORGANIZED HEALTH CARE EDUCATION/TRAINING PROGRAM

## 2021-12-24 PROCEDURE — 84165 PROTEIN E-PHORESIS SERUM: CPT | Performed by: NURSE PRACTITIONER

## 2021-12-24 PROCEDURE — 49566 PR REPAIR RECURR INCIS HERNIA,STRANG: ICD-10-PCS | Mod: 51,,, | Performed by: STUDENT IN AN ORGANIZED HEALTH CARE EDUCATION/TRAINING PROGRAM

## 2021-12-24 PROCEDURE — 58140 PR MYOMECTOMY 1-4,W/TOT 250GMS/<,ABD APPRCH: ICD-10-PCS | Mod: 51,,, | Performed by: STUDENT IN AN ORGANIZED HEALTH CARE EDUCATION/TRAINING PROGRAM

## 2021-12-24 PROCEDURE — 88307 TISSUE EXAM BY PATHOLOGIST: CPT | Performed by: PATHOLOGY

## 2021-12-24 PROCEDURE — 49566 PR REPAIR RECURR INCIS HERNIA,STRANG: CPT | Mod: 51,,, | Performed by: STUDENT IN AN ORGANIZED HEALTH CARE EDUCATION/TRAINING PROGRAM

## 2021-12-24 PROCEDURE — 44120 PR RESECT SMALL INTEST,SINGL RESEC/ANAS: ICD-10-PCS | Mod: ,,, | Performed by: STUDENT IN AN ORGANIZED HEALTH CARE EDUCATION/TRAINING PROGRAM

## 2021-12-24 PROCEDURE — 37000008 HC ANESTHESIA 1ST 15 MINUTES: Performed by: STUDENT IN AN ORGANIZED HEALTH CARE EDUCATION/TRAINING PROGRAM

## 2021-12-24 PROCEDURE — 99223 PR INITIAL HOSPITAL CARE,LEVL III: ICD-10-PCS | Mod: 57,,, | Performed by: STUDENT IN AN ORGANIZED HEALTH CARE EDUCATION/TRAINING PROGRAM

## 2021-12-24 PROCEDURE — 63600175 PHARM REV CODE 636 W HCPCS: Performed by: NURSE ANESTHETIST, CERTIFIED REGISTERED

## 2021-12-24 PROCEDURE — 44121 PR RESECT SMALL INTEST,EACH ADDNL: ICD-10-PCS | Mod: ,,, | Performed by: STUDENT IN AN ORGANIZED HEALTH CARE EDUCATION/TRAINING PROGRAM

## 2021-12-24 PROCEDURE — 99223 1ST HOSP IP/OBS HIGH 75: CPT | Mod: 57,,, | Performed by: STUDENT IN AN ORGANIZED HEALTH CARE EDUCATION/TRAINING PROGRAM

## 2021-12-24 PROCEDURE — 44120 REMOVAL OF SMALL INTESTINE: CPT | Mod: ,,, | Performed by: STUDENT IN AN ORGANIZED HEALTH CARE EDUCATION/TRAINING PROGRAM

## 2021-12-24 PROCEDURE — 88305 TISSUE EXAM BY PATHOLOGIST: ICD-10-PCS | Mod: 26,,, | Performed by: PATHOLOGY

## 2021-12-24 PROCEDURE — 88302 PR  SURG PATH,LEVEL II: ICD-10-PCS | Mod: 26,,, | Performed by: PATHOLOGY

## 2021-12-24 PROCEDURE — 36000709 HC OR TIME LEV III EA ADD 15 MIN: Performed by: STUDENT IN AN ORGANIZED HEALTH CARE EDUCATION/TRAINING PROGRAM

## 2021-12-24 PROCEDURE — 25000003 PHARM REV CODE 250: Performed by: NURSE PRACTITIONER

## 2021-12-24 PROCEDURE — 27201423 OPTIME MED/SURG SUP & DEVICES STERILE SUPPLY: Performed by: STUDENT IN AN ORGANIZED HEALTH CARE EDUCATION/TRAINING PROGRAM

## 2021-12-24 PROCEDURE — 88307 PR  SURG PATH,LEVEL V: ICD-10-PCS | Mod: 26,,, | Performed by: PATHOLOGY

## 2021-12-24 PROCEDURE — 63600175 PHARM REV CODE 636 W HCPCS: Performed by: NURSE PRACTITIONER

## 2021-12-24 PROCEDURE — 88302 TISSUE EXAM BY PATHOLOGIST: CPT | Mod: 26,,, | Performed by: PATHOLOGY

## 2021-12-24 PROCEDURE — 63600175 PHARM REV CODE 636 W HCPCS: Performed by: ANESTHESIOLOGY

## 2021-12-24 PROCEDURE — 37000009 HC ANESTHESIA EA ADD 15 MINS: Performed by: STUDENT IN AN ORGANIZED HEALTH CARE EDUCATION/TRAINING PROGRAM

## 2021-12-24 PROCEDURE — 71000033 HC RECOVERY, INTIAL HOUR: Performed by: STUDENT IN AN ORGANIZED HEALTH CARE EDUCATION/TRAINING PROGRAM

## 2021-12-24 PROCEDURE — 71000039 HC RECOVERY, EACH ADD'L HOUR: Performed by: STUDENT IN AN ORGANIZED HEALTH CARE EDUCATION/TRAINING PROGRAM

## 2021-12-24 PROCEDURE — D9220A PRA ANESTHESIA: ICD-10-PCS | Mod: CRNA,,, | Performed by: NURSE ANESTHETIST, CERTIFIED REGISTERED

## 2021-12-24 PROCEDURE — 88307 TISSUE EXAM BY PATHOLOGIST: CPT | Mod: 26,,, | Performed by: PATHOLOGY

## 2021-12-24 PROCEDURE — 36415 COLL VENOUS BLD VENIPUNCTURE: CPT | Performed by: NURSE PRACTITIONER

## 2021-12-24 PROCEDURE — 86301 IMMUNOASSAY TUMOR CA 19-9: CPT | Performed by: NURSE PRACTITIONER

## 2021-12-24 PROCEDURE — 25000003 PHARM REV CODE 250: Performed by: STUDENT IN AN ORGANIZED HEALTH CARE EDUCATION/TRAINING PROGRAM

## 2021-12-24 PROCEDURE — 63600175 PHARM REV CODE 636 W HCPCS: Performed by: STUDENT IN AN ORGANIZED HEALTH CARE EDUCATION/TRAINING PROGRAM

## 2021-12-24 PROCEDURE — 88305 TISSUE EXAM BY PATHOLOGIST: CPT | Mod: 26,,, | Performed by: PATHOLOGY

## 2021-12-24 PROCEDURE — 99900035 HC TECH TIME PER 15 MIN (STAT)

## 2021-12-24 PROCEDURE — 83615 LACTATE (LD) (LDH) ENZYME: CPT | Performed by: NURSE PRACTITIONER

## 2021-12-24 PROCEDURE — 44121 REMOVAL OF SMALL INTESTINE: CPT | Mod: ,,, | Performed by: STUDENT IN AN ORGANIZED HEALTH CARE EDUCATION/TRAINING PROGRAM

## 2021-12-24 PROCEDURE — D9220A PRA ANESTHESIA: Mod: CRNA,,, | Performed by: NURSE ANESTHETIST, CERTIFIED REGISTERED

## 2021-12-24 PROCEDURE — 58140 MYOMECTOMY ABDOM METHOD: CPT | Mod: 51,,, | Performed by: STUDENT IN AN ORGANIZED HEALTH CARE EDUCATION/TRAINING PROGRAM

## 2021-12-24 PROCEDURE — 12000002 HC ACUTE/MED SURGE SEMI-PRIVATE ROOM

## 2021-12-24 PROCEDURE — 88302 TISSUE EXAM BY PATHOLOGIST: CPT | Performed by: PATHOLOGY

## 2021-12-24 PROCEDURE — D9220A PRA ANESTHESIA: ICD-10-PCS | Mod: ANES,,, | Performed by: ANESTHESIOLOGY

## 2021-12-24 PROCEDURE — D9220A PRA ANESTHESIA: Mod: ANES,,, | Performed by: ANESTHESIOLOGY

## 2021-12-24 PROCEDURE — 25000003 PHARM REV CODE 250: Performed by: NURSE ANESTHETIST, CERTIFIED REGISTERED

## 2021-12-24 RX ORDER — BUPIVACAINE HYDROCHLORIDE AND EPINEPHRINE 2.5; 5 MG/ML; UG/ML
INJECTION, SOLUTION EPIDURAL; INFILTRATION; INTRACAUDAL; PERINEURAL
Status: DISCONTINUED | OUTPATIENT
Start: 2021-12-24 | End: 2021-12-24 | Stop reason: HOSPADM

## 2021-12-24 RX ORDER — MIDAZOLAM HYDROCHLORIDE 1 MG/ML
INJECTION INTRAMUSCULAR; INTRAVENOUS
Status: DISCONTINUED | OUTPATIENT
Start: 2021-12-24 | End: 2021-12-24

## 2021-12-24 RX ORDER — KETOROLAC TROMETHAMINE 30 MG/ML
30 INJECTION, SOLUTION INTRAMUSCULAR; INTRAVENOUS EVERY 6 HOURS PRN
Status: DISPENSED | OUTPATIENT
Start: 2021-12-24 | End: 2021-12-27

## 2021-12-24 RX ORDER — HYDROMORPHONE HYDROCHLORIDE 2 MG/ML
INJECTION, SOLUTION INTRAMUSCULAR; INTRAVENOUS; SUBCUTANEOUS
Status: DISCONTINUED | OUTPATIENT
Start: 2021-12-24 | End: 2021-12-24

## 2021-12-24 RX ORDER — FENTANYL CITRATE 50 UG/ML
INJECTION, SOLUTION INTRAMUSCULAR; INTRAVENOUS
Status: DISCONTINUED | OUTPATIENT
Start: 2021-12-24 | End: 2021-12-24

## 2021-12-24 RX ORDER — ONDANSETRON 2 MG/ML
INJECTION INTRAMUSCULAR; INTRAVENOUS
Status: DISCONTINUED | OUTPATIENT
Start: 2021-12-24 | End: 2021-12-24

## 2021-12-24 RX ORDER — HYDROMORPHONE HYDROCHLORIDE 2 MG/ML
1 INJECTION, SOLUTION INTRAMUSCULAR; INTRAVENOUS; SUBCUTANEOUS EVERY 4 HOURS PRN
Status: DISCONTINUED | OUTPATIENT
Start: 2021-12-24 | End: 2021-12-25

## 2021-12-24 RX ORDER — OXYCODONE HYDROCHLORIDE 5 MG/1
5 TABLET ORAL
Status: DISCONTINUED | OUTPATIENT
Start: 2021-12-24 | End: 2021-12-24

## 2021-12-24 RX ORDER — HYDROMORPHONE HYDROCHLORIDE 2 MG/ML
0.2 INJECTION, SOLUTION INTRAMUSCULAR; INTRAVENOUS; SUBCUTANEOUS EVERY 5 MIN PRN
Status: DISCONTINUED | OUTPATIENT
Start: 2021-12-24 | End: 2021-12-24

## 2021-12-24 RX ORDER — KETOROLAC TROMETHAMINE 30 MG/ML
INJECTION, SOLUTION INTRAMUSCULAR; INTRAVENOUS
Status: DISCONTINUED | OUTPATIENT
Start: 2021-12-24 | End: 2021-12-24

## 2021-12-24 RX ORDER — CEFAZOLIN SODIUM 1 G/3ML
INJECTION, POWDER, FOR SOLUTION INTRAMUSCULAR; INTRAVENOUS
Status: DISCONTINUED | OUTPATIENT
Start: 2021-12-24 | End: 2021-12-24

## 2021-12-24 RX ORDER — MUPIROCIN 20 MG/G
OINTMENT TOPICAL 2 TIMES DAILY
Status: COMPLETED | OUTPATIENT
Start: 2021-12-24 | End: 2021-12-29

## 2021-12-24 RX ORDER — ROCURONIUM BROMIDE 10 MG/ML
INJECTION, SOLUTION INTRAVENOUS
Status: DISCONTINUED | OUTPATIENT
Start: 2021-12-24 | End: 2021-12-24

## 2021-12-24 RX ORDER — FENTANYL CITRATE 50 UG/ML
25 INJECTION, SOLUTION INTRAMUSCULAR; INTRAVENOUS EVERY 5 MIN PRN
Status: DISCONTINUED | OUTPATIENT
Start: 2021-12-24 | End: 2021-12-24

## 2021-12-24 RX ORDER — DEXAMETHASONE SODIUM PHOSPHATE 4 MG/ML
INJECTION, SOLUTION INTRA-ARTICULAR; INTRALESIONAL; INTRAMUSCULAR; INTRAVENOUS; SOFT TISSUE
Status: DISCONTINUED | OUTPATIENT
Start: 2021-12-24 | End: 2021-12-24

## 2021-12-24 RX ORDER — SUCCINYLCHOLINE CHLORIDE 20 MG/ML
INJECTION INTRAMUSCULAR; INTRAVENOUS
Status: DISCONTINUED | OUTPATIENT
Start: 2021-12-24 | End: 2021-12-24

## 2021-12-24 RX ORDER — ACETAMINOPHEN 10 MG/ML
INJECTION, SOLUTION INTRAVENOUS
Status: DISCONTINUED | OUTPATIENT
Start: 2021-12-24 | End: 2021-12-24

## 2021-12-24 RX ORDER — PROPOFOL 10 MG/ML
VIAL (ML) INTRAVENOUS
Status: DISCONTINUED | OUTPATIENT
Start: 2021-12-24 | End: 2021-12-24

## 2021-12-24 RX ORDER — HYDROMORPHONE HYDROCHLORIDE 1 MG/ML
0.5 INJECTION, SOLUTION INTRAMUSCULAR; INTRAVENOUS; SUBCUTANEOUS EVERY 4 HOURS PRN
Status: DISCONTINUED | OUTPATIENT
Start: 2021-12-24 | End: 2021-12-25

## 2021-12-24 RX ORDER — LIDOCAINE HCL/PF 100 MG/5ML
SYRINGE (ML) INTRAVENOUS
Status: DISCONTINUED | OUTPATIENT
Start: 2021-12-24 | End: 2021-12-24

## 2021-12-24 RX ADMIN — HYDROMORPHONE HYDROCHLORIDE 0.2 MG: 2 INJECTION INTRAMUSCULAR; INTRAVENOUS; SUBCUTANEOUS at 03:12

## 2021-12-24 RX ADMIN — FENTANYL CITRATE 50 MCG: 50 INJECTION, SOLUTION INTRAMUSCULAR; INTRAVENOUS at 12:12

## 2021-12-24 RX ADMIN — ROCURONIUM BROMIDE 10 MG: 10 INJECTION, SOLUTION INTRAVENOUS at 12:12

## 2021-12-24 RX ADMIN — MUPIROCIN: 20 OINTMENT TOPICAL at 09:12

## 2021-12-24 RX ADMIN — ONDANSETRON 8 MG: 2 INJECTION INTRAMUSCULAR; INTRAVENOUS at 09:12

## 2021-12-24 RX ADMIN — ENOXAPARIN SODIUM 40 MG: 40 INJECTION SUBCUTANEOUS at 04:12

## 2021-12-24 RX ADMIN — HYDROMORPHONE HYDROCHLORIDE 1 MG: 2 INJECTION, SOLUTION INTRAMUSCULAR; INTRAVENOUS; SUBCUTANEOUS at 07:12

## 2021-12-24 RX ADMIN — SODIUM CHLORIDE: 0.9 INJECTION, SOLUTION INTRAVENOUS at 04:12

## 2021-12-24 RX ADMIN — MORPHINE SULFATE 4 MG: 4 INJECTION INTRAVENOUS at 04:12

## 2021-12-24 RX ADMIN — PROPOFOL 200 MG: 10 INJECTION, EMULSION INTRAVENOUS at 12:12

## 2021-12-24 RX ADMIN — GLYCOPYRROLATE 0.2 MG: 0.2 INJECTION, SOLUTION INTRAMUSCULAR; INTRAVITREAL at 12:12

## 2021-12-24 RX ADMIN — ONDANSETRON 8 MG: 2 INJECTION INTRAMUSCULAR; INTRAVENOUS at 04:12

## 2021-12-24 RX ADMIN — KETOROLAC TROMETHAMINE 30 MG: 30 INJECTION, SOLUTION INTRAMUSCULAR; INTRAVENOUS at 09:12

## 2021-12-24 RX ADMIN — LIDOCAINE HYDROCHLORIDE 75 MG: 20 INJECTION INTRAVENOUS at 12:12

## 2021-12-24 RX ADMIN — DEXAMETHASONE SODIUM PHOSPHATE 4 MG: 4 INJECTION, SOLUTION INTRA-ARTICULAR; INTRALESIONAL; INTRAMUSCULAR; INTRAVENOUS; SOFT TISSUE at 12:12

## 2021-12-24 RX ADMIN — HYDROMORPHONE HYDROCHLORIDE 1 MG: 2 INJECTION INTRAMUSCULAR; INTRAVENOUS; SUBCUTANEOUS at 12:12

## 2021-12-24 RX ADMIN — MIDAZOLAM HYDROCHLORIDE 2 MG: 1 INJECTION, SOLUTION INTRAMUSCULAR; INTRAVENOUS at 12:12

## 2021-12-24 RX ADMIN — SUGAMMADEX 200 MG: 100 INJECTION, SOLUTION INTRAVENOUS at 02:12

## 2021-12-24 RX ADMIN — SUCCINYLCHOLINE CHLORIDE 140 MG: 20 INJECTION, SOLUTION INTRAMUSCULAR; INTRAVENOUS; PARENTERAL at 12:12

## 2021-12-24 RX ADMIN — CEFAZOLIN 2 G: 1 INJECTION, POWDER, FOR SOLUTION INTRAVENOUS at 12:12

## 2021-12-24 RX ADMIN — HYDROMORPHONE HYDROCHLORIDE 1 MG: 2 INJECTION INTRAMUSCULAR; INTRAVENOUS; SUBCUTANEOUS at 07:12

## 2021-12-24 RX ADMIN — HYDROMORPHONE HYDROCHLORIDE 0.5 MG: 2 INJECTION INTRAMUSCULAR; INTRAVENOUS; SUBCUTANEOUS at 02:12

## 2021-12-24 RX ADMIN — ACETAMINOPHEN 1000 MG: 10 INJECTION, SOLUTION INTRAVENOUS at 12:12

## 2021-12-24 RX ADMIN — KETOROLAC TROMETHAMINE 30 MG: 30 INJECTION, SOLUTION INTRAMUSCULAR; INTRAVENOUS at 02:12

## 2021-12-24 RX ADMIN — HYDROMORPHONE HYDROCHLORIDE 0.5 MG: 2 INJECTION INTRAMUSCULAR; INTRAVENOUS; SUBCUTANEOUS at 01:12

## 2021-12-24 RX ADMIN — SODIUM CHLORIDE: 0.9 INJECTION, SOLUTION INTRAVENOUS at 12:12

## 2021-12-24 RX ADMIN — MORPHINE SULFATE 4 MG: 4 INJECTION INTRAVENOUS at 09:12

## 2021-12-24 RX ADMIN — HYDROMORPHONE HYDROCHLORIDE 1 MG: 2 INJECTION INTRAMUSCULAR; INTRAVENOUS; SUBCUTANEOUS at 02:12

## 2021-12-24 RX ADMIN — ONDANSETRON 4 MG: 2 INJECTION INTRAMUSCULAR; INTRAVENOUS at 12:12

## 2021-12-24 RX ADMIN — ONDANSETRON 4 MG: 2 INJECTION INTRAMUSCULAR; INTRAVENOUS at 02:12

## 2021-12-24 RX ADMIN — ROCURONIUM BROMIDE 40 MG: 10 INJECTION, SOLUTION INTRAVENOUS at 12:12

## 2021-12-24 NOTE — NURSING
Care explained, questions answered. VSS. Afebrile. Ambulatory during shift. Medication regimen reviewed w/ pt, pt verbalized understanding. Safety maintained bed locked, in lowest position, call light in reach. No falls or injuries during shift. NS@100mL/hr via 20''g (R) wrist. Pt denies SOB, N/V, dizziness, lightheadedness.  at BS, attentive to pt. POC will be updated prn.

## 2021-12-24 NOTE — ASSESSMENT & PLAN NOTE
Admit to med surg  CT of the abdomen showed dilated loops of bowel concerning for partial obstruction secondary to mesenteric and retroperitoneal masses in the LLQ, which have been stable since prior exam. Unsure if these masses have been evaluated further.  NPO  Consult General surgery-has been seen by Dr. Gallegos for hernia at the site of pain (no palpable or reducible hernia today) and s/p post op ileus  Pain medication PRN  Antiemetics PRN  NGT PRN-discussed with patient

## 2021-12-24 NOTE — PLAN OF CARE
Released from Pacu when criteria met pain controlled skin w+d No nausea No emesis dsg dry intact  NG to low intermit suction  Nothing out so far    [id number] encouraged deep breaths Pt has all belongings in room with spouse hf in bed did IS up to 1000 ml conte cath clr yellow 220 out abd with island dsg intact instr on splinting abd when coughing

## 2021-12-24 NOTE — PLAN OF CARE
[facility]  Initial Discharge Assessment       Primary Care Provider: Claudia Barajas MD    Admission Diagnosis: Epigastric pain [R10.13]  Partial small bowel obstruction [K56.600]  Chest pain [R07.9]  Non-intractable vomiting with nausea, unspecified vomiting type [R11.2]    Admission Date: 12/23/2021  Expected Discharge Date:     Discharge Barriers Identified: None    Payor: [employer] / Plan: [facility] SELECT PLUS / Product Type: Commercial /      completed the assessment with pt and spouse at bedside.Pt is independent in care.. demographic, PCP and insurance is current on face sheet. Disposition:  Pt will discharge to home. Spouse will provide transportation on discharge.  No needs.    Extended Emergency Contact Information  Primary Emergency Contact: James Shell  Address: [address] [place] [address] [place]  Mobile Phone: [phone]  Relation: Spouse  Secondary Emergency Contact: Gretchen Snell  Address: [address] [place] [address] [place]  Mobile Phone: [phone]  Relation: Mother  Father: Jr. Snell Calvin  Address: [address] [place] [address] [place]  Mobile Phone: [phone]    Discharge Plan A: Home with family  Discharge Plan B: Home with family      [id number] - [place] - [address] [facility] AT [facility] [address]  [address] [facility] [address]  Phone: [phone] Fax: [phone]    [id number] - [place] - [address] & [facility]  [address]  [facility] [address]  Phone: [phone] Fax: [phone]    [id number] - [place] - [address]  [address]  [facility] [address]  Phone: [phone] Fax: [phone]    [id number] - [place] - [address]  [address] [place] [address]  Phone: [phone] Fax: [phone]      Initial Assessment (most recent)     Adult Discharge Assessment - 12/24/21 1700        Discharge Assessment    Assessment Type Discharge Planning Assessment     Confirmed/corrected address, phone number and insurance Yes     Confirmed Demographics Correct on Facesheet     Source of Information patient     Reason For Admission abdominal pain     Lives With spouse     Facility Arrived From: home     Do you expect to return to your current living situation? Yes     Do you have help at home or someone to help you manage your care at home? Yes     Who are your caregiver(s) and their phone number(s)? spouse- James - [phone]     Prior to hospitilization cognitive status: Alert/Oriented     Current cognitive status: Alert/Oriented     Walking or Climbing Stairs Difficulty none     Dressing/Bathing Difficulty none     Equipment Currently Used at Home none     Readmission within 30 days? No     Patient currently being followed by outpatient case management? No     Do you currently have service(s) that help you manage your care at home? No     Do you take prescription medications? Yes     Do you have prescription coverage? Yes     Do you have any problems affording any of your prescribed medications? No     Is the patient taking medications as prescribed? yes     Who is going to help you get home at discharge? spouse     How do you get to doctors appointments? car, drives self;family or friend will provide     Are you on dialysis? No     Do you take coumadin? No     Discharge Plan A Home with family     Discharge Plan B Home with family     DME Needed Upon Discharge  none     Discharge Plan discussed with: Spouse/sig other;Patient     Name(s) and Number(s) JAMES     Discharge Barriers Identified None

## 2021-12-24 NOTE — CARE UPDATE
Patient seen examined at bedside with spouse present.  Agree with H&P and plan of care.  Patient complaining of mid abdominal pain at ventral hernia site.  She reports persistent nausea with 1 episode of vomiting this morning that is controlled with IV antiemetics.  She states pain is controlled with current IV pain medication but is intermittent and comes in waves and will be severe at times.  She describes the pain as sharp stabbing quality, 10 out 10 at its worst.  Patient CT reviewed and discussed with her and spouse at bedside.  Patient voiced known uterine fibroids and she is followed closely outpatient with her gyn.  She is unable to tell me she was told in the past about the mesenteric lymphadenopathy.  I had a detailed discussion with patient in regards to this and will order further tumor marker testing to evaluate.  Awaiting General surgery evaluation as well.  Will continue to monitor closely.    Physical exam:  Awake alert orient x3, pleasant, no acute distress  Heart-regular rate rhythm, no edema  Lungs-Clear to auscultation bilaterally, respirations even unlabored  Abdomen-tenderness to mid abdomen periumbilical region with ventral hernia present that is soft but very tender on palpation.  hypoactive bowel sounds    A/P:  1. SBO  - general surgery consulted  - continue NPO status  - continue IV p.r.n. pain and antiemetics    2. Mesenteric lymphadenopathy  - d/w pt and she is aware of uterine fibroids but unclear about mesenteric lymphadenopathy  - will check CA 19, LDH and SPEP

## 2021-12-24 NOTE — PLAN OF CARE
To preop pt  took partial and put with pt belonging in room no jewlery on pt ready for or to preop via bed  at  vss dr saldivar and dr Gallegos aware of pt location
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
## 2021-12-24 NOTE — PLAN OF CARE
Problem: Adult Inpatient Plan of Care  Goal: Plan of Care Review  Outcome: Ongoing, Progressing  Goal: Patient-Specific Goal (Individualized)  Outcome: Ongoing, Progressing  Goal: Absence of Hospital-Acquired Illness or Injury  Outcome: Ongoing, Progressing  Goal: Optimal Comfort and Wellbeing  Outcome: Ongoing, Progressing
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
## 2021-12-24 NOTE — ASSESSMENT & PLAN NOTE
Patient's anemia is currently controlled. Has not received any PRBCs to date.. Etiology likely d/t recent pregnancy and childbirth  Current CBC reviewed-   Lab Results   Component Value Date    HGB 11.2 (L) 12/23/2021    HCT 33.8 (L) 12/23/2021     Monitor serial CBC and transfuse if patient becomes hemodynamically unstable, symptomatic or H/H drops below 7/21.
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
## 2021-12-24 NOTE — OP NOTE
[facility]  Surgery Department  Operative Note    SUMMARY     Date of Procedure: 12/24/2021     Procedure: Procedure(s) (LRB):  LAPAROTOMY, EXPLORATORY (N/A)  EXCISION, MASS, PELVIS (N/A)  EXCISION, SMALL INTESTINE (N/A)  REPAIR, HERNIA, INCISIONAL, INCARCERATED, RECURRENT  LYSIS, ADHESIONS (N/A)     Surgeon(s) and Role:     * Quinton Gallegos MD - Primary    Assisting Surgeon: None    Pre-Operative Diagnosis: Partial small bowel obstruction [K56.600]  Periumbilical abdominal pain [R10.33]    Post-Operative Diagnosis: Post-Op Diagnosis Codes:     * Partial small bowel obstruction [K56.600]     * Periumbilical abdominal pain [R10.33]    Anesthesia: General    Operative Findings (including complications, if any):  Multiple adhesions between the small bowel and a left-sided pelvic mass causing bowel obstruction.  Excision of the pelvic mass.  Resection and primary anastomosis of 2 focal areas of small bowel.  Adhesiolysis.  Reduction of incarcerated incisional hernia containing omentum and falciform ligament.    Description of Technical Procedures:  This is a 36-year-old female who presented with abdominal pain, nausea, vomiting.  She had CT evidence of a bowel obstruction, likely related to her pelvic masses as well as changes within an incisional hernia consistent with incarceration and possible strangulation of fatty tissue.  She did consent operative intervention.  She was taken to the OR, placed supine, general endotracheal anesthesia was induced, the abdomen was prepped and draped in the usual fashion, a time-out was completed.  Her old laparotomy scar in the lower midline was excised.  Deep tissues were divided using electrocautery down to the abdominal fascia.  The fascia was opened sharply.  The fascial incision was then extended superiorly and inferiorly.  There was proteinaceous appearing fluid throughout the abdominal cavity.  The transverse colon was identified.  There was omentum  that  was incarcerated along with falciform ligament at the superior aspect of our incision at the site of her incisional hernia.  This fatty tissue was divided using electrocautery and included the hernia sac.  It was passed off as specimen.  The transverse colon was then grasped and eviscerated in a cephalad position.  The small bowel was run starting at the terminal ileum and moving proximally.  There were multiple interloop adhesions which were divided sharply.  There were also dense adhesions between 2 loops of small bowel, approximately 35 cm apart, and her left-sided pelvic mass.  Blunt and sharp dissection was used to free the small bowel off of the pelvic mass.  These 2 locations of small bowel did not appear healthy and I was concerned about postoperative necrosis and a leak at these locations.  They would later be resected.  I continued to run the small bowel proximally.  There was 1 more band like adhesion between the mid jejunum and the pelvic mass where there was an abrupt caliber change in the small bowel.  Once this was divided, the remaining small bowel was able to be run proximally all the way to ligament of Treitz.  The left pelvic mass was examined.  There were necrotic areas where the small bowel had been attached which were oozing blood.  It was on a narrow pedicle and I was concerned about it possibly volvulizing and becoming ischemic.  I elected to resect this mass which was slightly bigger than a softball at the pedicle using a LigaSure device.  It was in very close proximity to the fallopian tube and fimbria.  Unclear if this was ovarian or uterine in nature.  The mass was then passed off as specimen.  Attention was turned back to the two concerning locations of small bowel.  I elected to resect each segment.  As they were approximately 35 cm apart,  I did not feel a single resection and anastomosis would be appropriate.  Windows in the mesentery on either side of the dusky segments of small  bowel were made bluntly.  A blue staple load was used to divide both the afferent and efferent limbs of small bowel x2.  The intervening segments of mesentery were then divided with the LigaSure device.  Enterotomies were made using electrocautery and both limbs of the small bowel.  A common channel was created with an additional blue staple load for each anastomosis.  Both common enterotomies were then hand sewn with multiple interrupted 3-0 Vicryl sutures.  The small bowel was again run from ligament of Treitz down to terminal ileum.  All interloop adhesions had been freed.  Hemostasis was confirmed within the abdominal cavity.  The NG tube was confirmed to be in appropriate location by palpation.  Tap blocks were performed using Marcaine. Midline fascia was then closed with 2 looped 0 PDS sutures which also primarily closed her hernia at the superior aspect of the incision.  Skin was then closed with 3-0 Monocryl.  Dermabond and a dressing were applied.  Patient tolerated the entire procedure without apparent complication, was extubated in the OR, brought to recovery in stable condition.  All counts were correct.    Significant Surgical Tasks Conducted by the Assistant(s), if Applicable: n/a    Estimated Blood Loss (EBL): 100 mL           Implants: * No implants in log *    Specimens:   Small bowel resection x2  Pelvic mass  Incarcerated omentum/falciform ligament and hernia sac           Condition: Good    Disposition: PACU - hemodynamically stable.    Attestation: I was present and scrubbed for the entire procedure.

## 2021-12-24 NOTE — ASSESSMENT & PLAN NOTE
Present on CT today as well as prior, per CT, states compatible with malignancy.  Unable to find in medical record where this was evaluated further

## 2021-12-24 NOTE — NURSING
Pt taken to OR via bed. at side. Pt c/o pain 8/10 stabbing to her abdomen prior to surgeon coming in.

## 2021-12-24 NOTE — HPI
"Melita Shell is a 36 y.o. female with as past medical history of a ventral incisional hernia with multiple evaluations for same, most recent on 21, as well as a post operative ileus, and past surgical history of a  section on 21 with an ex lap, who presented to night to the ED with a complaint of severe pain over the upper midline ventral hernia site.  She was recently evaluated by Dr. Gallegos, who did not find an incarcerated hernia at the time, but did educate on when to return to the ER. She states that her pain has been severe tonight at the site of the hernia. She does not feel it bulging out, but is painful at the site. She reports nausea and vomiting, but no diarrhea or constipation. She denies blood in vomit. She states the pain is localized to the upper abdomen and does not radiate, with no provoking or palliative factors.  She denies all other complaint.     Upon arrival to the ED, per ED notes, the patient was reportedly in significant pain, writhing around on stretcher. She was treated with pain medication and an antiemetic, which did help ease the pain briefly. CT of the abdomen and pelvis revealed: "Dilated loops of small bowel raising question of partial obstruction secondary to mesenteric and retroperitoneal masses in the left lower quadrant and in the pelvis essentially stable since prior exam compatible with malignancy. No other acute abnormality. Postoperative changes of hernia repair.  No evidence of abscess or drainable fluid collection around the umbilicus. Small amount of free fluid in the pelvis."  Lab work did not reveal leukocytosis or elevated lactic acid level. CMP was essentially unremarkable. She did have mild anemia, likely due to post partum status. Hospital Medicine consulted for admission and further management.      "

## 2021-12-25 LAB
ALBUMIN SERPL BCP-MCNC: 2.6 G/DL (ref 3.5–5.2)
ALP SERPL-CCNC: 53 U/L (ref 55–135)
ALT SERPL W/O P-5'-P-CCNC: 13 U/L (ref 10–44)
ANION GAP SERPL CALC-SCNC: 13 MMOL/L (ref 8–16)
AST SERPL-CCNC: 14 U/L (ref 10–40)
BASOPHILS # BLD AUTO: 0.01 K/UL (ref 0–0.2)
BASOPHILS NFR BLD: 0.2 % (ref 0–1.9)
BILIRUB SERPL-MCNC: 1 MG/DL (ref 0.1–1)
BUN SERPL-MCNC: 6 MG/DL (ref 6–20)
CALCIUM SERPL-MCNC: 7.8 MG/DL (ref 8.7–10.5)
CANCER AG19-9 SERPL-ACNC: 4.7 U/ML (ref 0–40)
CHLORIDE SERPL-SCNC: 107 MMOL/L (ref 95–110)
CO2 SERPL-SCNC: 17 MMOL/L (ref 23–29)
CREAT SERPL-MCNC: 0.7 MG/DL (ref 0.5–1.4)
DIFFERENTIAL METHOD: ABNORMAL
EOSINOPHIL # BLD AUTO: 0.1 K/UL (ref 0–0.5)
EOSINOPHIL NFR BLD: 0.8 % (ref 0–8)
ERYTHROCYTE [DISTWIDTH] IN BLOOD BY AUTOMATED COUNT: 20.3 % (ref 11.5–14.5)
EST. GFR  (AFRICAN AMERICAN): >60 ML/MIN/1.73 M^2
EST. GFR  (NON AFRICAN AMERICAN): >60 ML/MIN/1.73 M^2
GLUCOSE SERPL-MCNC: 77 MG/DL (ref 70–110)
HCT VFR BLD AUTO: 31.8 % (ref 37–48.5)
HGB BLD-MCNC: 10 G/DL (ref 12–16)
IMM GRANULOCYTES # BLD AUTO: 0.01 K/UL (ref 0–0.04)
IMM GRANULOCYTES NFR BLD AUTO: 0.2 % (ref 0–0.5)
LYMPHOCYTES # BLD AUTO: 0.6 K/UL (ref 1–4.8)
LYMPHOCYTES NFR BLD: 8.4 % (ref 18–48)
MCH RBC QN AUTO: 24.6 PG (ref 27–31)
MCHC RBC AUTO-ENTMCNC: 31.4 G/DL (ref 32–36)
MCV RBC AUTO: 78 FL (ref 82–98)
MONOCYTES # BLD AUTO: 0.3 K/UL (ref 0.3–1)
MONOCYTES NFR BLD: 4.1 % (ref 4–15)
NEUTROPHILS # BLD AUTO: 5.8 K/UL (ref 1.8–7.7)
NEUTROPHILS NFR BLD: 86.3 % (ref 38–73)
NRBC BLD-RTO: 0 /100 WBC
PLATELET # BLD AUTO: 354 K/UL (ref 150–450)
PMV BLD AUTO: 9.2 FL (ref 9.2–12.9)
POTASSIUM SERPL-SCNC: 3.7 MMOL/L (ref 3.5–5.1)
PROT SERPL-MCNC: 6.5 G/DL (ref 6–8.4)
RBC # BLD AUTO: 4.07 M/UL (ref 4–5.4)
SODIUM SERPL-SCNC: 137 MMOL/L (ref 136–145)
WBC # BLD AUTO: 6.65 K/UL (ref 3.9–12.7)

## 2021-12-25 PROCEDURE — 80053 COMPREHEN METABOLIC PANEL: CPT | Performed by: NURSE PRACTITIONER

## 2021-12-25 PROCEDURE — 25000003 PHARM REV CODE 250: Performed by: STUDENT IN AN ORGANIZED HEALTH CARE EDUCATION/TRAINING PROGRAM

## 2021-12-25 PROCEDURE — 12000002 HC ACUTE/MED SURGE SEMI-PRIVATE ROOM

## 2021-12-25 PROCEDURE — 63600175 PHARM REV CODE 636 W HCPCS: Performed by: NURSE PRACTITIONER

## 2021-12-25 PROCEDURE — 36415 COLL VENOUS BLD VENIPUNCTURE: CPT | Performed by: NURSE PRACTITIONER

## 2021-12-25 PROCEDURE — 94761 N-INVAS EAR/PLS OXIMETRY MLT: CPT

## 2021-12-25 PROCEDURE — 63600175 PHARM REV CODE 636 W HCPCS: Performed by: STUDENT IN AN ORGANIZED HEALTH CARE EDUCATION/TRAINING PROGRAM

## 2021-12-25 PROCEDURE — 25000003 PHARM REV CODE 250: Performed by: NURSE PRACTITIONER

## 2021-12-25 PROCEDURE — 99900035 HC TECH TIME PER 15 MIN (STAT)

## 2021-12-25 PROCEDURE — 85025 COMPLETE CBC W/AUTO DIFF WBC: CPT | Performed by: NURSE PRACTITIONER

## 2021-12-25 RX ORDER — HYDROMORPHONE HYDROCHLORIDE 2 MG/ML
1 INJECTION, SOLUTION INTRAMUSCULAR; INTRAVENOUS; SUBCUTANEOUS
Status: DISCONTINUED | OUTPATIENT
Start: 2021-12-25 | End: 2021-12-25

## 2021-12-25 RX ORDER — HYDROMORPHONE HYDROCHLORIDE 1 MG/ML
0.5 INJECTION, SOLUTION INTRAMUSCULAR; INTRAVENOUS; SUBCUTANEOUS
Status: DISCONTINUED | OUTPATIENT
Start: 2021-12-25 | End: 2021-12-25

## 2021-12-25 RX ORDER — HYDROMORPHONE HYDROCHLORIDE 2 MG/ML
1 INJECTION, SOLUTION INTRAMUSCULAR; INTRAVENOUS; SUBCUTANEOUS
Status: DISCONTINUED | OUTPATIENT
Start: 2021-12-25 | End: 2021-12-29 | Stop reason: HOSPADM

## 2021-12-25 RX ORDER — OXYCODONE AND ACETAMINOPHEN 5; 325 MG/1; MG/1
2 TABLET ORAL EVERY 4 HOURS PRN
Status: DISCONTINUED | OUTPATIENT
Start: 2021-12-25 | End: 2021-12-29 | Stop reason: HOSPADM

## 2021-12-25 RX ORDER — OXYCODONE AND ACETAMINOPHEN 5; 325 MG/1; MG/1
1 TABLET ORAL EVERY 4 HOURS PRN
Status: DISCONTINUED | OUTPATIENT
Start: 2021-12-25 | End: 2021-12-29 | Stop reason: HOSPADM

## 2021-12-25 RX ADMIN — SODIUM CHLORIDE: 0.9 INJECTION, SOLUTION INTRAVENOUS at 12:12

## 2021-12-25 RX ADMIN — SODIUM CHLORIDE: 0.9 INJECTION, SOLUTION INTRAVENOUS at 02:12

## 2021-12-25 RX ADMIN — KETOROLAC TROMETHAMINE 30 MG: 30 INJECTION, SOLUTION INTRAMUSCULAR; INTRAVENOUS at 04:12

## 2021-12-25 RX ADMIN — MUPIROCIN: 20 OINTMENT TOPICAL at 08:12

## 2021-12-25 RX ADMIN — SODIUM CHLORIDE: 0.9 INJECTION, SOLUTION INTRAVENOUS at 10:12

## 2021-12-25 RX ADMIN — HYDROMORPHONE HYDROCHLORIDE 1 MG: 2 INJECTION, SOLUTION INTRAMUSCULAR; INTRAVENOUS; SUBCUTANEOUS at 02:12

## 2021-12-25 RX ADMIN — ACETAMINOPHEN 650 MG: 325 TABLET ORAL at 12:12

## 2021-12-25 RX ADMIN — KETOROLAC TROMETHAMINE 30 MG: 30 INJECTION, SOLUTION INTRAMUSCULAR; INTRAVENOUS at 10:12

## 2021-12-25 RX ADMIN — MUPIROCIN: 20 OINTMENT TOPICAL at 10:12

## 2021-12-25 RX ADMIN — OXYCODONE HYDROCHLORIDE AND ACETAMINOPHEN 2 TABLET: 5; 325 TABLET ORAL at 08:12

## 2021-12-25 RX ADMIN — HYDROMORPHONE HYDROCHLORIDE 1 MG: 2 INJECTION, SOLUTION INTRAMUSCULAR; INTRAVENOUS; SUBCUTANEOUS at 07:12

## 2021-12-25 RX ADMIN — ENOXAPARIN SODIUM 40 MG: 40 INJECTION SUBCUTANEOUS at 04:12

## 2021-12-25 NOTE — ASSESSMENT & PLAN NOTE
Patient's anemia is currently controlled. Has not received any PRBCs to date.. Etiology likely d/t recent pregnancy and childbirth  Current CBC reviewed-   Lab Results   Component Value Date    HGB 10.0 (L) 12/25/2021    HCT 31.8 (L) 12/25/2021     Monitor serial CBC and transfuse if patient becomes hemodynamically unstable, symptomatic or H/H drops below 7/21.

## 2021-12-25 NOTE — CARE UPDATE
12/25/21 0716   Patient Assessment/Suction   Level of Consciousness (AVPU) alert   Respiratory Effort Normal;Unlabored   Expansion/Accessory Muscles/Retractions no use of accessory muscles   Rhythm/Pattern, Respiratory unlabored;pattern regular;depth regular   Cough Frequency no cough   PRE-TX-O2   O2 Device (Oxygen Therapy) room air   SpO2 98 %   Pulse Oximetry Type Intermittent   $ Pulse Oximetry - Multiple Charge Pulse Oximetry - Multiple   Pulse 95   Resp 18   Aerosol Therapy   $ Aerosol Therapy Charges PRN treatment not required   Respiratory Treatment Status (SVN) PRN treatment not required

## 2021-12-25 NOTE — CARE UPDATE
12/24/21 1937   Patient Assessment/Suction   Level of Consciousness (AVPU) alert   Respiratory Effort Normal;Unlabored   Expansion/Accessory Muscles/Retractions expansion symmetric;no retractions;no use of accessory muscles   PRE-TX-O2   O2 Device (Oxygen Therapy) room air   SpO2 97 %   Pulse Oximetry Type Intermittent   Aerosol Therapy   $ Aerosol Therapy Charges PRN treatment not required   Respiratory Treatment Status (SVN) PRN treatment not required

## 2021-12-25 NOTE — ASSESSMENT & PLAN NOTE
POD 1 s/p ex lap for SBO, hernia repair and pelvic mass removal with Dr. Gallegos  Continue to follow surgery recommendations.  Needs aggressive incentive spirometry.  Follow hemoglobin and hematocrit closely.  Pain control with IV narcotics and antiemetics as needed.  lovenox for DVT prophylaxis

## 2021-12-25 NOTE — SUBJECTIVE & OBJECTIVE
Interval History: notes reviewed, no acute events overnight. Pt reports post op abd pain which she currently rates as 6/10 intensity, aching quality, worsens with movement, improves with medication.  She states the pain medication is helping but it is not lasting long enough and last approximately 2 hours.  Advised patient will adjust timing of medication for better pain control until she can tolerate p.o..  Patient verbalized understanding and appreciative of care.  Spouse present at bedside all questions answered.    Review of Systems   Constitutional: Negative for chills, fatigue and fever.   HENT: Negative for congestion, sore throat and trouble swallowing.    Respiratory: Negative for cough and shortness of breath.    Cardiovascular: Negative for chest pain and leg swelling.   Gastrointestinal: Positive for abdominal pain. Negative for diarrhea, nausea and vomiting.   Genitourinary: Negative for difficulty urinating, dysuria and urgency.   Musculoskeletal: Negative for arthralgias, back pain and gait problem.   Skin: Negative for color change, pallor, rash and wound.   Neurological: Negative for dizziness, weakness and light-headedness.   Hematological: Negative for adenopathy.   Psychiatric/Behavioral: Negative for agitation, behavioral problems and confusion.   All other systems reviewed and are negative.    Objective:     Vital Signs (Most Recent):  Temp: 99.5 °F (37.5 °C) (12/25/21 1126)  Pulse: 80 (12/25/21 1126)  Resp: 16 (12/25/21 1126)  BP: (!) 145/74 (12/25/21 1126)  SpO2: 97 % (12/25/21 1126) Vital Signs (24h Range):  Temp:  [97.9 °F (36.6 °C)-99.5 °F (37.5 °C)] 99.5 °F (37.5 °C)  Pulse:  [64-96] 80  Resp:  [12-20] 16  SpO2:  [92 %-100 %] 97 %  BP: (125-167)/(59-86) 145/74     Weight: 73.4 kg (161 lb 13.1 oz)  Body mass index is 29.6 kg/m².    Intake/Output Summary (Last 24 hours) at 12/25/2021 1200  Last data filed at 12/25/2021 0720  Gross per 24 hour   Intake 3564.97 ml   Output 2305 ml   Net  1259.97 ml      Physical Exam  Vitals and nursing note reviewed.   Constitutional:       General: She is not in acute distress.     Appearance: Normal appearance. She is well-developed and well-nourished. She is not ill-appearing or diaphoretic.   HENT:      Head: Normocephalic and atraumatic.      Right Ear: External ear normal.      Left Ear: External ear normal.      Nose: Nose normal. No congestion or rhinorrhea.      Comments: NG tube in place     Mouth/Throat:      Mouth: Oropharynx is clear and moist. Mucous membranes are moist.      Pharynx: Oropharynx is clear. No oropharyngeal exudate or posterior oropharyngeal erythema.   Eyes:      Extraocular Movements: EOM normal.      Conjunctiva/sclera: Conjunctivae normal.      Pupils: Pupils are equal, round, and reactive to light.   Neck:      Vascular: No JVD.   Cardiovascular:      Rate and Rhythm: Normal rate and regular rhythm.      Pulses: Normal pulses.      Heart sounds: Normal heart sounds. No murmur heard.      Pulmonary:      Effort: Pulmonary effort is normal. No respiratory distress.      Breath sounds: Normal breath sounds. No stridor. No wheezing, rhonchi or rales.   Abdominal:      General: There is no distension.      Palpations: Abdomen is soft.      Tenderness: There is abdominal tenderness.      Comments: Hypoactive bowel sounds.  Mid abdominal incision covered with dressing clean dry intact.  Expected postop tenderness to abdomen   Genitourinary:     Comments: Romeo draining clear yellow urine  Musculoskeletal:         General: No edema. Normal range of motion.      Cervical back: Normal range of motion and neck supple.   Skin:     General: Skin is warm and dry.      Capillary Refill: Capillary refill takes 2 to 3 seconds.   Neurological:      General: No focal deficit present.      Mental Status: She is alert and oriented to person, place, and time.      Cranial Nerves: No cranial nerve deficit.      Sensory: No sensory deficit.   Psychiatric:          Mood and Affect: Mood and affect normal.         Behavior: Behavior normal.         Thought Content: Thought content normal.         Significant Labs:   All pertinent labs within the past 24 hours have been reviewed.  CBC:   Recent Labs   Lab 12/23/21 2104 12/25/21  1010   WBC 9.69 6.65   HGB 11.2* 10.0*   HCT 33.8* 31.8*    354     CMP:   Recent Labs   Lab 12/23/21 2104 12/25/21  1010    137   K 3.5 3.7    107   CO2 18* 17*   * 77   BUN 11 6   CREATININE 0.8 0.7   CALCIUM 9.3 7.8*   PROT 8.5* 6.5   ALBUMIN 3.5 2.6*   BILITOT 0.8 1.0   ALKPHOS 74 53*   AST 15 14   ALT 19 13   ANIONGAP 15 13   EGFRNONAA >60 >60       Significant Imaging: I have reviewed all pertinent imaging results/findings within the past 24 hours.

## 2021-12-25 NOTE — PROGRESS NOTES
Patient seen and examined.  No nausea.  No flatus.  Pain seems to be adequately controlled.    Vital stable  Abdomen soft, appropriately tender  Dressing clean and dry  No NG output    Labs stable    Postop day 1 ex lap with excision of pelvic mass of uterine or ovarian pathology, adhesiolysis, small-bowel resection x2 with primary anastomosis, repair of incarcerated and strangulated incisional hernia.  Doing well.    NG tube was removed at bedside  DC Romeo  Sips of clear liquids okay  Transition to p.o. pain medicine  Ambulate

## 2021-12-25 NOTE — PROGRESS NOTES
"[facility]  Progress Note    Patient Name: Melita Shell  MRN: [id number]  Patient Class: IP- Inpatient   Admission Date: 2021  Length of Stay: 1 days  Attending Physician: Glenn Morelos MD  Primary Care Provider: Claudia Barajas MD        Subjective:     Principal Problem:Partial small bowel obstruction        HPI:  Melita Shell is a 36 y.o. female with as past medical history of a ventral incisional hernia with multiple evaluations for same, most recent on 21, as well as a post operative ileus, and past surgical history of a  section on 21 with an ex lap, who presented to night to the ED with a complaint of severe pain over the upper midline ventral hernia site.  She was recently evaluated by Dr. Gallegos, who did not find an incarcerated hernia at the time, but did educate on when to return to the ER. She states that her pain has been severe tonight at the site of the hernia. She does not feel it bulging out, but is painful at the site. She reports nausea and vomiting, but no diarrhea or constipation. She denies blood in vomit. She states the pain is localized to the upper abdomen and does not radiate, with no provoking or palliative factors.  She denies all other complaint.     Upon arrival to the ED, per ED notes, the patient was reportedly in significant pain, writhing around on stretcher. She was treated with pain medication and an antiemetic, which did help ease the pain briefly. CT of the abdomen and pelvis revealed: "Dilated loops of small bowel raising question of partial obstruction secondary to mesenteric and retroperitoneal masses in the left lower quadrant and in the pelvis essentially stable since prior exam compatible with malignancy. No other acute abnormality. Postoperative changes of hernia repair.  No evidence of abscess or drainable fluid collection around the umbilicus. Small amount of free fluid in the pelvis."  Lab work " did not reveal leukocytosis or elevated lactic acid level. CMP was essentially unremarkable. She did have mild anemia, likely due to post partum status. Hospital Medicine consulted for admission and further management.          Overview/Hospital Course:  No notes on file    Interval History: notes reviewed, no acute events overnight. Pt reports post op abd pain which she currently rates as 6/10 intensity, aching quality, worsens with movement, improves with medication.  She states the pain medication is helping but it is not lasting long enough and last approximately 2 hours.  Advised patient will adjust timing of medication for better pain control until she can tolerate p.o..  Patient verbalized understanding and appreciative of care.  Spouse present at bedside all questions answered.    Review of Systems   Constitutional: Negative for chills, fatigue and fever.   HENT: Negative for congestion, sore throat and trouble swallowing.    Respiratory: Negative for cough and shortness of breath.    Cardiovascular: Negative for chest pain and leg swelling.   Gastrointestinal: Positive for abdominal pain. Negative for diarrhea, nausea and vomiting.   Genitourinary: Negative for difficulty urinating, dysuria and urgency.   Musculoskeletal: Negative for arthralgias, back pain and gait problem.   Skin: Negative for color change, pallor, rash and wound.   Neurological: Negative for dizziness, weakness and light-headedness.   Hematological: Negative for adenopathy.   Psychiatric/Behavioral: Negative for agitation, behavioral problems and confusion.   All other systems reviewed and are negative.    Objective:     Vital Signs (Most Recent):  Temp: 99.5 °F (37.5 °C) (12/25/21 1126)  Pulse: 80 (12/25/21 1126)  Resp: 16 (12/25/21 1126)  BP: (!) 145/74 (12/25/21 1126)  SpO2: 97 % (12/25/21 1126) Vital Signs (24h Range):  Temp:  [97.9 °F (36.6 °C)-99.5 °F (37.5 °C)] 99.5 °F (37.5 °C)  Pulse:  [64-96] 80  Resp:  [12-20] 16  SpO2:  [92  %-100 %] 97 %  BP: (125-167)/(59-86) 145/74     Weight: 73.4 kg (161 lb 13.1 oz)  Body mass index is 29.6 kg/m².    Intake/Output Summary (Last 24 hours) at 12/25/2021 1200  Last data filed at 12/25/2021 0720  Gross per 24 hour   Intake 3564.97 ml   Output 2305 ml   Net 1259.97 ml      Physical Exam  Vitals and nursing note reviewed.   Constitutional:       General: She is not in acute distress.     Appearance: Normal appearance. She is well-developed and well-nourished. She is not ill-appearing or diaphoretic.   HENT:      Head: Normocephalic and atraumatic.      Right Ear: External ear normal.      Left Ear: External ear normal.      Nose: Nose normal. No congestion or rhinorrhea.      Comments: NG tube in place     Mouth/Throat:      Mouth: Oropharynx is clear and moist. Mucous membranes are moist.      Pharynx: Oropharynx is clear. No oropharyngeal exudate or posterior oropharyngeal erythema.   Eyes:      Extraocular Movements: EOM normal.      Conjunctiva/sclera: Conjunctivae normal.      Pupils: Pupils are equal, round, and reactive to light.   Neck:      Vascular: No JVD.   Cardiovascular:      Rate and Rhythm: Normal rate and regular rhythm.      Pulses: Normal pulses.      Heart sounds: Normal heart sounds. No murmur heard.      Pulmonary:      Effort: Pulmonary effort is normal. No respiratory distress.      Breath sounds: Normal breath sounds. No stridor. No wheezing, rhonchi or rales.   Abdominal:      General: There is no distension.      Palpations: Abdomen is soft.      Tenderness: There is abdominal tenderness.      Comments: Hypoactive bowel sounds.  Mid abdominal incision covered with dressing clean dry intact.  Expected postop tenderness to abdomen   Genitourinary:     Comments: Romeo draining clear yellow urine  Musculoskeletal:         General: No edema. Normal range of motion.      Cervical back: Normal range of motion and neck supple.   Skin:     General: Skin is warm and dry.      Capillary  Refill: Capillary refill takes 2 to 3 seconds.   Neurological:      General: No focal deficit present.      Mental Status: She is alert and oriented to person, place, and time.      Cranial Nerves: No cranial nerve deficit.      Sensory: No sensory deficit.   Psychiatric:         Mood and Affect: Mood and affect normal.         Behavior: Behavior normal.         Thought Content: Thought content normal.         Significant Labs:   All pertinent labs within the past 24 hours have been reviewed.  CBC:   Recent Labs   Lab 12/23/21  2104 12/25/21  1010   WBC 9.69 6.65   HGB 11.2* 10.0*   HCT 33.8* 31.8*    354     CMP:   Recent Labs   Lab 12/23/21  2104 12/25/21  1010    137   K 3.5 3.7    107   CO2 18* 17*   * 77   BUN 11 6   CREATININE 0.8 0.7   CALCIUM 9.3 7.8*   PROT 8.5* 6.5   ALBUMIN 3.5 2.6*   BILITOT 0.8 1.0   ALKPHOS 74 53*   AST 15 14   ALT 19 13   ANIONGAP 15 13   EGFRNONAA >60 >60       Significant Imaging: I have reviewed all pertinent imaging results/findings within the past 24 hours.      Assessment/Plan:      * Partial small bowel obstruction  POD 1 s/p ex lap for SBO, hernia repair and pelvic mass removal with Dr. Gallegos  Continue to follow surgery recommendations.  Needs aggressive incentive spirometry.  Follow hemoglobin and hematocrit closely.  Pain control with IV narcotics and antiemetics as needed.  lovenox for DVT prophylaxis      Intraabdominal mass  S/p ex lap and removal of pelvic mass 12/24 with Dr. Gallegos        Mild intermittent asthma without complication  Noted, chronic  Nebs PRN      Microcytic anemia  Patient's anemia is currently controlled. Has not received any PRBCs to date.. Etiology likely d/t recent pregnancy and childbirth  Current CBC reviewed-   Lab Results   Component Value Date    HGB 10.0 (L) 12/25/2021    HCT 31.8 (L) 12/25/2021     Monitor serial CBC and transfuse if patient becomes hemodynamically unstable, symptomatic or H/H drops below 7/21.            VTE Risk Mitigation (From admission, onward)         Ordered     enoxaparin injection 40 mg  Daily         12/23/21 2311     IP VTE HIGH RISK PATIENT  Once         12/23/21 2311     Place sequential compression device  Until discontinued         12/23/21 2311                Discharge Planning   SAM:      Code Status: Full Code   Is the patient medically ready for discharge?:     Reason for patient still in hospital (select all that apply): Patient trending condition, Treatment and Consult recommendations  Discharge Plan A: Home with family                  Ines De La Paz NP  Department of Hospital Medicine   [facility]

## 2021-12-25 NOTE — HOSPITAL COURSE
Patient admitted for abdominal pain and small-bowel obstruction.  She was a placed NPO and evaluated by General surgery.  She underwent ex lap on 12/24 with excision of pelvic mass of uterine or ovarian pathology, adhesiolysis, small-bowel resection x2 with primary anastomosis, repair of incarcerated and strangulated incisional hernia by Dr. Gallegos.  NG tube was placed postoperatively and was removed on 12/25 by Dr. Gallegos.  She required IV pain medicine for pain control.  She was monitored closely postoperatively.  Labs were monitored and remained stable.

## 2021-12-25 NOTE — PLAN OF CARE
Patient alert and oriented resting in bed. NAD. Denies SOB. C/o abd pain, prn meds given, VSS. No tele, NGT to low interm. Suction. Romeo to gravity. IS preformed while awake. Room air.  at bedside throughout shift. Bed alarm active. Plan of care reviewed with patient. Verbalizes understanding.Call light in reach. Pt free from fall or injury. Will monitor.

## 2021-12-25 NOTE — PLAN OF CARE
Problem: Adult Inpatient Plan of Care  Goal: Patient-Specific Goal (Individualized)  Outcome: Ongoing, Progressing  Goal: Absence of Hospital-Acquired Illness or Injury  Outcome: Ongoing, Progressing     Problem: Adult Inpatient Plan of Care  Goal: Absence of Hospital-Acquired Illness or Injury  Outcome: Ongoing, Progressing     Problem: Infection  Goal: Absence of Infection Signs and Symptoms  Outcome: Ongoing, Progressing     Problem: Pain Acute  Goal: Acceptable Pain Control and Functional Ability  Outcome: Ongoing, Progressing     Problem: Surgical Site Infection  Goal: Absence of Infection Signs and Symptoms  Outcome: Ongoing, Progressing

## 2021-12-25 NOTE — PLAN OF CARE
Pt has been sleeping off and on since returning from recovery. Discussed with pt the need of using IS to prevent pneumonia and temperature elevations. Pt stated understanding.  at bedside agrees to help encourage her. Pt is aware of using a pillow to her splint her abdomen with coughing and sneezing. Pt aware that she will need a pillow to put on her abdomen before holding her 3 month old baby.  at side and is in agreement. Pt understands that she will have the conte catheter for a day before getting it removed.

## 2021-12-26 PROBLEM — R10.9 ABDOMINAL PAIN: Status: RESOLVED | Noted: 2021-06-24 | Resolved: 2021-12-26

## 2021-12-26 PROBLEM — O34.29 PREGNANCY WITH HISTORY OF UTERINE MYOMECTOMY: Status: RESOLVED | Noted: 2021-03-18 | Resolved: 2021-12-26

## 2021-12-26 PROBLEM — R19.00 INTRAABDOMINAL MASS: Status: RESOLVED | Noted: 2021-12-24 | Resolved: 2021-12-26

## 2021-12-26 PROBLEM — K56.600 PARTIAL SMALL BOWEL OBSTRUCTION: Status: RESOLVED | Noted: 2021-12-23 | Resolved: 2021-12-26

## 2021-12-26 PROBLEM — O47.00 THREATENED PRETERM LABOR: Status: RESOLVED | Noted: 2021-06-24 | Resolved: 2021-12-26

## 2021-12-26 PROBLEM — O09.512 AMA (ADVANCED MATERNAL AGE) PRIMIGRAVIDA 35+, SECOND TRIMESTER: Status: RESOLVED | Noted: 2021-05-13 | Resolved: 2021-12-26

## 2021-12-26 PROBLEM — Z67.91 RH NEGATIVE STATE IN ANTEPARTUM PERIOD: Status: RESOLVED | Noted: 2021-01-29 | Resolved: 2021-12-26

## 2021-12-26 PROBLEM — Z98.891 S/P CESAREAN SECTION: Status: RESOLVED | Noted: 2021-08-20 | Resolved: 2021-12-26

## 2021-12-26 PROBLEM — O99.013 ANEMIA DURING PREGNANCY IN THIRD TRIMESTER: Status: RESOLVED | Noted: 2021-06-24 | Resolved: 2021-12-26

## 2021-12-26 PROBLEM — O26.899 RH NEGATIVE STATE IN ANTEPARTUM PERIOD: Status: RESOLVED | Noted: 2021-01-29 | Resolved: 2021-12-26

## 2021-12-26 PROBLEM — K91.89 POSTOPERATIVE ILEUS: Status: RESOLVED | Noted: 2021-08-25 | Resolved: 2021-12-26

## 2021-12-26 PROBLEM — K56.7 POSTOPERATIVE ILEUS: Status: RESOLVED | Noted: 2021-08-25 | Resolved: 2021-12-26

## 2021-12-26 PROBLEM — O09.513 SUPERVISION OF HIGH RISK ELDERLY PRIMIGRAVIDA IN THIRD TRIMESTER: Status: RESOLVED | Noted: 2021-08-20 | Resolved: 2021-12-26

## 2021-12-26 PROBLEM — R74.01 TRANSAMINITIS: Status: RESOLVED | Noted: 2021-06-28 | Resolved: 2021-12-26

## 2021-12-26 PROBLEM — J45.20 MILD INTERMITTENT ASTHMA WITHOUT COMPLICATION: Status: RESOLVED | Noted: 2021-06-24 | Resolved: 2021-12-26

## 2021-12-26 LAB
ALBUMIN SERPL BCP-MCNC: 2.6 G/DL (ref 3.5–5.2)
ALP SERPL-CCNC: 53 U/L (ref 55–135)
ALT SERPL W/O P-5'-P-CCNC: 13 U/L (ref 10–44)
ANION GAP SERPL CALC-SCNC: 10 MMOL/L (ref 8–16)
AST SERPL-CCNC: 13 U/L (ref 10–40)
BASOPHILS # BLD AUTO: 0.01 K/UL (ref 0–0.2)
BASOPHILS NFR BLD: 0.2 % (ref 0–1.9)
BILIRUB SERPL-MCNC: 1 MG/DL (ref 0.1–1)
BUN SERPL-MCNC: 4 MG/DL (ref 6–20)
CALCIUM SERPL-MCNC: 7.9 MG/DL (ref 8.7–10.5)
CHLORIDE SERPL-SCNC: 105 MMOL/L (ref 95–110)
CO2 SERPL-SCNC: 20 MMOL/L (ref 23–29)
CREAT SERPL-MCNC: 0.7 MG/DL (ref 0.5–1.4)
DIFFERENTIAL METHOD: ABNORMAL
EOSINOPHIL # BLD AUTO: 0.2 K/UL (ref 0–0.5)
EOSINOPHIL NFR BLD: 3.6 % (ref 0–8)
ERYTHROCYTE [DISTWIDTH] IN BLOOD BY AUTOMATED COUNT: 20.1 % (ref 11.5–14.5)
EST. GFR  (AFRICAN AMERICAN): >60 ML/MIN/1.73 M^2
EST. GFR  (NON AFRICAN AMERICAN): >60 ML/MIN/1.73 M^2
GLUCOSE SERPL-MCNC: 88 MG/DL (ref 70–110)
HCT VFR BLD AUTO: 29.2 % (ref 37–48.5)
HGB BLD-MCNC: 9.3 G/DL (ref 12–16)
IMM GRANULOCYTES # BLD AUTO: 0.01 K/UL (ref 0–0.04)
IMM GRANULOCYTES NFR BLD AUTO: 0.2 % (ref 0–0.5)
LYMPHOCYTES # BLD AUTO: 0.5 K/UL (ref 1–4.8)
LYMPHOCYTES NFR BLD: 11.7 % (ref 18–48)
MCH RBC QN AUTO: 25.3 PG (ref 27–31)
MCHC RBC AUTO-ENTMCNC: 31.8 G/DL (ref 32–36)
MCV RBC AUTO: 79 FL (ref 82–98)
MONOCYTES # BLD AUTO: 0.3 K/UL (ref 0.3–1)
MONOCYTES NFR BLD: 6.8 % (ref 4–15)
NEUTROPHILS # BLD AUTO: 3.4 K/UL (ref 1.8–7.7)
NEUTROPHILS NFR BLD: 77.5 % (ref 38–73)
NRBC BLD-RTO: 0 /100 WBC
PLATELET # BLD AUTO: 328 K/UL (ref 150–450)
PMV BLD AUTO: 9.9 FL (ref 9.2–12.9)
POTASSIUM SERPL-SCNC: 3.6 MMOL/L (ref 3.5–5.1)
PROT SERPL-MCNC: 6.5 G/DL (ref 6–8.4)
RBC # BLD AUTO: 3.68 M/UL (ref 4–5.4)
SODIUM SERPL-SCNC: 135 MMOL/L (ref 136–145)
WBC # BLD AUTO: 4.43 K/UL (ref 3.9–12.7)

## 2021-12-26 PROCEDURE — 25000003 PHARM REV CODE 250: Performed by: STUDENT IN AN ORGANIZED HEALTH CARE EDUCATION/TRAINING PROGRAM

## 2021-12-26 PROCEDURE — 80053 COMPREHEN METABOLIC PANEL: CPT | Performed by: NURSE PRACTITIONER

## 2021-12-26 PROCEDURE — 99900035 HC TECH TIME PER 15 MIN (STAT)

## 2021-12-26 PROCEDURE — 63600175 PHARM REV CODE 636 W HCPCS: Performed by: NURSE PRACTITIONER

## 2021-12-26 PROCEDURE — 94761 N-INVAS EAR/PLS OXIMETRY MLT: CPT

## 2021-12-26 PROCEDURE — 25000003 PHARM REV CODE 250: Performed by: NURSE PRACTITIONER

## 2021-12-26 PROCEDURE — 12000002 HC ACUTE/MED SURGE SEMI-PRIVATE ROOM

## 2021-12-26 PROCEDURE — 85025 COMPLETE CBC W/AUTO DIFF WBC: CPT | Performed by: NURSE PRACTITIONER

## 2021-12-26 PROCEDURE — 63600175 PHARM REV CODE 636 W HCPCS: Performed by: STUDENT IN AN ORGANIZED HEALTH CARE EDUCATION/TRAINING PROGRAM

## 2021-12-26 PROCEDURE — 36415 COLL VENOUS BLD VENIPUNCTURE: CPT | Performed by: NURSE PRACTITIONER

## 2021-12-26 RX ADMIN — MUPIROCIN: 20 OINTMENT TOPICAL at 09:12

## 2021-12-26 RX ADMIN — OXYCODONE HYDROCHLORIDE AND ACETAMINOPHEN 2 TABLET: 5; 325 TABLET ORAL at 03:12

## 2021-12-26 RX ADMIN — KETOROLAC TROMETHAMINE 30 MG: 30 INJECTION, SOLUTION INTRAMUSCULAR; INTRAVENOUS at 09:12

## 2021-12-26 RX ADMIN — ACETAMINOPHEN 650 MG: 325 TABLET ORAL at 08:12

## 2021-12-26 RX ADMIN — OXYCODONE HYDROCHLORIDE AND ACETAMINOPHEN 2 TABLET: 5; 325 TABLET ORAL at 12:12

## 2021-12-26 RX ADMIN — OXYCODONE HYDROCHLORIDE AND ACETAMINOPHEN 2 TABLET: 5; 325 TABLET ORAL at 04:12

## 2021-12-26 RX ADMIN — SODIUM CHLORIDE: 0.9 INJECTION, SOLUTION INTRAVENOUS at 08:12

## 2021-12-26 RX ADMIN — MUPIROCIN: 20 OINTMENT TOPICAL at 08:12

## 2021-12-26 RX ADMIN — KETOROLAC TROMETHAMINE 30 MG: 30 INJECTION, SOLUTION INTRAMUSCULAR; INTRAVENOUS at 01:12

## 2021-12-26 RX ADMIN — OXYCODONE HYDROCHLORIDE AND ACETAMINOPHEN 2 TABLET: 5; 325 TABLET ORAL at 09:12

## 2021-12-26 RX ADMIN — ENOXAPARIN SODIUM 40 MG: 40 INJECTION SUBCUTANEOUS at 04:12

## 2021-12-26 RX ADMIN — SODIUM CHLORIDE: 0.9 INJECTION, SOLUTION INTRAVENOUS at 09:12

## 2021-12-26 NOTE — PROGRESS NOTES
Pt seen and examined.  Tolerating clears. Passing flatus.  Pain controlled    Abd soft, ATTP  Mild distention    Labs stable    Ok to advance to full liquds.  Should pt become nauseated, back down to sips of clears  Ambulate  Progressing towards goals

## 2021-12-26 NOTE — PROGRESS NOTES
"[facility] Hospital Medicine  Progress Note    Patient Name: Melita Shell  MRN: [id number]  Patient Class: IP- Inpatient   Admission Date: 2021  Length of Stay: 2 days  Attending Physician: Glenn Morelos MD  Primary Care Provider: Claudia Barajas MD        Subjective:     Principal Problem:Partial small bowel obstruction        HPI:  Melita Shell is a 36 y.o. female with as past medical history of a ventral incisional hernia with multiple evaluations for same, most recent on 21, as well as a post operative ileus, and past surgical history of a  section on 21 with an ex lap, who presented to night to the ED with a complaint of severe pain over the upper midline ventral hernia site.  She was recently evaluated by Dr. Gallegos, who did not find an incarcerated hernia at the time, but did educate on when to return to the ER. She states that her pain has been severe tonight at the site of the hernia. She does not feel it bulging out, but is painful at the site. She reports nausea and vomiting, but no diarrhea or constipation. She denies blood in vomit. She states the pain is localized to the upper abdomen and does not radiate, with no provoking or palliative factors.  She denies all other complaint.     Upon arrival to the ED, per ED notes, the patient was reportedly in significant pain, writhing around on stretcher. She was treated with pain medication and an antiemetic, which did help ease the pain briefly. CT of the abdomen and pelvis revealed: "Dilated loops of small bowel raising question of partial obstruction secondary to mesenteric and retroperitoneal masses in the left lower quadrant and in the pelvis essentially stable since prior exam compatible with malignancy. No other acute abnormality. Postoperative changes of hernia repair.  No evidence of abscess or drainable fluid collection around the umbilicus. Small amount of free fluid in the pelvis."  Lab work " did not reveal leukocytosis or elevated lactic acid level. CMP was essentially unremarkable. She did have mild anemia, likely due to post partum status. Hospital Medicine consulted for admission and further management.          Overview/Hospital Course:  Patient admitted for abdominal pain and small-bowel obstruction.  She was a placed NPO and evaluated by General surgery.  She underwent ex lap on 12/24 with excision of pelvic mass of uterine or ovarian pathology, adhesiolysis, small-bowel resection x2 with primary anastomosis, repair of incarcerated and strangulated incisional hernia by Dr. Gallegos.  NG tube was placed postoperatively and was removed on 12/25 by Dr. Gallegos.  She required IV pain medicine for pain control.  She was monitored closely postoperatively.  Labs were monitored and remained stable.      Interval History: Ms CT was seen and examined. Labs and diagnostics reviewed. Pt reported that she feels better today. She denies CP/SOB, n/v and is tolerating cl liq. Reported she is walking to the BR and urinating. Pain is controlled with PO pain meds but it is a shorter period of time than the IV med controlled it.    Review of Systems   Constitutional: Positive for activity change and appetite change. Negative for chills, diaphoresis, fatigue and fever.   HENT: Negative for congestion, postnasal drip and sore throat.    Respiratory: Negative for cough and shortness of breath.    Cardiovascular: Negative for chest pain and palpitations.   Gastrointestinal: Positive for abdominal pain. Negative for diarrhea, nausea and vomiting.        Expected surgical tenderness- mild mid abd tenderness   Genitourinary: Negative for dysuria.   Musculoskeletal: Negative for arthralgias and myalgias.   Neurological: Negative for dizziness and weakness.   Psychiatric/Behavioral: Negative for agitation, behavioral problems and confusion.     Objective:     Vital Signs (Most Recent):  Temp: 99.7 °F (37.6 °C) (12/26/21  0806)  Pulse: 89 (12/26/21 0806)  Resp: 16 (12/26/21 0905)  BP: 124/72 (12/26/21 0806)  SpO2: 96 % (12/26/21 0806) Vital Signs (24h Range):  Temp:  [96.4 °F (35.8 °C)-99.7 °F (37.6 °C)] 99.7 °F (37.6 °C)  Pulse:  [] 89  Resp:  [14-18] 16  SpO2:  [96 %-99 %] 96 %  BP: (124-145)/(72-82) 124/72     Weight: 73.4 kg (161 lb 13.1 oz)  Body mass index is 29.6 kg/m².    Intake/Output Summary (Last 24 hours) at 12/26/2021 0945  Last data filed at 12/26/2021 0623  Gross per 24 hour   Intake 2259.46 ml   Output --   Net 2259.46 ml      Physical Exam  Vitals and nursing note reviewed.   Constitutional:       Appearance: She is ill-appearing.   HENT:      Head: Normocephalic and atraumatic.      Nose: Nose normal.      Mouth/Throat:      Pharynx: Oropharynx is clear.   Eyes:      Conjunctiva/sclera: Conjunctivae normal.   Cardiovascular:      Rate and Rhythm: Normal rate and regular rhythm.      Pulses: Normal pulses.      Heart sounds: Normal heart sounds.   Pulmonary:      Effort: Pulmonary effort is normal.      Breath sounds: Normal breath sounds.   Abdominal:      General: There is no distension.      Tenderness: There is abdominal tenderness. There is no guarding or rebound.      Comments: Occasional bowel sound  Mild mid abd pain.   Musculoskeletal:         General: No swelling. Normal range of motion.      Cervical back: Normal range of motion.      Right lower leg: No edema.      Left lower leg: No edema.   Skin:     General: Skin is warm.      Capillary Refill: Capillary refill takes less than 2 seconds.   Neurological:      Mental Status: She is alert and oriented to person, place, and time.   Psychiatric:         Mood and Affect: Mood normal.         Behavior: Behavior normal.         Significant Labs:   All pertinent labs within the past 24 hours have been reviewed.  CBC:   Recent Labs   Lab 12/25/21  1010 12/26/21  0438   WBC 6.65 4.43   HGB 10.0* 9.3*   HCT 31.8* 29.2*    328     CMP:   Recent Labs    Lab 12/25/21  1010 12/26/21  0438    135*   K 3.7 3.6    105   CO2 17* 20*   GLU 77 88   BUN 6 4*   CREATININE 0.7 0.7   CALCIUM 7.8* 7.9*   PROT 6.5 6.5   ALBUMIN 2.6* 2.6*   BILITOT 1.0 1.0   ALKPHOS 53* 53*   AST 14 13   ALT 13 13   ANIONGAP 13 10   EGFRNONAA >60 >60       Significant Imaging: None in last 24 hours      Assessment/Plan:      * Partial small bowel obstruction  POD 1 s/p ex lap for SBO, hernia repair and pelvic mass removal with Dr. Gallegos  Continue to follow surgery recommendations.  Needs aggressive incentive spirometry.  Follow hemoglobin and hematocrit closely.  Pain control with IV narcotics and antiemetics as needed.  lovenox for DVT prophylaxis      Intraabdominal mass  S/p ex lap and removal of pelvic mass 12/24 with Dr. Gallegos        Mild intermittent asthma without complication  Noted, chronic  Nebs PRN      Microcytic anemia  Patient's anemia is currently controlled. Has not received any PRBCs to date.. Etiology likely d/t recent pregnancy and childbirth  Current CBC reviewed-   Lab Results   Component Value Date    HGB 9.3 (L) 12/26/2021    HCT 29.2 (L) 12/26/2021     Monitor serial CBC and transfuse if patient becomes hemodynamically unstable, symptomatic or H/H drops below 7/21.           VTE Risk Mitigation (From admission, onward)         Ordered     enoxaparin injection 40 mg  Daily         12/23/21 2311     IP VTE HIGH RISK PATIENT  Once         12/23/21 2311     Place sequential compression device  Until discontinued         12/23/21 2311                Discharge Planning   SAM:      Code Status: Full Code   Is the patient medically ready for discharge?:     Reason for patient still in hospital (select all that apply): Patient trending condition, Laboratory test, Treatment and Consult recommendations  Discharge Plan A: Home with family                  Nataliia Chapin NP  Department of Hospital Medicine   [facility]

## 2021-12-26 NOTE — PLAN OF CARE
Patient awake and alert. Able to make needs known. Resp even and unlabored. POC reviewed with patient and fm. Encourage to call for assist when needed. Patient is ambulatory. No bleeding concerns noted. Dressing to abd intact. Encourage patient to call for assist if notice any usual changes or feel different. Encourage patient to report if notice any bleeding to abd. Safety measure in place. Will cont to monitor and provide care as needed.

## 2021-12-26 NOTE — CARE UPDATE
12/26/21 0701   Patient Assessment/Suction   Level of Consciousness (AVPU) alert   Respiratory Effort Normal;Unlabored   Expansion/Accessory Muscles/Retractions no use of accessory muscles   Rhythm/Pattern, Respiratory no shortness of breath reported;unlabored;pattern regular;depth regular   Cough Frequency no cough   PRE-TX-O2   O2 Device (Oxygen Therapy) room air   SpO2 98 %   Pulse Oximetry Type Intermittent   $ Pulse Oximetry - Multiple Charge Pulse Oximetry - Multiple   Pulse 100   Resp 18   Aerosol Therapy   $ Aerosol Therapy Charges PRN treatment not required   Respiratory Treatment Status (SVN) PRN treatment not required

## 2021-12-26 NOTE — SUBJECTIVE & OBJECTIVE
Interval History: Ms CT was seen and examined. Labs and diagnostics reviewed. Pt reported that she feels better today. She denies CP/SOB, n/v and is tolerating cl liq. Reported she is walking to the BR and urinating. Pain is controlled with PO pain meds but it is a shorter period of time than the IV med controlled it.    Review of Systems   Constitutional: Positive for activity change and appetite change. Negative for chills, diaphoresis, fatigue and fever.   HENT: Negative for congestion, postnasal drip and sore throat.    Respiratory: Negative for cough and shortness of breath.    Cardiovascular: Negative for chest pain and palpitations.   Gastrointestinal: Positive for abdominal pain. Negative for diarrhea, nausea and vomiting.        Expected surgical tenderness- mild mid abd tenderness   Genitourinary: Negative for dysuria.   Musculoskeletal: Negative for arthralgias and myalgias.   Neurological: Negative for dizziness and weakness.   Psychiatric/Behavioral: Negative for agitation, behavioral problems and confusion.     Objective:     Vital Signs (Most Recent):  Temp: 99.7 °F (37.6 °C) (12/26/21 0806)  Pulse: 89 (12/26/21 0806)  Resp: 16 (12/26/21 0905)  BP: 124/72 (12/26/21 0806)  SpO2: 96 % (12/26/21 0806) Vital Signs (24h Range):  Temp:  [96.4 °F (35.8 °C)-99.7 °F (37.6 °C)] 99.7 °F (37.6 °C)  Pulse:  [] 89  Resp:  [14-18] 16  SpO2:  [96 %-99 %] 96 %  BP: (124-145)/(72-82) 124/72     Weight: 73.4 kg (161 lb 13.1 oz)  Body mass index is 29.6 kg/m².    Intake/Output Summary (Last 24 hours) at 12/26/2021 0946  Last data filed at 12/26/2021 0623  Gross per 24 hour   Intake 2259.46 ml   Output --   Net 2259.46 ml      Physical Exam  Vitals and nursing note reviewed.   Constitutional:       Appearance: She is ill-appearing.   HENT:      Head: Normocephalic and atraumatic.      Nose: Nose normal.      Mouth/Throat:      Pharynx: Oropharynx is clear.   Eyes:      Conjunctiva/sclera: Conjunctivae normal.    Cardiovascular:      Rate and Rhythm: Normal rate and regular rhythm.      Pulses: Normal pulses.      Heart sounds: Normal heart sounds.   Pulmonary:      Effort: Pulmonary effort is normal.      Breath sounds: Normal breath sounds.   Abdominal:      General: There is no distension.      Tenderness: There is abdominal tenderness. There is no guarding or rebound.      Comments: Occasional bowel sound  Mild mid abd pain.   Musculoskeletal:         General: No swelling. Normal range of motion.      Cervical back: Normal range of motion.      Right lower leg: No edema.      Left lower leg: No edema.   Skin:     General: Skin is warm.      Capillary Refill: Capillary refill takes less than 2 seconds.   Neurological:      Mental Status: She is alert and oriented to person, place, and time.   Psychiatric:         Mood and Affect: Mood normal.         Behavior: Behavior normal.         Significant Labs:   All pertinent labs within the past 24 hours have been reviewed.  CBC:   Recent Labs   Lab 12/25/21  1010 12/26/21  0438   WBC 6.65 4.43   HGB 10.0* 9.3*   HCT 31.8* 29.2*    328     CMP:   Recent Labs   Lab 12/25/21  1010 12/26/21  0438    135*   K 3.7 3.6    105   CO2 17* 20*   GLU 77 88   BUN 6 4*   CREATININE 0.7 0.7   CALCIUM 7.8* 7.9*   PROT 6.5 6.5   ALBUMIN 2.6* 2.6*   BILITOT 1.0 1.0   ALKPHOS 53* 53*   AST 14 13   ALT 13 13   ANIONGAP 13 10   EGFRNONAA >60 >60       Significant Imaging: None in last 24 hours

## 2021-12-26 NOTE — ASSESSMENT & PLAN NOTE
Patient's anemia is currently controlled. Has not received any PRBCs to date.. Etiology likely d/t recent pregnancy and childbirth  Current CBC reviewed-   Lab Results   Component Value Date    HGB 9.3 (L) 12/26/2021    HCT 29.2 (L) 12/26/2021     Monitor serial CBC and transfuse if patient becomes hemodynamically unstable, symptomatic or H/H drops below 7/21.

## 2021-12-27 LAB
ALBUMIN SERPL BCP-MCNC: 2.7 G/DL (ref 3.5–5.2)
ALP SERPL-CCNC: 55 U/L (ref 55–135)
ALT SERPL W/O P-5'-P-CCNC: 13 U/L (ref 10–44)
ANION GAP SERPL CALC-SCNC: 8 MMOL/L (ref 8–16)
AST SERPL-CCNC: 17 U/L (ref 10–40)
BASOPHILS # BLD AUTO: 0.01 K/UL (ref 0–0.2)
BASOPHILS NFR BLD: 0.2 % (ref 0–1.9)
BILIRUB SERPL-MCNC: 0.6 MG/DL (ref 0.1–1)
BUN SERPL-MCNC: 3 MG/DL (ref 6–20)
CALCIUM SERPL-MCNC: 8.4 MG/DL (ref 8.7–10.5)
CHLORIDE SERPL-SCNC: 105 MMOL/L (ref 95–110)
CO2 SERPL-SCNC: 25 MMOL/L (ref 23–29)
CREAT SERPL-MCNC: 0.6 MG/DL (ref 0.5–1.4)
DIFFERENTIAL METHOD: ABNORMAL
EOSINOPHIL # BLD AUTO: 0.3 K/UL (ref 0–0.5)
EOSINOPHIL NFR BLD: 5.9 % (ref 0–8)
ERYTHROCYTE [DISTWIDTH] IN BLOOD BY AUTOMATED COUNT: 19.6 % (ref 11.5–14.5)
EST. GFR  (AFRICAN AMERICAN): >60 ML/MIN/1.73 M^2
EST. GFR  (NON AFRICAN AMERICAN): >60 ML/MIN/1.73 M^2
GLUCOSE SERPL-MCNC: 93 MG/DL (ref 70–110)
HCT VFR BLD AUTO: 28.8 % (ref 37–48.5)
HGB BLD-MCNC: 9.3 G/DL (ref 12–16)
IMM GRANULOCYTES # BLD AUTO: 0.02 K/UL (ref 0–0.04)
IMM GRANULOCYTES NFR BLD AUTO: 0.5 % (ref 0–0.5)
LYMPHOCYTES # BLD AUTO: 0.8 K/UL (ref 1–4.8)
LYMPHOCYTES NFR BLD: 17.4 % (ref 18–48)
MCH RBC QN AUTO: 25 PG (ref 27–31)
MCHC RBC AUTO-ENTMCNC: 32.3 G/DL (ref 32–36)
MCV RBC AUTO: 77 FL (ref 82–98)
MONOCYTES # BLD AUTO: 0.3 K/UL (ref 0.3–1)
MONOCYTES NFR BLD: 6.1 % (ref 4–15)
NEUTROPHILS # BLD AUTO: 3.1 K/UL (ref 1.8–7.7)
NEUTROPHILS NFR BLD: 69.9 % (ref 38–73)
NRBC BLD-RTO: 0 /100 WBC
PLATELET # BLD AUTO: 323 K/UL (ref 150–450)
PMV BLD AUTO: 9 FL (ref 9.2–12.9)
POTASSIUM SERPL-SCNC: 3.6 MMOL/L (ref 3.5–5.1)
PROT SERPL-MCNC: 7.1 G/DL (ref 6–8.4)
RBC # BLD AUTO: 3.72 M/UL (ref 4–5.4)
SODIUM SERPL-SCNC: 138 MMOL/L (ref 136–145)
WBC # BLD AUTO: 4.42 K/UL (ref 3.9–12.7)

## 2021-12-27 PROCEDURE — 36415 COLL VENOUS BLD VENIPUNCTURE: CPT | Performed by: NURSE PRACTITIONER

## 2021-12-27 PROCEDURE — 12000002 HC ACUTE/MED SURGE SEMI-PRIVATE ROOM

## 2021-12-27 PROCEDURE — 99900035 HC TECH TIME PER 15 MIN (STAT)

## 2021-12-27 PROCEDURE — 85025 COMPLETE CBC W/AUTO DIFF WBC: CPT | Performed by: NURSE PRACTITIONER

## 2021-12-27 PROCEDURE — 63600175 PHARM REV CODE 636 W HCPCS: Performed by: NURSE PRACTITIONER

## 2021-12-27 PROCEDURE — 25000003 PHARM REV CODE 250: Performed by: STUDENT IN AN ORGANIZED HEALTH CARE EDUCATION/TRAINING PROGRAM

## 2021-12-27 PROCEDURE — 80053 COMPREHEN METABOLIC PANEL: CPT | Performed by: NURSE PRACTITIONER

## 2021-12-27 PROCEDURE — 63600175 PHARM REV CODE 636 W HCPCS: Performed by: STUDENT IN AN ORGANIZED HEALTH CARE EDUCATION/TRAINING PROGRAM

## 2021-12-27 PROCEDURE — 94761 N-INVAS EAR/PLS OXIMETRY MLT: CPT

## 2021-12-27 PROCEDURE — 25000003 PHARM REV CODE 250: Performed by: NURSE PRACTITIONER

## 2021-12-27 RX ORDER — DOCUSATE SODIUM 100 MG/1
100 CAPSULE, LIQUID FILLED ORAL DAILY
Status: DISCONTINUED | OUTPATIENT
Start: 2021-12-27 | End: 2021-12-29 | Stop reason: HOSPADM

## 2021-12-27 RX ORDER — DOCUSATE SODIUM 100 MG/1
100 CAPSULE, LIQUID FILLED ORAL DAILY
Status: DISCONTINUED | OUTPATIENT
Start: 2021-12-28 | End: 2021-12-27

## 2021-12-27 RX ORDER — KETOROLAC TROMETHAMINE 30 MG/ML
30 INJECTION, SOLUTION INTRAMUSCULAR; INTRAVENOUS ONCE
Status: COMPLETED | OUTPATIENT
Start: 2021-12-27 | End: 2021-12-27

## 2021-12-27 RX ORDER — POLYETHYLENE GLYCOL 3350 17 G/17G
17 POWDER, FOR SOLUTION ORAL DAILY
Status: DISCONTINUED | OUTPATIENT
Start: 2021-12-28 | End: 2021-12-27

## 2021-12-27 RX ORDER — POLYETHYLENE GLYCOL 3350 17 G/17G
17 POWDER, FOR SOLUTION ORAL DAILY
Status: DISCONTINUED | OUTPATIENT
Start: 2021-12-27 | End: 2021-12-29 | Stop reason: HOSPADM

## 2021-12-27 RX ADMIN — ENOXAPARIN SODIUM 40 MG: 40 INJECTION SUBCUTANEOUS at 05:12

## 2021-12-27 RX ADMIN — DOCUSATE SODIUM 100 MG: 100 CAPSULE ORAL at 05:12

## 2021-12-27 RX ADMIN — KETOROLAC TROMETHAMINE 30 MG: 30 INJECTION, SOLUTION INTRAMUSCULAR; INTRAVENOUS at 03:12

## 2021-12-27 RX ADMIN — HYDROMORPHONE HYDROCHLORIDE 1 MG: 2 INJECTION INTRAMUSCULAR; INTRAVENOUS; SUBCUTANEOUS at 09:12

## 2021-12-27 RX ADMIN — HYDROMORPHONE HYDROCHLORIDE 1 MG: 2 INJECTION INTRAMUSCULAR; INTRAVENOUS; SUBCUTANEOUS at 06:12

## 2021-12-27 RX ADMIN — SODIUM CHLORIDE: 0.9 INJECTION, SOLUTION INTRAVENOUS at 05:12

## 2021-12-27 RX ADMIN — POLYETHYLENE GLYCOL 3350 17 G: 17 POWDER, FOR SOLUTION ORAL at 05:12

## 2021-12-27 RX ADMIN — MUPIROCIN: 20 OINTMENT TOPICAL at 09:12

## 2021-12-27 RX ADMIN — SODIUM CHLORIDE: 0.9 INJECTION, SOLUTION INTRAVENOUS at 08:12

## 2021-12-27 RX ADMIN — OXYCODONE HYDROCHLORIDE AND ACETAMINOPHEN 2 TABLET: 5; 325 TABLET ORAL at 07:12

## 2021-12-27 RX ADMIN — OXYCODONE HYDROCHLORIDE AND ACETAMINOPHEN 2 TABLET: 5; 325 TABLET ORAL at 11:12

## 2021-12-27 RX ADMIN — KETOROLAC TROMETHAMINE 30 MG: 30 INJECTION, SOLUTION INTRAMUSCULAR at 01:12

## 2021-12-27 RX ADMIN — OXYCODONE HYDROCHLORIDE AND ACETAMINOPHEN 2 TABLET: 5; 325 TABLET ORAL at 05:12

## 2021-12-27 RX ADMIN — MUPIROCIN: 20 OINTMENT TOPICAL at 08:12

## 2021-12-27 NOTE — PROGRESS NOTES
"[facility] Hospital Medicine  Progress Note    Patient Name: Melita Shell  MRN: [id number]  Patient Class: IP- Inpatient   Admission Date: 2021  Length of Stay: 3 days  Attending Physician: Glenn Morelos MD  Primary Care Provider: Claudia Barajas MD        Subjective:     Principal Problem:Partial small bowel obstruction        HPI:  Meliat Shell is a 36 y.o. female with as past medical history of a ventral incisional hernia with multiple evaluations for same, most recent on 21, as well as a post operative ileus, and past surgical history of a  section on 21 with an ex lap, who presented to night to the ED with a complaint of severe pain over the upper midline ventral hernia site.  She was recently evaluated by Dr. Gallegos, who did not find an incarcerated hernia at the time, but did educate on when to return to the ER. She states that her pain has been severe tonight at the site of the hernia. She does not feel it bulging out, but is painful at the site. She reports nausea and vomiting, but no diarrhea or constipation. She denies blood in vomit. She states the pain is localized to the upper abdomen and does not radiate, with no provoking or palliative factors.  She denies all other complaint.     Upon arrival to the ED, per ED notes, the patient was reportedly in significant pain, writhing around on stretcher. She was treated with pain medication and an antiemetic, which did help ease the pain briefly. CT of the abdomen and pelvis revealed: "Dilated loops of small bowel raising question of partial obstruction secondary to mesenteric and retroperitoneal masses in the left lower quadrant and in the pelvis essentially stable since prior exam compatible with malignancy. No other acute abnormality. Postoperative changes of hernia repair.  No evidence of abscess or drainable fluid collection around the umbilicus. Small amount of free fluid in the pelvis."  Lab work " did not reveal leukocytosis or elevated lactic acid level. CMP was essentially unremarkable. She did have mild anemia, likely due to post partum status. Hospital Medicine consulted for admission and further management.          Overview/Hospital Course:  Patient admitted for abdominal pain and small-bowel obstruction.  She was a placed NPO and evaluated by General surgery.  She underwent ex lap on 12/24 with excision of pelvic mass of uterine or ovarian pathology, adhesiolysis, small-bowel resection x2 with primary anastomosis, repair of incarcerated and strangulated incisional hernia by Dr. Gallegos.  NG tube was placed postoperatively and was removed on 12/25 by Dr. Gallegos.  She required IV pain medicine for pain control.  She was monitored closely postoperatively.  Labs were monitored and remained stable.      Interval History: Ms CT seen and examined. Labs and diagnostics reviewed. Pt denied CP, SOB, n/v    Review of Systems   Constitutional: Positive for activity change and appetite change. Negative for chills, diaphoresis, fatigue and fever.   HENT: Negative for congestion, postnasal drip and sore throat.    Respiratory: Negative for cough and shortness of breath.    Cardiovascular: Negative for chest pain and palpitations.   Gastrointestinal: Negative for abdominal pain, constipation, diarrhea, nausea and vomiting.        Passing gas  Ate small amount of grits this am and tolerated well   Genitourinary: Negative for dyspareunia.   Musculoskeletal: Negative for arthralgias, back pain, gait problem and joint swelling.   Neurological: Negative for dizziness and weakness.   Psychiatric/Behavioral: Negative for agitation, behavioral problems, confusion and decreased concentration.     Objective:     Vital Signs (Most Recent):  Temp: 97.6 °F (36.4 °C) (12/27/21 1223)  Pulse: 88 (12/27/21 1223)  Resp: 18 (12/27/21 1223)  BP: (!) 157/91 (12/27/21 1223)  SpO2: 95 % (12/27/21 1223) Vital Signs (24h Range):  Temp:  [97.6  °F (36.4 °C)-99.4 °F (37.4 °C)] 97.6 °F (36.4 °C)  Pulse:  [85-90] 88  Resp:  [17-20] 18  SpO2:  [95 %-100 %] 95 %  BP: (141-174)/(78-92) 157/91     Weight: 73.4 kg (161 lb 13.1 oz)  Body mass index is 29.6 kg/m².    Intake/Output Summary (Last 24 hours) at 12/27/2021 1259  Last data filed at 12/27/2021 0611  Gross per 24 hour   Intake 2257.77 ml   Output --   Net 2257.77 ml      Physical Exam  Vitals and nursing note reviewed.   Constitutional:       Appearance: Normal appearance. She is ill-appearing.   HENT:      Head: Normocephalic and atraumatic.      Nose: Nose normal.      Mouth/Throat:      Pharynx: Oropharynx is clear.   Eyes:      Conjunctiva/sclera: Conjunctivae normal.   Cardiovascular:      Rate and Rhythm: Normal rate and regular rhythm.      Pulses: Normal pulses.      Heart sounds: Normal heart sounds.   Abdominal:      General: Bowel sounds are normal.      Tenderness: There is abdominal tenderness. There is no guarding or rebound.      Comments: Mild expected post op tenderness   Musculoskeletal:         General: Normal range of motion.      Cervical back: Normal range of motion.      Right lower leg: No edema.      Left lower leg: No edema.   Skin:     General: Skin is warm.      Capillary Refill: Capillary refill takes less than 2 seconds.   Neurological:      Mental Status: She is alert and oriented to person, place, and time.   Psychiatric:         Mood and Affect: Mood normal.         Behavior: Behavior normal.         Significant Labs:   All pertinent labs within the past 24 hours have been reviewed.  CBC:   Recent Labs   Lab 12/26/21  0438 12/27/21  1339   WBC 4.43 4.42   HGB 9.3* 9.3*   HCT 29.2* 28.8*    323     CMP:   Recent Labs   Lab 12/26/21  0438 12/27/21  1339   * 138   K 3.6 3.6    105   CO2 20* 25   GLU 88 93   BUN 4* 3*   CREATININE 0.7 0.6   CALCIUM 7.9* 8.4*   PROT 6.5 7.1   ALBUMIN 2.6* 2.7*   BILITOT 1.0 0.6   ALKPHOS 53* 55   AST 13 17   ALT 13 13    ANIONGAP 10 8   EGFRNONAA >60 >60       Significant Imaging: I have reviewed all pertinent imaging results/findings within the past 24 hours.      Assessment/Plan:      Microcytic anemia  Patient's anemia is currently controlled. Has not received any PRBCs to date.. Etiology likely d/t recent pregnancy and childbirth  Current CBC reviewed-   Lab Results   Component Value Date    HGB 9.3 (L) 12/27/2021    HCT 28.8 (L) 12/27/2021     Monitor serial CBC and transfuse if patient becomes hemodynamically unstable, symptomatic or H/H drops below 7/21.           VTE Risk Mitigation (From admission, onward)         Ordered     enoxaparin injection 40 mg  Daily         12/23/21 2311     IP VTE HIGH RISK PATIENT  Once         12/23/21 2311     Place sequential compression device  Until discontinued         12/23/21 2311                Discharge Planning   SAM:      Code Status: Full Code   Is the patient medically ready for discharge?:     Reason for patient still in hospital (select all that apply): Patient trending condition, Laboratory test, Treatment and Consult recommendations  Discharge Plan A: Home with family                  Nataliia Chapin NP  Department of Hospital Medicine   [facility]

## 2021-12-27 NOTE — ASSESSMENT & PLAN NOTE
Patient's anemia is currently controlled. Has not received any PRBCs to date.. Etiology likely d/t recent pregnancy and childbirth  Current CBC reviewed-   Lab Results   Component Value Date    HGB 9.3 (L) 12/27/2021    HCT 28.8 (L) 12/27/2021     Monitor serial CBC and transfuse if patient becomes hemodynamically unstable, symptomatic or H/H drops below 7/21.

## 2021-12-27 NOTE — SUBJECTIVE & OBJECTIVE
Interval History: Ms CT seen and examined. Labs and diagnostics reviewed. Pt denied CP, SOB, n/v    Review of Systems   Constitutional: Positive for activity change and appetite change. Negative for chills, diaphoresis, fatigue and fever.   HENT: Negative for congestion, postnasal drip and sore throat.    Respiratory: Negative for cough and shortness of breath.    Cardiovascular: Negative for chest pain and palpitations.   Gastrointestinal: Negative for abdominal pain, constipation, diarrhea, nausea and vomiting.        Passing gas  Ate small amount of grits this am and tolerated well   Genitourinary: Negative for dyspareunia.   Musculoskeletal: Negative for arthralgias, back pain, gait problem and joint swelling.   Neurological: Negative for dizziness and weakness.   Psychiatric/Behavioral: Negative for agitation, behavioral problems, confusion and decreased concentration.     Objective:     Vital Signs (Most Recent):  Temp: 97.6 °F (36.4 °C) (12/27/21 1223)  Pulse: 88 (12/27/21 1223)  Resp: 18 (12/27/21 1223)  BP: (!) 157/91 (12/27/21 1223)  SpO2: 95 % (12/27/21 1223) Vital Signs (24h Range):  Temp:  [97.6 °F (36.4 °C)-99.4 °F (37.4 °C)] 97.6 °F (36.4 °C)  Pulse:  [85-90] 88  Resp:  [17-20] 18  SpO2:  [95 %-100 %] 95 %  BP: (141-174)/(78-92) 157/91     Weight: 73.4 kg (161 lb 13.1 oz)  Body mass index is 29.6 kg/m².    Intake/Output Summary (Last 24 hours) at 12/27/2021 1259  Last data filed at 12/27/2021 0611  Gross per 24 hour   Intake 2257.77 ml   Output --   Net 2257.77 ml      Physical Exam  Vitals and nursing note reviewed.   Constitutional:       Appearance: Normal appearance. She is ill-appearing.   HENT:      Head: Normocephalic and atraumatic.      Nose: Nose normal.      Mouth/Throat:      Pharynx: Oropharynx is clear.   Eyes:      Conjunctiva/sclera: Conjunctivae normal.   Cardiovascular:      Rate and Rhythm: Normal rate and regular rhythm.      Pulses: Normal pulses.      Heart sounds: Normal heart  sounds.   Abdominal:      General: Bowel sounds are normal.      Tenderness: There is abdominal tenderness. There is no guarding or rebound.      Comments: Mild expected post op tenderness   Musculoskeletal:         General: Normal range of motion.      Cervical back: Normal range of motion.      Right lower leg: No edema.      Left lower leg: No edema.   Skin:     General: Skin is warm.      Capillary Refill: Capillary refill takes less than 2 seconds.   Neurological:      Mental Status: She is alert and oriented to person, place, and time.   Psychiatric:         Mood and Affect: Mood normal.         Behavior: Behavior normal.         Significant Labs:   All pertinent labs within the past 24 hours have been reviewed.  CBC:   Recent Labs   Lab 12/26/21  0438 12/27/21  1339   WBC 4.43 4.42   HGB 9.3* 9.3*   HCT 29.2* 28.8*    323     CMP:   Recent Labs   Lab 12/26/21  0438 12/27/21  1339   * 138   K 3.6 3.6    105   CO2 20* 25   GLU 88 93   BUN 4* 3*   CREATININE 0.7 0.6   CALCIUM 7.9* 8.4*   PROT 6.5 7.1   ALBUMIN 2.6* 2.7*   BILITOT 1.0 0.6   ALKPHOS 53* 55   AST 13 17   ALT 13 13   ANIONGAP 10 8   EGFRNONAA >60 >60       Significant Imaging: I have reviewed all pertinent imaging results/findings within the past 24 hours.

## 2021-12-27 NOTE — CARE UPDATE
12/27/21 0707   Patient Assessment/Suction   Level of Consciousness (AVPU) alert   PRE-TX-O2   O2 Device (Oxygen Therapy) room air   SpO2 100 %   Pulse Oximetry Type Intermittent   $ Pulse Oximetry - Multiple Charge Pulse Oximetry - Multiple   Aerosol Therapy   $ Aerosol Therapy Charges PRN treatment not required

## 2021-12-27 NOTE — CONSULTS
Thank you for your consult to [facility]. We have reviewed the patient chart. This patient does not meet criteria for [facility] service at this time as patient is anticipated to d/c home on 12/28    If patient continues to require ongoing hospitalization, please re-consult [facility]     Signing Physician:     Lizzy Kim MD  Cell: [phone]  Department of [facility]

## 2021-12-27 NOTE — PROGRESS NOTES
Patient seen and examined.  Doing well.  Tolerating liquids.    Abdomen soft, less distension    Overall doing well.  Continue full liquids today.  Approaching DC, possibly tomorrow afternoon

## 2021-12-27 NOTE — PLAN OF CARE
Patient has no acute changes. POC reviewed. Nurse reviewed pain medication and pain management with patient. Encourage frequent change in positions or even ambulation during stay. Encourage patient to be careful with ambulation and call for assist when needed. No other concerns at present. FM present at bedside and helpful with care. Will cont to monitor and provide care as needed.

## 2021-12-28 PROCEDURE — 12000002 HC ACUTE/MED SURGE SEMI-PRIVATE ROOM

## 2021-12-28 PROCEDURE — 94761 N-INVAS EAR/PLS OXIMETRY MLT: CPT

## 2021-12-28 PROCEDURE — 25000003 PHARM REV CODE 250: Performed by: STUDENT IN AN ORGANIZED HEALTH CARE EDUCATION/TRAINING PROGRAM

## 2021-12-28 PROCEDURE — 63600175 PHARM REV CODE 636 W HCPCS: Performed by: STUDENT IN AN ORGANIZED HEALTH CARE EDUCATION/TRAINING PROGRAM

## 2021-12-28 PROCEDURE — 63600175 PHARM REV CODE 636 W HCPCS: Performed by: NURSE PRACTITIONER

## 2021-12-28 PROCEDURE — 99900035 HC TECH TIME PER 15 MIN (STAT)

## 2021-12-28 PROCEDURE — 25000003 PHARM REV CODE 250: Performed by: NURSE PRACTITIONER

## 2021-12-28 RX ADMIN — OXYCODONE HYDROCHLORIDE AND ACETAMINOPHEN 1 TABLET: 5; 325 TABLET ORAL at 07:12

## 2021-12-28 RX ADMIN — POLYETHYLENE GLYCOL 3350 17 G: 17 POWDER, FOR SOLUTION ORAL at 08:12

## 2021-12-28 RX ADMIN — SODIUM CHLORIDE: 0.9 INJECTION, SOLUTION INTRAVENOUS at 02:12

## 2021-12-28 RX ADMIN — OXYCODONE HYDROCHLORIDE AND ACETAMINOPHEN 2 TABLET: 5; 325 TABLET ORAL at 03:12

## 2021-12-28 RX ADMIN — OXYCODONE HYDROCHLORIDE AND ACETAMINOPHEN 2 TABLET: 5; 325 TABLET ORAL at 07:12

## 2021-12-28 RX ADMIN — DOCUSATE SODIUM 100 MG: 100 CAPSULE ORAL at 08:12

## 2021-12-28 RX ADMIN — ENOXAPARIN SODIUM 40 MG: 40 INJECTION SUBCUTANEOUS at 05:12

## 2021-12-28 RX ADMIN — OXYCODONE HYDROCHLORIDE AND ACETAMINOPHEN 2 TABLET: 5; 325 TABLET ORAL at 11:12

## 2021-12-28 RX ADMIN — MUPIROCIN: 20 OINTMENT TOPICAL at 08:12

## 2021-12-28 RX ADMIN — HYDROMORPHONE HYDROCHLORIDE 1 MG: 2 INJECTION INTRAMUSCULAR; INTRAVENOUS; SUBCUTANEOUS at 01:12

## 2021-12-28 RX ADMIN — OXYCODONE HYDROCHLORIDE AND ACETAMINOPHEN 2 TABLET: 5; 325 TABLET ORAL at 10:12

## 2021-12-28 NOTE — CARE UPDATE
12/28/21 0800   Patient Assessment/Suction   Level of Consciousness (AVPU) alert   Respiratory Effort Normal   Expansion/Accessory Muscles/Retractions expansion symmetric   All Lung Fields Breath Sounds Anterior:   TATYANA Breath Sounds clear   RUL Breath Sounds clear   Rhythm/Pattern, Respiratory pattern regular   Cough Frequency no cough   PRE-TX-O2   O2 Device (Oxygen Therapy) room air   Oxygen Concentration (%) 21   SpO2 99 %   Pulse Oximetry Type Intermittent   $ Pulse Oximetry - Multiple Charge Pulse Oximetry - Multiple   Pulse 70   Resp 15   Aerosol Therapy   $ Aerosol Therapy Charges PRN treatment not required   POx, nebs prn

## 2021-12-28 NOTE — SUBJECTIVE & OBJECTIVE
Interval History: No acute events overnight.  Tolerating full liquid diet without difficulty.  DC planning discussed with patient and her  who was at bedside.      Review of Systems   Constitutional: Positive for activity change and appetite change. Negative for chills, diaphoresis, fatigue and fever.   HENT: Negative for congestion, postnasal drip and sore throat.    Respiratory: Negative for cough and shortness of breath.    Cardiovascular: Negative for chest pain and palpitations.   Gastrointestinal: Positive for abdominal distention. Negative for abdominal pain, constipation, diarrhea, nausea and vomiting.        Passing gas  Tolerating diet  No BM as yet   Genitourinary: Negative for dyspareunia.   Musculoskeletal: Negative for arthralgias, back pain, gait problem and joint swelling.   Neurological: Negative for dizziness and weakness.   Psychiatric/Behavioral: Negative for agitation, behavioral problems, confusion and decreased concentration.     Objective:     Vital Signs (Most Recent):  Temp: 97.9 °F (36.6 °C) (12/28/21 0706)  Pulse: 70 (12/28/21 0800)  Resp: 15 (12/28/21 0800)  BP: (!) 165/90 (12/28/21 0706)  SpO2: 99 % (12/28/21 0800) Vital Signs (24h Range):  Temp:  [96.8 °F (36 °C)-98.1 °F (36.7 °C)] 97.9 °F (36.6 °C)  Pulse:  [70-88] 70  Resp:  [15-20] 15  SpO2:  [95 %-99 %] 99 %  BP: (157-177)/(85-91) 165/90     Weight: 73.4 kg (161 lb 13.1 oz)  Body mass index is 29.6 kg/m².  No intake or output data in the 24 hours ending 12/28/21 1115   Physical Exam  Vitals and nursing note reviewed.   Constitutional:       General: She is not in acute distress.     Appearance: Normal appearance. She is not ill-appearing.   HENT:      Head: Normocephalic and atraumatic.      Nose: Nose normal.      Mouth/Throat:      Pharynx: Oropharynx is clear.   Eyes:      Conjunctiva/sclera: Conjunctivae normal.   Cardiovascular:      Rate and Rhythm: Normal rate and regular rhythm.      Pulses: Normal pulses.      Heart  sounds: Normal heart sounds.   Abdominal:      General: Bowel sounds are normal. There is distension.      Tenderness: There is abdominal tenderness (improving). There is no guarding or rebound.      Comments: Mild expected post op tenderness   Musculoskeletal:         General: Normal range of motion.      Cervical back: Normal range of motion.      Right lower leg: No edema.      Left lower leg: No edema.   Skin:     General: Skin is warm.      Capillary Refill: Capillary refill takes less than 2 seconds.   Neurological:      Mental Status: She is alert and oriented to person, place, and time.   Psychiatric:         Mood and Affect: Mood normal.         Behavior: Behavior normal.         Thought Content: Thought content normal.         Judgment: Judgment normal.         Significant Labs:   All pertinent labs within the past 24 hours have been reviewed.  CBC:   Recent Labs   Lab 12/27/21  1339   WBC 4.42   HGB 9.3*   HCT 28.8*        CMP:   Recent Labs   Lab 12/27/21  1339      K 3.6      CO2 25   GLU 93   BUN 3*   CREATININE 0.6   CALCIUM 8.4*   PROT 7.1   ALBUMIN 2.7*   BILITOT 0.6   ALKPHOS 55   AST 17   ALT 13   ANIONGAP 8   EGFRNONAA >60       Significant Imaging: I have reviewed all pertinent imaging results/findings within the past 24 hours.

## 2021-12-28 NOTE — ASSESSMENT & PLAN NOTE
POD 4 s/p ex lap for SBO, hernia repair and pelvic mass removal with Dr. Gallegos  Continue to follow surgery recommendations.  Needs aggressive incentive spirometry.  Follow hemoglobin and hematocrit closely.  Pain control   lovenox for DVT prophylaxis  Mobilization-nursing to ambulate in hallway twice per shift; up in chair for all meals.

## 2021-12-28 NOTE — NURSING
Patient complaining of severe pain. She was previously medicated with 2 Oxycodone tablets and states they are not helping. Explained to patient that I will medicate her with breakthrough Dilaudid in 1 hour if not relieved. She also complain of needing something to have a BM. Dr. Gallegos messaged for Miralax and Colace. Patient linens also changed and next dose times wrote on the board for patient. Patient mom states that she wants the night nurse to wake her up if she is sleeping and ask her if she wants her pain medicine.

## 2021-12-28 NOTE — PROGRESS NOTES
"[facility] Hospital Medicine  Progress Note    Patient Name: Melita Shell  MRN: [id number]  Patient Class: IP- Inpatient   Admission Date: 2021  Length of Stay: 4 days  Attending Physician: Glenn Morelos MD  Primary Care Provider: Claudia Barajas MD        Subjective:     Principal Problem:Partial small bowel obstruction        HPI:  Melita Shell is a 36 y.o. female with as past medical history of a ventral incisional hernia with multiple evaluations for same, most recent on 21, as well as a post operative ileus, and past surgical history of a  section on 21 with an ex lap, who presented to night to the ED with a complaint of severe pain over the upper midline ventral hernia site.  She was recently evaluated by Dr. Gallegos, who did not find an incarcerated hernia at the time, but did educate on when to return to the ER. She states that her pain has been severe tonight at the site of the hernia. She does not feel it bulging out, but is painful at the site. She reports nausea and vomiting, but no diarrhea or constipation. She denies blood in vomit. She states the pain is localized to the upper abdomen and does not radiate, with no provoking or palliative factors.  She denies all other complaint.     Upon arrival to the ED, per ED notes, the patient was reportedly in significant pain, writhing around on stretcher. She was treated with pain medication and an antiemetic, which did help ease the pain briefly. CT of the abdomen and pelvis revealed: "Dilated loops of small bowel raising question of partial obstruction secondary to mesenteric and retroperitoneal masses in the left lower quadrant and in the pelvis essentially stable since prior exam compatible with malignancy. No other acute abnormality. Postoperative changes of hernia repair.  No evidence of abscess or drainable fluid collection around the umbilicus. Small amount of free fluid in the pelvis."  Lab work " did not reveal leukocytosis or elevated lactic acid level. CMP was essentially unremarkable. She did have mild anemia, likely due to post partum status. Hospital Medicine consulted for admission and further management.          Overview/Hospital Course:  Patient admitted for abdominal pain and small-bowel obstruction.  She was a placed NPO and evaluated by General surgery.  She underwent ex lap on 12/24 with excision of pelvic mass of uterine or ovarian pathology, adhesiolysis, small-bowel resection x2 with primary anastomosis, repair of incarcerated and strangulated incisional hernia by Dr. Gallegos.  NG tube was placed postoperatively and was removed on 12/25 by Dr. Gallegos.  She required IV pain medicine for pain control.  She was monitored closely postoperatively.  Labs were monitored and remained stable.      Interval History: No acute events overnight.  Tolerating full liquid diet without difficulty.  DC planning discussed with patient and her  who was at bedside.      Review of Systems   Constitutional: Positive for activity change and appetite change. Negative for chills, diaphoresis, fatigue and fever.   HENT: Negative for congestion, postnasal drip and sore throat.    Respiratory: Negative for cough and shortness of breath.    Cardiovascular: Negative for chest pain and palpitations.   Gastrointestinal: Positive for abdominal distention. Negative for abdominal pain, constipation, diarrhea, nausea and vomiting.        Passing gas  Tolerating diet  No BM as yet   Genitourinary: Negative for dyspareunia.   Musculoskeletal: Negative for arthralgias, back pain, gait problem and joint swelling.   Neurological: Negative for dizziness and weakness.   Psychiatric/Behavioral: Negative for agitation, behavioral problems, confusion and decreased concentration.     Objective:     Vital Signs (Most Recent):  Temp: 97.9 °F (36.6 °C) (12/28/21 0706)  Pulse: 70 (12/28/21 0800)  Resp: 15 (12/28/21 0800)  BP: (!) 165/90  (12/28/21 0706)  SpO2: 99 % (12/28/21 0800) Vital Signs (24h Range):  Temp:  [96.8 °F (36 °C)-98.1 °F (36.7 °C)] 97.9 °F (36.6 °C)  Pulse:  [70-88] 70  Resp:  [15-20] 15  SpO2:  [95 %-99 %] 99 %  BP: (157-177)/(85-91) 165/90     Weight: 73.4 kg (161 lb 13.1 oz)  Body mass index is 29.6 kg/m².  No intake or output data in the 24 hours ending 12/28/21 1115   Physical Exam  Vitals and nursing note reviewed.   Constitutional:       General: She is not in acute distress.     Appearance: Normal appearance. She is not ill-appearing.   HENT:      Head: Normocephalic and atraumatic.      Nose: Nose normal.      Mouth/Throat:      Pharynx: Oropharynx is clear.   Eyes:      Conjunctiva/sclera: Conjunctivae normal.   Cardiovascular:      Rate and Rhythm: Normal rate and regular rhythm.      Pulses: Normal pulses.      Heart sounds: Normal heart sounds.   Abdominal:      General: Bowel sounds are normal. There is distension.      Tenderness: There is abdominal tenderness (improving). There is no guarding or rebound.      Comments: Mild expected post op tenderness   Musculoskeletal:         General: Normal range of motion.      Cervical back: Normal range of motion.      Right lower leg: No edema.      Left lower leg: No edema.   Skin:     General: Skin is warm.      Capillary Refill: Capillary refill takes less than 2 seconds.   Neurological:      Mental Status: She is alert and oriented to person, place, and time.   Psychiatric:         Mood and Affect: Mood normal.         Behavior: Behavior normal.         Thought Content: Thought content normal.         Judgment: Judgment normal.         Significant Labs:   All pertinent labs within the past 24 hours have been reviewed.  CBC:   Recent Labs   Lab 12/27/21  1339   WBC 4.42   HGB 9.3*   HCT 28.8*        CMP:   Recent Labs   Lab 12/27/21  1339      K 3.6      CO2 25   GLU 93   BUN 3*   CREATININE 0.6   CALCIUM 8.4*   PROT 7.1   ALBUMIN 2.7*   BILITOT 0.6    ALKPHOS 55   AST 17   ALT 13   ANIONGAP 8   EGFRNONAA >60       Significant Imaging: I have reviewed all pertinent imaging results/findings within the past 24 hours.      Assessment/Plan:      * Partial small bowel obstruction  POD 4 s/p ex lap for SBO, hernia repair and pelvic mass removal with Dr. Gallegos  Continue to follow surgery recommendations.  Needs aggressive incentive spirometry.  Follow hemoglobin and hematocrit closely.  Pain control   lovenox for DVT prophylaxis  Mobilization-nursing to ambulate in hallway twice per shift; up in chair for all meals.      Microcytic anemia  Patient's anemia is currently controlled. Has not received any PRBCs to date.. Etiology likely d/t recent pregnancy and childbirth  Current CBC reviewed-   Lab Results   Component Value Date    HGB 9.3 (L) 12/27/2021    HCT 28.8 (L) 12/27/2021     Monitor serial CBC and transfuse if patient becomes hemodynamically unstable, symptomatic or H/H drops below 7/21.           VTE Risk Mitigation (From admission, onward)         Ordered     enoxaparin injection 40 mg  Daily         12/23/21 2311     IP VTE HIGH RISK PATIENT  Once         12/23/21 2311     Place sequential compression device  Until discontinued         12/23/21 2311                Discharge Planning   SAM: 12/28/2021     Code Status: Full Code   Is the patient medically ready for discharge?:     Reason for patient still in hospital (select all that apply): Patient trending condition, Treatment and Consult recommendations  Discharge Plan A: Home with family                  MILAD Cobb  Department of Hospital Medicine   [facility]

## 2021-12-28 NOTE — PLAN OF CARE
Problem: Adult Inpatient Plan of Care  Goal: Plan of Care Review  Outcome: Ongoing, Progressing      Problem: Adult Inpatient Plan of Care  Goal: Optimal Comfort and Wellbeing  Outcome: Ongoing, Progressing     Problem: Pain Acute  Goal: Acceptable Pain Control and Functional Ability  Outcome: Ongoing, Progressing     Problem: Fall Injury Risk  Goal: Absence of Fall and Fall-Related Injury  Outcome: Ongoing, Progressing

## 2021-12-28 NOTE — PROGRESS NOTES
Patient seen and examined.  Doing well.  Tolerating full liquids.  No nausea.    Incision is healing well  Minimal distension  Abdomen is soft    Overall doing well.  Diet was advanced to regular  If she tolerates, okay for DC tomorrow on p.o. pain medicine

## 2021-12-29 VITALS
HEART RATE: 76 BPM | HEIGHT: 62 IN | BODY MASS INDEX: 29.78 KG/M2 | SYSTOLIC BLOOD PRESSURE: 125 MMHG | RESPIRATION RATE: 20 BRPM | OXYGEN SATURATION: 95 % | TEMPERATURE: 98 F | WEIGHT: 161.81 LBS | DIASTOLIC BLOOD PRESSURE: 84 MMHG

## 2021-12-29 LAB
ALBUMIN SERPL ELPH-MCNC: NORMAL G/DL
ALPHA1 GLOB SERPL ELPH-MCNC: NORMAL G/DL
ALPHA2 GLOB SERPL ELPH-MCNC: NORMAL G/DL
B-GLOBULIN SERPL ELPH-MCNC: NORMAL G/DL
GAMMA GLOB SERPL ELPH-MCNC: NORMAL G/DL
PROT SERPL-MCNC: 6.6 G/DL (ref 6–8.4)

## 2021-12-29 PROCEDURE — 25000003 PHARM REV CODE 250: Performed by: STUDENT IN AN ORGANIZED HEALTH CARE EDUCATION/TRAINING PROGRAM

## 2021-12-29 PROCEDURE — 25000003 PHARM REV CODE 250: Performed by: NURSE PRACTITIONER

## 2021-12-29 PROCEDURE — 99900035 HC TECH TIME PER 15 MIN (STAT)

## 2021-12-29 PROCEDURE — 63600175 PHARM REV CODE 636 W HCPCS: Performed by: NURSE PRACTITIONER

## 2021-12-29 PROCEDURE — 94760 N-INVAS EAR/PLS OXIMETRY 1: CPT

## 2021-12-29 RX ORDER — OXYCODONE AND ACETAMINOPHEN 5; 325 MG/1; MG/1
1 TABLET ORAL EVERY 4 HOURS PRN
Qty: 12 TABLET | Refills: 0 | Status: ON HOLD | OUTPATIENT
Start: 2021-12-29 | End: 2022-01-02 | Stop reason: SDUPTHER

## 2021-12-29 RX ORDER — ONDANSETRON 4 MG/1
4 TABLET, ORALLY DISINTEGRATING ORAL EVERY 8 HOURS PRN
Qty: 15 TABLET | Refills: 0 | Status: SHIPPED | OUTPATIENT
Start: 2021-12-29 | End: 2022-01-01

## 2021-12-29 RX ADMIN — SODIUM CHLORIDE: 0.9 INJECTION, SOLUTION INTRAVENOUS at 12:12

## 2021-12-29 RX ADMIN — DOCUSATE SODIUM 100 MG: 100 CAPSULE ORAL at 07:12

## 2021-12-29 RX ADMIN — OXYCODONE HYDROCHLORIDE AND ACETAMINOPHEN 2 TABLET: 5; 325 TABLET ORAL at 03:12

## 2021-12-29 RX ADMIN — OXYCODONE HYDROCHLORIDE AND ACETAMINOPHEN 2 TABLET: 5; 325 TABLET ORAL at 02:12

## 2021-12-29 RX ADMIN — POLYETHYLENE GLYCOL 3350 17 G: 17 POWDER, FOR SOLUTION ORAL at 07:12

## 2021-12-29 RX ADMIN — ONDANSETRON 8 MG: 2 INJECTION INTRAMUSCULAR; INTRAVENOUS at 09:12

## 2021-12-29 RX ADMIN — OXYCODONE HYDROCHLORIDE AND ACETAMINOPHEN 2 TABLET: 5; 325 TABLET ORAL at 07:12

## 2021-12-29 RX ADMIN — MUPIROCIN: 20 OINTMENT TOPICAL at 07:12

## 2021-12-29 NOTE — NURSING
Pt ride back to facility. D.c instructions given to pt along with printed prescription. Iv removed cathter intact. Pt dressed and assisted in wheelchair. Transferred off unit via wheelchair with her belongings.

## 2021-12-29 NOTE — PLAN OF CARE
Pt clear for DC from case management standpoint. Discharging to home       12/29/21 0943   Final Note   Assessment Type Final Discharge Note   Anticipated Discharge Disposition Home   Hospital Resources/Appts/Education Provided Appointments scheduled and added to AVS

## 2021-12-29 NOTE — NURSING
Notified by hospitalist that pt is clear for dc today after lunch. Pt c/o nausea after eating breakfast. Pt aware of d.c plans.

## 2021-12-29 NOTE — PLAN OF CARE
Poc reviewed with pt. Pt verbalizes understanding. Frequent rounding Q 2 hours. Nausea and pain controlled with prn meds, call light within reach.

## 2021-12-29 NOTE — NURSING
Patient alert and oriented X4. Patient ambulated one lap around the whole unit. She was medicated for pain after her walk. She has a midline incision open to air without any drainage and staples intact. Her father is at the bedside. Patient informed that she has to walk twice and sit up in the chair with all meals. She is passing flatus but has not had a bowel movement yet.

## 2021-12-29 NOTE — CARE UPDATE
12/29/21 0811   Patient Assessment/Suction   Level of Consciousness (AVPU) alert   PRE-TX-O2   O2 Device (Oxygen Therapy) room air   SpO2 99 %   Pulse Oximetry Type Intermittent   $ Pulse Oximetry - Single Charge Pulse Oximetry - Single   Aerosol Therapy   $ Aerosol Therapy Charges PRN treatment not required

## 2021-12-29 NOTE — PROGRESS NOTES
Patient seen and examined.  Tolerating regular food.    Abdomen soft  Vital stable    Doing well.  Tolerating regular diet.  Okay for DC.

## 2021-12-30 LAB — MAYO MISCELLANEOUS RESULT (REF): NORMAL

## 2021-12-31 ENCOUNTER — PATIENT MESSAGE (OUTPATIENT)
Dept: INTERNAL MEDICINE | Facility: CLINIC | Age: 36
End: 2021-12-31
Payer: COMMERCIAL

## 2021-12-31 ENCOUNTER — PATIENT MESSAGE (OUTPATIENT)
Dept: OBSTETRICS AND GYNECOLOGY | Facility: CLINIC | Age: 36
End: 2021-12-31
Payer: COMMERCIAL

## 2022-01-01 ENCOUNTER — HOSPITAL ENCOUNTER (OUTPATIENT)
Facility: HOSPITAL | Age: 37
Discharge: HOME | DRG: 391 | End: 2022-01-02
Attending: EMERGENCY MEDICINE | Admitting: STUDENT IN AN ORGANIZED HEALTH CARE EDUCATION/TRAINING PROGRAM
Payer: COMMERCIAL

## 2022-01-01 ENCOUNTER — NURSE TRIAGE (OUTPATIENT)
Dept: ADMINISTRATIVE | Facility: CLINIC | Age: 37
End: 2022-01-01

## 2022-01-01 DIAGNOSIS — K59.00 CONSTIPATION, UNSPECIFIED CONSTIPATION TYPE: Primary | ICD-10-CM

## 2022-01-01 DIAGNOSIS — R07.9 CHEST PAIN: ICD-10-CM

## 2022-01-01 DIAGNOSIS — R11.10 VOMITING: ICD-10-CM

## 2022-01-01 DIAGNOSIS — K56.609 SBO (SMALL BOWEL OBSTRUCTION): ICD-10-CM

## 2022-01-01 PROBLEM — D75.839 THROMBOCYTOSIS: Status: ACTIVE | Noted: 2022-01-01

## 2022-01-01 PROBLEM — R74.8 ELEVATED LIVER ENZYMES: Status: RESOLVED | Noted: 2021-07-18 | Resolved: 2022-01-01

## 2022-01-01 PROBLEM — E46 HYPOALBUMINEMIA DUE TO PROTEIN-CALORIE MALNUTRITION: Status: ACTIVE | Noted: 2022-01-01

## 2022-01-01 PROBLEM — U07.1 COVID-19: Status: ACTIVE | Noted: 2022-01-01

## 2022-01-01 PROBLEM — K56.600 PARTIAL SMALL BOWEL OBSTRUCTION: Status: RESOLVED | Noted: 2021-12-23 | Resolved: 2022-01-01

## 2022-01-01 PROBLEM — E88.09 HYPOALBUMINEMIA DUE TO PROTEIN-CALORIE MALNUTRITION: Status: ACTIVE | Noted: 2022-01-01

## 2022-01-01 LAB
ALBUMIN SERPL BCP-MCNC: 3.4 G/DL (ref 3.5–5.2)
ALP SERPL-CCNC: 81 U/L (ref 55–135)
ALT SERPL W/O P-5'-P-CCNC: 15 U/L (ref 10–44)
ANION GAP SERPL CALC-SCNC: 13 MMOL/L (ref 8–16)
AST SERPL-CCNC: 15 U/L (ref 10–40)
BASOPHILS # BLD AUTO: 0.02 K/UL (ref 0–0.2)
BASOPHILS NFR BLD: 0.3 % (ref 0–1.9)
BILIRUB SERPL-MCNC: 0.7 MG/DL (ref 0.1–1)
BUN SERPL-MCNC: 5 MG/DL (ref 6–20)
CALCIUM SERPL-MCNC: 9.5 MG/DL (ref 8.7–10.5)
CHLORIDE SERPL-SCNC: 104 MMOL/L (ref 95–110)
CO2 SERPL-SCNC: 19 MMOL/L (ref 23–29)
CREAT SERPL-MCNC: 0.7 MG/DL (ref 0.5–1.4)
DIFFERENTIAL METHOD: ABNORMAL
EOSINOPHIL # BLD AUTO: 0.3 K/UL (ref 0–0.5)
EOSINOPHIL NFR BLD: 4.3 % (ref 0–8)
ERYTHROCYTE [DISTWIDTH] IN BLOOD BY AUTOMATED COUNT: 19.4 % (ref 11.5–14.5)
EST. GFR  (AFRICAN AMERICAN): >60 ML/MIN/1.73 M^2
EST. GFR  (NON AFRICAN AMERICAN): >60 ML/MIN/1.73 M^2
FERRITIN SERPL-MCNC: 102 NG/ML (ref 20–300)
GLUCOSE SERPL-MCNC: 137 MG/DL (ref 70–110)
HCT VFR BLD AUTO: 35.5 % (ref 37–48.5)
HGB BLD-MCNC: 11.6 G/DL (ref 12–16)
IMM GRANULOCYTES # BLD AUTO: 0.04 K/UL (ref 0–0.04)
IMM GRANULOCYTES NFR BLD AUTO: 0.5 % (ref 0–0.5)
IRON SERPL-MCNC: 31 UG/DL (ref 30–160)
LACTATE SERPL-SCNC: 0.6 MMOL/L (ref 0.5–2.2)
LIPASE SERPL-CCNC: 36 U/L (ref 4–60)
LYMPHOCYTES # BLD AUTO: 1 K/UL (ref 1–4.8)
LYMPHOCYTES NFR BLD: 12.6 % (ref 18–48)
MCH RBC QN AUTO: 24.8 PG (ref 27–31)
MCHC RBC AUTO-ENTMCNC: 32.7 G/DL (ref 32–36)
MCV RBC AUTO: 76 FL (ref 82–98)
MONOCYTES # BLD AUTO: 0.2 K/UL (ref 0.3–1)
MONOCYTES NFR BLD: 2.9 % (ref 4–15)
NEUTROPHILS # BLD AUTO: 6.1 K/UL (ref 1.8–7.7)
NEUTROPHILS NFR BLD: 79.4 % (ref 38–73)
NRBC BLD-RTO: 0 /100 WBC
PLATELET # BLD AUTO: 523 K/UL (ref 150–450)
PLATELET BLD QL SMEAR: ABNORMAL
PMV BLD AUTO: 9.4 FL (ref 9.2–12.9)
POTASSIUM SERPL-SCNC: 3.8 MMOL/L (ref 3.5–5.1)
PROT SERPL-MCNC: 8.5 G/DL (ref 6–8.4)
RBC # BLD AUTO: 4.68 M/UL (ref 4–5.4)
SARS-COV-2 RDRP RESP QL NAA+PROBE: POSITIVE
SATURATED IRON: 9 % (ref 20–50)
SODIUM SERPL-SCNC: 136 MMOL/L (ref 136–145)
TOTAL IRON BINDING CAPACITY: 346 UG/DL (ref 250–450)
TRANSFERRIN SERPL-MCNC: 234 MG/DL (ref 200–375)
WBC # BLD AUTO: 7.68 K/UL (ref 3.9–12.7)

## 2022-01-01 PROCEDURE — 12000002 HC ACUTE/MED SURGE SEMI-PRIVATE ROOM

## 2022-01-01 PROCEDURE — 43752 NASAL/OROGASTRIC W/TUBE PLMT: CPT

## 2022-01-01 PROCEDURE — 63600175 PHARM REV CODE 636 W HCPCS: Performed by: EMERGENCY MEDICINE

## 2022-01-01 PROCEDURE — 84466 ASSAY OF TRANSFERRIN: CPT | Performed by: STUDENT IN AN ORGANIZED HEALTH CARE EDUCATION/TRAINING PROGRAM

## 2022-01-01 PROCEDURE — 25000003 PHARM REV CODE 250: Performed by: EMERGENCY MEDICINE

## 2022-01-01 PROCEDURE — 83690 ASSAY OF LIPASE: CPT | Performed by: EMERGENCY MEDICINE

## 2022-01-01 PROCEDURE — 63600175 PHARM REV CODE 636 W HCPCS: Performed by: STUDENT IN AN ORGANIZED HEALTH CARE EDUCATION/TRAINING PROGRAM

## 2022-01-01 PROCEDURE — 36415 COLL VENOUS BLD VENIPUNCTURE: CPT | Performed by: EMERGENCY MEDICINE

## 2022-01-01 PROCEDURE — 83605 ASSAY OF LACTIC ACID: CPT | Performed by: STUDENT IN AN ORGANIZED HEALTH CARE EDUCATION/TRAINING PROGRAM

## 2022-01-01 PROCEDURE — G0378 HOSPITAL OBSERVATION PER HR: HCPCS

## 2022-01-01 PROCEDURE — 99285 EMERGENCY DEPT VISIT HI MDM: CPT | Mod: 25

## 2022-01-01 PROCEDURE — 96375 TX/PRO/DX INJ NEW DRUG ADDON: CPT

## 2022-01-01 PROCEDURE — 36415 COLL VENOUS BLD VENIPUNCTURE: CPT | Performed by: STUDENT IN AN ORGANIZED HEALTH CARE EDUCATION/TRAINING PROGRAM

## 2022-01-01 PROCEDURE — 25000003 PHARM REV CODE 250: Performed by: SURGERY

## 2022-01-01 PROCEDURE — 80053 COMPREHEN METABOLIC PANEL: CPT | Performed by: EMERGENCY MEDICINE

## 2022-01-01 PROCEDURE — 85025 COMPLETE CBC W/AUTO DIFF WBC: CPT | Performed by: EMERGENCY MEDICINE

## 2022-01-01 PROCEDURE — 27000207 HC ISOLATION

## 2022-01-01 PROCEDURE — 96374 THER/PROPH/DIAG INJ IV PUSH: CPT | Mod: 59

## 2022-01-01 PROCEDURE — 96372 THER/PROPH/DIAG INJ SC/IM: CPT | Mod: 59

## 2022-01-01 PROCEDURE — U0002 COVID-19 LAB TEST NON-CDC: HCPCS | Performed by: EMERGENCY MEDICINE

## 2022-01-01 PROCEDURE — 25500020 PHARM REV CODE 255

## 2022-01-01 PROCEDURE — 99223 1ST HOSP IP/OBS HIGH 75: CPT | Mod: ,,, | Performed by: SURGERY

## 2022-01-01 PROCEDURE — 96376 TX/PRO/DX INJ SAME DRUG ADON: CPT | Mod: 59

## 2022-01-01 PROCEDURE — 99223 PR INITIAL HOSPITAL CARE,LEVL III: ICD-10-PCS | Mod: ,,, | Performed by: SURGERY

## 2022-01-01 PROCEDURE — 96361 HYDRATE IV INFUSION ADD-ON: CPT

## 2022-01-01 PROCEDURE — 82728 ASSAY OF FERRITIN: CPT | Performed by: STUDENT IN AN ORGANIZED HEALTH CARE EDUCATION/TRAINING PROGRAM

## 2022-01-01 RX ORDER — SODIUM CHLORIDE 0.9 % (FLUSH) 0.9 %
10 SYRINGE (ML) INJECTION
Status: DISCONTINUED | OUTPATIENT
Start: 2022-01-01 | End: 2022-01-02 | Stop reason: HOSPADM

## 2022-01-01 RX ORDER — ONDANSETRON 2 MG/ML
4 INJECTION INTRAMUSCULAR; INTRAVENOUS EVERY 8 HOURS PRN
Status: DISCONTINUED | OUTPATIENT
Start: 2022-01-01 | End: 2022-01-02 | Stop reason: HOSPADM

## 2022-01-01 RX ORDER — HYDROMORPHONE HYDROCHLORIDE 2 MG/ML
1 INJECTION, SOLUTION INTRAMUSCULAR; INTRAVENOUS; SUBCUTANEOUS
Status: COMPLETED | OUTPATIENT
Start: 2022-01-01 | End: 2022-01-01

## 2022-01-01 RX ORDER — ONDANSETRON 2 MG/ML
4 INJECTION INTRAMUSCULAR; INTRAVENOUS
Status: COMPLETED | OUTPATIENT
Start: 2022-01-01 | End: 2022-01-01

## 2022-01-01 RX ORDER — ENOXAPARIN SODIUM 100 MG/ML
40 INJECTION SUBCUTANEOUS EVERY 24 HOURS
Status: DISCONTINUED | OUTPATIENT
Start: 2022-01-01 | End: 2022-01-02 | Stop reason: HOSPADM

## 2022-01-01 RX ORDER — HYDROMORPHONE HYDROCHLORIDE 1 MG/ML
1 INJECTION, SOLUTION INTRAMUSCULAR; INTRAVENOUS; SUBCUTANEOUS EVERY 4 HOURS PRN
Status: DISCONTINUED | OUTPATIENT
Start: 2022-01-01 | End: 2022-01-02

## 2022-01-01 RX ORDER — BISACODYL 10 MG
10 SUPPOSITORY, RECTAL RECTAL DAILY PRN
Status: DISCONTINUED | OUTPATIENT
Start: 2022-01-01 | End: 2022-01-02 | Stop reason: HOSPADM

## 2022-01-01 RX ORDER — SODIUM CHLORIDE, SODIUM LACTATE, POTASSIUM CHLORIDE, CALCIUM CHLORIDE 600; 310; 30; 20 MG/100ML; MG/100ML; MG/100ML; MG/100ML
INJECTION, SOLUTION INTRAVENOUS CONTINUOUS
Status: DISCONTINUED | OUTPATIENT
Start: 2022-01-01 | End: 2022-01-02 | Stop reason: HOSPADM

## 2022-01-01 RX ORDER — NALOXONE HCL 0.4 MG/ML
0.02 VIAL (ML) INJECTION
Status: DISCONTINUED | OUTPATIENT
Start: 2022-01-01 | End: 2022-01-02

## 2022-01-01 RX ORDER — DROPERIDOL 2.5 MG/ML
1.25 INJECTION, SOLUTION INTRAMUSCULAR; INTRAVENOUS
Status: COMPLETED | OUTPATIENT
Start: 2022-01-01 | End: 2022-01-01

## 2022-01-01 RX ADMIN — ONDANSETRON 4 MG: 2 INJECTION INTRAMUSCULAR; INTRAVENOUS at 04:01

## 2022-01-01 RX ADMIN — BISACODYL 10 MG: 10 SUPPOSITORY RECTAL at 05:01

## 2022-01-01 RX ADMIN — IOHEXOL 75 ML: 350 INJECTION, SOLUTION INTRAVENOUS at 05:01

## 2022-01-01 RX ADMIN — ENOXAPARIN SODIUM 40 MG: 40 INJECTION SUBCUTANEOUS at 05:01

## 2022-01-01 RX ADMIN — HYDROMORPHONE HYDROCHLORIDE 1 MG: 2 INJECTION INTRAMUSCULAR; INTRAVENOUS; SUBCUTANEOUS at 04:01

## 2022-01-01 RX ADMIN — ONDANSETRON 4 MG: 2 INJECTION INTRAMUSCULAR; INTRAVENOUS at 08:01

## 2022-01-01 RX ADMIN — SODIUM CHLORIDE, SODIUM LACTATE, POTASSIUM CHLORIDE, AND CALCIUM CHLORIDE 1000 ML: .6; .31; .03; .02 INJECTION, SOLUTION INTRAVENOUS at 08:01

## 2022-01-01 RX ADMIN — SODIUM CHLORIDE 1000 ML: 0.9 INJECTION, SOLUTION INTRAVENOUS at 04:01

## 2022-01-01 RX ADMIN — DROPERIDOL 1.25 MG: 2.5 INJECTION, SOLUTION INTRAMUSCULAR; INTRAVENOUS at 10:01

## 2022-01-01 RX ADMIN — HYDROMORPHONE HYDROCHLORIDE 1 MG: 2 INJECTION INTRAMUSCULAR; INTRAVENOUS; SUBCUTANEOUS at 07:01

## 2022-01-01 RX ADMIN — HYDROMORPHONE HYDROCHLORIDE 1 MG: 2 INJECTION INTRAMUSCULAR; INTRAVENOUS; SUBCUTANEOUS at 06:01

## 2022-01-01 RX ADMIN — SODIUM CHLORIDE, SODIUM LACTATE, POTASSIUM CHLORIDE, AND CALCIUM CHLORIDE: .6; .31; .03; .02 INJECTION, SOLUTION INTRAVENOUS at 12:01
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
## 2022-01-01 NOTE — PLAN OF CARE
01/01/22 1629   Readmission   Why were you hospitalized in the last 30 days? Epigastric pain   Why were you readmitted? Alarmed about signs/symptoms   When you left the hospital where did you go? Home with Family   Did patient/caregiver refused recommended DC plan? No   Tell me about what happened between when you left the hospital and the day you returned. Surgery on [facility] for hernia repair and large mass from abdomen and piece of intestine removed. Now having vomiting. Started throwing up around midnight.   Was this a planned readmission? No

## 2022-01-01 NOTE — HPI
36-year-old with recent small bowel resection in pelvic mass excision.  She presents with nausea vomiting mild abdominal pain and general fatigue.  She recently was diagnosed with COVID.  She reports to me that she is passing flatus and has been, but she is not having bowel movement since surgery.  She does report a very tiny hard stool today but very little.  She denies any further nausea vomiting.
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
## 2022-01-01 NOTE — TELEPHONE ENCOUNTER
No bm since her surgery 12/23. Vomited x4 times in past few hours. She cannot keep anything down. Too weak to stand up,  has to assist her with walking. Advised him to bring her to the ED now, but if she is too weak to make it to the car, he needs to call 911. He verbalized understanding.     Reason for Disposition   Patient sounds very sick or weak to the triager    Protocols used: CONSTIPATION-A-AH
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
## 2022-01-01 NOTE — HPI
Melita Snell is a 36 year old female with a past medical history of uterine fibroids s/p myomectomy complicated by recent admission for SBO requiring exploratory laparotomy with MAGO, repair of incisional hernia, and small bowel resection with anastomosis who presents with one day of nausea, vomiting, abdominal pain and distension with no fever or chills. Workup in the ED showed an SBO on CT abdomen and pelvis. She also tested positive for COVID-19 yet has no symptoms. She is vaccinated. She received fluids and and pain medication in the ED; an NG tube was also ordered. Hospital Medicine was consulted for admission.

## 2022-01-01 NOTE — PLAN OF CARE
Plan of care reviewed with patient. Patient verbalized complete understanding. Afebrile throughout shift. NG tube to LIS, as ordered. No output noted. Denies nausea/vomiting.  NPO status. IV fluids infusing as ordered. Currently on room air, sats 98%. Afebrile throughout shift. All fall precautions maintained. Bed in lowest position, locked, call light within reach. Side rails up x's 2. Slip resistant socks maintained.
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
## 2022-01-01 NOTE — PLAN OF CARE
[facility]  Initial Discharge Assessment       Primary Care Provider: Claudia Barajas MD    Admission Diagnosis: Vomiting [R11.10]  SBO (small bowel obstruction) [K56.609]    Admission Date: 1/1/2022  Expected Discharge Date:     Discharge Barriers Identified: None    Payor: [employer] / Plan: [employer] SELECT PLUS / Product Type: Commercial /     Extended Emergency Contact Information  Primary Emergency Contact: James Shell  Address: [address] [place] [address] [place]  Mobile Phone: [phone]  Relation: Spouse  Secondary Emergency Contact: Gretchen Snell  Address: [address] [place] [address] [place]  Mobile Phone: [phone]  Relation: Mother  Father: SnellJr.Raman  Address: [address] [place] [address] [place]  Mobile Phone: [phone]    Discharge Plan A: Home  Discharge Plan B: Home with family      [id number] - [place] - [address] [facility] [address] [facility] [address] [facility]  [address] [place] [facility] [address]  Phone: [phone] Fax: [phone]    [id number] - [place] - [address] & [facility]  [address]  [facility] [address]  Phone: [phone] Fax: [phone]    [id number] - [place] - [address] [facility] [address] & [facility]  [address] [facility] [address]  Phone: [phone] Fax: [phone]    [id number] - [place] - [address]  [address]  [place] [address]  Phone: [phone] Fax: [phone]    SW attempted to speak with patient and/or patient's emergency contact to complete discharge planning assessment without success.  SW completed from record review.  Verified all demographic information on facesheet is correct.  Verified PCP is Dr. Barajas.  Verified primary health insurance is [employer].   Patient with NO home health or DME.  Patient with NO POA or Living Will.  Patient not on dialysis or medication coumadin.  Patient with 30 day admission.  Patient with no financial issues at this time.  Patient family will provide transportation upon discharge from facility.  Patient independent with ADLs, live with spouse, drives self.      Initial Assessment (most recent)     Adult Discharge Assessment - 01/01/22 4502        Discharge Assessment    Assessment Type Discharge Planning Assessment     Confirmed/corrected address, phone number and insurance No     Reason No family to provide information/patient unable to answer     Confirmed Demographics Correct on Facesheet     Source of Information health record     If unable to respond/provide information was family/caregiver contacted? Other (see comments)     Communicated SAM with patient/caregiver Date not available/Unable to determine     Lives With spouse     Facility Arrived From: home     Do you expect to return to your current living situation? Yes     Do you have help at home or someone to help you manage your care at home? Yes     Who are your caregiver(s) and their phone number(s)? spouse     Prior to hospitilization cognitive status: Alert/Oriented     Current cognitive status: Alert/Oriented     Walking or Climbing Stairs Difficulty none     Dressing/Bathing Difficulty none     Equipment Currently Used at Home none     Readmission within 30 days? No     Patient currently being followed by outpatient case management? Yes     If yes, name of outpatient case management following: [facility]     Do you currently have service(s) that help you manage your care at home? Yes     Name and Contact number of agency [facility]     Is the pt/caregiver preference to resume services with current agency Yes     Do you take prescription medications? Yes     Do you have prescription coverage? Yes     Do you have any problems  affording any of your prescribed medications? No     Is the patient taking medications as prescribed? yes     Who is going to help you get home at discharge? spouse     How do you get to doctors appointments? car, drives self     Are you on dialysis? No     Do you take coumadin? No     Discharge Plan A Home     Discharge Plan B Home with family     DME Needed Upon Discharge  none     Discharge Barriers Identified None

## 2022-01-01 NOTE — ED NOTES
Placement verified by Auscultation with 2nd nurse. Small amt of yellow fluid from suction. Placed on int low suction. Dr Morelos is in room to see pt and update care.   remains at BS.

## 2022-01-02 ENCOUNTER — PATIENT MESSAGE (OUTPATIENT)
Dept: ADMINISTRATIVE | Facility: OTHER | Age: 37
End: 2022-01-02

## 2022-01-02 ENCOUNTER — PATIENT MESSAGE (OUTPATIENT)
Dept: INTERNAL MEDICINE | Facility: CLINIC | Age: 37
End: 2022-01-02

## 2022-01-02 VITALS
BODY MASS INDEX: 29.44 KG/M2 | HEART RATE: 75 BPM | TEMPERATURE: 98 F | OXYGEN SATURATION: 98 % | HEIGHT: 62 IN | RESPIRATION RATE: 16 BRPM | DIASTOLIC BLOOD PRESSURE: 74 MMHG | SYSTOLIC BLOOD PRESSURE: 163 MMHG | WEIGHT: 160 LBS

## 2022-01-02 PROBLEM — K59.00 CONSTIPATION: Status: ACTIVE | Noted: 2021-12-23

## 2022-01-02 LAB
ALBUMIN SERPL BCP-MCNC: 2.7 G/DL (ref 3.5–5.2)
ALP SERPL-CCNC: 67 U/L (ref 55–135)
ALT SERPL W/O P-5'-P-CCNC: 13 U/L (ref 10–44)
ANION GAP SERPL CALC-SCNC: 11 MMOL/L (ref 8–16)
AST SERPL-CCNC: 14 U/L (ref 10–40)
BASOPHILS # BLD AUTO: 0.01 K/UL (ref 0–0.2)
BASOPHILS NFR BLD: 0.2 % (ref 0–1.9)
BILIRUB SERPL-MCNC: 0.5 MG/DL (ref 0.1–1)
BUN SERPL-MCNC: 4 MG/DL (ref 6–20)
CALCIUM SERPL-MCNC: 8.6 MG/DL (ref 8.7–10.5)
CHLORIDE SERPL-SCNC: 104 MMOL/L (ref 95–110)
CO2 SERPL-SCNC: 23 MMOL/L (ref 23–29)
CREAT SERPL-MCNC: 0.7 MG/DL (ref 0.5–1.4)
DIFFERENTIAL METHOD: ABNORMAL
EOSINOPHIL # BLD AUTO: 0.3 K/UL (ref 0–0.5)
EOSINOPHIL NFR BLD: 5.3 % (ref 0–8)
ERYTHROCYTE [DISTWIDTH] IN BLOOD BY AUTOMATED COUNT: 19.2 % (ref 11.5–14.5)
EST. GFR  (AFRICAN AMERICAN): >60 ML/MIN/1.73 M^2
EST. GFR  (NON AFRICAN AMERICAN): >60 ML/MIN/1.73 M^2
GLUCOSE SERPL-MCNC: 92 MG/DL (ref 70–110)
HCT VFR BLD AUTO: 30.8 % (ref 37–48.5)
HGB BLD-MCNC: 9.7 G/DL (ref 12–16)
IMM GRANULOCYTES # BLD AUTO: 0.05 K/UL (ref 0–0.04)
IMM GRANULOCYTES NFR BLD AUTO: 1.1 % (ref 0–0.5)
LYMPHOCYTES # BLD AUTO: 1.2 K/UL (ref 1–4.8)
LYMPHOCYTES NFR BLD: 25.6 % (ref 18–48)
MAGNESIUM SERPL-MCNC: 2 MG/DL (ref 1.6–2.6)
MCH RBC QN AUTO: 24.6 PG (ref 27–31)
MCHC RBC AUTO-ENTMCNC: 31.5 G/DL (ref 32–36)
MCV RBC AUTO: 78 FL (ref 82–98)
MONOCYTES # BLD AUTO: 0.3 K/UL (ref 0.3–1)
MONOCYTES NFR BLD: 7.2 % (ref 4–15)
NEUTROPHILS # BLD AUTO: 2.8 K/UL (ref 1.8–7.7)
NEUTROPHILS NFR BLD: 60.6 % (ref 38–73)
NRBC BLD-RTO: 0 /100 WBC
PHOSPHATE SERPL-MCNC: 2.8 MG/DL (ref 2.7–4.5)
PLATELET # BLD AUTO: 490 K/UL (ref 150–450)
PMV BLD AUTO: 9.7 FL (ref 9.2–12.9)
POTASSIUM SERPL-SCNC: 3.8 MMOL/L (ref 3.5–5.1)
PROT SERPL-MCNC: 6.9 G/DL (ref 6–8.4)
RBC # BLD AUTO: 3.94 M/UL (ref 4–5.4)
SODIUM SERPL-SCNC: 138 MMOL/L (ref 136–145)
WBC # BLD AUTO: 4.69 K/UL (ref 3.9–12.7)

## 2022-01-02 PROCEDURE — 85025 COMPLETE CBC W/AUTO DIFF WBC: CPT | Performed by: STUDENT IN AN ORGANIZED HEALTH CARE EDUCATION/TRAINING PROGRAM

## 2022-01-02 PROCEDURE — 36415 COLL VENOUS BLD VENIPUNCTURE: CPT | Performed by: STUDENT IN AN ORGANIZED HEALTH CARE EDUCATION/TRAINING PROGRAM

## 2022-01-02 PROCEDURE — 25000003 PHARM REV CODE 250: Performed by: STUDENT IN AN ORGANIZED HEALTH CARE EDUCATION/TRAINING PROGRAM

## 2022-01-02 PROCEDURE — 84100 ASSAY OF PHOSPHORUS: CPT | Performed by: STUDENT IN AN ORGANIZED HEALTH CARE EDUCATION/TRAINING PROGRAM

## 2022-01-02 PROCEDURE — 80053 COMPREHEN METABOLIC PANEL: CPT | Performed by: STUDENT IN AN ORGANIZED HEALTH CARE EDUCATION/TRAINING PROGRAM

## 2022-01-02 PROCEDURE — G0378 HOSPITAL OBSERVATION PER HR: HCPCS

## 2022-01-02 PROCEDURE — 83735 ASSAY OF MAGNESIUM: CPT | Performed by: STUDENT IN AN ORGANIZED HEALTH CARE EDUCATION/TRAINING PROGRAM

## 2022-01-02 RX ORDER — OXYCODONE AND ACETAMINOPHEN 5; 325 MG/1; MG/1
1 TABLET ORAL EVERY 6 HOURS PRN
Qty: 12 TABLET | Refills: 0 | Status: SHIPPED | OUTPATIENT
Start: 2022-01-02 | End: 2023-02-27

## 2022-01-02 RX ORDER — AMOXICILLIN 250 MG
1 CAPSULE ORAL DAILY PRN
Status: DISCONTINUED | OUTPATIENT
Start: 2022-01-02 | End: 2022-01-02 | Stop reason: HOSPADM

## 2022-01-02 RX ORDER — AMOXICILLIN 250 MG
1 CAPSULE ORAL DAILY
Qty: 90 TABLET | Refills: 3 | Status: SHIPPED | OUTPATIENT
Start: 2022-01-02 | End: 2023-02-27

## 2022-01-02 RX ORDER — POLYETHYLENE GLYCOL 3350 17 G/17G
17 POWDER, FOR SOLUTION ORAL DAILY
Status: DISCONTINUED | OUTPATIENT
Start: 2022-01-02 | End: 2022-01-02 | Stop reason: HOSPADM

## 2022-01-02 RX ORDER — POLYETHYLENE GLYCOL 3350 17 G/17G
17 POWDER, FOR SOLUTION ORAL DAILY
Qty: 100 EACH | Refills: 0 | Status: SHIPPED | OUTPATIENT
Start: 2022-01-02 | End: 2023-02-27

## 2022-01-02 RX ADMIN — POLYETHYLENE GLYCOL (3350) 17 G: 17 POWDER, FOR SOLUTION ORAL at 11:01

## 2022-01-02 NOTE — PLAN OF CARE
Patient cleared for discharge from case management standpoint.       01/02/22 1121   Final Note   Assessment Type Final Discharge Note   Anticipated Discharge Disposition Home   Hospital Resources/Appts/Education Provided Provided education on problems/symptoms using teachback;Appointments scheduled and added to AVS;Provided patient/caregiver with written discharge plan information

## 2022-01-02 NOTE — PLAN OF CARE
Plan of care reviewed with patient. Patient verbalized complete understanding. Advanced diet to full liquids, currently tolerating. Stool softener and laxative initiated, per MD. Currently on room air. Patient denies nausea, vomiting, cough, chills, loss of taste/smell, etc. Patient c/o mild, decreased abdominal pain. All fall precautions maintained. Bed in lowest position, locked, call light within reach. Side rails up x's 2. Slip resistant socks maintained.

## 2022-01-02 NOTE — HOSPITAL COURSE
Melita Snell is a 36 year old female with a past medical history of uterine fibroids s/p myomectomy complicated by recent admission for SBO requiring exploratory laparotomy with MAGO, repair of incisional hernia, and small bowel resection with anastomosis who presented with one day of nausea, vomiting, abdominal pain and distension with no fever or chills. Initial concern was for SBO which was noted on CT abdomen and pelvis Radiology report. Surgery was consulted and an NG tube was placed; IV fluids were also started. Surgery noted a high stool burden on CT and suspected constipation. After suppository therapy, the patient did have a bowel and was noted to tolerated PO. The NG tube was removed and the patient was able to be safely discharged 1/2/2022 with a bowel regimen and a short course of PRN pain medication. She will follow up with PCP and Dr. Gallegos of Surgery in the outpatient setting. Of note, the patient did test positive for COVID-19, yet she did not display any respiratory symptoms. She is vaccinated and was counseled to self quarantine at home.

## 2022-01-02 NOTE — ASSESSMENT & PLAN NOTE
-CBC trend  -Iron studies    
-CMP daily  -NPO currently    
-CMP trend    
-Surgery consulted  -Bowel regimen  -PRN pain and antiemetic medication  -Follow up with Surgery in clinic    
-Surgery consulted  -NG tube to LIS  -IV fluids  -NPO  -PRN pain and antiemetic medication  -KUB PRN    
-Trended with CBC    
I am doubtful that she is obstructed and she is clinically more in line with severe constipation.  Her CT scan does show a large amount of stool throughout her intestines.  I will plan for a suppository.  She does not like the NG tube and has had no output from it.  I will have them remove NG tube and keep her on clear liquids for now to see how she does.  She does not endorse any more nausea or vomiting.  
Iron at lower limit of normal.  -CBC trend    
Patient vaccinated. Asymptomatic.  -Airborne and isolation precautions  -Monitor for signs and symptoms    
Patient vaccinated. Asymptomatic.  -Airborne and isolation precautions  -Monitored for signs and symptoms    
Present on CT scan.    
Present on CT scan.    
DISPLAY PLAN FREE TEXT

## 2022-01-02 NOTE — PROGRESS NOTES
Progress Note  Gen Surg    Admit Date: 1/1/2022  Attending: Boo  S/P: * No surgery found *    Post-operative Day:      Hospital Day: 2    SUBJECTIVE:     Doing better  Having bowel movement and flatus     OBJECTIVE:     Vital Signs (Most Recent)  Temp: 97.5 °F (36.4 °C) (01/02/22 0842)  Pulse: 75 (01/02/22 0842)  Resp: 16 (01/02/22 0842)  BP: (!) 163/74 (01/02/22 0842)  SpO2: 98 % (01/02/22 0842)    Vital Signs Range (Last 24H):  Temp:  [97.5 °F (36.4 °C)-99 °F (37.2 °C)]   Pulse:  []   Resp:  [16-18]   BP: (135-163)/(74-80)   SpO2:  [98 %]     I & O (Last 24H):    Intake/Output Summary (Last 24 hours) at 1/2/2022 1314  Last data filed at 1/2/2022 0500  Gross per 24 hour   Intake 120 ml   Output --   Net 120 ml       Scheduled medications:   enoxaparin  40 mg Subcutaneous Daily    polyethylene glycol  17 g Oral Daily       Physical Exam:  General: no distress  Lungs:  clear to auscultation bilaterally and normal respiratory effort  Heart: regular rate and rhythm, S1, S2 normal, no murmur, rub or gallop  Abdomen: soft, non-tender non-distented; bowel sounds normal; no masses,  no organomegaly    Wound/Incision:  clean, dry, intact    Laboratory:  Labs within the past 24 hours have been reviewed.    ASSESSMENT/PLAN:     Ok to dc when clear with medical team    Akosua Haddad MD

## 2022-01-02 NOTE — DISCHARGE SUMMARY
[facility] Hospital Medicine  Discharge Summary      Patient Name: Melita Shell  MRN: [id number]  Patient Class: IP- Inpatient  Admission Date: 1/1/2022  Hospital Length of Stay: 1 days  Discharge Date and Time: No discharge date for patient encounter.  Attending Physician: Glenn Morelos MD   Discharging Provider: Glenn Morelos MD  Primary Care Provider: Claudia Barajas MD      HPI:   Melita Snell is a 36 year old female with a past medical history of uterine fibroids s/p myomectomy complicated by recent admission for SBO requiring exploratory laparotomy with MAGO, repair of incisional hernia, and small bowel resection with anastomosis who presents with one day of nausea, vomiting, abdominal pain and distension with no fever or chills. Workup in the ED showed an SBO on CT abdomen and pelvis. She also tested positive for COVID-19 yet has no symptoms. She is vaccinated. She received fluids and and pain medication in the ED; an NG tube was also ordered. Hospital Medicine was consulted for admission.      * No surgery found *      Hospital Course:   Melita Snell is a 36 year old female with a past medical history of uterine fibroids s/p myomectomy complicated by recent admission for SBO requiring exploratory laparotomy with MAGO, repair of incisional hernia, and small bowel resection with anastomosis who presented with one day of nausea, vomiting, abdominal pain and distension with no fever or chills. Initial concern was for SBO which was noted on CT abdomen and pelvis Radiology report. Surgery was consulted and an NG tube was placed; IV fluids were also started. Surgery noted a high stool burden on CT and suspected constipation. After suppository therapy, the patient did have a bowel and was noted to tolerated PO. The NG tube was removed and the patient was able to be safely discharged 1/2/2022 with a bowel regimen and a short course of PRN pain medication. She will follow up with PCP and  Dr. Gallegos of Surgery in the outpatient setting. Of note, the patient did test positive for COVID-19, yet she did not display any respiratory symptoms. She is vaccinated and was counseled to self quarantine at home.       Goals of Care Treatment Preferences:  Code Status: Full Code      Consults:   Consults (From admission, onward)        Status Ordering Provider     Inpatient consult to General Surgery  Once        Provider:  Akosua Haddad MD    Completed NIKO COTTON          * Constipation  -Surgery consulted  -Bowel regimen  -PRN pain and antiemetic medication  -Follow up with Surgery in clinic      Hypoalbuminemia due to protein-calorie malnutrition  -CMP trend      COVID-19  Patient vaccinated. Asymptomatic.  -Airborne and isolation precautions  -Monitored for signs and symptoms      Thrombocytosis  -Trended with CBC      Microcytic anemia  Iron at lower limit of normal.  -CBC trend      Fibroids, intramural -   Present on CT scan.        Final Active Diagnoses:    Diagnosis Date Noted POA    PRINCIPAL PROBLEM:  Constipation [K59.00] 12/23/2021 Yes    Thrombocytosis [D75.839] 01/01/2022 Yes    COVID-19 [U07.1] 01/01/2022 Yes    Hypoalbuminemia due to protein-calorie malnutrition [E88.09, E46] 01/01/2022 Yes    Microcytic anemia [D50.9] 06/02/2018 Yes    Fibroids, intramural -  [D25.1] 05/29/2018 Yes      Problems Resolved During this Admission:       Discharged Condition: stable    Disposition: Home or Self Care    Follow Up:   Follow-up Information     Quinton Gallegos MD On 1/6/2022.    Specialty: General Surgery  Contact information:  [address] [facility]  [address]  [place] [address]  [phone]             Leslie Lovelace DO On 1/4/2022.    Specialty: Internal Medicine  Contact information:  [address] [facility] [place] [address]  [phone]                       Patient Instructions:      Diet Adult Regular     Order Specific Question Answer Comments   Additional restrictions: High  Fiber      Notify your health care provider if you experience any of the following:  persistent nausea and vomiting or diarrhea     Notify your health care provider if you experience any of the following:  temperature >100.4     Notify your health care provider if you experience any of the following:  severe uncontrolled pain     Notify your health care provider if you experience any of the following:  redness, tenderness, or signs of infection (pain, swelling, redness, odor or green/yellow discharge around incision site)     Notify your health care provider if you experience any of the following:  difficulty breathing or increased cough     Notify your health care provider if you experience any of the following:  increased confusion or weakness     Notify your health care provider if you experience any of the following:  persistent dizziness, light-headedness, or visual disturbances     Notify your health care provider if you experience any of the following:  worsening rash     Notify your health care provider if you experience any of the following:  severe persistent headache     Activity as tolerated       Significant Diagnostic Studies: Labs:   CMP   Recent Labs   Lab 01/01/22  0439 01/02/22  0507    138   K 3.8 3.8    104   CO2 19* 23   * 92   BUN 5* 4*   CREATININE 0.7 0.7   CALCIUM 9.5 8.6*   PROT 8.5* 6.9   ALBUMIN 3.4* 2.7*   BILITOT 0.7 0.5   ALKPHOS 81 67   AST 15 14   ALT 15 13   ANIONGAP 13 11   ESTGFRAFRICA >60 >60   EGFRNONAA >60 >60    and CBC   Recent Labs   Lab 01/01/22  0439 01/01/22  0439 01/02/22  0507   WBC 7.68  --  4.69   HGB 11.6*  --  9.7*   HCT 35.5*   < > 30.8*   *  --  490*    < > = values in this interval not displayed.       Pending Diagnostic Studies:     None         Medications:  Reconciled Home Medications:      Medication List      START taking these medications    polyethylene glycol 17 gram Pwpk  Commonly known as: GLYCOLAX  Take 17 g by mouth once  daily.     senna-docusate 8.6-50 mg 8.6-50 mg per tablet  Commonly known as: PERICOLACE  Take 1 tablet by mouth once daily.        CHANGE how you take these medications    ondansetron 8 MG Tbdl  Commonly known as: ZOFRAN-ODT  Take 1 tablet (8 mg total) by mouth 3 (three) times daily as needed (nausea).  What changed: Another medication with the same name was removed. Continue taking this medication, and follow the directions you see here.     oxyCODONE-acetaminophen 5-325 mg per tablet  Commonly known as: PERCOCET  Take 1 tablet by mouth every 6 (six) hours as needed for Pain.  What changed: when to take this        CONTINUE taking these medications    ibuprofen 800 MG tablet  Commonly known as: ADVIL,MOTRIN  Take 1 tablet (800 mg total) by mouth every 8 (eight) hours as needed for Pain.     metoclopramide HCl 10 MG tablet  Commonly known as: REGLAN  Take 10 mg by mouth every 6 (six) hours as needed.     norethindrone-ethinyl estradiol 1-20 mg-mcg per tablet  Commonly known as: MICROGESTIN 1/20  Take 1 tablet by mouth once daily.     simethicone 80 MG chewable tablet  Commonly known as: MYLICON  Take 1 tablet (80 mg total) by mouth every 6 (six) hours as needed for Flatulence.        STOP taking these medications    nitrofurantoin (macrocrystal-monohydrate) 100 MG capsule  Commonly known as: MACROBID     ursodioL 300 mg capsule  Commonly known as: ACTIGALL            Indwelling Lines/Drains at time of discharge:   Lines/Drains/Airways     None                 Time spent on the discharge of patient: 32 minutes         Glenn Morelos MD  Department of Hospital Medicine  [facility]

## 2022-01-03 ENCOUNTER — PATIENT OUTREACH (OUTPATIENT)
Dept: ADMINISTRATIVE | Facility: CLINIC | Age: 37
End: 2022-01-03

## 2022-01-03 NOTE — PATIENT INSTRUCTIONS
Patient Education       Constipation Discharge Instructions, Adult   About this topic   Constipation is the medical term for when your bowel movements are too hard, too small, or dont happen often enough. It can also be hard to have a bowel movement. Most of the time, you can treat your constipation at home.     What care is needed at home?   · Ask your doctor what you need to do when you go home. Make sure you ask questions if you do not understand what the doctor says. This way you will know what you need to do.  · Eat high-fiber foods. These include whole grains, fruits, and vegetables.  · Drink plenty of water and other fluids each day. This helps to keep your bowel movements soft.  · Set a regular schedule to try and have a bowel movement. Do not ignore the urge to have a bowel movement.  · Give yourself plenty of time to have a bowel movement.  · Be active. Walk, garden, or do something active for 30 minutes or more on most days of the week.  · Sitting in a warm bath can help you relax and feel like you can have a bowel movement.  · Talk to your regular doctor before you use laxatives or enemas regularly.  What follow-up care is needed?   Your doctor may ask you to make visits to the office to check on your progress. Be sure to keep these visits.  What drugs may be needed?   The doctor may order drugs to:  · Help you move your bowels  · Soften stools, like mineral oils  · Add bulk to the stool, like fiber supplements  Will physical activity be limited?   Your physical activities will not be limited in most cases. Try to stay physically active. This may help you move your bowels more regularly.  What problems could happen?   · Rectal bleeding  · Hemorrhoids  · Tears around the skin of the anus  · Hard stool may pack the large bowels very tightly. If this happens, the normal pushing action of the bowels is not enough to remove the stool. This is called fecal impaction.  When do I need to call the doctor?   Seek  care right away or go to the ER if:  · You have sudden severe belly pain or the pain is constant.  · Your belly becomes very hard or swollen.  · You start throwing up blood.  · You pass a lot of blood in your bowel movements.  · Your bowel movements are black or tar-colored.  · You throw up a lot and cant keep liquids down.  · You have a fever of 102.2°F (39°C) or higher.  Call your doctor if:  · You have a fever of 100.4°F (38°C) or higher or chills.  · Your bowel movements have a small amount (less than 1 teaspoon or 5 mL) of blood in them.  · You feel that something is not right in your belly.  · You have hemorrhoids.  · You have hard bowel movements for more than 2 weeks with belly pain.  Teach Back: Helping You Understand   The Teach Back Method helps you understand the information we are giving you. After you talk with the staff, tell them in your own words what you learned. This helps to make sure the staff has described each thing clearly. It also helps to explain things that may have been confusing. Before going home, make sure you can do these:  · I can tell you about my condition.  · I can tell you what changes I need to make with my diet or drugs.  · I can tell you what I will do if I have lots of rectal bleeding or very bad belly pain with hard stools and a fever.  Where can I learn more?   Academy of Nutrition and Dietetics  https://www.eatright.org/food/vitamins-and-supplements/nutrient-rich-foods/fiber   Academy of Nutrition and Dietetics  https://www.eatright.org/food/vitamins-and-supplements/types-of-vitamins-and-nutrients/easy-ways-to-boost-fiber-in-your-daily-diet   FamilyDoctor.org  http://familydoctor.org/familydoctor/en/diseases-conditions/constipation.html   National Digestive Diseases Information Clearinghouse  https://www.niddk.nih.gov/health-information/digestive-diseases/constipation/definition-facts   Last Reviewed Date   2021-06-07  Consumer Information Use and Disclaimer   This  information is not specific medical advice and does not replace information you receive from your health care provider. This is only a brief summary of general information. It does NOT include all information about conditions, illnesses, injuries, tests, procedures, treatments, therapies, discharge instructions or life-style choices that may apply to you. You must talk with your health care provider for complete information about your health and treatment options. This information should not be used to decide whether or not to accept your health care providers advice, instructions or recommendations. Only your health care provider has the knowledge and training to provide advice that is right for you.  Copyright   Copyright © 2021 UpToDate, Inc. and its affiliates and/or licensors. All rights reserved.  Patient Education       Constipation Discharge Instructions, Adult   About this topic   Constipation is the medical term for when your bowel movements are too hard, too small, or dont happen often enough. It can also be hard to have a bowel movement. Most of the time, you can treat your constipation at home.     What care is needed at home?   · Ask your doctor what you need to do when you go home. Make sure you ask questions if you do not understand what the doctor says. This way you will know what you need to do.  · Eat high-fiber foods. These include whole grains, fruits, and vegetables.  · Drink plenty of water and other fluids each day. This helps to keep your bowel movements soft.  · Set a regular schedule to try and have a bowel movement. Do not ignore the urge to have a bowel movement.  · Give yourself plenty of time to have a bowel movement.  · Be active. Walk, garden, or do something active for 30 minutes or more on most days of the week.  · Sitting in a warm bath can help you relax and feel like you can have a bowel movement.  · Talk to your regular doctor before you use laxatives or enemas  regularly.  What follow-up care is needed?   Your doctor may ask you to make visits to the office to check on your progress. Be sure to keep these visits.  What drugs may be needed?   The doctor may order drugs to:  · Help you move your bowels  · Soften stools, like mineral oils  · Add bulk to the stool, like fiber supplements  Will physical activity be limited?   Your physical activities will not be limited in most cases. Try to stay physically active. This may help you move your bowels more regularly.  What problems could happen?   · Rectal bleeding  · Hemorrhoids  · Tears around the skin of the anus  · Hard stool may pack the large bowels very tightly. If this happens, the normal pushing action of the bowels is not enough to remove the stool. This is called fecal impaction.  When do I need to call the doctor?   Seek care right away or go to the ER if:  · You have sudden severe belly pain or the pain is constant.  · Your belly becomes very hard or swollen.  · You start throwing up blood.  · You pass a lot of blood in your bowel movements.  · Your bowel movements are black or tar-colored.  · You throw up a lot and cant keep liquids down.  · You have a fever of 102.2°F (39°C) or higher.  Call your doctor if:  · You have a fever of 100.4°F (38°C) or higher or chills.  · Your bowel movements have a small amount (less than 1 teaspoon or 5 mL) of blood in them.  · You feel that something is not right in your belly.  · You have hemorrhoids.  · You have hard bowel movements for more than 2 weeks with belly pain.  Teach Back: Helping You Understand   The Teach Back Method helps you understand the information we are giving you. After you talk with the staff, tell them in your own words what you learned. This helps to make sure the staff has described each thing clearly. It also helps to explain things that may have been confusing. Before going home, make sure you can do these:  · I can tell you about my condition.  · I can  tell you what changes I need to make with my diet or drugs.  · I can tell you what I will do if I have lots of rectal bleeding or very bad belly pain with hard stools and a fever.  Where can I learn more?   Academy of Nutrition and Dietetics  https://www.eatright.org/food/vitamins-and-supplements/nutrient-rich-foods/fiber   Academy of Nutrition and Dietetics  https://www.eatright.org/food/vitamins-and-supplements/types-of-vitamins-and-nutrients/easy-ways-to-boost-fiber-in-your-daily-diet   FamilyDoctor.org  http://familydoctor.org/familydoctor/en/diseases-conditions/constipation.html   National Digestive Diseases Information Clearinghouse  https://www.niddk.nih.gov/health-information/digestive-diseases/constipation/definition-facts   Last Reviewed Date   2021-06-07  Consumer Information Use and Disclaimer   This information is not specific medical advice and does not replace information you receive from your health care provider. This is only a brief summary of general information. It does NOT include all information about conditions, illnesses, injuries, tests, procedures, treatments, therapies, discharge instructions or life-style choices that may apply to you. You must talk with your health care provider for complete information about your health and treatment options. This information should not be used to decide whether or not to accept your health care providers advice, instructions or recommendations. Only your health care provider has the knowledge and training to provide advice that is right for you.  Copyright   Copyright © 2021 UpToDate, Inc. and its affiliates and/or licensors. All rights reserved.  Tim teaching reviewed with   Anemia [Type Not Specified, Adult]  Red blood cells carry oxygen to the tissues of the body. Anemia is a condition where the size or number of red blood cells in the body is reduced. Iron is needed to make red blood cells. The most common cause of anemia is iron deficiency.  This may be due to:  i) Blood loss (heavy menstrual periods or bleeding from the stomach or intestines); or,  ii) Not eating enough iron-containing foods.  Other causes of anemia include certain vitamin deficiencies, chronic kidney disease or certain other chronic illnesses.  Anemia causes a feeling of being tired and run down. When anemia becomes severe, the skin becomes pale and there is shortness of breath with exertion. Headaches, dizziness, leg cramps with exertion, drowsiness and fatigue are other common symptoms.  Home Care:  If you are having symptoms of anemia listed above:  -- Do not overexert yourself.  -- Talk to your doctor before flying on an airplane or traveling to high altitudes.  Follow Up with your doctor as advised by our staff. Additional blood testing may be required to determine the exact cause of your anemia. If testing was done on this visit, it may take several days to get all of the results. You may call this facility or follow up with your own doctor to get the results.  Get Prompt Medical Attention if any of the following occur:  -- Shortness of breath or chest pain  -- Worsening of dizziness, fainting  -- Vomiting blood or passing red or black-colored stool  © 2365-4951 Tim \[employer] [facility] [place] [facility]\"", [address] [place] [id number]. All rights reserved. This information is not intended as a substitute for professional medical care. Always follow your healthcare professional's instructions.     Melita Shell     verbalized understanding.    Education was provided based on the patient's discharge diagnosis using the attached [facility] patient education as a reference.  Patient Education       COVID-19 Discharge Instructions   About this topic   Coronavirus disease 2019 is also known as COVID-19. It is a viral illness that infects the lungs. It is caused by a virus called SARS-associated coronavirus (SARS-CoV-2).  The signs of COVID-19 most often start a few days after you have been  infected. In some people, it takes longer to show signs. Others never show signs of the infection. You may have a cough, fever, shaking chills and it may be hard to breathe. You may be very tired, have muscle aches, a headache or sore throat. Some people have an upset stomach or loose stools. Others lose their sense of smell or taste. You may not have these signs all the time and they may come and go while you are sick.  The virus spreads easily through droplets when you talk, sneeze, or cough. You can pass the virus to others when you are talking close together, singing, hugging, sharing food, or shaking hands. Doctors believe the germs also survive on surfaces like tables, door handles, and telephones. However, this is not a common way that COVID-19 spreads. Doctors believe you can also spread the infection even if you dont have any symptoms, but they do not know how that happens. This is why getting vaccinated is one of the best ways to keep you healthy and slow the spread of the virus.  Some people have a mild case of COVID-19 and are able to stay at home and away from others until they feel better. Others may need to be in the hospital if they are very sick. Some people with COVID-19 can have some symptoms for weeks or months. People with COVID-19 must isolate themselves. You can start to be around others when your doctor says it is safe to do so.       What care is needed at home?   · Ask your doctor what you need to do when you go home. Make sure you ask questions if you do not understand what the doctor says.  · Drink lots of water, juice, or broth to replace fluids lost from a fever.  · You may use cool mist humidifiers to help ease congestion and coughing.  · Use 2 to 3 pillows to prop yourself up when you lie down to make it easier to breathe and sleep.  · Do not smoke and do not drink beer, wine, and mixed drinks (alcohol).  · To lower the chance of passing the infection to others, get a COVID-19  vaccine after your infection has resolved.  · If you have not been fully vaccinated:  ? Wear a mask over your mouth and nose if you are around others who are not sick. Cloth masks work best if they have more than one layer of fabric.  ? Wash your hands often.  ? Stay home in a separate room, if possible, away from others. Only go out to get medical care.  ? Use a separate bathroom if possible.  ? Do not make food for others.  What follow-up care is needed?   · Your doctor may ask you to make visits to the office to check on your progress. Be sure to keep these visits. Make sure you wear a mask at these visits.  · If you can, tell the staff you have COVID-19 ahead of time so they can take extra care to stop the disease from spreading.  · It may take a few weeks before your health returns to normal.  What drugs may be needed?   The doctor may order drugs to:  · Help with breathing  · Help with fever  · Help with swelling in your airways and lungs  · Control coughing  · Ease a sore throat  · Help a runny or stuffy nose  Will physical activity be limited?   You may have to limit your physical activity. Talk to your doctor about the right amount of activity for you. If you have been very sick with COVID-19, it can take some time to get your strength back.  Will there be any other care needed?   Doctors do not know how long you can pass the virus on to others after you are sick. This is why it is important to stay in a separate room, if possible, when you are sick. For now, doctors are giving general guidelines for you to follow after you have been sick. Before you go around other people, you should:  · Be fever free for 24 hours without taking any drugs to lower the fever  · Have no symptoms of cough or shortness of breath  · Wait at least 10 days after first having symptoms or your first positive test, and you need to be symptom free as above. Some experts suggest waiting 20 days if you have had a more severe  infection.  Talk with your doctor about getting a COVID-19 vaccine.  What problems could happen?   · Fluid loss. This is dehydration.  · Short-term or long-term lung damage  · Heart problems  · Death  When do I need to call the doctor?   · You are having so much trouble breathing that you can only say one or two words at a time.  · You need to sit upright at all times to be able to breathe and/or cannot lie down.  · You are very confused or cannot stay awake.  · Your lips or skin start to turn blue or grey.  · You think you might be having a medical emergency. Some examples of medical emergencies are:  ? Severe chest pain.  ? Not able to speak or move normally.  · You have trouble breathing when talking or sitting still.  · You have new shortness of breath.  · You become weak or dizzy.  · You have very dark urine or do not pass urine for more than 8 hours.  · You have new or worsening COVID-19 symptoms like:  ? Fever  ? Cough  ? Feeling very tired  ? Shaking chills  ? Headache  ? Trouble swallowing  ? Throwing up  ? Loose stools  ? Reddish purple spots on your fingers or toes  Teach Back: Helping You Understand   The Teach Back Method helps you understand the information we are giving you. After you talk with the staff, tell them in your own words what you learned. This helps to make sure the staff has described each thing clearly. It also helps to explain things that may have been confusing. Before going home, make sure you can do these:  · I can tell you about my condition.  · I can tell you what may help ease my breathing.  · I can tell you what I can do to help avoid passing the infection to others.  · I can tell you what I will do if I have trouble breathing; feel sleepy or confused; or my fingertips, fingernails, skin, or lips are blue.  Where can I learn more?   Centers for Disease Control and Prevention  https://www.cdc.gov/coronavirus/2019-ncov/about/index.html   Centers for Disease Control and  Prevention  https://www.cdc.gov/coronavirus/2019-ncov/hcp/disposition-in-home-patients.html   World Health Organization  https://www.who.int/news-room/q-a-detail/q-d-kyimxcmpyedih   Last Reviewed Date   2021-10-05  Consumer Information Use and Disclaimer   This information is not specific medical advice and does not replace information you receive from your health care provider. This is only a brief summary of general information. It does NOT include all information about conditions, illnesses, injuries, tests, procedures, treatments, therapies, discharge instructions or life-style choices that may apply to you. You must talk with your health care provider for complete information about your health and treatment options. This information should not be used to decide whether or not to accept your health care providers advice, instructions or recommendations. Only your health care provider has the knowledge and training to provide advice that is right for you.  Copyright   Copyright © 2021 UpToDate, Inc. and its affiliates and/or licensors. All rights reserved.  Tim teaching reviewed with Melita Shell   . Melita Shell   verbalized understanding.    Education was provided based on the patient's discharge diagnosis using the attached Tim patient education as a reference.

## 2022-01-03 NOTE — PROGRESS NOTES
C3 nurse spoke with Melita Shell   for a TCC post hospital discharge follow up call. The patient has a scheduled HOSFU appointment with Dr Lovelace on 1/4/22 @ 11am.

## 2022-01-03 NOTE — DISCHARGE SUMMARY
[facility]  Discharge Summary      Patient Name: Melita Shell  MRN: [id number]  Admission Date: 12/23/2021  Hospital Length of Stay: 5 days  Discharge Date and Time: 12/29/2021  2:20 PM  Attending Physician: No att. providers found   Discharging Provider: Quinton Gallegos MD  Primary Care Provider: Claudia Barajas MD     HPI:  This is a 36-year-old female who presented with a small-bowel obstruction related to a pelvic mass and prior abdominal surgeries.  She also had incarcerated hernia.  She was taken floor exploration with lysis of adhesions and resection of small bowel as well as a partial excision of the pelvic mass.  Postoperatively she did well.  She was slowly advanced on her diet.  She was able to tolerate p.o. and ambulate with pain control.  She was discharged in stable condition.    Procedure(s) (LRB):  LAPAROTOMY, EXPLORATORY (N/A)  EXCISION, MASS, PELVIS (N/A)  EXCISION, SMALL INTESTINE (N/A)  REPAIR, HERNIA, INCISIONAL, INCARCERATED, RECURRENT  LYSIS, ADHESIONS (N/A)     Hospital Course:  As above    Consults:   Consults (From admission, onward)        Status Ordering Provider     Inpatient consult to General Surgery  Once        Provider:  Quinton Gallegos MD    Completed MIGUEL HUMPHREY          Significant Diagnostic Studies:  See epic for details    Pending Diagnostic Studies:     Procedure Component Value Units Date/Time    Specimen to Pathology, Surgery General Surgery [[id number]] Collected: 12/24/21 1505    Order Status: Sent Lab Status: In process Updated: 12/27/21 0729        Final Active Diagnoses:    Diagnosis Date Noted POA    PRINCIPAL PROBLEM:  Constipation [K59.00] 12/23/2021 Yes    Microcytic anemia [D50.9] 06/02/2018 Yes      Problems Resolved During this Admission:    Diagnosis Date Noted Date Resolved POA    Intraabdominal mass [R19.00] 12/24/2021 12/26/2021 Yes    Mild intermittent asthma without complication [J45.20] 06/24/2021  12/26/2021 Yes      Discharged Condition: good    Disposition: Home or Self Care    Follow Up:    Patient Instructions:      Ambulatory referral/consult to Outpatient Case Management   Referral Priority: Routine Referral Type: Consultation   Referral Reason: Specialty Services Required   Number of Visits Requested: 1     Ambulatory referral/consult to [facility]   Standing Status: Future   Referral Priority: Routine Referral Type: Consultation   Referral Reason: Specialty Services Required   Number of Visits Requested: 1     Diet Adult Regular     Notify your health care provider if you experience any of the following:  temperature >100.4     Notify your health care provider if you experience any of the following:  persistent nausea and vomiting or diarrhea     Notify your health care provider if you experience any of the following:  severe uncontrolled pain     Notify your health care provider if you experience any of the following:  redness, tenderness, or signs of infection (pain, swelling, redness, odor or green/yellow discharge around incision site)     Notify your health care provider if you experience any of the following:  difficulty breathing or increased cough     Notify your health care provider if you experience any of the following:  severe persistent headache     Notify your health care provider if you experience any of the following:  persistent dizziness, light-headedness, or visual disturbances     Notify your health care provider if you experience any of the following:  increased confusion or weakness     Activity as tolerated     Medications:  Reconciled Home Medications:      Medication List      CONTINUE taking these medications    ibuprofen 800 MG tablet  Commonly known as: ADVIL,MOTRIN  Take 1 tablet (800 mg total) by mouth every 8 (eight) hours as needed for Pain.     metoclopramide HCl 10 MG tablet  Commonly known as: REGLAN  Take 10 mg by mouth every 6 (six) hours as  needed.     norethindrone-ethinyl estradiol 1-20 mg-mcg per tablet  Commonly known as: MICROGESTIN 1/20  Take 1 tablet by mouth once daily.     ondansetron 8 MG Tbdl  Commonly known as: ZOFRAN-ODT  Take 1 tablet (8 mg total) by mouth 3 (three) times daily as needed (nausea).     simethicone 80 MG chewable tablet  Commonly known as: MYLICON  Take 1 tablet (80 mg total) by mouth every 6 (six) hours as needed for Flatulence.        STOP taking these medications    nitrofurantoin (macrocrystal-monohydrate) 100 MG capsule  Commonly known as: MACROBID     ursodioL 300 mg capsule  Commonly known as: RAZA Gallegos MD  General Surgery  [facility]

## 2022-01-04 ENCOUNTER — TELEPHONE (OUTPATIENT)
Dept: MEDSURG UNIT | Facility: HOSPITAL | Age: 37
End: 2022-01-04

## 2022-01-06 ENCOUNTER — OFFICE VISIT (OUTPATIENT)
Dept: SURGERY | Facility: CLINIC | Age: 37
End: 2022-01-06
Payer: COMMERCIAL

## 2022-01-06 VITALS — WEIGHT: 150.81 LBS | HEIGHT: 62 IN | BODY MASS INDEX: 27.75 KG/M2 | TEMPERATURE: 99 F

## 2022-01-06 DIAGNOSIS — Z98.890 POST-OPERATIVE STATE: Primary | ICD-10-CM

## 2022-01-06 LAB
FINAL PATHOLOGIC DIAGNOSIS: NORMAL
GROSS: NORMAL
Lab: NORMAL

## 2022-01-06 PROCEDURE — 1159F PR MEDICATION LIST DOCUMENTED IN MEDICAL RECORD: ICD-10-PCS | Mod: CPTII,S$GLB,, | Performed by: STUDENT IN AN ORGANIZED HEALTH CARE EDUCATION/TRAINING PROGRAM

## 2022-01-06 PROCEDURE — 99999 PR PBB SHADOW E&M-EST. PATIENT-LVL II: CPT | Mod: PBBFAC,,, | Performed by: STUDENT IN AN ORGANIZED HEALTH CARE EDUCATION/TRAINING PROGRAM

## 2022-01-06 PROCEDURE — 99024 PR POST-OP FOLLOW-UP VISIT: ICD-10-PCS | Mod: S$GLB,,, | Performed by: STUDENT IN AN ORGANIZED HEALTH CARE EDUCATION/TRAINING PROGRAM

## 2022-01-06 PROCEDURE — 99999 PR PBB SHADOW E&M-EST. PATIENT-LVL II: ICD-10-PCS | Mod: PBBFAC,,, | Performed by: STUDENT IN AN ORGANIZED HEALTH CARE EDUCATION/TRAINING PROGRAM

## 2022-01-06 PROCEDURE — 1159F MED LIST DOCD IN RCRD: CPT | Mod: CPTII,S$GLB,, | Performed by: STUDENT IN AN ORGANIZED HEALTH CARE EDUCATION/TRAINING PROGRAM

## 2022-01-06 PROCEDURE — 99024 POSTOP FOLLOW-UP VISIT: CPT | Mod: S$GLB,,, | Performed by: STUDENT IN AN ORGANIZED HEALTH CARE EDUCATION/TRAINING PROGRAM

## 2022-01-06 PROCEDURE — 3008F PR BODY MASS INDEX (BMI) DOCUMENTED: ICD-10-PCS | Mod: CPTII,S$GLB,, | Performed by: STUDENT IN AN ORGANIZED HEALTH CARE EDUCATION/TRAINING PROGRAM

## 2022-01-06 PROCEDURE — 3008F BODY MASS INDEX DOCD: CPT | Mod: CPTII,S$GLB,, | Performed by: STUDENT IN AN ORGANIZED HEALTH CARE EDUCATION/TRAINING PROGRAM

## 2022-01-06 NOTE — PROGRESS NOTES
Postop note    Patient underwent exploratory laparotomy for a small-bowel obstruction related to uterine mass and hernia.  She had small-bowel resections x2.  She is doing well.  Tolerating regular diet.  Ambulating.    Incision is healing well    Pathology:  Pending    Overall doing well after ex lap.  Will review pathology next week over the phone.  I am most concerned about her uterine/ovarian pathology which likely needs to be managed by gyn.  Follow-up as needed.

## 2022-01-07 NOTE — PHYSICIAN QUERY
"PT Name: Melita Shell  MR #: [id number]    DOCUMENTATION CLARIFICATION     CDS/: Mar Olivares RN CCDS             Contact information:[email or web address]  This form is a permanent document in the medical record.     Query Date: January 7, 2022    By submitting this query, we are merely seeking further clarification of documentation.  Please utilize your independent clinical judgment when addressing the question(s) below.    The medical record contains the following:  Pathology Findings Location in Medical Record   1. HERNIA SAC WITH FOCAL CHRONIC INFLAMMATION AND REACTIVE CHANGES PRESENT.   2. A LEIOMYOMA MEASURING 11 CM AND WEIGHING 457.8 G. THE CENTRAL PORTION IS   HYALINIZED   3. TWO SEPARATE SEGMENTS OF SMALL BOWEL SHOWING ADHESIONS.  ONE OF THEM HAS   AN AREA WITH TRANSMURAL INFLAMMATION AND NECROSIS PRESENT.  THE MARGINS ARE   COMPOSED OF VIABLE TISSUE.  Pathology report of "hernia sac with   incarcerated omentum" 12/24       Please clarify:  [ x ] Pathology findings noted above are ruled in/confirmed as diagnoses   [  ] Pathology findings noted above are not confirmed as diagnoses   [  ] Pathology findings noted above are incidental   [  ] Other diagnosis (please specify): ___________   [  ] Clinically Undetermined       Please document in your progress notes daily for the duration of treatment until resolved and include in your discharge summary.    Form No. [id number]            "

## 2022-01-18 NOTE — PROGRESS NOTES
"Subjective:       Patient ID: Melita Shell is a 36 y.o. female.    Chief Complaint: No chief complaint on file.    Patient is a 36 y.o.female who presents today for hospital follow up    The patient location is: [place]  The chief complaint leading to consultation is: f/u    Visit type: audiovisual    Face to Face time with patient: 20   minutes of total time spent on the encounter, which includes face to face time and non-face to face time preparing to see the patient (eg, review of tests), Obtaining and/or reviewing separately obtained history, Documenting clinical information in the electronic or other health record, Independently interpreting results (not separately reported) and communicating results to the patient/family/caregiver, or Care coordination (not separately reported).         Each patient to whom he or she provides medical services by telemedicine is:  (1) informed of the relationship between the physician and patient and the respective role of any other health care provider with respect to management of the patient; and (2) notified that he or she may decline to receive medical services by telemedicine and may withdraw from such care at any time.        Family and/or Caretaker present at visit?  No.  Diagnostic tests reviewed/disposition: No diagnosic tests pending after this hospitalization.  Disease/illness education: n/a  Home health/community services discussion/referrals: Patient does not have home health established from hospital visit.  They do not need home health.  If needed, we will set up home health for the patient.   Establishment or re-establishment of referral orders for community resources: n/a.   Discussion with other health care providers: No discussion with other health care providers necessary.  Notes: "Hospital Course:   Melita Snell is a 36 year old female with a past medical history of uterine fibroids s/p myomectomy complicated by recent admission for SBO requiring " "exploratory laparotomy with MAGO, repair of incisional hernia, and small bowel resection with anastomosis who presented with one day of nausea, vomiting, abdominal pain and distension with no fever or chills. Initial concern was for SBO which was noted on CT abdomen and pelvis Radiology report. Surgery was consulted and an NG tube was placed; IV fluids were also started. Surgery noted a high stool burden on CT and suspected constipation. After suppository therapy, the patient did have a bowel and was noted to tolerated PO. The NG tube was removed and the patient was able to be safely discharged 1/2/2022 with a bowel regimen and a short course of PRN pain medication. She will follow up with PCP and Dr. Gallegos of Surgery in the outpatient setting. Of note, the patient did test positive for COVID-19, yet she did not display any respiratory symptoms. She is vaccinated and was counseled to self quarantine at home."      sbo:    - no pain or inflammation; not swollen; no nausea or vomiting; holding food and drink down/ normal bowel movements      Covid:    - had headache and it lasted only one day; no other issues.   Review of Systems   Constitutional: Negative for appetite change, chills, diaphoresis and fever.   HENT: Negative for congestion, ear discharge, ear pain, postnasal drip, tinnitus, trouble swallowing and voice change.    Eyes: Negative for discharge, redness and itching.   Respiratory: Negative for cough, chest tightness, shortness of breath and wheezing.    Cardiovascular: Negative for chest pain, palpitations and leg swelling.   Gastrointestinal: Negative for abdominal pain, constipation, diarrhea, nausea and vomiting.   Endocrine: Negative for cold intolerance and heat intolerance.   Genitourinary: Negative for difficulty urinating, flank pain, hematuria and urgency.   Musculoskeletal: Negative for arthralgias, gait problem, myalgias and neck stiffness.   Skin: Negative for color change and rash. "   Neurological: Negative for dizziness, seizures, syncope and headaches.   Hematological: Negative for adenopathy.   Psychiatric/Behavioral: Negative for agitation, behavioral problems, confusion and sleep disturbance.       Objective:      Physical Exam  Constitutional:       General: She is not in acute distress.     Appearance: She is well-developed and well-nourished. She is not diaphoretic.   HENT:      Head: Normocephalic and atraumatic.      Right Ear: External ear normal.      Left Ear: External ear normal.   Eyes:      General: No scleral icterus.        Right eye: No discharge.         Left eye: No discharge.      Conjunctiva/sclera: Conjunctivae normal.      Pupils: Pupils are equal, round, and reactive to light.   Pulmonary:      Effort: Pulmonary effort is normal.      Breath sounds: No stridor.   Musculoskeletal:      Cervical back: Normal range of motion and neck supple.   Neurological:      Mental Status: She is alert and oriented to person, place, and time.   Psychiatric:         Mood and Affect: Mood and affect normal.         Behavior: Behavior normal.         Thought Content: Thought content normal.         Judgment: Judgment normal.         Assessment and Plan:       1. SBO (small bowel obstruction)  Symptoms resolved; prefers to hold off on any repeat imaging or labs; I agree since she is now asymptomatic    2. COVID  Resolved; no long term issues          No follow-ups on file.

## 2022-01-20 ENCOUNTER — OFFICE VISIT (OUTPATIENT)
Dept: SURGERY | Facility: CLINIC | Age: 37
End: 2022-01-20
Payer: COMMERCIAL

## 2022-01-20 VITALS — BODY MASS INDEX: 28.64 KG/M2 | TEMPERATURE: 98 F | HEIGHT: 62 IN | WEIGHT: 155.63 LBS

## 2022-01-20 DIAGNOSIS — Z98.890 POST-OPERATIVE STATE: Primary | ICD-10-CM

## 2022-01-20 PROCEDURE — 99024 POSTOP FOLLOW-UP VISIT: CPT | Mod: S$GLB,,, | Performed by: STUDENT IN AN ORGANIZED HEALTH CARE EDUCATION/TRAINING PROGRAM

## 2022-01-20 PROCEDURE — 3008F BODY MASS INDEX DOCD: CPT | Mod: CPTII,S$GLB,, | Performed by: STUDENT IN AN ORGANIZED HEALTH CARE EDUCATION/TRAINING PROGRAM

## 2022-01-20 PROCEDURE — 99999 PR PBB SHADOW E&M-EST. PATIENT-LVL III: ICD-10-PCS | Mod: PBBFAC,,, | Performed by: STUDENT IN AN ORGANIZED HEALTH CARE EDUCATION/TRAINING PROGRAM

## 2022-01-20 PROCEDURE — 1159F PR MEDICATION LIST DOCUMENTED IN MEDICAL RECORD: ICD-10-PCS | Mod: CPTII,S$GLB,, | Performed by: STUDENT IN AN ORGANIZED HEALTH CARE EDUCATION/TRAINING PROGRAM

## 2022-01-20 PROCEDURE — 1159F MED LIST DOCD IN RCRD: CPT | Mod: CPTII,S$GLB,, | Performed by: STUDENT IN AN ORGANIZED HEALTH CARE EDUCATION/TRAINING PROGRAM

## 2022-01-20 PROCEDURE — 99999 PR PBB SHADOW E&M-EST. PATIENT-LVL III: CPT | Mod: PBBFAC,,, | Performed by: STUDENT IN AN ORGANIZED HEALTH CARE EDUCATION/TRAINING PROGRAM

## 2022-01-20 PROCEDURE — 99024 PR POST-OP FOLLOW-UP VISIT: ICD-10-PCS | Mod: S$GLB,,, | Performed by: STUDENT IN AN ORGANIZED HEALTH CARE EDUCATION/TRAINING PROGRAM

## 2022-01-20 PROCEDURE — 3008F PR BODY MASS INDEX (BMI) DOCUMENTED: ICD-10-PCS | Mod: CPTII,S$GLB,, | Performed by: STUDENT IN AN ORGANIZED HEALTH CARE EDUCATION/TRAINING PROGRAM

## 2022-01-20 RX ORDER — COVID-19 MOLECULAR TEST ASSAY
KIT MISCELLANEOUS
COMMUNITY
Start: 2022-01-10 | End: 2023-02-27

## 2022-01-20 NOTE — PROGRESS NOTES
Path review    Patient is doing well after ex lap with removal of uterine mass, small-bowel resection, hernia repair for bowel obstruction.    Pathology:  11 cm leiomyoma.  Benign small bowel and hernia sac.    Overall doing well.  Follow-up as needed.

## 2022-02-01 ENCOUNTER — OFFICE VISIT (OUTPATIENT)
Dept: HEPATOLOGY | Facility: CLINIC | Age: 37
End: 2022-02-01
Payer: COMMERCIAL

## 2022-02-01 ENCOUNTER — OFFICE VISIT (OUTPATIENT)
Dept: INTERNAL MEDICINE | Facility: CLINIC | Age: 37
End: 2022-02-01
Payer: COMMERCIAL

## 2022-02-01 VITALS
WEIGHT: 158.81 LBS | TEMPERATURE: 98 F | SYSTOLIC BLOOD PRESSURE: 116 MMHG | BODY MASS INDEX: 29.22 KG/M2 | OXYGEN SATURATION: 98 % | HEART RATE: 70 BPM | DIASTOLIC BLOOD PRESSURE: 70 MMHG | HEIGHT: 62 IN | RESPIRATION RATE: 17 BRPM

## 2022-02-01 DIAGNOSIS — K56.609 SBO (SMALL BOWEL OBSTRUCTION): Primary | ICD-10-CM

## 2022-02-01 DIAGNOSIS — R79.89 ELEVATED LIVER FUNCTION TESTS: Primary | ICD-10-CM

## 2022-02-01 DIAGNOSIS — R74.8 LIVER ENZYME ELEVATION: ICD-10-CM

## 2022-02-01 DIAGNOSIS — U07.1 COVID: ICD-10-CM

## 2022-02-01 PROCEDURE — 3008F BODY MASS INDEX DOCD: CPT | Mod: CPTII,S$GLB,, | Performed by: INTERNAL MEDICINE

## 2022-02-01 PROCEDURE — 3074F PR MOST RECENT SYSTOLIC BLOOD PRESSURE < 130 MM HG: ICD-10-PCS | Mod: CPTII,S$GLB,, | Performed by: INTERNAL MEDICINE

## 2022-02-01 PROCEDURE — 99214 OFFICE O/P EST MOD 30 MIN: CPT | Mod: S$GLB,,, | Performed by: INTERNAL MEDICINE

## 2022-02-01 PROCEDURE — 3008F PR BODY MASS INDEX (BMI) DOCUMENTED: ICD-10-PCS | Mod: CPTII,S$GLB,, | Performed by: INTERNAL MEDICINE

## 2022-02-01 PROCEDURE — 3078F PR MOST RECENT DIASTOLIC BLOOD PRESSURE < 80 MM HG: ICD-10-PCS | Mod: CPTII,S$GLB,, | Performed by: INTERNAL MEDICINE

## 2022-02-01 PROCEDURE — 99213 PR OFFICE/OUTPT VISIT, EST, LEVL III, 20-29 MIN: ICD-10-PCS | Mod: 95,,, | Performed by: INTERNAL MEDICINE

## 2022-02-01 PROCEDURE — 99999 PR PBB SHADOW E&M-EST. PATIENT-LVL V: ICD-10-PCS | Mod: PBBFAC,,, | Performed by: INTERNAL MEDICINE

## 2022-02-01 PROCEDURE — 3078F DIAST BP <80 MM HG: CPT | Mod: CPTII,S$GLB,, | Performed by: INTERNAL MEDICINE

## 2022-02-01 PROCEDURE — 1160F RVW MEDS BY RX/DR IN RCRD: CPT | Mod: CPTII,95,, | Performed by: INTERNAL MEDICINE

## 2022-02-01 PROCEDURE — 1160F PR REVIEW ALL MEDS BY PRESCRIBER/CLIN PHARMACIST DOCUMENTED: ICD-10-PCS | Mod: CPTII,95,, | Performed by: INTERNAL MEDICINE

## 2022-02-01 PROCEDURE — 99214 PR OFFICE/OUTPT VISIT, EST, LEVL IV, 30-39 MIN: ICD-10-PCS | Mod: S$GLB,,, | Performed by: INTERNAL MEDICINE

## 2022-02-01 PROCEDURE — 3074F SYST BP LT 130 MM HG: CPT | Mod: CPTII,S$GLB,, | Performed by: INTERNAL MEDICINE

## 2022-02-01 PROCEDURE — 1159F MED LIST DOCD IN RCRD: CPT | Mod: CPTII,95,, | Performed by: INTERNAL MEDICINE

## 2022-02-01 PROCEDURE — 99213 OFFICE O/P EST LOW 20 MIN: CPT | Mod: 95,,, | Performed by: INTERNAL MEDICINE

## 2022-02-01 PROCEDURE — 1159F PR MEDICATION LIST DOCUMENTED IN MEDICAL RECORD: ICD-10-PCS | Mod: CPTII,95,, | Performed by: INTERNAL MEDICINE

## 2022-02-01 PROCEDURE — 99999 PR PBB SHADOW E&M-EST. PATIENT-LVL V: CPT | Mod: PBBFAC,,, | Performed by: INTERNAL MEDICINE
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
## 2022-02-01 NOTE — PATIENT INSTRUCTIONS
1. Will send to genetic counselor for testing for Intrahepatic cholestasis of preganncy and for testing for Acute fatty liver of pregnancy  2. Will have you check your liver tests every 3 months  3. fibroscan    [phone]

## 2022-02-01 NOTE — Clinical Note
Marya, please schedule liver tests now and every 3 months. Return in 3 months. Pasha, hoping you can see this patient for me and do testing for ICP and acute fatty liver of pregnancy.  Colleagues, please read this history and let me know your thoughts about etiology of her elevated liver enzymes during pregnancy

## 2022-02-02 ENCOUNTER — TELEPHONE (OUTPATIENT)
Dept: HEPATOLOGY | Facility: CLINIC | Age: 37
End: 2022-02-02

## 2022-02-02 ENCOUNTER — LAB VISIT (OUTPATIENT)
Dept: LAB | Facility: HOSPITAL | Age: 37
End: 2022-02-02
Attending: INTERNAL MEDICINE
Payer: COMMERCIAL

## 2022-02-02 DIAGNOSIS — R74.8 LIVER ENZYME ELEVATION: ICD-10-CM

## 2022-02-02 LAB
ALBUMIN SERPL BCP-MCNC: 3.6 G/DL (ref 3.5–5.2)
ALP SERPL-CCNC: 78 U/L (ref 55–135)
ALT SERPL W/O P-5'-P-CCNC: 16 U/L (ref 10–44)
AST SERPL-CCNC: 15 U/L (ref 10–40)
BILIRUB DIRECT SERPL-MCNC: 0.2 MG/DL (ref 0.1–0.3)
BILIRUB SERPL-MCNC: 0.6 MG/DL (ref 0.1–1)
GGT SERPL-CCNC: 35 U/L (ref 8–55)
PROT SERPL-MCNC: 8.4 G/DL (ref 6–8.4)

## 2022-02-02 PROCEDURE — 80076 HEPATIC FUNCTION PANEL: CPT | Performed by: INTERNAL MEDICINE

## 2022-02-02 PROCEDURE — 36415 COLL VENOUS BLD VENIPUNCTURE: CPT | Performed by: INTERNAL MEDICINE

## 2022-02-02 PROCEDURE — 82977 ASSAY OF GGT: CPT | Performed by: INTERNAL MEDICINE

## 2022-02-02 PROCEDURE — 82239 BILE ACIDS TOTAL: CPT | Performed by: INTERNAL MEDICINE

## 2022-02-03 ENCOUNTER — TELEPHONE (OUTPATIENT)
Dept: HEPATOLOGY | Facility: CLINIC | Age: 37
End: 2022-02-03

## 2022-02-03 ENCOUNTER — TELEPHONE (OUTPATIENT)
Dept: GENETICS | Facility: CLINIC | Age: 37
End: 2022-02-03

## 2022-02-03 NOTE — TELEPHONE ENCOUNTER
Spoke with pt in reference to scheduling a Genetics appointment from a referral for R74.8 (ICD-10-CM) - Liver enzyme elevation on 2/18/22 at 2:30 pm with Dr Michelle. Pt verbalized understanding.

## 2022-02-04 ENCOUNTER — IMMUNIZATION (OUTPATIENT)
Dept: PRIMARY CARE CLINIC | Facility: CLINIC | Age: 37
End: 2022-02-04
Payer: COMMERCIAL

## 2022-02-04 DIAGNOSIS — Z23 NEED FOR VACCINATION: Primary | ICD-10-CM

## 2022-02-04 LAB — BILE AC SERPL-SCNC: 12 MCMOL/L

## 2022-02-04 PROCEDURE — 91300 COVID-19, MRNA, LNP-S, PF, 30 MCG/0.3 ML DOSE VACCINE: CPT | Mod: S$GLB,,, | Performed by: FAMILY MEDICINE

## 2022-02-04 PROCEDURE — 91300 COVID-19, MRNA, LNP-S, PF, 30 MCG/0.3 ML DOSE VACCINE: ICD-10-PCS | Mod: S$GLB,,, | Performed by: FAMILY MEDICINE

## 2022-02-17 ENCOUNTER — TELEPHONE (OUTPATIENT)
Dept: GENETICS | Facility: CLINIC | Age: 37
End: 2022-02-17

## 2022-03-24 ENCOUNTER — OUTPATIENT CASE MANAGEMENT (OUTPATIENT)
Dept: ADMINISTRATIVE | Facility: OTHER | Age: 37
End: 2022-03-24

## 2022-03-24 DIAGNOSIS — K59.00 CONSTIPATION, UNSPECIFIED CONSTIPATION TYPE: ICD-10-CM

## 2022-03-24 DIAGNOSIS — D25.1 FIBROIDS, INTRAMURAL: ICD-10-CM

## 2022-03-24 DIAGNOSIS — D50.9 MICROCYTIC ANEMIA: Primary | ICD-10-CM

## 2022-06-16 ENCOUNTER — PATIENT MESSAGE (OUTPATIENT)
Dept: ADMINISTRATIVE | Facility: OTHER | Age: 37
End: 2022-06-16
Payer: COMMERCIAL

## 2022-06-16 ENCOUNTER — PATIENT OUTREACH (OUTPATIENT)
Dept: ADMINISTRATIVE | Facility: OTHER | Age: 37
End: 2022-06-16
Payer: COMMERCIAL

## 2022-06-21 NOTE — PROGRESS NOTES
CHW - Case Closure    This Community Health Worker spoke to patient via telephone today.   Pt/Caregiver reported: pt reports they don't need any outside resources at the time.  Pt/Caregiver denied any additional needs at this time and agrees with episode closure at this time.  Provided patient with Community Health Worker's contact information and encouraged him/her to contact this Community Health Worker if additional needs arise.

## 2022-10-04 NOTE — TELEPHONE ENCOUNTER
----- Message from Roberta Campa MA sent at 11/30/2017  9:20 AM CST -----  R/s fibroids consult and conceiving for early January.  
Contacted the pt to schedule fibroids consult. No answer, left VM message for the pt to call the clinic back.  
Discharged

## 2022-10-06 NOTE — TELEPHONE ENCOUNTER
Blood pressure is much better controlled.  I recommend she continue the losartan 25 mg daily.  Kidney function was reviewed and stable.   Contacted the pt to inform her that Dr. Mcmullen sent a refill of her Ibuprofen to her pharmacy. Pt verbalized understanding.

## 2022-11-14 ENCOUNTER — CLINICAL SUPPORT (OUTPATIENT)
Dept: OTHER | Facility: CLINIC | Age: 37
End: 2022-11-14
Payer: COMMERCIAL

## 2022-11-14 DIAGNOSIS — Z00.8 ENCOUNTER FOR OTHER GENERAL EXAMINATION: ICD-10-CM

## 2022-11-15 VITALS
DIASTOLIC BLOOD PRESSURE: 78 MMHG | SYSTOLIC BLOOD PRESSURE: 120 MMHG | HEIGHT: 62 IN | BODY MASS INDEX: 35.7 KG/M2 | WEIGHT: 194 LBS

## 2022-11-15 LAB
HDLC SERPL-MCNC: 50 MG/DL
POC CHOLESTEROL, LDL (DOCK): 109 MG/DL
POC CHOLESTEROL, TOTAL: 178 MG/DL
POC GLUCOSE, FASTING: 87 MG/DL (ref 60–110)
TRIGL SERPL-MCNC: 104 MG/DL

## 2022-12-29 ENCOUNTER — PATIENT OUTREACH (OUTPATIENT)
Dept: ADMINISTRATIVE | Facility: HOSPITAL | Age: 37
End: 2022-12-29
Payer: COMMERCIAL

## 2022-12-29 ENCOUNTER — TELEPHONE (OUTPATIENT)
Dept: INTERNAL MEDICINE | Facility: CLINIC | Age: 37
End: 2022-12-29
Payer: COMMERCIAL

## 2022-12-29 VITALS — DIASTOLIC BLOOD PRESSURE: 78 MMHG | SYSTOLIC BLOOD PRESSURE: 120 MMHG

## 2023-02-27 ENCOUNTER — OFFICE VISIT (OUTPATIENT)
Dept: INTERNAL MEDICINE | Facility: CLINIC | Age: 38
End: 2023-02-27
Payer: COMMERCIAL

## 2023-02-27 ENCOUNTER — LAB VISIT (OUTPATIENT)
Dept: LAB | Facility: HOSPITAL | Age: 38
End: 2023-02-27
Attending: INTERNAL MEDICINE
Payer: COMMERCIAL

## 2023-02-27 VITALS
WEIGHT: 193.31 LBS | SYSTOLIC BLOOD PRESSURE: 132 MMHG | TEMPERATURE: 98 F | OXYGEN SATURATION: 99 % | DIASTOLIC BLOOD PRESSURE: 78 MMHG | HEIGHT: 62 IN | RESPIRATION RATE: 18 BRPM | HEART RATE: 82 BPM | BODY MASS INDEX: 35.57 KG/M2

## 2023-02-27 DIAGNOSIS — Z00.00 ROUTINE MEDICAL EXAM: ICD-10-CM

## 2023-02-27 DIAGNOSIS — Z00.00 ROUTINE MEDICAL EXAM: Primary | ICD-10-CM

## 2023-02-27 LAB
BILIRUB UR QL STRIP: NEGATIVE
CLARITY UR REFRACT.AUTO: CLEAR
COLOR UR AUTO: COLORLESS
GLUCOSE UR QL STRIP: NEGATIVE
HGB UR QL STRIP: NEGATIVE
KETONES UR QL STRIP: NEGATIVE
LEUKOCYTE ESTERASE UR QL STRIP: NEGATIVE
NITRITE UR QL STRIP: NEGATIVE
PH UR STRIP: 7 [PH] (ref 5–8)
PROT UR QL STRIP: NEGATIVE
SP GR UR STRIP: 1.01 (ref 1–1.03)
URN SPEC COLLECT METH UR: ABNORMAL

## 2023-02-27 PROCEDURE — 1160F PR REVIEW ALL MEDS BY PRESCRIBER/CLIN PHARMACIST DOCUMENTED: ICD-10-PCS | Mod: CPTII,S$GLB,, | Performed by: INTERNAL MEDICINE

## 2023-02-27 PROCEDURE — 3078F PR MOST RECENT DIASTOLIC BLOOD PRESSURE < 80 MM HG: ICD-10-PCS | Mod: CPTII,S$GLB,, | Performed by: INTERNAL MEDICINE

## 2023-02-27 PROCEDURE — 1159F MED LIST DOCD IN RCRD: CPT | Mod: CPTII,S$GLB,, | Performed by: INTERNAL MEDICINE

## 2023-02-27 PROCEDURE — 3078F DIAST BP <80 MM HG: CPT | Mod: CPTII,S$GLB,, | Performed by: INTERNAL MEDICINE

## 2023-02-27 PROCEDURE — 3044F PR MOST RECENT HEMOGLOBIN A1C LEVEL <7.0%: ICD-10-PCS | Mod: CPTII,S$GLB,, | Performed by: INTERNAL MEDICINE

## 2023-02-27 PROCEDURE — 99395 PREV VISIT EST AGE 18-39: CPT | Mod: S$GLB,,, | Performed by: INTERNAL MEDICINE

## 2023-02-27 PROCEDURE — 99999 PR PBB SHADOW E&M-EST. PATIENT-LVL III: ICD-10-PCS | Mod: PBBFAC,,, | Performed by: INTERNAL MEDICINE

## 2023-02-27 PROCEDURE — 99395 PR PREVENTIVE VISIT,EST,18-39: ICD-10-PCS | Mod: S$GLB,,, | Performed by: INTERNAL MEDICINE

## 2023-02-27 PROCEDURE — 1160F RVW MEDS BY RX/DR IN RCRD: CPT | Mod: CPTII,S$GLB,, | Performed by: INTERNAL MEDICINE

## 2023-02-27 PROCEDURE — 3008F PR BODY MASS INDEX (BMI) DOCUMENTED: ICD-10-PCS | Mod: CPTII,S$GLB,, | Performed by: INTERNAL MEDICINE

## 2023-02-27 PROCEDURE — 3044F HG A1C LEVEL LT 7.0%: CPT | Mod: CPTII,S$GLB,, | Performed by: INTERNAL MEDICINE

## 2023-02-27 PROCEDURE — 81003 URINALYSIS AUTO W/O SCOPE: CPT | Performed by: INTERNAL MEDICINE

## 2023-02-27 PROCEDURE — 99999 PR PBB SHADOW E&M-EST. PATIENT-LVL III: CPT | Mod: PBBFAC,,, | Performed by: INTERNAL MEDICINE

## 2023-02-27 PROCEDURE — 3075F PR MOST RECENT SYSTOLIC BLOOD PRESS GE 130-139MM HG: ICD-10-PCS | Mod: CPTII,S$GLB,, | Performed by: INTERNAL MEDICINE

## 2023-02-27 PROCEDURE — 1159F PR MEDICATION LIST DOCUMENTED IN MEDICAL RECORD: ICD-10-PCS | Mod: CPTII,S$GLB,, | Performed by: INTERNAL MEDICINE

## 2023-02-27 PROCEDURE — 3075F SYST BP GE 130 - 139MM HG: CPT | Mod: CPTII,S$GLB,, | Performed by: INTERNAL MEDICINE

## 2023-02-27 PROCEDURE — 3008F BODY MASS INDEX DOCD: CPT | Mod: CPTII,S$GLB,, | Performed by: INTERNAL MEDICINE
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
## 2023-03-05 ENCOUNTER — PATIENT MESSAGE (OUTPATIENT)
Dept: INTERNAL MEDICINE | Facility: CLINIC | Age: 38
End: 2023-03-05
Payer: COMMERCIAL

## 2023-06-12 ENCOUNTER — PATIENT MESSAGE (OUTPATIENT)
Dept: INTERNAL MEDICINE | Facility: CLINIC | Age: 38
End: 2023-06-12
Payer: COMMERCIAL

## 2023-06-12 NOTE — TELEPHONE ENCOUNTER
Please inform patient that prescription for Paxlovid has been sent to her pharmacy electronically.

## 2023-06-12 NOTE — TELEPHONE ENCOUNTER
Spoke to pt and she tested positive for Covid on 06/12/2023, her symptoms started yesterday cough, runny nose, and chills. Pt requesting the Paxlovid.

## 2023-06-13 ENCOUNTER — PATIENT MESSAGE (OUTPATIENT)
Dept: INTERNAL MEDICINE | Facility: CLINIC | Age: 38
End: 2023-06-13
Payer: COMMERCIAL

## 2023-10-27 NOTE — PROGRESS NOTES
CC: Annual  HPI: Pt is a 34 y.o.  female who presents for routine annual exam. 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 She is not using any contraception- she is trying to conceive.  She had missed AB in 8/2019.  Cycles are now back to normal.  She does not want STD screening.  Denies any GYN complaints.  The patient participates in regular exercise: yes .  The patient does not smoke.  The patient wears seatbelts.   Pt denies any domestic violence.   She is s/p myomectomy X 2.   FH:  Breast cancer: none  Colon cancer: none  Ovarian cancer: none  Endometrial cancer: none    ROS:  GENERAL: Feeling well overall. Denies fever or chills.   SKIN: Denies rash or lesions.   HEAD: Denies head injury or headache.   NODES: Denies enlarged lymph nodes.   CHEST: Denies chest pain or shortness of breath.   CARDIOVASCULAR: Denies palpitations or left sided chest pain.   ABDOMEN: No abdominal pain, constipation, diarrhea, nausea, vomiting or rectal bleeding.   URINARY: No dysuria, hematuria, or burning on urination.  REPRODUCTIVE: See HPI.   BREASTS: Denies pain, lumps, or nipple discharge.   HEMATOLOGIC: No easy bruisability or excessive bleeding.   MUSCULOSKELETAL: Denies joint pain or swelling.   NEUROLOGIC: Denies syncope or weakness.   PSYCHIATRIC: Denies depression, anxiety or mood swings.    PE:   APPEARANCE: Well nourished, well developed, Black or  female in no acute distress.  NODES: no cervical, supraclavicular, or inguinal lymphadenopathy  BREASTS: Symmetrical, no skin changes or visible lesions. No palpable masses, nipple discharge or adenopathy bilaterally.  ABDOMEN: Soft. No tenderness or masses. No distention. No hernias palpated. No CVA tenderness.  VULVA: No lesions. Normal external female genitalia.  URETHRAL MEATUS: Normal size and location, no lesions, no prolapse.  URETHRA: No masses, tenderness, or prolapse.  VAGINA: Moist. No lesions or lacerations noted. No abnormal  Catalina rec'd cl from mom who is Samoan speaking and is asking about the charges from [facility] clinics. Catalina informed mom that writer does not have an answer for her and Writer is reaching out to [email or web address]. Mom asked if she should have pt who is 17 call the billers. Catalina informed mom, if she is patient, staff is working of getting the issues resolved. Mom asked if they should complete a financial form and writer stated to mom  to give time to assess the issues causing a bill. Mom voiced understanding. Catalina reached out to [email or web address] for assistance with billing issues. discharge present. No odor present.   CERVIX: No lesions or discharge. No cervical motion tenderness.   UTERUS: Enlarged 10 week size, regular shape, mobile, non-tender.  ADNEXA: No tenderness. No fullness or masses palpated in the adnexal regions.   ANUS PERINEUM: Normal.      Diagnosis:  1. Women's annual routine gynecological examination    2. Encounter for Papanicolaou smear for cervical cancer screening    3. Screening for HPV (human papillomavirus)        Plan:   Pap/ HPV  Preconceptional counseling/folic acid recommendation/mentrual calendar/mid-cycle relations discussed      Orders Placed This Encounter    HPV High Risk Genotypes, PCR    Liquid-Based Pap Smear, Screening         Patient was counseled today on the new ACS guidelines for cervical cytology screening as well as the current recommendations for breast cancer screening. She was counseled to follow up with her PCP for other routine health maintenance. Counseling session lasted approximately 10 minutes, and all her questions were answered.    Follow-up with me in 1 year for routine exam    ADELAIDE Woodruff

## 2024-03-07 ENCOUNTER — PATIENT MESSAGE (OUTPATIENT)
Dept: OBSTETRICS AND GYNECOLOGY | Facility: CLINIC | Age: 39
End: 2024-03-07
Payer: COMMERCIAL

## 2024-04-04 ENCOUNTER — OFFICE VISIT (OUTPATIENT)
Dept: OBSTETRICS AND GYNECOLOGY | Facility: CLINIC | Age: 39
End: 2024-04-04
Payer: COMMERCIAL

## 2024-04-04 VITALS — BODY MASS INDEX: 36.09 KG/M2 | WEIGHT: 197.31 LBS

## 2024-04-04 DIAGNOSIS — Z01.419 WELL WOMAN EXAM: Primary | ICD-10-CM

## 2024-04-04 DIAGNOSIS — R10.30 LOWER ABDOMINAL PAIN: ICD-10-CM

## 2024-04-04 LAB
BILIRUB SERPL-MCNC: NEGATIVE MG/DL
BLOOD URINE, POC: NEGATIVE
CLARITY, POC UA: CLEAR
COLOR, POC UA: YELLOW
GLUCOSE UR QL STRIP: NORMAL
KETONES UR QL STRIP: NEGATIVE
LEUKOCYTE ESTERASE URINE, POC: NEGATIVE
NITRITE, POC UA: NEGATIVE
PH, POC UA: 7
PROTEIN, POC: NEGATIVE
SPECIFIC GRAVITY, POC UA: 1.01
UROBILINOGEN, POC UA: NORMAL

## 2024-04-04 PROCEDURE — 3008F BODY MASS INDEX DOCD: CPT | Mod: CPTII,S$GLB,,

## 2024-04-04 PROCEDURE — 1159F MED LIST DOCD IN RCRD: CPT | Mod: CPTII,S$GLB,,

## 2024-04-04 PROCEDURE — 1160F RVW MEDS BY RX/DR IN RCRD: CPT | Mod: CPTII,S$GLB,,

## 2024-04-04 PROCEDURE — 99395 PREV VISIT EST AGE 18-39: CPT | Mod: S$GLB,,,

## 2024-04-04 PROCEDURE — 81003 URINALYSIS AUTO W/O SCOPE: CPT

## 2024-04-04 PROCEDURE — 99999 PR PBB SHADOW E&M-EST. PATIENT-LVL III: CPT | Mod: PBBFAC,,,

## 2024-04-04 PROCEDURE — 81002 URINALYSIS NONAUTO W/O SCOPE: CPT | Mod: S$GLB,,,
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
## 2024-04-05 ENCOUNTER — PATIENT MESSAGE (OUTPATIENT)
Dept: OBSTETRICS AND GYNECOLOGY | Facility: CLINIC | Age: 39
End: 2024-04-05
Payer: COMMERCIAL

## 2024-04-05 LAB
BILIRUB UR QL STRIP: NEGATIVE
CLARITY UR REFRACT.AUTO: CLEAR
COLOR UR AUTO: YELLOW
GLUCOSE UR QL STRIP: NEGATIVE
HGB UR QL STRIP: NEGATIVE
KETONES UR QL STRIP: NEGATIVE
LEUKOCYTE ESTERASE UR QL STRIP: NEGATIVE
NITRITE UR QL STRIP: NEGATIVE
PH UR STRIP: 7 [PH] (ref 5–8)
PROT UR QL STRIP: NEGATIVE
SP GR UR STRIP: 1.02 (ref 1–1.03)
URN SPEC COLLECT METH UR: NORMAL

## 2024-04-11 ENCOUNTER — PATIENT MESSAGE (OUTPATIENT)
Dept: OBSTETRICS AND GYNECOLOGY | Facility: CLINIC | Age: 39
End: 2024-04-11
Payer: COMMERCIAL

## 2024-04-17 ENCOUNTER — PATIENT MESSAGE (OUTPATIENT)
Dept: OBSTETRICS AND GYNECOLOGY | Facility: CLINIC | Age: 39
End: 2024-04-17
Payer: COMMERCIAL

## 2024-04-18 ENCOUNTER — TELEPHONE (OUTPATIENT)
Dept: OBSTETRICS AND GYNECOLOGY | Facility: CLINIC | Age: 39
End: 2024-04-18
Payer: COMMERCIAL

## 2024-04-18 NOTE — TELEPHONE ENCOUNTER
Spoke to pt to schedule fibroid consult asap per juan manuel. Appointment scheduled for 5/16 @ 9:15am

## 2024-05-16 ENCOUNTER — OFFICE VISIT (OUTPATIENT)
Dept: OBSTETRICS AND GYNECOLOGY | Facility: CLINIC | Age: 39
End: 2024-05-16
Payer: COMMERCIAL

## 2024-05-16 VITALS — SYSTOLIC BLOOD PRESSURE: 121 MMHG | DIASTOLIC BLOOD PRESSURE: 83 MMHG

## 2024-05-16 DIAGNOSIS — D25.1 FIBROIDS, INTRAMURAL: Primary | ICD-10-CM

## 2024-05-16 DIAGNOSIS — N83.201 CYST OF RIGHT OVARY: ICD-10-CM

## 2024-05-16 PROCEDURE — 1159F MED LIST DOCD IN RCRD: CPT | Mod: CPTII,S$GLB,, | Performed by: OBSTETRICS & GYNECOLOGY

## 2024-05-16 PROCEDURE — 99999 PR PBB SHADOW E&M-EST. PATIENT-LVL III: CPT | Mod: PBBFAC,,, | Performed by: OBSTETRICS & GYNECOLOGY

## 2024-05-16 PROCEDURE — 1160F RVW MEDS BY RX/DR IN RCRD: CPT | Mod: CPTII,S$GLB,, | Performed by: OBSTETRICS & GYNECOLOGY

## 2024-05-16 PROCEDURE — 99214 OFFICE O/P EST MOD 30 MIN: CPT | Mod: S$GLB,,, | Performed by: OBSTETRICS & GYNECOLOGY

## 2024-05-16 PROCEDURE — 3074F SYST BP LT 130 MM HG: CPT | Mod: CPTII,S$GLB,, | Performed by: OBSTETRICS & GYNECOLOGY

## 2024-05-16 PROCEDURE — 3079F DIAST BP 80-89 MM HG: CPT | Mod: CPTII,S$GLB,, | Performed by: OBSTETRICS & GYNECOLOGY
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
## 2024-07-22 ENCOUNTER — LAB VISIT (OUTPATIENT)
Dept: LAB | Facility: HOSPITAL | Age: 39
End: 2024-07-22
Payer: COMMERCIAL

## 2024-07-22 ENCOUNTER — OFFICE VISIT (OUTPATIENT)
Dept: INTERNAL MEDICINE | Facility: CLINIC | Age: 39
End: 2024-07-22
Payer: COMMERCIAL

## 2024-07-22 VITALS
SYSTOLIC BLOOD PRESSURE: 116 MMHG | WEIGHT: 191.81 LBS | HEART RATE: 64 BPM | DIASTOLIC BLOOD PRESSURE: 62 MMHG | RESPIRATION RATE: 12 BRPM | HEIGHT: 62 IN | OXYGEN SATURATION: 98 % | BODY MASS INDEX: 35.3 KG/M2

## 2024-07-22 DIAGNOSIS — D50.9 MICROCYTIC ANEMIA: ICD-10-CM

## 2024-07-22 DIAGNOSIS — Z00.00 ENCOUNTER FOR ANNUAL HEALTH EXAMINATION: Primary | ICD-10-CM

## 2024-07-22 DIAGNOSIS — E46 HYPOALBUMINEMIA DUE TO PROTEIN-CALORIE MALNUTRITION: ICD-10-CM

## 2024-07-22 DIAGNOSIS — E66.9 OBESITY (BMI 35.0-39.9 WITHOUT COMORBIDITY): ICD-10-CM

## 2024-07-22 DIAGNOSIS — D75.839 THROMBOCYTOSIS: ICD-10-CM

## 2024-07-22 DIAGNOSIS — Z00.00 ENCOUNTER FOR ANNUAL HEALTH EXAMINATION: ICD-10-CM

## 2024-07-22 DIAGNOSIS — E88.09 HYPOALBUMINEMIA DUE TO PROTEIN-CALORIE MALNUTRITION: ICD-10-CM

## 2024-07-22 PROBLEM — U07.1 COVID-19: Status: RESOLVED | Noted: 2022-01-01 | Resolved: 2024-07-22

## 2024-07-22 LAB
ALBUMIN SERPL BCP-MCNC: 3.4 G/DL (ref 3.5–5.2)
ALP SERPL-CCNC: 66 U/L (ref 55–135)
ALT SERPL W/O P-5'-P-CCNC: 16 U/L (ref 10–44)
ANION GAP SERPL CALC-SCNC: 9 MMOL/L (ref 8–16)
AST SERPL-CCNC: 17 U/L (ref 10–40)
BASOPHILS # BLD AUTO: 0.03 K/UL (ref 0–0.2)
BASOPHILS NFR BLD: 0.6 % (ref 0–1.9)
BILIRUB SERPL-MCNC: 0.7 MG/DL (ref 0.1–1)
BUN SERPL-MCNC: 9 MG/DL (ref 6–20)
CALCIUM SERPL-MCNC: 9.1 MG/DL (ref 8.7–10.5)
CHLORIDE SERPL-SCNC: 106 MMOL/L (ref 95–110)
CHOLEST SERPL-MCNC: 168 MG/DL (ref 120–199)
CHOLEST/HDLC SERPL: 3.8 {RATIO} (ref 2–5)
CO2 SERPL-SCNC: 20 MMOL/L (ref 23–29)
CREAT SERPL-MCNC: 0.7 MG/DL (ref 0.5–1.4)
DIFFERENTIAL METHOD BLD: ABNORMAL
EOSINOPHIL # BLD AUTO: 0.2 K/UL (ref 0–0.5)
EOSINOPHIL NFR BLD: 2.8 % (ref 0–8)
ERYTHROCYTE [DISTWIDTH] IN BLOOD BY AUTOMATED COUNT: 12.9 % (ref 11.5–14.5)
EST. GFR  (NO RACE VARIABLE): >60 ML/MIN/1.73 M^2
ESTIMATED AVG GLUCOSE: 114 MG/DL (ref 68–131)
GLUCOSE SERPL-MCNC: 83 MG/DL (ref 70–110)
HBA1C MFR BLD: 5.6 % (ref 4–5.6)
HCT VFR BLD AUTO: 39.4 % (ref 37–48.5)
HDLC SERPL-MCNC: 44 MG/DL (ref 40–75)
HDLC SERPL: 26.2 % (ref 20–50)
HGB BLD-MCNC: 12.4 G/DL (ref 12–16)
IMM GRANULOCYTES # BLD AUTO: 0.01 K/UL (ref 0–0.04)
IMM GRANULOCYTES NFR BLD AUTO: 0.2 % (ref 0–0.5)
LDLC SERPL CALC-MCNC: 105.4 MG/DL (ref 63–159)
LYMPHOCYTES # BLD AUTO: 1.4 K/UL (ref 1–4.8)
LYMPHOCYTES NFR BLD: 26.9 % (ref 18–48)
MCH RBC QN AUTO: 28.3 PG (ref 27–31)
MCHC RBC AUTO-ENTMCNC: 31.5 G/DL (ref 32–36)
MCV RBC AUTO: 90 FL (ref 82–98)
MONOCYTES # BLD AUTO: 0.4 K/UL (ref 0.3–1)
MONOCYTES NFR BLD: 8.3 % (ref 4–15)
NEUTROPHILS # BLD AUTO: 3.2 K/UL (ref 1.8–7.7)
NEUTROPHILS NFR BLD: 61.2 % (ref 38–73)
NONHDLC SERPL-MCNC: 124 MG/DL
NRBC BLD-RTO: 0 /100 WBC
PLATELET # BLD AUTO: 390 K/UL (ref 150–450)
PMV BLD AUTO: 11.2 FL (ref 9.2–12.9)
POTASSIUM SERPL-SCNC: 4 MMOL/L (ref 3.5–5.1)
PROT SERPL-MCNC: 7.5 G/DL (ref 6–8.4)
RBC # BLD AUTO: 4.38 M/UL (ref 4–5.4)
SODIUM SERPL-SCNC: 135 MMOL/L (ref 136–145)
TRIGL SERPL-MCNC: 93 MG/DL (ref 30–150)
TSH SERPL DL<=0.005 MIU/L-ACNC: 2.36 UIU/ML (ref 0.4–4)
WBC # BLD AUTO: 5.27 K/UL (ref 3.9–12.7)

## 2024-07-22 PROCEDURE — 99385 PREV VISIT NEW AGE 18-39: CPT | Mod: S$GLB,,,

## 2024-07-22 PROCEDURE — 1159F MED LIST DOCD IN RCRD: CPT | Mod: CPTII,S$GLB,,

## 2024-07-22 PROCEDURE — 36415 COLL VENOUS BLD VENIPUNCTURE: CPT | Mod: PO

## 2024-07-22 PROCEDURE — 80061 LIPID PANEL: CPT

## 2024-07-22 PROCEDURE — 80053 COMPREHEN METABOLIC PANEL: CPT

## 2024-07-22 PROCEDURE — 3008F BODY MASS INDEX DOCD: CPT | Mod: CPTII,S$GLB,,

## 2024-07-22 PROCEDURE — 85025 COMPLETE CBC W/AUTO DIFF WBC: CPT

## 2024-07-22 PROCEDURE — 1160F RVW MEDS BY RX/DR IN RCRD: CPT | Mod: CPTII,S$GLB,,

## 2024-07-22 PROCEDURE — 3074F SYST BP LT 130 MM HG: CPT | Mod: CPTII,S$GLB,,

## 2024-07-22 PROCEDURE — 3078F DIAST BP <80 MM HG: CPT | Mod: CPTII,S$GLB,,

## 2024-07-22 PROCEDURE — 83036 HEMOGLOBIN GLYCOSYLATED A1C: CPT

## 2024-07-22 PROCEDURE — 84443 ASSAY THYROID STIM HORMONE: CPT

## 2024-07-22 PROCEDURE — 99999 PR PBB SHADOW E&M-EST. PATIENT-LVL III: CPT | Mod: PBBFAC,,,
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
## 2024-08-11 ENCOUNTER — OFFICE VISIT (OUTPATIENT)
Dept: URGENT CARE | Facility: CLINIC | Age: 39
End: 2024-08-11
Payer: COMMERCIAL

## 2024-08-11 VITALS
SYSTOLIC BLOOD PRESSURE: 131 MMHG | TEMPERATURE: 99 F | WEIGHT: 191 LBS | OXYGEN SATURATION: 97 % | RESPIRATION RATE: 16 BRPM | BODY MASS INDEX: 35.15 KG/M2 | DIASTOLIC BLOOD PRESSURE: 78 MMHG | HEART RATE: 75 BPM | HEIGHT: 62 IN
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
## 2024-08-11 DIAGNOSIS — Z87.09 HISTORY OF ASTHMA: ICD-10-CM

## 2024-08-11 DIAGNOSIS — S16.1XXA STRAIN OF NECK MUSCLE, INITIAL ENCOUNTER: ICD-10-CM

## 2024-08-11 PROCEDURE — 99203 OFFICE O/P NEW LOW 30 MIN: CPT | Mod: ,,, | Performed by: NURSE PRACTITIONER

## 2024-08-11 PROCEDURE — 72040 X-RAY EXAM NECK SPINE 2-3 VW: CPT | Mod: ,,, | Performed by: RADIOLOGY

## 2024-08-11 RX ORDER — METHOCARBAMOL 500 MG/1
500 TABLET, FILM COATED ORAL 3 TIMES DAILY PRN
Qty: 30 TABLET | Refills: 0 | Status: SHIPPED | OUTPATIENT
Start: 2024-08-11 | End: 2024-08-21

## 2024-08-11 RX ORDER — DICLOFENAC SODIUM 75 MG/1
75 TABLET, DELAYED RELEASE ORAL 2 TIMES DAILY
Qty: 28 TABLET | Refills: 0 | Status: SHIPPED | OUTPATIENT
Start: 2024-08-11 | End: 2024-08-25

## 2024-08-11 NOTE — PATIENT INSTRUCTIONS
Diclofenac twice a day as needed for neck pain.  Always take with food.  Do not take any other anti-inflammatories or NSAIDs over-the-counter such as ibuprofen or Naprosyn while taking diclofenac.    Robaxin as prescribed as needed for muscle spasm.  This medication may make you very sedated/drowsy do not drive or operate heavy machinery while taking this medication  Limit activity not to aggravate symptoms    Ice to the affected area for 15-20 minutes every 3-4 hours for the 1st 48 hours then you may alternate ice and heat as desired if needed.    Referral was placed to Orthopedics, someone should be calling you to schedule an appointment for close follow-up
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
## 2024-12-16 ENCOUNTER — PATIENT MESSAGE (OUTPATIENT)
Dept: ADMINISTRATIVE | Facility: HOSPITAL | Age: 39
End: 2024-12-16
Payer: COMMERCIAL

## 2024-12-22 ENCOUNTER — OFFICE VISIT (OUTPATIENT)
Dept: URGENT CARE | Facility: CLINIC | Age: 39
End: 2024-12-22
Payer: COMMERCIAL

## 2024-12-22 VITALS
TEMPERATURE: 101 F | BODY MASS INDEX: 36.47 KG/M2 | SYSTOLIC BLOOD PRESSURE: 142 MMHG | OXYGEN SATURATION: 98 % | HEIGHT: 62 IN | WEIGHT: 198.19 LBS | HEART RATE: 88 BPM | RESPIRATION RATE: 16 BRPM | DIASTOLIC BLOOD PRESSURE: 89 MMHG

## 2024-12-22 DIAGNOSIS — R05.9 COUGH, UNSPECIFIED TYPE: ICD-10-CM

## 2024-12-22 DIAGNOSIS — R50.9 FEVER, UNSPECIFIED FEVER CAUSE: ICD-10-CM

## 2024-12-22 DIAGNOSIS — Z20.828 EXPOSURE TO THE FLU: Primary | ICD-10-CM

## 2024-12-22 LAB
CTP QC/QA: YES
FLUAV AG NPH QL: NEGATIVE
FLUBV AG NPH QL: NEGATIVE

## 2024-12-22 PROCEDURE — 99213 OFFICE O/P EST LOW 20 MIN: CPT | Mod: S$GLB,,, | Performed by: NURSE PRACTITIONER

## 2024-12-22 PROCEDURE — 87804 INFLUENZA ASSAY W/OPTIC: CPT | Mod: QW,,, | Performed by: NURSE PRACTITIONER

## 2024-12-22 RX ORDER — OSELTAMIVIR PHOSPHATE 75 MG/1
75 CAPSULE ORAL 2 TIMES DAILY
Qty: 10 CAPSULE | Refills: 0 | Status: SHIPPED | OUTPATIENT
Start: 2024-12-22 | End: 2024-12-27

## 2024-12-22 RX ORDER — ACETAMINOPHEN 500 MG
1000 TABLET ORAL
Status: COMPLETED | OUTPATIENT
Start: 2024-12-22 | End: 2024-12-22

## 2024-12-22 RX ORDER — BROMPHENIRAMINE MALEATE, PSEUDOEPHEDRINE HYDROCHLORIDE, AND DEXTROMETHORPHAN HYDROBROMIDE 2; 30; 10 MG/5ML; MG/5ML; MG/5ML
5 SYRUP ORAL EVERY 6 HOURS PRN
Qty: 200 ML | Refills: 0 | Status: SHIPPED | OUTPATIENT
Start: 2024-12-22 | End: 2025-01-01

## 2024-12-22 RX ADMIN — Medication 1000 MG: at 03:12

## 2024-12-26 ENCOUNTER — PATIENT OUTREACH (OUTPATIENT)
Dept: ADMINISTRATIVE | Facility: HOSPITAL | Age: 39
End: 2024-12-26
Payer: COMMERCIAL

## 2025-03-20 ENCOUNTER — PATIENT MESSAGE (OUTPATIENT)
Dept: ADMINISTRATIVE | Facility: HOSPITAL | Age: 40
End: 2025-03-20
Payer: COMMERCIAL

## 2025-03-21 ENCOUNTER — PATIENT OUTREACH (OUTPATIENT)
Dept: ADMINISTRATIVE | Facility: HOSPITAL | Age: 40
End: 2025-03-21
Payer: COMMERCIAL

## 2025-03-21 ENCOUNTER — PATIENT MESSAGE (OUTPATIENT)
Dept: ADMINISTRATIVE | Facility: HOSPITAL | Age: 40
End: 2025-03-21
Payer: COMMERCIAL

## 2025-05-14 ENCOUNTER — OFFICE VISIT (OUTPATIENT)
Dept: URGENT CARE | Facility: CLINIC | Age: 40
End: 2025-05-14
Payer: COMMERCIAL

## 2025-05-14 VITALS
HEART RATE: 92 BPM | WEIGHT: 198 LBS | BODY MASS INDEX: 36.44 KG/M2 | OXYGEN SATURATION: 100 % | TEMPERATURE: 102 F | DIASTOLIC BLOOD PRESSURE: 94 MMHG | SYSTOLIC BLOOD PRESSURE: 156 MMHG | HEIGHT: 62 IN | RESPIRATION RATE: 20 BRPM

## 2025-05-14 DIAGNOSIS — R50.9 FEVER, UNSPECIFIED FEVER CAUSE: ICD-10-CM

## 2025-05-14 DIAGNOSIS — R05.9 COUGH, UNSPECIFIED TYPE: Primary | ICD-10-CM

## 2025-05-14 DIAGNOSIS — U07.1 COVID-19 VIRUS INFECTION: ICD-10-CM

## 2025-05-14 LAB
CTP QC/QA: YES
CTP QC/QA: YES
FLUAV AG NPH QL: NEGATIVE
FLUBV AG NPH QL: NEGATIVE
SARS-COV+SARS-COV-2 AG RESP QL IA.RAPID: POSITIVE

## 2025-05-14 RX ORDER — AZELASTINE 1 MG/ML
1 SPRAY, METERED NASAL 2 TIMES DAILY
Qty: 30 ML | Refills: 0 | Status: SHIPPED | OUTPATIENT
Start: 2025-05-14

## 2025-05-14 RX ORDER — CETIRIZINE HYDROCHLORIDE 10 MG/1
10 TABLET ORAL DAILY
Qty: 30 TABLET | Refills: 0 | Status: SHIPPED | OUTPATIENT
Start: 2025-05-14 | End: 2025-06-13

## 2025-05-14 RX ORDER — PROMETHAZINE HYDROCHLORIDE AND DEXTROMETHORPHAN HYDROBROMIDE 6.25; 15 MG/5ML; MG/5ML
5 SYRUP ORAL NIGHTLY PRN
Qty: 118 ML | Refills: 0 | Status: SHIPPED | OUTPATIENT
Start: 2025-05-14

## 2025-05-14 RX ORDER — FLUTICASONE PROPIONATE 50 MCG
1 SPRAY, SUSPENSION (ML) NASAL DAILY
Qty: 15.8 ML | Refills: 0 | Status: SHIPPED | OUTPATIENT
Start: 2025-05-14

## 2025-05-14 RX ORDER — BENZONATATE 100 MG/1
100 CAPSULE ORAL 3 TIMES DAILY PRN
Qty: 30 CAPSULE | Refills: 0 | Status: SHIPPED | OUTPATIENT
Start: 2025-05-14 | End: 2025-05-24

## 2025-05-14 NOTE — LETTER
May 14, 2025      [facility]  [address] [id number]       Patient: Melita Shell   YOB: 1985  Date of Visit: 05/14/2025    To Whom It May Concern:    Joshua Shell  was at [facility] on 05/14/2025. The patient may return to work/school once symptoms are improving and there has been no fever the preceding 24 hours. If you have any questions or concerns, or if I can be of further assistance, please do not hesitate to contact me.    Sincerely,    Marla Cuellar, NP

## 2025-05-14 NOTE — PATIENT INSTRUCTIONS
If mucus if thin with excessive clear, runny nose, you can try mucinex- D which must be purchased from behind the pharmacy counter as take as prescribed on the package. Do not use longer than 4-5 days.  You can also utilize over the counter Afrin or generic equivalent nasal spray to open nasal passages for breathing, however do not use longer than 3-4 days.    The following allergy medications are recommended, not required:  However they do help minimize symptoms now but will reduce lingering symptoms such as plugged/congested ears.   Zyrtec or antihistamine of choice over-the-counter daily until symptoms have resolved  Flonase or nasal steroid of choice over-the-counter daily until symptoms have resolved  Astelin nasal antihistamine as prescribed for faster relief of nasal/sinus inflammation.  If insurance does not cover, you can purchase over the counter as Astepro much cheaper.    The following cough medications have been prescribed to you, however most insurances do not cover them so be certain to price check these items before they ring you up as you may find something off the shelf or already at home that may be sufficient.    Tessalon Perles cough pills that help with cough caused by the postnasal drip, throat irritation.  Best for use during daytime hours   Phenergan cough syrup at night only to suppress cough so that you are able to rest.  You can take this together with Tessalon Perles for added benefit      If you have been told this is a viral illness, you may require re-evaluation if symptoms improve but then worsen soon after or you have note changes in characteristic of mucus/sputum developing  into thick cloudy (not clear), colored change or blood-tinged.    ER for any significant worsening of respiratory status such as difficulty breathing, shortness of breath not relieved by rest, air hunger, excessive fatigue/lethargy, upper airway swelling making it hard to swallow.

## 2025-05-14 NOTE — PROGRESS NOTES
"Subjective:      Patient ID: Melita Shell is a 40 y.o. female.    Vitals:  height is 5' 2" (1.575 m) and weight is 89.8 kg (198 lb). Her oral temperature is 102.2 °F (39 °C) (abnormal). Her blood pressure is 156/94 (abnormal) and her pulse is 92. Her respiration is 20 and oxygen saturation is 100%.     Chief Complaint: Cough    Cough  Episode onset: x 2 days. The cough is Productive of sputum. Associated symptoms include chills, a fever, headaches, myalgias and postnasal drip. Pertinent negatives include no chest pain, rash, sore throat, shortness of breath or wheezing.       Constitution: Positive for appetite change, chills, sweating, fatigue and fever.   HENT:  Positive for congestion and postnasal drip. Negative for sore throat.    Cardiovascular:  Negative for chest pain.   Respiratory:  Positive for cough. Negative for chest tightness, shortness of breath, wheezing and asthma.    Musculoskeletal:  Positive for muscle ache.   Skin:  Negative for rash.   Allergic/Immunologic: Negative for asthma.   Neurological:  Positive for headaches.      Objective:     Physical Exam   Constitutional: She is oriented to person, place, and time. She is cooperative.  Non-toxic appearance. She does not appear ill. No distress. awake  HENT:   Head: Normocephalic and atraumatic.   Ears:   Right Ear: Tympanic membrane, external ear and ear canal normal.   Left Ear: Tympanic membrane, external ear and ear canal normal.   Nose: Congestion present. No rhinorrhea.   Mouth/Throat: Mucous membranes are moist. No oropharyngeal exudate or posterior oropharyngeal erythema.   Eyes: Conjunctivae are normal. Right eye exhibits no discharge. Left eye exhibits no discharge.   Neck: Neck supple. No neck rigidity present.   Cardiovascular: Normal rate, regular rhythm and normal heart sounds.   Pulmonary/Chest: Effort normal and breath sounds normal. No accessory muscle usage. No tachypnea. No respiratory distress. She has no wheezes. She " has no rhonchi. She has no rales. She exhibits no tenderness.   Abdominal: Normal appearance.   Musculoskeletal:      Cervical back: She exhibits no tenderness.   Lymphadenopathy:     She has no cervical adenopathy.   Neurological: no focal deficit. She is alert and oriented to person, place, and time. No sensory deficit.   Skin: Skin is warm, dry, not diaphoretic and no rash. Capillary refill takes 2 to 3 seconds.   Psychiatric: Her behavior is normal. Mood normal.   Nursing note and vitals reviewed.      Assessment:     1. Cough, unspecified type    2. Fever, unspecified fever cause    3. COVID-19 virus infection      Covid positive    Flu a/b negative      Ibuprofen given in clinic for fever  Plan:       Cough, unspecified type  -     POCT Influenza A/B Rapid Antigen  -     SARS Coronavirus 2 Antigen, POCT Manual Read    Fever, unspecified fever cause    COVID-19 virus infection  -     azelastine (ASTELIN) 137 mcg (0.1 %) nasal spray; 1 spray (137 mcg total) by Nasal route 2 (two) times daily.  Dispense: 30 mL; Refill: 0  -     fluticasone propionate (FLONASE) 50 mcg/actuation nasal spray; 1 spray (50 mcg total) by Each Nostril route once daily.  Dispense: 15.8 mL; Refill: 0  -     cetirizine (ZYRTEC) 10 MG tablet; Take 1 tablet (10 mg total) by mouth once daily.  Dispense: 30 tablet; Refill: 0  -     benzonatate (TESSALON) 100 MG capsule; Take 1 capsule (100 mg total) by mouth 3 (three) times daily as needed for Cough.  Dispense: 30 capsule; Refill: 0  -     promethazine-dextromethorphan (PROMETHAZINE-DM) 6.25-15 mg/5 mL Syrp; Take 5 mLs by mouth nightly as needed (cough).  Dispense: 118 mL; Refill: 0

## 2025-05-15 ENCOUNTER — PATIENT MESSAGE (OUTPATIENT)
Dept: INTERNAL MEDICINE | Facility: CLINIC | Age: 40
End: 2025-05-15
Payer: COMMERCIAL

## 2025-05-16 NOTE — TELEPHONE ENCOUNTER
Pt seen at  5/14, dx with covid. Requesting PCP prescribe her Paxlovid     LOV with Claudia Barajas MD , 2/27/2023

## 2025-05-27 ENCOUNTER — OFFICE VISIT (OUTPATIENT)
Dept: INTERNAL MEDICINE | Facility: CLINIC | Age: 40
End: 2025-05-27
Payer: COMMERCIAL

## 2025-05-27 VITALS
TEMPERATURE: 97 F | HEART RATE: 80 BPM | BODY MASS INDEX: 36.39 KG/M2 | DIASTOLIC BLOOD PRESSURE: 72 MMHG | OXYGEN SATURATION: 98 % | SYSTOLIC BLOOD PRESSURE: 114 MMHG | HEIGHT: 62 IN | WEIGHT: 197.75 LBS | RESPIRATION RATE: 14 BRPM

## 2025-05-27 DIAGNOSIS — M54.50 ACUTE MIDLINE LOW BACK PAIN WITHOUT SCIATICA: Primary | ICD-10-CM

## 2025-05-27 PROCEDURE — 3074F SYST BP LT 130 MM HG: CPT | Mod: CPTII,S$GLB,,

## 2025-05-27 PROCEDURE — 99213 OFFICE O/P EST LOW 20 MIN: CPT | Mod: S$GLB,,,

## 2025-05-27 PROCEDURE — 99999 PR PBB SHADOW E&M-EST. PATIENT-LVL IV: CPT | Mod: PBBFAC,,,

## 2025-05-27 PROCEDURE — 1159F MED LIST DOCD IN RCRD: CPT | Mod: CPTII,S$GLB,,

## 2025-05-27 PROCEDURE — 1160F RVW MEDS BY RX/DR IN RCRD: CPT | Mod: CPTII,S$GLB,,

## 2025-05-27 PROCEDURE — 3078F DIAST BP <80 MM HG: CPT | Mod: CPTII,S$GLB,,

## 2025-05-27 PROCEDURE — 3008F BODY MASS INDEX DOCD: CPT | Mod: CPTII,S$GLB,,

## 2025-05-27 RX ORDER — MELOXICAM 15 MG/1
15 TABLET ORAL DAILY
Qty: 30 TABLET | Refills: 0 | Status: SHIPPED | OUTPATIENT
Start: 2025-05-27

## 2025-05-27 RX ORDER — METHOCARBAMOL 750 MG/1
1500 TABLET, FILM COATED ORAL NIGHTLY PRN
Qty: 20 TABLET | Refills: 0 | Status: SHIPPED | OUTPATIENT
Start: 2025-05-27 | End: 2025-06-06

## 2025-05-27 RX ORDER — LIDOCAINE 50 MG/G
1 PATCH TOPICAL DAILY
Qty: 10 PATCH | Refills: 0 | Status: SHIPPED | OUTPATIENT
Start: 2025-05-27

## 2025-05-27 NOTE — PROGRESS NOTES
Subjective:       Patient ID: Melita Shell is a 40 y.o. female.    Chief Complaint: Low back pain,  Muscle Spasm    HPI    History of Present Illness    CHIEF COMPLAINT:  Ms. Shell presents today for lower back pain    HISTORY OF PRESENT ILLNESS:  She reports lower back pain that started 5 days ago, intensifying over the last 3 days. She describes a sharp, central lower back pain with spasms, currently rated as 8/10 and reaching 10/10 at its worst. The pain does not radiate to the legs. Pain is exacerbated by movement, especially when standing up, and is worse when lying down. She reports temporary relief with stretching. She denies relief from ibuprofen, Tylenol, or heating pad. The onset occurred during when standing up from using the restroom at work without any strenuous activity. She denies recent trauma, falls, or injuries. She denies fever, sensory changes, weakness, saddle anesthesia, or bowel/bladder incontinence. No urinary symptoms.     PAST MEDICAL HISTORY:  She has history of mild back pain during pregnancy but otherwise denies any previous back injuries or pain.       ROS:  Constitutional: -fevers, -fatigue  Gastrointestinal: -bowel incontinence, -pain with defecation  Genitourinary: -dysuria, -frequency, -urgency, -urinary incontinence, -hematuria  Musculoskeletal: +back pain, +pain with movement  Neurological: -numbness, -tingling    Objective:      Physical Exam  Vitals reviewed.   Constitutional:       General: She is awake. She is not in acute distress.     Appearance: Normal appearance. She is well-developed. She is not ill-appearing, toxic-appearing or diaphoretic.   HENT:      Head: Normocephalic and atraumatic.   Eyes:      Pupils: Pupils are equal, round, and reactive to light.   Cardiovascular:      Rate and Rhythm: Normal rate.      Pulses: Normal pulses.           Dorsalis pedis pulses are 2+ on the right side and 2+ on the left side.   Musculoskeletal:      Lumbar back: Spasms  present. No signs of trauma, tenderness or bony tenderness. Decreased range of motion. Negative right straight leg raise test and negative left straight leg raise test.        Back:       Right lower leg: No edema.      Left lower leg: No edema.   Skin:     General: Skin is warm and dry.      Capillary Refill: Capillary refill takes less than 2 seconds.   Neurological:      Mental Status: She is alert. Mental status is at baseline.      GCS: GCS eye subscore is 4. GCS verbal subscore is 5. GCS motor subscore is 6.   Psychiatric:         Behavior: Behavior is cooperative.           Physical Exam            Assessment:       1. Acute midline low back pain without sciatica  - LIDOcaine (LIDODERM) 5 %; Place 1 patch onto the skin once daily. Remove & Discard patch within 12 hours or as directed by MD  Dispense: 10 patch; Refill: 0  - meloxicam (MOBIC) 15 MG tablet; Take 1 tablet (15 mg total) by mouth once daily.  Dispense: 30 tablet; Refill: 0  - methocarbamoL (ROBAXIN) 750 MG Tab; Take 2 tablets (1,500 mg total) by mouth nightly as needed (Muscle Spasm). Can cause drowsiness.  Dispense: 20 tablet; Refill: 0      Plan:         Assessment & Plan    PLAN SUMMARY:  - Prescribed Mobic (meloxicam) 15 mg daily with food  - Prescribed lidocaine patches for most painful area, 12 hours on/off rotation  - Prescribed methocarbamol 1500 mg at bedtime for 10 days  - Discontinue all other NSAIDs (ibuprofen, Aleve, Motrin, naproxen) while taking Mobic  - Continue Tylenol as needed for pain management  - Apply ice to lower back for 20 minutes on, 20 minutes off, several times daily  - Resume low back stretches as pain improves, stopping if pain occurs  - All prescriptions sent to [facility]  - Follow-up in 4-6 weeks if symptoms not improving, for consideration of physical therapy    LOW BACK PAIN:  - Ms. Shell reports lower back pain starting 5 days ago, intensifying over the last 3 days, with pain rated as 8/10  currently and 10/10 when intensified.  - The pain is sharp, sometimes feels like a spasm, and is exacerbated by movement.  - Examination revealed no tenderness to palpation in the midline or paraspinal area, with no spasm felt, and negative straight leg raise tests.   - Assessment indicates musculoskeletal pain, likely from a strain due to standing up wrong.  - Prescribed Mobic (meloxicam) 15 mg daily with food (discontinue all other NSAIDs including ibuprofen, Aleve, Motrin, naproxen), lidocaine patches to be applied to the most painful area for 12 hours then rotated, and methocarbamol 1500 mg at bedtime for 10 days.  - All prescriptions sent to [facility] on [address].  - Recommend continuing Tylenol as needed for pain management.  - For inflammation reduction, advised applying ice to lower back for 20 minutes on, 20 minutes off, several times daily.  - Ms. Shell should return to low back stretches as pain improves, stopping if any pain occurs.      FOLLOW-UP:  - Scheduled follow up in 4-6 weeks if symptoms are not improving or resolving, for consideration of physical therapy, or sooner if needed.              This note was generated with the assistance of ambient listening technology. Verbal consent was obtained by the patient and accompanying visitor(s) for the recording of patient appointment to facilitate this note. I attest to having reviewed and edited the generated note for accuracy, though some syntax or spelling errors may persist. Please contact the author of this note for any clarification.

## 2025-06-23 DIAGNOSIS — M54.50 ACUTE MIDLINE LOW BACK PAIN WITHOUT SCIATICA: ICD-10-CM

## 2025-06-24 RX ORDER — MELOXICAM 15 MG/1
15 TABLET ORAL DAILY
Qty: 30 TABLET | Refills: 0 | Status: SHIPPED | OUTPATIENT
Start: 2025-06-24

## 2025-07-23 ENCOUNTER — OFFICE VISIT (OUTPATIENT)
Dept: INTERNAL MEDICINE | Facility: CLINIC | Age: 40
End: 2025-07-23
Payer: COMMERCIAL

## 2025-07-23 VITALS
HEART RATE: 76 BPM | RESPIRATION RATE: 12 BRPM | OXYGEN SATURATION: 98 % | SYSTOLIC BLOOD PRESSURE: 100 MMHG | BODY MASS INDEX: 36.37 KG/M2 | WEIGHT: 197.63 LBS | TEMPERATURE: 99 F | DIASTOLIC BLOOD PRESSURE: 80 MMHG | HEIGHT: 62 IN

## 2025-07-23 DIAGNOSIS — Z00.00 ENCOUNTER FOR ANNUAL HEALTH EXAMINATION: Primary | ICD-10-CM

## 2025-07-23 PROBLEM — K59.00 CONSTIPATION: Status: RESOLVED | Noted: 2021-12-23 | Resolved: 2025-07-23

## 2025-07-23 PROCEDURE — 99999 PR PBB SHADOW E&M-EST. PATIENT-LVL III: CPT | Mod: PBBFAC,,,
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
## 2025-07-25 ENCOUNTER — LAB VISIT (OUTPATIENT)
Dept: LAB | Facility: HOSPITAL | Age: 40
End: 2025-07-25
Payer: COMMERCIAL

## 2025-07-25 DIAGNOSIS — Z00.00 ENCOUNTER FOR ANNUAL HEALTH EXAMINATION: ICD-10-CM

## 2025-07-25 LAB
ABSOLUTE EOSINOPHIL (SMH): 0.17 K/UL
ABSOLUTE MONOCYTE (SMH): 0.44 K/UL (ref 0.3–1)
ABSOLUTE NEUTROPHIL COUNT (SMH): 3.1 K/UL (ref 1.8–7.7)
ALBUMIN SERPL-MCNC: 3.5 G/DL (ref 3.5–5.2)
ALP SERPL-CCNC: 71 UNIT/L (ref 40–150)
ALT SERPL-CCNC: 18 UNIT/L (ref 10–44)
ANION GAP (SMH): 7 MMOL/L (ref 8–16)
AST SERPL-CCNC: 23 UNIT/L (ref 11–45)
BASOPHILS # BLD AUTO: 0.02 K/UL
BASOPHILS NFR BLD AUTO: 0.4 %
BILIRUB SERPL-MCNC: 0.5 MG/DL (ref 0.1–1)
BUN SERPL-MCNC: 9 MG/DL (ref 6–20)
CALCIUM SERPL-MCNC: 8.9 MG/DL (ref 8.7–10.5)
CHLORIDE SERPL-SCNC: 108 MMOL/L (ref 95–110)
CHOLEST SERPL-MCNC: 175 MG/DL (ref 120–199)
CHOLEST/HDLC SERPL: 4.4 {RATIO} (ref 2–5)
CO2 SERPL-SCNC: 25 MMOL/L (ref 23–29)
CREAT SERPL-MCNC: 0.8 MG/DL (ref 0.5–1.4)
EAG (SMH): 114 MG/DL (ref 68–131)
ERYTHROCYTE [DISTWIDTH] IN BLOOD BY AUTOMATED COUNT: 12.5 % (ref 11.5–14.5)
GFR SERPLBLD CREATININE-BSD FMLA CKD-EPI: >60 ML/MIN/1.73/M2
GLUCOSE SERPL-MCNC: 103 MG/DL (ref 70–110)
HBA1C MFR BLD: 5.6 % (ref 4–5.6)
HCT VFR BLD AUTO: 35.7 % (ref 37–48.5)
HDLC SERPL-MCNC: 40 MG/DL (ref 40–75)
HDLC SERPL: 22.9 % (ref 20–50)
HGB BLD-MCNC: 11.8 GM/DL (ref 12–16)
IMM GRANULOCYTES # BLD AUTO: 0.02 K/UL (ref 0–0.04)
IMM GRANULOCYTES NFR BLD AUTO: 0.4 % (ref 0–0.5)
LDLC SERPL CALC-MCNC: 114.8 MG/DL (ref 63–159)
LYMPHOCYTES # BLD AUTO: 1.27 K/UL (ref 1–4.8)
MCH RBC QN AUTO: 28.9 PG (ref 27–31)
MCHC RBC AUTO-ENTMCNC: 33.1 G/DL (ref 32–36)
MCV RBC AUTO: 88 FL (ref 82–98)
NONHDLC SERPL-MCNC: 135 MG/DL
NUCLEATED RBC (/100WBC) (SMH): 0 /100 WBC
PLATELET # BLD AUTO: 349 K/UL (ref 150–450)
PMV BLD AUTO: 9.9 FL (ref 9.2–12.9)
POTASSIUM SERPL-SCNC: 4.3 MMOL/L (ref 3.5–5.1)
PROT SERPL-MCNC: 7.5 GM/DL (ref 6–8.4)
RBC # BLD AUTO: 4.08 M/UL (ref 4–5.4)
RELATIVE EOSINOPHIL (SMH): 3.4 % (ref 0–8)
RELATIVE LYMPHOCYTE (SMH): 25.4 % (ref 18–48)
RELATIVE MONOCYTE (SMH): 8.8 % (ref 4–15)
RELATIVE NEUTROPHIL (SMH): 61.6 % (ref 38–73)
SODIUM SERPL-SCNC: 140 MMOL/L (ref 136–145)
TRIGL SERPL-MCNC: 101 MG/DL (ref 30–150)
TSH SERPL-ACNC: 2.92 UIU/ML (ref 0.4–4)
WBC # BLD AUTO: 5 K/UL (ref 3.9–12.7)

## 2025-07-25 PROCEDURE — 80061 LIPID PANEL: CPT

## 2025-07-25 PROCEDURE — 82247 BILIRUBIN TOTAL: CPT

## 2025-07-25 PROCEDURE — 36415 COLL VENOUS BLD VENIPUNCTURE: CPT

## 2025-07-25 PROCEDURE — 85025 COMPLETE CBC W/AUTO DIFF WBC: CPT

## 2025-07-25 PROCEDURE — 84443 ASSAY THYROID STIM HORMONE: CPT

## 2025-07-25 PROCEDURE — 83036 HEMOGLOBIN GLYCOSYLATED A1C: CPT

## 2025-08-27 ENCOUNTER — PATIENT MESSAGE (OUTPATIENT)
Dept: ADMINISTRATIVE | Facility: HOSPITAL | Age: 40
End: 2025-08-27
Payer: COMMERCIAL

## 2025-08-27 ENCOUNTER — PATIENT OUTREACH (OUTPATIENT)
Dept: ADMINISTRATIVE | Facility: HOSPITAL | Age: 40
End: 2025-08-27
Payer: COMMERCIAL

## (undated) DEVICE — GLOVE BIOGEL SKINSENSE PI 6.0

## (undated) DEVICE — UNDERGLOVE BIOGEL PI SZ 6.5 LF

## (undated) DEVICE — DRESSING TRANS 4X4 3/4

## (undated) DEVICE — DRAPE CORETEMP FLD WRM 56X62IN

## (undated) DEVICE — ELECTRODE REM PLYHSV RETURN 9

## (undated) DEVICE — PAD K-THERMIA 24IN X 60IN

## (undated) DEVICE — SOL PVP-I SCRUB 7.5% 4OZ

## (undated) DEVICE — SEE MEDLINE ITEM 152622

## (undated) DEVICE — NDL 18GA X1 1/2 REG BEVEL

## (undated) DEVICE — CLOSURE SKIN STERI STRIP 1/2X4

## (undated) DEVICE — SUT 1 36IN PDS II VIO MONO

## (undated) DEVICE — NDL SPINAL SPINOCAN 22GX3.5

## (undated) DEVICE — SYR 10CC LUER LOCK

## (undated) DEVICE — PENCIL ELECTROSURG HOLST W/BLD

## (undated) DEVICE — SUT ETHILON 2 BLK MONO TP-1

## (undated) DEVICE — PACK BASIC

## (undated) DEVICE — TOWEL OR DISP STRL BLUE 4/PK

## (undated) DEVICE — GLOVE BIOGEL SKINSENSE PI 6.5

## (undated) DEVICE — SET DECANTER MEDICHOICE

## (undated) DEVICE — UNDERGLOVES BIOGEL PI SIZE 7.5

## (undated) DEVICE — DRAPE LAP TIBURON 77X122IN

## (undated) DEVICE — SUT 2/0 27IN PDS II VIO MO

## (undated) DEVICE — Device

## (undated) DEVICE — SEALER LIGASURE CRV 18.8CM

## (undated) DEVICE — GAUZE SPONGE 4'X4 12 PLY

## (undated) DEVICE — SUT 2-0 12-18IN SILK

## (undated) DEVICE — SUT ETHIBOND EXCEL 0 CT2 30

## (undated) DEVICE — INSTRUMENT POOLE ST

## (undated) DEVICE — SOL 9P NACL IRR PIC IL

## (undated) DEVICE — TRAY DRY SKIN SCRUB PREP

## (undated) DEVICE — SEALER LIGASURE IMPACT 18CM

## (undated) DEVICE — NDL HYPO REG 25G X 1 1/2

## (undated) DEVICE — DRAPE ABDOMINAL TIBURON 14X11

## (undated) DEVICE — DRESSING AQUACEL AG 3.5X10IN

## (undated) DEVICE — TIP RUMI GREEN DISPOSABLE6.7MM

## (undated) DEVICE — DRESSING WND ADH PRIMAPORE 10

## (undated) DEVICE — PAD ABD 8X10 STERILE

## (undated) DEVICE — APPLICATOR CHLORAPREP ORN 26ML

## (undated) DEVICE — GLOVE BIOGEL 7.0

## (undated) DEVICE — SYR 50ML CATH TIP

## (undated) DEVICE — SUT MONOCRYL 5-0 P-3 UND 18

## (undated) DEVICE — WARMER DRAPE STERILE LF

## (undated) DEVICE — GOWN SMART IMP BREATHABLE XXLG

## (undated) DEVICE — SUT MCRYL PLUS 4-0 PS2 27IN

## (undated) DEVICE — KIT URINE METER 350ML STAT LOC

## (undated) DEVICE — SEE MEDLINE ITEM 153151

## (undated) DEVICE — SUT PDS II 0 CT VIL MONO 36

## (undated) DEVICE — HANDLE EZ 3641

## (undated) DEVICE — GAUZE SPONGE 4X4 12PLY

## (undated) DEVICE — GLOVE SURG ULTRA TOUCH 7.5

## (undated) DEVICE — SEE MEDLINE ITEM 146296

## (undated) DEVICE — CUTTER PROXIMATE BLUE 75MM

## (undated) DEVICE — SEE MEDLINE ITEM 157116

## (undated) DEVICE — SUT 1 48IN PDS II VIO MONO

## (undated) DEVICE — ELECTRODE BLD EXT 6.50 ST DISP

## (undated) DEVICE — SUT CTD VICRYL BR CR/SH VIL

## (undated) DEVICE — DRESSING TRANS 2X2 TEGADERM

## (undated) DEVICE — STRAP OR TABLE 5IN X 72IN

## (undated) DEVICE — SEE MEDLINE ITEM 157110

## (undated) DEVICE — SUT 5-0 MONO P-3 18IN

## (undated) DEVICE — SLEEVE SCD EXPRESS KNEE MEDIUM

## (undated) DEVICE — SUT VICRYL 3-0 27 SH

## (undated) DEVICE — RELOAD PROXIMATE CUT BLUE 75MM

## (undated) DEVICE — SUT VICRYL 2-0 36 CT-1

## (undated) DEVICE — SUT PDS II 2-0 CT1

## (undated) DEVICE — UNDERGLOVES BIOGEL PI SIZE 8

## (undated) DEVICE — SUT VICRYL 4-0 27 RB-1

## (undated) DEVICE — SOL WATER STRL IRR 1000ML

## (undated) DEVICE — PACK CUSTOM UNIV BASIN SLI

## (undated) DEVICE — SCRUB 10% POVIDONE IODINE 4OZ

## (undated) DEVICE — CHLORAPREP W TINT 26ML APPL

## (undated) DEVICE — JELLY KY LUBRICATING 5G PACKET

## (undated) DEVICE — CATH URETHRAL 16FR RED

## (undated) DEVICE — SUT MONOCRYL 2-0 VIOL 27IN